# Patient Record
Sex: MALE | Race: BLACK OR AFRICAN AMERICAN | NOT HISPANIC OR LATINO | Employment: OTHER | URBAN - METROPOLITAN AREA
[De-identification: names, ages, dates, MRNs, and addresses within clinical notes are randomized per-mention and may not be internally consistent; named-entity substitution may affect disease eponyms.]

---

## 2019-07-17 ENCOUNTER — HISTORICAL (OUTPATIENT)
Dept: ADMINISTRATIVE | Facility: HOSPITAL | Age: 39
End: 2019-07-17

## 2019-07-17 LAB
BUN SERPL-MCNC: 70 MG/DL (ref 7–18)
CALCIUM SERPL-MCNC: 8.7 MG/DL (ref 8.5–10.1)
CHLORIDE SERPL-SCNC: 111 MMOL/L (ref 98–107)
CO2 SERPL-SCNC: 23 MMOL/L (ref 21–32)
CREAT SERPL-MCNC: 6.56 MG/DL (ref 0.7–1.3)
CREAT/UREA NIT SERPL: 10.7
GLUCOSE SERPL-MCNC: 215 MG/DL (ref 74–106)
POTASSIUM SERPL-SCNC: 5.4 MMOL/L (ref 3.5–5.1)
SODIUM SERPL-SCNC: 141 MMOL/L (ref 136–145)

## 2020-01-16 DIAGNOSIS — Z76.82 ORGAN TRANSPLANT CANDIDATE: Primary | ICD-10-CM

## 2020-01-20 ENCOUNTER — TELEPHONE (OUTPATIENT)
Dept: TRANSPLANT | Facility: CLINIC | Age: 40
End: 2020-01-20

## 2020-06-01 ENCOUNTER — TELEPHONE (OUTPATIENT)
Dept: TRANSPLANT | Facility: CLINIC | Age: 40
End: 2020-06-01

## 2020-06-02 ENCOUNTER — DOCUMENTATION ONLY (OUTPATIENT)
Dept: TRANSPLANT | Facility: CLINIC | Age: 40
End: 2020-06-02

## 2020-06-16 NOTE — PROGRESS NOTES
Spoke to patient to complete history for upcoming appointment. Patient stated that his sister will come with him to his appointment patient also aware that he have to bring a small breakfast to eat after blood is drawn. Patient do not need a

## 2020-06-17 ENCOUNTER — OFFICE VISIT (OUTPATIENT)
Dept: TRANSPLANT | Facility: CLINIC | Age: 40
End: 2020-06-17
Payer: MEDICARE

## 2020-06-17 ENCOUNTER — HOSPITAL ENCOUNTER (OUTPATIENT)
Dept: RADIOLOGY | Facility: HOSPITAL | Age: 40
Discharge: HOME | End: 2020-06-17
Attending: NURSE PRACTITIONER
Payer: MEDICARE

## 2020-06-17 ENCOUNTER — TELEPHONE (OUTPATIENT)
Dept: TRANSPLANT | Facility: CLINIC | Age: 40
End: 2020-06-17

## 2020-06-17 VITALS
OXYGEN SATURATION: 100 % | WEIGHT: 186.31 LBS | SYSTOLIC BLOOD PRESSURE: 103 MMHG | RESPIRATION RATE: 16 BRPM | HEIGHT: 69 IN | TEMPERATURE: 98 F | DIASTOLIC BLOOD PRESSURE: 64 MMHG | BODY MASS INDEX: 27.6 KG/M2 | HEART RATE: 76 BPM

## 2020-06-17 DIAGNOSIS — Z76.82 ORGAN TRANSPLANT CANDIDATE: ICD-10-CM

## 2020-06-17 DIAGNOSIS — N18.5 CKD (CHRONIC KIDNEY DISEASE) STAGE 5, GFR LESS THAN 15 ML/MIN: ICD-10-CM

## 2020-06-17 DIAGNOSIS — H54.3 BLINDNESS OF BOTH EYES DUE TO DIABETES MELLITUS: ICD-10-CM

## 2020-06-17 DIAGNOSIS — Z01.818 PRE-TRANSPLANT EVALUATION FOR KIDNEY TRANSPLANT: Primary | ICD-10-CM

## 2020-06-17 DIAGNOSIS — Z76.82 KIDNEY TRANSPLANT CANDIDATE: Primary | ICD-10-CM

## 2020-06-17 DIAGNOSIS — E10.3499: ICD-10-CM

## 2020-06-17 DIAGNOSIS — I10 HYPERTENSION, UNSPECIFIED TYPE: ICD-10-CM

## 2020-06-17 DIAGNOSIS — E11.39 BLINDNESS OF BOTH EYES DUE TO DIABETES MELLITUS: ICD-10-CM

## 2020-06-17 DIAGNOSIS — N18.6 ESRD ON DIALYSIS: ICD-10-CM

## 2020-06-17 DIAGNOSIS — Z99.2 ESRD ON DIALYSIS: ICD-10-CM

## 2020-06-17 PROCEDURE — 71046 X-RAY EXAM CHEST 2 VIEWS: CPT | Mod: 26,TXP,, | Performed by: RADIOLOGY

## 2020-06-17 PROCEDURE — 72170 X-RAY EXAM OF PELVIS: CPT | Mod: 26,TXP,, | Performed by: RADIOLOGY

## 2020-06-17 PROCEDURE — 72170 X-RAY EXAM OF PELVIS: CPT | Mod: TC,TXP

## 2020-06-17 PROCEDURE — 99205 PR OFFICE/OUTPT VISIT, NEW, LEVL V, 60-74 MIN: ICD-10-PCS | Mod: S$PBB,TXP,, | Performed by: NURSE PRACTITIONER

## 2020-06-17 PROCEDURE — 76700 US EXAM ABDOM COMPLETE: CPT | Mod: 26,TXP,, | Performed by: RADIOLOGY

## 2020-06-17 PROCEDURE — 76700 US EXAM ABDOM COMPLETE: CPT | Mod: TC,TXP

## 2020-06-17 PROCEDURE — 93978 VASCULAR STUDY: CPT | Mod: 26,TXP,, | Performed by: RADIOLOGY

## 2020-06-17 PROCEDURE — 71046 X-RAY EXAM CHEST 2 VIEWS: CPT | Mod: TC,TXP

## 2020-06-17 PROCEDURE — 99244 OFF/OP CNSLTJ NEW/EST MOD 40: CPT | Mod: S$PBB,TXP,, | Performed by: SURGERY

## 2020-06-17 PROCEDURE — 99205 OFFICE O/P NEW HI 60 MIN: CPT | Mod: S$PBB,TXP,, | Performed by: NURSE PRACTITIONER

## 2020-06-17 PROCEDURE — 76700 US ABDOMEN COMPLETE: ICD-10-PCS | Mod: 26,TXP,, | Performed by: RADIOLOGY

## 2020-06-17 PROCEDURE — 72170 XR PELVIS ROUTINE AP: ICD-10-PCS | Mod: 26,TXP,, | Performed by: RADIOLOGY

## 2020-06-17 PROCEDURE — 99244 PR OFFICE CONSULTATION,LEVEL IV: ICD-10-PCS | Mod: S$PBB,TXP,, | Performed by: SURGERY

## 2020-06-17 PROCEDURE — 93978 VASCULAR STUDY: CPT | Mod: TC,TXP

## 2020-06-17 PROCEDURE — 71046 XR CHEST PA AND LATERAL: ICD-10-PCS | Mod: 26,TXP,, | Performed by: RADIOLOGY

## 2020-06-17 PROCEDURE — 99999 PR PBB SHADOW E&M-EST. PATIENT-LVL V: CPT | Mod: PBBFAC,TXP,, | Performed by: NURSE PRACTITIONER

## 2020-06-17 PROCEDURE — 99215 OFFICE O/P EST HI 40 MIN: CPT | Mod: PBBFAC,25,TXP | Performed by: NURSE PRACTITIONER

## 2020-06-17 PROCEDURE — 99999 PR PBB SHADOW E&M-EST. PATIENT-LVL V: ICD-10-PCS | Mod: PBBFAC,TXP,, | Performed by: NURSE PRACTITIONER

## 2020-06-17 PROCEDURE — 93978 US DOPP ILIACS BILATERAL: ICD-10-PCS | Mod: 26,TXP,, | Performed by: RADIOLOGY

## 2020-06-17 RX ORDER — FENOFIBRATE 160 MG/1
160 TABLET ORAL DAILY
Status: ON HOLD | COMMUNITY
End: 2020-11-06 | Stop reason: HOSPADM

## 2020-06-17 RX ORDER — FUROSEMIDE 80 MG/1
80 TABLET ORAL DAILY
Status: ON HOLD | COMMUNITY
End: 2020-11-06 | Stop reason: HOSPADM

## 2020-06-17 RX ORDER — ATENOLOL 50 MG/1
50 TABLET ORAL DAILY
Status: ON HOLD | COMMUNITY
End: 2020-11-06 | Stop reason: HOSPADM

## 2020-06-17 RX ORDER — CALCIUM ACETATE 667 MG/1
1334 CAPSULE ORAL
Status: ON HOLD | COMMUNITY
End: 2020-11-06 | Stop reason: HOSPADM

## 2020-06-17 RX ORDER — HALOPERIDOL 1 MG/1
1 TABLET ORAL 2 TIMES DAILY
Status: ON HOLD | COMMUNITY
End: 2020-11-06 | Stop reason: HOSPADM

## 2020-06-17 RX ORDER — PRAVASTATIN SODIUM 40 MG/1
40 TABLET ORAL DAILY
COMMUNITY
End: 2020-11-20 | Stop reason: SDUPTHER

## 2020-06-17 RX ORDER — INSULIN GLARGINE 100 [IU]/ML
15 INJECTION, SOLUTION SUBCUTANEOUS NIGHTLY
Status: ON HOLD | COMMUNITY
End: 2020-11-06 | Stop reason: HOSPADM

## 2020-06-17 RX ORDER — BUSPIRONE HYDROCHLORIDE 5 MG/1
5 TABLET ORAL 2 TIMES DAILY
Status: ON HOLD | COMMUNITY
End: 2020-11-06 | Stop reason: HOSPADM

## 2020-06-17 RX ORDER — HYDRALAZINE HYDROCHLORIDE 50 MG/1
50 TABLET, FILM COATED ORAL 3 TIMES DAILY
Status: ON HOLD | COMMUNITY
End: 2020-11-09 | Stop reason: HOSPADM

## 2020-06-17 NOTE — LETTER
June 18, 2020        ARUNA Lockett From Jr.  [address]  [place] [address]  Phone: [phone]  Fax: [phone]             [facility]  [address]  [facility] [address]  Phone: [phone]   Patient: Hernandez Rosales   MR Number: [id number]   YOB: 1980   Date of Visit: 6/17/2020       Dear Dr. ARUNA Lockett From .    Thank you for referring Hernandez Rosales to me for evaluation. Attached you will find relevant portions of my assessment and plan of care.    If you have questions, please do not hesitate to call me. I look forward to following Hernandez Rosales along with you.    Sincerely,    Radha Mancia, NP    Enclosure    If you would like to receive this communication electronically, please contact [email or web address] or [phone] to request [employer] access.    [employer] is a tool which provides read-only access to select patient information with whom you have a relationship. Its easy to use and provides real time access to review your patients record including encounter summaries, notes, results, and demographic information.    If you feel you have received this communication in error or would no longer like to receive these types of communications, please e-mail [email or web address]

## 2020-06-17 NOTE — PROGRESS NOTES
PHARM.D. PRE-TRANSPLANT NOTE:    This patient's medication therapy was evaluated as part of his pre-transplant evaluation.      The following general pharmacologic concerns were noted: blind, therefore caregiver involvement will be vital in post-transplant education; would resume haldol, buspar and neurontin in the jayesh-op period    The following concerns for post-operative pain management were noted: none    The following pharmacologic concerns related to HCV therapy were noted: nonr      This patient's medication profile was reviewed for considerations for DAA Hepatitis C therapy:    [x]  No current inducers of CYP 3A4 or PGP  [x]  No amiodarone on this patient's EMR profile in the last 24 months  [x]  No past or current atrial fibrillation on this patient's EMR profile       Current Outpatient Medications   Medication Sig Dispense Refill    amlodipine (NORVASC) 10 MG tablet Take 10 mg by mouth every evening.       atenoloL (TENORMIN) 50 MG tablet Take 50 mg by mouth once daily.      busPIRone (BUSPAR) 5 MG Tab Take 5 mg by mouth 2 (two) times daily.      calcium acetate,phosphat bind, (PHOSLO) 667 mg capsule Take 1,334 mg by mouth 3 (three) times daily with meals.      fenofibrate 160 MG Tab Take 160 mg by mouth once daily.      ferrous sulfate 325 (65 FE) MG EC tablet Take 325 mg by mouth once daily.      furosemide (LASIX) 80 MG tablet Take 80 mg by mouth once daily.      gabapentin (NEURONTIN) 300 MG capsule Take 300 mg by mouth 2 (two) times daily.       GLIPIZIDE ORAL Take 2.5 mg by mouth 2 (two) times daily with meals.      haloperidoL (HALDOL) 1 MG tablet Take 1 mg by mouth 2 (two) times daily.      hydrALAZINE (APRESOLINE) 50 MG tablet Take 50 mg by mouth 3 (three) times daily.      insulin (LANTUS SOLOSTAR U-100 INSULIN) glargine 100 units/mL (3mL) SubQ pen Inject 15 Units into the skin every evening.      pravastatin (PRAVACHOL) 40 MG tablet Take 40 mg by mouth once daily.       No current  facility-administered medications for this visit.          Currently Mr Rasheed's caregiver is responsible for preparing / administering this patient's medications on a daily basis.  I am available for consultation and can be contacted, as needed by the other members of the Kidney Transplant team.

## 2020-06-17 NOTE — PROGRESS NOTES
Transplant Nephrology  Kidney/Pancreas Transplant Recipient Evaluation    Referring Physician: ARUNA Krishnan Jr.  Current Nephrologist: ARUNA Krishnan Jr.    Subjective:   CC:  Initial evaluation of kidney/Pancreas transplant candidacy.    HPI:  Mr. Rosales is a 40 y.o. year old Black or  male who has presented to be evaluated as a potential kidney transplant recipient.  He has ESRD secondary to diabetic nephropathy.  Patient is currently on hemodialysis started on 3/16/2020. Patient is dialyzing on TTS schedule.  Patient reports that he is tolerating dialysis well.. He has a LUE AV graft for dialysis access.     Previous Transplant: no    Past Medical and Surgical History: Mr. Rosales  has a past medical history of Anxiety, Cataract, Diabetes mellitus, Diabetic retinopathy, Disorder of kidney and ureter, Glaucoma, High cholesterol, Hypertension, and Retinal detachment.  He has a past surgical history that includes Leg amputation (Left); Toe amputation (Right); Retinal detachment surgery; and Eye surgery.    Past Social and Family History: Mr. Rosales reports that he has quit smoking. He has a 2.50 pack-year smoking history. He has never used smokeless tobacco. He reports current alcohol use. He reports that he does not use drugs. His family history includes Coronary artery disease in his mother; Diabetes in his brother and mother; Glaucoma in his mother; No Known Problems in his father and sister.    Past Medical History:   Diagnosis Date    Anxiety     Cataract     Diabetes mellitus     Diabetic retinopathy     Disorder of kidney and ureter     Glaucoma     High cholesterol     Hypertension     Retinal detachment      ESRD:  Renal function slowly declining for last 2 years but unsure exactly when renal function began to decline  Dialysis was started on March 16 2020  Site LUE graft  Reports needing fluids at the end of dialysis session for his blood pressure but not sure who low it  "goes  Unsure of dry weight  Urinates 2-3x a day    DM:  DX at 14yo  HA1C 7.2  Insulin-- lantus at night no longer taking short acting, reports diabetic coma in April 2020 and novolog was removed  On glipizide  Gabapentin, right foot neuropathy and phantom limb pain on left  Bilateral retinal detachment 6 years ago    HTN:  reports having BP controled but unsure of reading numbers  On hydralazine and Norvasc    Functional Status:  Walking stick for blindiness  L BKA 2013 with prosthetic   Denies much activity at all due to lack of independence with blindness    L BKA 2013 with prosthetic  Right great toe 2016 and 5th digit  2008  Reports all amputations are secondary to infections  Does not recall having being told that he has vascular disease or stents; denies needing to be on AC for his legs    PTH      Review of Systems   Constitutional: Positive for fatigue. Negative for appetite change, chills and fever.   HENT: Negative for trouble swallowing.    Eyes: Positive for visual disturbance (blind bilaterally).   Respiratory: Negative for cough, chest tightness, shortness of breath and wheezing.    Cardiovascular: Positive for palpitations (occasional ). Negative for chest pain and leg swelling.   Gastrointestinal: Positive for diarrhea (reports weekly). Negative for abdominal pain, constipation and nausea.   Genitourinary: Negative for difficulty urinating (urinates 2-3 times a day), frequency and urgency.   Musculoskeletal: Positive for back pain. Negative for arthralgias and myalgias.   Skin: Negative for rash.   Neurological: Negative for dizziness, weakness, light-headedness and headaches.   Psychiatric/Behavioral: Positive for sleep disturbance (depression). Negative for suicidal ideas.       Objective:   Blood pressure 103/64, pulse 76, temperature 97.5 °F (36.4 °C), temperature source Oral, resp. rate 16, height 5' 8.5" (1.74 m), weight 84.5 kg (186 lb 4.6 oz), SpO2 100 %.body mass index is 27.91 " kg/m².    Physical Exam  Constitutional:       General: He is not in acute distress.     Appearance: He is well-developed. He is not diaphoretic.   Neck:      Musculoskeletal: Normal range of motion and neck supple.   Cardiovascular:      Rate and Rhythm: Normal rate and regular rhythm.      Heart sounds: Normal heart sounds. No murmur. No friction rub. No gallop.    Pulmonary:      Effort: Pulmonary effort is normal. No respiratory distress.      Breath sounds: Normal breath sounds. No wheezing or rales.   Abdominal:      General: Bowel sounds are normal. There is no distension.      Palpations: Abdomen is soft.      Tenderness: There is no abdominal tenderness.   Genitourinary:     Comments: LUE graft  Musculoskeletal: Normal range of motion.         General: No swelling or tenderness.      Right lower leg: No edema.      Left lower leg: No edema.      Comments: L BKA--prosthetic present, amputation to right great toe and 5th digit   Lymphadenopathy:      Cervical: No cervical adenopathy.   Skin:     General: Skin is warm and dry.      Findings: No rash.      Nails: There is no clubbing.               Comments: Multiple tattoos to BUE   Neurological:      Mental Status: He is alert and oriented to person, place, and time.   Psychiatric:         Behavior: Behavior normal.         Labs:  Lab Results   Component Value Date    WBC 6.75 06/17/2020    HGB 11.3 (L) 06/17/2020    HCT 38.3 (L) 06/17/2020     06/17/2020    K 3.9 06/17/2020     06/17/2020    CO2 32 (H) 06/17/2020    BUN 35 (H) 06/17/2020    CREATININE 7.3 (H) 06/17/2020    EGFRNONAA 8.6 (A) 06/17/2020    CALCIUM 8.2 (L) 06/17/2020    PHOS 5.3 (H) 06/17/2020    ALBUMIN 3.6 06/17/2020    AST 27 06/17/2020    ALT 40 06/17/2020    .0 (H) 06/17/2020       No results found for: PREALBUMIN, BILIRUBINUA, GGT, AMYLASE, LIPASE, PROTEINUA, NITRITE, RBCUA, WBCUA    No results found for: HLAABCTYPE    Labs were reviewed with the patient.    Assessment:      1. Pre-transplant evaluation for kidney transplant    2. ESRD on dialysis    3. CKD (chronic kidney disease) stage 5, GFR less than 15 ml/min    4. Type 1 diabetes mellitus with severe nonproliferative retinopathy, macular edema presence unspecified, unspecified laterality    5. Hypertension, unspecified type    6. Blindness of both eyes due to diabetes mellitus        Plan:     Transplant Candidacy:   Based on available information, Mr. Rosales is a suitable kidney and pancreas transplant candidate.   Meets center eligibility for accepting HCV+ donor offer - yes.  Patient educated on HCV+ donors. Hernandez is willing to accept HCV+ donor offer - yes   Patient is a candidate for KDPI > 85 kidney donor offer - yes.  Final determination of transplant candidacy will be made once workup is complete and reviewed by the selection committee.    Radha Mancai NP       Plan:  · Psychiatry eval for depression and anxiety; on multiple medications  · , send results to nephrologist for management  · Cardiology clearance needed; already had echo and lexiscan in red chart    · Obtain old records on left BKA        UNOS Patient Status  Functional Status: 60% - Requires occasional assistance but is able to care for needs  Physical Capacity: No Limitations

## 2020-06-17 NOTE — PROGRESS NOTES
Transplant Surgery  Kidney/Pancreas Transplant Recipient Evaluation    Referring Physician: ARUNA Krishnan Jr.  Current Nephrologist: ARUNA Krishnan .    Subjective:     Reason for Visit: evaluate transplant candidacy    History of Present Illness: Hernandez Rosales is a 40 y.o. year old male undergoing transplant evaluation.    Dialysis History: Hernandez is on hemodialysis.      Transplant History: N/A    Etiology of Renal Disease: Diabetes Mellitus - Type I (based on medical records from referral).    Review of Systems   Constitutional: Negative for activity change, appetite change, chills and fever.   Respiratory: Negative for cough and shortness of breath.    Cardiovascular: Negative for chest pain and leg swelling.   Gastrointestinal: Negative for abdominal distention, constipation, diarrhea, nausea and vomiting.   Genitourinary: Negative for difficulty urinating and dysuria.   Skin: Negative for color change and rash.   Neurological: Negative for dizziness and light-headedness.   All other systems reviewed and are negative.      Objective:     Physical Exam:  Constitutional:   Vitals reviewed: yes   Well-nourished and well-groomed: yes  Eyes:   Sclerae icteric: no   Extraocular movements intact: yes  GI:    Bowel sounds normal: yes   Tenderness: no    If yes, quadrant/location: not applicable   Palpable masses: no    If yes, quadrant/location: not applicable   Hepatosplenomegaly: no   Ascites: no   Hernia: no    If yes, type/location: not applicable   Surgical scars: no    If yes, type/location: not applicable  Resp:   Effort normal: yes   Breath sounds normal: yes    CV:   Regular rate and rhythm: yes   Heart sounds normal: yes   Femoral pulses normal: yes   Extremities edematous: no  Skin:   Rashes or lesions present: no    If yes, describe:not applicable   Jaundice:: no    Musculoskeletal:   Gait normal: yes   Strength normal: yes  Psych:   Oriented to person, place, and time: yes   Affect and mood normal:  yes    Additional comments: not applicable    Counseling: We provided Hernandez Rosales with a group education session today.  We discussed kidney transplantation at length with him, including risks, potential complications, and alternatives in the management of his renal failure.  The discussion included complications related to anesthesia, bleeding, infection, primary nonfunction, and ATN.  I discussed the typical postoperative course, length of hospitalization, the need for long-term immunosuppression, and the need for long-term routine follow-up.  I discussed living-donor and -donor transplantation and the relative advantages and disadvantages of each.  I also discussed average waiting times for both living donation and  donation.  I discussed national and center-specific survival rates.  I also mentioned the potential benefit of multicenter listing to candidates listed with centers within more than one organ procurement organization.  All questions were answered.    Final determination of transplant candidacy will be made once evaluation is complete and reviewed by the Kidney & Kidney/Pancreas Selection Committee.         Transplant Surgery - Candidacy   Assessment/Plan:   Hernandez Rosales has end stage renal disease (ESRD) on dialysis. I see no surgical contraindication to placing a kidney transplant. Based on available information, Hernandez Rosales is a suitable kidney/pancreas transplant candidate.     Dheeraj Juarez MD

## 2020-06-17 NOTE — TELEPHONE ENCOUNTER
Reviewed pt transplant labs.  Notified dialysis unit dietitian of the following abnormal labs via fax and requested their most recent nutrition note on this pt.  Once this note is received it will be scanned into pt's chart.    HbA1c 7.2  Phos 5.3

## 2020-06-17 NOTE — PROGRESS NOTES
INITIAL PATIENT EDUCATION NOTE    Mr. Hernandez Rosales was seen in pre-kidney transplant clinic for evaluation for kidney, kidney/pancreas or pancreas only transplant.  The patient attended a video conference session that discussed/reviewed the following aspects of transplantation: evaluation and selection committee process, [facility] waitlist management/multiple listings, types of organs offered (KDPI < 85%, KDPI > 85%, PHS increased risk, DCD, HCV+, HIV+ for HIV+ recipients and enbloc/dual), financial aspects, surgical procedures, dietary instruction pre- and post-transplant, health maintenance pre- and post-transplant, post-transplant hospitalization and outpatient follow-up, potential to participate in a research protocol, and medication management and side effects.  A question and answer session was provided after the presentation.    The patient was seen by all members of the multi-disciplinary team to include: Nephrologist/PA, Surgeon, , Transplant Coordinator, , Pharmacist and Dietician (if applicable).    The patient reviewed and signed all consents for evaluation which were witnessed and sent to scanning into the [facility] chart.    The patient was given an education book and plan for further evaluation based on his individual assessment.      The patient was encouraged to call with any questions or concerns.

## 2020-06-17 NOTE — PROGRESS NOTES
Transplant Recipient Adult Psychosocial Assessment    Hernandez Rosales  [address] [facility] [address]  Telephone Information:   Mobile [phone]   Home  [phone] (home)  Work  There is no work phone number on file.  E-mail  [email or web address]    Sex: male  YOB: 1980  Age: 40 y.o.    Encounter Date: 2020  [place] Citizen: yes  Primary Language: English   Needed: no   PATIENT IS BILATERALLY BLIND PATIENT IS BILATERALLY BLIND. IT CAN TAKE TIME FOR HIM TO ANSWER THE PHONE. PT AND FAMILY WANT MEDICAL PROVIDERS TO CALL HIS AUNT Malena FIRST AS SHE CAN HELP WITH ANY INFORMATION OR CAN FIND PATIENT EASILY IF HE NEEDS TO CALL PROVIDER BACK.    Emergency Contact:  Malena Rosales, 70 yo aunt, Vanessa CHERY, lives down the street from patient, does drive/own car, retired. [phone]  Dallas Oh, 44 yo sister, Onel GALVAN, does drive/own car, works full time as  for [employer]    Family/Social Support:   Number of dependents/: patient denies  Marital history:  since   Other family dynamics: Pt reports is bilaterally blind due to diabetes and is disabled since . Pt reports mother is . Pt's father is estranged and not available for any support. Pt reports lives alone. Pt reports aunt Malena and cousin Jason live down the street and are highly involved in patient's care, including all transportation for dialysis. Pt's sister Dallas lives away but patient reports is highly involved in patient's life; Dallas came for organ transplant evaluation today. Pt reports aunt Malena is primary contact for all appointments. Pt reports sister Dallas will be primary transplant caregiver with aunt Malena and cousin Jason will be back up transplant caregivers.    Household Composition:  Pt is totally blind and lives alone. Pt reports aunt and cousin live very nearby.    Do you and your caregivers have access to reliable transportation? yes pt reports family  provides all transportation, including for dialysis.  PRIMARY CAREGIVER: Dallas Oh, sister, will be primary caregiver, phone number [phone].     provided in-depth information to patient and caregiver regarding pre- and post-transplant caregiver role.   strongly encourages patient and caregiver to have concrete plan regarding post-transplant care giving, including back-up caregiver(s) to ensure care giving needs are met as needed.    Patient and Caregiver states understanding all aspects of caregiver role/commitment and is able/willing/committed to being caregiver to the fullest extent necessary.    Patient and Caregiver verbalizes understanding of the education provided today and caregiver responsibilities.         remains available. Patient and Caregiver agree to contact  in a timely manner if concerns arise.      Able to take time off work without financial concerns: yes.     Additional Significant Others who will Assist with Transplant:  Malena Rosales, 72 yo aunt, [facility], lives down the street from patient, does drive/own car, retired. [phone]  Jason HerculesNulty, 49 yo cousin, [facility], lives down the street from patient, does drive/own car, works as a beautician. [phone]    Living Will: no  Healthcare Power of : no  Advance Directives on file: <<no information> per medical record.  Verbally reviewed LW/HCPA information.   provided patient with copy of LW/HCPA documents and provided education on completion of forms.    Living Donors: Education and resource information given to patient.    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    Spouse/Significant Other Employment: pt reports is not     Disabled: disabled in 2013 due to diabetes and blindness    Monthly Income:  $905 disability check  Able to afford all costs now and if transplanted, including medications: yes  Patient and Caregiver verbalizes understanding of personal responsibilities related to transplant costs and the importance of having a financial plan to ensure that patients transplant costs are fully covered.       provided fundraising information/education. Patient and Caregiververbalizes understanding.   remains available.    Insurance:   Payor/Plan Subscr  Sex Relation Sub. Ins. ID Effective Group Num   1. [place] - [place]* GIL HANDLEY 1980 Male  [id number] 10/1/15                                    [address]   2. [place] GIL HAND[place] 1980 Male  [id number]* [id number]                                    [address] [address]     Primary Insurance (for UNOS reporting): Public Insurance - Medicare FFS (Fee For Service)  Secondary Insurance (for UNOS reporting): Public Insurance - Medicaid  Patient and Caregiver verbalizes clear understanding that patient may experience difficulty obtaining and/or be denied insurance coverage post-surgery. This includes and is not limited to disability insurance, life insurance, health insurance, burial insurance, long term care insurance, and other insurances.      Patient and Caregiver also reports understanding that future health concerns related to or unrelated to transplantation may not be covered by patient's insurance.  Resources and information provided and reviewed.     Patient and Caregiver provides verbal permission to release any necessary information to outside resources for patient  care and discharge planning.  Resources and information provided are reviewed.     [facility], [phone]. Hemodialysis: Tues, Thurs and Sat for 4 hours.     Dialysis Adherence: Patient and Caregiver report having high dialysis compliance over all. Pt reports has missed treatments in the past due to vomit/Gi illness.  Dialysis compliance update requested.    Infusion Service: patient utilizing? no  Home Health: patient utilizing?  nurse for vital signs -- thinks it'll be d/c soon  DME: yes cane for blindness; left leg prosthesis.   Pulmonary/Cardiac Rehab: pt denies   ADLS:  Relies on family for shopping, medication management, reading, writing and transportation. Pt reports utilizes a blind cane for ambulation.  Adherence:   Pt reports is highly compliant with medical appointments and instructions. Pt reports has been confused and taken too much insulin in the past which resulted in diabetic coma. Pt and family working on obtaining a special equipment that utilizes a phone pham to help with diabetes and blood sugar. The patient has a prescription for it but has not been able to find a pharmacy who can fill it. Transplant  asked patient to enlist help from dialysis social worker and also provided name of [facility] and [facility] transplant [facility] in case those specialty pharmacies can better assist. Adherence education and counseling provided.     Per History Section:  Past Medical History:   Diagnosis Date    Anxiety     Cataract     Diabetes mellitus     Diabetic retinopathy     Disorder of kidney and ureter     Encounter for blood transfusion     Glaucoma     High cholesterol     Hypertension     Retinal detachment      Social History     Tobacco Use    Smoking status: Former Smoker     Packs/day: 0.25     Years: 10.00     Pack years: 2.50    Smokeless tobacco: Never Used   Substance Use Topics    Alcohol use: Yes     Comment: occasional     Social History     Substance and  Sexual Activity   Drug Use No     Social History     Substance and Sexual Activity   Sexual Activity Yes    Partners: Female    Birth control/protection: Condom       Per Today's Psychosocial:  Please review above table for patient's reported substance use.    Patient and Caregiver states clear understanding of the potential impact of substance use as it relates to transplant candidacy and is aware of possible random substance screening.  Substance abstinence/cessation counseling, education and resources provided and reviewed.     Arrests/DWI/Treatment/Rehab: patient denies    Psychiatric History:    Mental Health: anxiety with panic attacks. Pt reports is disabled since about 2013 due to diabetes and blindness. Pt reports blindness was very gradual. Pt reports lives alone and relies heavily on family for support with transportation, reading, writing, shopping and medication management. Pt denies being totally socially isolated and reports does spend recreational time with family, especially with aunt Malena and cousin Jason, who live nearby. Pt's sister Dallas lives away and was with patient for organ transplant evaluation; pt reports sister is highly involved with his life and has been a valuable caregiver for other medical problems and hospitalizations. Patient reports has mistaken, in the past, if he had already taken his insulin and over medicated himself which resulted in falling into diabetic coma.     Patient cassandra is not engaged in on going assessment or treatment by a psychiatrist. Pt reports, a little less than a year ago, he experienced a panic attack the day after having a diabetic coma and, at that time, sister Dallas took him to the ED for assessment. Pt and sister report patient was discharged home with Buspar and Haldol prescriptions and he has been taking the medicines since that time. Transplant  explained, usually with those medicines, it is better for a psychiatrist to be  involved with continued use. [place] was encouraged to speak with his dialysis unit  for mental health provider referral in his community; pt agreed to same.     Transplant  recommends patient should be cleared by [facility] [place] for [place] transplant because mental health clearance from a [place] [facility] mental health unit may not be appropriate for surgery clearance due to the probable inconsistencies of mental health providers in that system.    Please consult [facility] psychiatry for mental health clearance for organ transplant due to patient reporting anxiety with panic attacks within past year.   Above provided to transplant nurse coordinator and transplant medical providers.   Psychiatrist/Counselor: pt denies  Medications:  Buspar 5 mg and Haldol 1 mg. Prescriptions written by ED doctor  Suicide/Homicide Issues: pt denies   Safety at home: pt reports living in safe home environment with no abuse. Pt is blind and requires much assistance from supportive nearby family for ADL.    Knowledge: Patient and Caregiver states having clear understanding and realistic expectations regarding the potential risks and potential benefits of organ transplantation and organ donation and agrees to discuss with health care team members and support system members, as well as to utilize available resources and express questions and/or concerns in order to further facilitate the pt informed decision-making.  Resources and information provided and reviewed.    Patient and Caregiver is aware of [facility]'s affiliation and/or partnership with agencies in home health care, LTAC, SNF, DME, and other hospitals and clinics.    Understanding: Patient and Caregiver reports having a clear understanding of the many lifetime commitments involved with being a transplant recipient, including costs, compliance, medications, lab work, procedures, appointments, concrete and financial planning,  preparedness, timely and appropriate communication of concerns, abstinence (ETOH, tobacco, illicit non-prescribed drugs), adherence to all health care team recommendations, support system and caregiver involvement, appropriate and timely resource utilization and follow-through, mental health counseling as needed/recommended, and patient and caregiver responsibilities.  Social Service Handbook, resources and detailed educational information provided and reviewed.  Educational information provided.    Patient and Caregiver also reports current and expected compliance with health care regime and states having a clear understanding of the importance of compliance.      Patient and Caregiver reports a clear understanding that risks and benefits may be involved with organ transplantation and with organ donation.       Patient and Caregiver also reports clear understanding that psychosocial risk factors may affect patient, and include but are not limited to feelings of depression, generalized anxiety, anxiety regarding dependence on others, post traumatic stress disorder, feelings of guilt and other emotional and/or mental concerns, and/or exacerbation of existing mental health concerns.  Detailed resources provided and discussed.      Patient and Caregiver agrees to access appropriate resources in a timely manner as needed and/or as recommended, and to communicate concerns appropriately.  Patient and Caregiver also reports a clear understanding of treatment options available.     Patient and Caregiver received education in a group setting.   reviewed education, provided additional information, and answered questions.    Feelings or Concerns: Pt reports high motivation to pursue organ transplant    Coping: Identify Patient & Caregiver Strategies to [place]:   1. Currently & Pre-transplant - patient reports plan to walk/be active each day; pt reports records music as a hobby; excellent family support    2. At the  time of surgery - family support   3. During post-Transplant & Recovery Period - family support    Goals: Pt reports hope for successful kidney transplant so he may discontinue dialysis and have healthier life.  Patient referred to Vocational Rehabilitation.    Interview Behavior: Patient and Caregiver presents as alert and oriented x 4, pleasant, good eye contact, well groomed, recall good, concentration/judgement good, average intelligence, calm, communicative, cooperative and asking and answering questions appropriately.  Pt's sister Dallas with patient in session with patient's permission.         Transplant Social Work - Candidacy  Assessment/Plan:     Psychosocial Suitability: Patient presents as an unacceptable high risk candidate for kidney transplant at this time due to needing psychiatric clearance. Please consult psychiatry for organ transplant clearance. Once patient has been cleared by psychiatry patient will be an acceptable medium risk candidate for organ transplant. Pt reports having organ transplant caregiver/transportation plan, medical insurance plan, and plan to afford transplant costs all in place. Pt reports high dialysis compliance with appointments and instructions within last 3 months.    Recommendations/Additional Comments: Psychiatry consult needed due to reported mental health history and patient reporting not currently engaged in ongoing psychiatric assessment and treatment. Dialysis compliance update requested. Pt reports some caregivers work and may need employer paperwork completed for any time missed due to transplant. Pt reports lives away and will need transplant lodging; lodging was reviewed in detail.     Final determination of transplant candidacy will be made once work up is complete and reviewed by the selection committee.    Bev GRAYSON LCSW

## 2020-06-23 ENCOUNTER — SOCIAL WORK (OUTPATIENT)
Dept: TRANSPLANT | Facility: CLINIC | Age: 40
End: 2020-06-23

## 2020-06-23 NOTE — PROGRESS NOTES
"[facility], [phone]. Hemodialysis: Tues, Thurs and Sat for 4 hours.     6- completed dialysis compliance update   Within last 3 months:  Current Dry weight:  84.5   Most recent pre treatment weight:  83.6  6-  AMA:  "6-20-20  Machine clotted 40 mins remaining.  3-16-20  Machine clotted with 35 mins remaining"  No Shows:  6-9-20 gastrointestianl upset.  6-6-20  No show.   5-21-20 no show/no reply.  35 mins remaining  Last intact    6-16-20    Yes concerns with labs. Phosphorus = 7.3  6-2-20  No concerns with caregivers, transportation, or mental health concerns.    Bev Cameron MSW LCSW    "

## 2020-07-22 ENCOUNTER — TELEPHONE (OUTPATIENT)
Dept: TRANSPLANT | Facility: CLINIC | Age: 40
End: 2020-07-22

## 2020-07-23 ENCOUNTER — TELEPHONE (OUTPATIENT)
Dept: TRANSPLANT | Facility: CLINIC | Age: 40
End: 2020-07-23

## 2020-08-12 ENCOUNTER — TELEPHONE (OUTPATIENT)
Dept: TRANSPLANT | Facility: CLINIC | Age: 40
End: 2020-08-12

## 2020-09-10 ENCOUNTER — TELEPHONE (OUTPATIENT)
Dept: TRANSPLANT | Facility: CLINIC | Age: 40
End: 2020-09-10
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
## 2020-09-18 ENCOUNTER — COMMITTEE REVIEW (OUTPATIENT)
Dept: TRANSPLANT | Facility: CLINIC | Age: 40
End: 2020-09-18

## 2020-09-18 NOTE — LETTER
September 18, 2020    ARUNA Lockett From Jr.  [address]  [place] [address]  Phone: [phone]  Fax: [phone]             Dear Dr. ARUNA Lockett From .:    Patient: Hernandez Rosales   MR Number: [id number]   YOB: 1980     Your patient, Hernandez Rosales, was recently discussed at the [facility] Kidney/Pancreas Selection Committee meeting on 9/18/2020. I am happy to inform you that Hernandez has been approved for transplantation.  He has met selection criteria for a Kidney and pancreas transplant related to ESRD secondary to primary diagnosis of Diabetes Mellitus - Type I. Your patient will be placed on the cadaveric wait list pending final financial approval from insurance company.     We appreciate your confidence in allowing us to participate in your patients care.  If you have any questions or concerns, please do not hesitate to contact me.    Sincerely,      Lucretia Gregory MD  Medical Director, Kidney & Kidney/Pancreas Transplantation

## 2020-09-18 NOTE — COMMITTEE REVIEW
Native Organ Dx: Diabetes Mellitus - Type I      SELECTION COMMITTEE NOTE    Hernandez Rosales was presented at selection committee on 9/18/2020.  Patient met selection criteria for kidney/pancreas transplant related to ESRD due to   Diabetes Mellitus - Type I.  No absolute contraindications to transplant at this time.  Patient will be placed on the cadaveric wait list pending final financial approval from insurance company.  Patient will return to clinic for routine appointment in 6 month(s). Patient does not meet criteria for High KDPI kidney offer. Patient meets HCV+ kidney offer. Patient does not meet criteria for dual/enbloc.    I spoke with Hernandez, his sister and his aunt about committee decision. I also updated phone numbers as Hernandez does not currently have a phone. His aunt lives down the street from him and she is his primary contact with his sister being second. I explained we will get blood sample from dialysis unit on Tuesday and once that is received he will be listed. He verbalized understanding.      Note written by Saloni Bella RN    ===============================================    I was present at the meeting and attest to the decision of the committee.    Damien Valladares  09/24/2020

## 2020-10-02 ENCOUNTER — PATIENT MESSAGE (OUTPATIENT)
Dept: TRANSPLANT | Facility: CLINIC | Age: 40
End: 2020-10-02

## 2020-10-20 ENCOUNTER — PATIENT MESSAGE (OUTPATIENT)
Dept: TRANSPLANT | Facility: CLINIC | Age: 40
End: 2020-10-20

## 2020-10-21 DIAGNOSIS — Z76.82 KIDNEY TRANSPLANT CANDIDATE: Primary | ICD-10-CM

## 2020-10-23 ENCOUNTER — TELEPHONE (OUTPATIENT)
Dept: TRANSPLANT | Facility: CLINIC | Age: 40
End: 2020-10-23

## 2020-10-23 DIAGNOSIS — Z76.82 ORGAN TRANSPLANT CANDIDATE: Primary | ICD-10-CM

## 2020-10-23 NOTE — TELEPHONE ENCOUNTER
Listing complete, all serologies, ABO and dialysis start date verified with Gee and myself.      KIDNEY/PANCREAS WAIT LISTING NOTE    Date of Financial clearance to list (check approval of both organs): 2020    N/[facility]:     Organ: Kidney and Pancreas  Name:       Hernandez Rosales   : 1980          Gender:     male    MRN#: [id number]                                 State of Permanent Residence:  [address] [place] [address]  Ethnicity: /Black   Race:      Black or     CLINICAL INFORMATION   Candidate Medical Urgency Status: Active (1)  Number of Previous Kidney Transplants: 0  Number of Previous Pancreas Transplants: 0  Number of Previous Solid Organ Transplants: 0  Did you enter number of previous kidney or other solid organ transplants? yes  Is this Candidate a Prior Living Donor: no  (If yes, please generate letter to [employer] with patient's date of donation, recipient SSN, signed by Surgical Director after patient is listed in order to receive priority points).      ABO  ABO Blood Group:   O POS     ABO Confirmation: (THESE DATES MUST BE PRIOR TO THE LIST DATE AND SUPPORTED BY SEPARATE LAB REPORTS)    Internal Results    Lab Results   Component Value Date    GROUPTRH O POS 2020     No results found for: ABO    External Results    ABO Date 1:    ABO Date 2  Are either of these ABO results based on External Labs? yes  (If Yes, STOP and go to source document in Media Tab for verification).    VITALS  Height: 5'8    Date taken: 2020  Weight: 84.5 kg  Date taken: 2020  (Use height from Transplant clinic visits only).  Did you enter height/weight? Yes    HLA    Class I:  Lab Results   Component Value Date    YMKW6QB 1 2020    NZKO3NT 23 2020    BDIZ9DC 8 2020    AGZQ0JN 27 2020    VGRDF6KX 6 2020    EUUSI4UA 4 2020    JNHKN6WP 1 2020    IQZPJ9YW 2 2020       Class II:  Lab Results   Component Value Date  "   YEASSW13CV 13 06/17/2020    VSYYTQ86ZX XX 06/17/2020    QBJNXX342HK 52 06/17/2020    VDELZC6358 XX 06/17/2020    BOIOY9NE 7 06/17/2020    YYMTS1CM XX 06/17/2020       Tested for HLA Antibodies: Yes, no antibodies detected     If result is "Positive" antibodies are detected     If result is "Negative or questionable" no antibodies detected    Lab Results   Component Value Date    CIPRAS Negative 06/17/2020    CIIPRAS Negative 06/17/2020       DIALYSIS INFORMATION  Is patient Pre-Dialysis: No     Report GFR being used as the criteria for placement on the kidney list. If not, leave blank  GFR < or = 20 ml/min? n/a  If Yes, Specify value  ___   ml/min     Initial date GFR became 20 or less:   Is GFR obtained from an Outside lab Result? n/a  (If YES verify with source document scanned into media)    If patient on Dialysis:    Is candidate currently on dialysis for ESRD? Yes  If Yes,  Date Chronic Dialysis Started:  3/16/20   (verify with source document in Media Tab)   Dialysis Unit Name: [facility] [place] [address] [place] [address]    DIABETES INFORMATION  Primary Native Kidney Diagnosis: Diabetes Mellitus - Type I  Primary Native Pancreas Diagnosis: Diabetes Mellitus - Type I (Pancreas)   C-Peptide Value -   Lab Results   Component Value Date    CPEPTIDE 4.07 06/17/2020     Is candidate currently on Insulin: Yes. Date Insulin started - 1/1/1995, Total insulin dosage in units - 15/day and Insulin duration of use - 9,427 days    FOR NON-KIDNEY DEPARTMENT USE ONLY:  Additional Organs Registered? none    Maximum Acceptable Number of HLA Mismatches  ABDR:     6      (0-6)               AB:               (0-4)  ADR:   _____  (0-4)              BDR: _____ (0-4)  A:        _____  (0-2)              B:      _____ (0-2)          DR: ______ (0-2)    Will Recipient Accept?   Accept HBcAB Positive Organ:            Yes  Accept HBV YIN Positive Organ:        no  Accept HCV Antibody Positive Organ: yes   Accept HCV YIN " Positive Organ: yes    Dual Kidney and En Bloc Opt In : No  Dual  Local:   No  Dual Import:   No  En Bloc Local:   No  En Bloc Import: No     Accept KDPI > 85: Single: No     Local: No     Import: No  Accept KDPI > 85: Dual: No     Local: No     Import: No    Unacceptible Antigens  If yes, list     No results found for: KO2WKHR, CIABCLM, CIIAB, ABCMT  ### DO NOT LIST IF ANTIGEN VALUE WEAK ###    TCR Information  Citizenship - [place] Citizen  Highest education level - Attended College/Technical School  Functional status - 90% - able to carry on normal activity, minor symptoms of disease, 80% - normal activity with effort: some symptoms of disease , 70% - cares for self: unable to carry on normal activity or active work and 60% - requires occasional assistance but is able to care for needs   Working for income - no  Previous Pancreas [facility] - Unknown  Source of payment - Public Insurance - Medicare & Choice  Any previous malignancy - No  Total serum albumin:   Lab Results   Component Value Date    ALBUMIN 3.6 06/17/2020     Exhausted vascular access - no  Exhausted peritoneal access - no  HbA1C -   Lab Results   Component Value Date    HGBA1C 7.2 (H) 06/17/2020

## 2020-10-23 NOTE — LETTER
2020    Hernandez Rosales  [address] [facility] [address]    Dear Hernandez Rosales:  MRN: [id number]  Congratulations! As of 10/23/2020, your name has been placed on the  donor waiting list at the [facility].  Your candidacy for kidney with pancreas  transplant is based on the following criteria: Diabetes Type I.    If you have any friends or family members interested in donating a kidney to you, please have them call the donor coordinator.  If you are a pre-dialysis patient or if you dialyze at home monthly transplant blood kits will be sent directly to you.  If you dialyze at a dialysis center the kits will be sent to your dialysis unit.      While active on the list, you must keep us notified of any changes in your health status.  Should your medical condition change and transplantation is no longer a viable option, it may be necessary to deactivate your status or to remove you from the [facility] list.    Please notify your coordinator when there are changes to your telephone number, address, insurance coverage, or dialysis unit.  If you have any alternate phone numbers that you would like us to have, please let us know.  Your Listed Transplant Coordinator will be Emily Garvin RN.  Feel free to call your coordinator at [phone] or [phone] should you have any questions.    The [facility] for Medicaid [employer] and the [employer] Organ Sharing requires that we inform any patient placed on our waiting list of information that would impact their ability to receive a transplant.  [facility]Pan[facility] transplant program has a transplant surgeon and physician available 365 days a year, 24 hours a day, and 7 days a week to facilitate organ acceptance, procurement, and implantation, and to address urgent patient issues.  You will be notified in writing of any changes to our key personnel and of any changes to our staffing plan that would impact your  ability to receive a transplant.    Attached is a letter from the [employer] for Organ Sharing ([employer]).  It describes the services and information offered to patients by [employer] and the Organ Procurement and Transplant Network.  Again, we congratulate you on your success and look forward to working with you very closely in the future.  Sincerely,    Lucretia Gregory M.D.                                                Medical Director, Kidney and Kidney/Pancreas Transplant  tj/Enclosed  [facility]  [facility]  [phone]  [place] [address]     Cc:On license of [facility]  ARUNA Krishnan Jr., MD                  The Organ Procurement and Transplantation Network   Toll-free patient services line: Your resource for organ transplant information     If you have a question regarding your own medical care, you always should call your transplant hospital first. However, for general organ transplant-related information, you can call the Organ Procurement and Transplantation Network (OPTN) toll-free patient services line at [phone].     Anyone, including potential transplant candidates, candidates, recipients, family members, friends, living donors, and donor family members, can call this number to:     · Talk about organ donation, living donation, the transplant process, the donation process, and transplant policies.   · Get a free patient information kit with helpful booklets, waiting list and transplant information, and a list of all transplant hospitals.   · Ask questions about the OPTN website (https://optn.transplant.hrsa.gov/), the [employer] for Organ Sharings (UNOS) website (https://unos.org/), or the [employer]OS website for living donors and transplant recipients. (https://www.transplantliving.org/).   · Learn how the OPTN can help you.   · Talk about any concerns that you may have with a transplant hospital.     The nations transplant system, the OPTN, is  managed under federal contract by the [employer] Organ Sharing (UNOS), which is a non-profit charitable organization. The OPTN helps create and define organ sharing policies that make the best use of donated organs. This process continuously evaluating new advances and discoveries so policies can be adapted to best serve patients waiting for transplants. To do so, the OPTN works closely with transplant professionals, transplant patients, transplant candidates, donor families, living donors, and the public. All transplant programs and organ procurement organizations throughout the country are OPTN members and are obligated to follow the policies the OPTN creates for allocating organs.     The OPTN also is responsible for:   · Providing educational material for patients, the public, and professionals.   · Raising awareness of the need for donated organs and tissue.   · Coordinating organ procurement, matching, and placement.   · Collecting information about every organ transplant and donation that occurs in the [place].     Remember, you should contact your transplant hospital directly if you have questions or concerns about your own medical care including medical records, work-up progress, and test results.     We are not your transplant hospital, and our staff will not be able to answer questions about your case, so please keep your transplant hospitals phone number handy.   However, while you research your transplant needs and learn as much as you can about transplantation and donation, we welcome your call to our toll-free patient services line at [phone].

## 2020-10-26 DIAGNOSIS — Z76.82 ORGAN TRANSPLANT CANDIDATE: Primary | ICD-10-CM

## 2020-10-27 ENCOUNTER — TELEPHONE (OUTPATIENT)
Dept: TRANSPLANT | Facility: CLINIC | Age: 40
End: 2020-10-27

## 2020-11-02 DIAGNOSIS — Z76.82 AWAITING ORGAN TRANSPLANT STATUS: Primary | ICD-10-CM

## 2020-11-03 ENCOUNTER — ANESTHESIA EVENT (OUTPATIENT)
Dept: SURGERY | Facility: HOSPITAL | Age: 40
DRG: 008 | End: 2020-11-03
Payer: MEDICARE

## 2020-11-03 ENCOUNTER — ANESTHESIA (OUTPATIENT)
Dept: SURGERY | Facility: HOSPITAL | Age: 40
DRG: 008 | End: 2020-11-03
Payer: MEDICARE

## 2020-11-03 ENCOUNTER — HOSPITAL ENCOUNTER (INPATIENT)
Facility: HOSPITAL | Age: 40
LOS: 6 days | Discharge: HOME | DRG: 008 | End: 2020-11-09
Attending: TRANSPLANT SURGERY | Admitting: TRANSPLANT SURGERY
Payer: MEDICARE

## 2020-11-03 DIAGNOSIS — H54.3 BLINDNESS OF BOTH EYES DUE TO DIABETES MELLITUS: ICD-10-CM

## 2020-11-03 DIAGNOSIS — E10.21 DIABETIC NEPHROPATHY ASSOCIATED WITH TYPE 1 DIABETES MELLITUS: ICD-10-CM

## 2020-11-03 DIAGNOSIS — Z91.89 AT RISK FOR OPPORTUNISTIC INFECTIONS: ICD-10-CM

## 2020-11-03 DIAGNOSIS — E16.2 HYPOGLYCEMIA: ICD-10-CM

## 2020-11-03 DIAGNOSIS — I15.0 RENOVASCULAR HYPERTENSION: ICD-10-CM

## 2020-11-03 DIAGNOSIS — Z94.83 HISTORY OF SIMULTANEOUS KIDNEY AND PANCREAS TRANSPLANT: ICD-10-CM

## 2020-11-03 DIAGNOSIS — E10.3499: ICD-10-CM

## 2020-11-03 DIAGNOSIS — Z79.60 LONG-TERM USE OF IMMUNOSUPPRESSANT MEDICATION: ICD-10-CM

## 2020-11-03 DIAGNOSIS — T86.19 RECEIVED KIDNEY FROM DONOR WITH HEPATITIS C: ICD-10-CM

## 2020-11-03 DIAGNOSIS — E11.39 BLINDNESS OF BOTH EYES DUE TO DIABETES MELLITUS: ICD-10-CM

## 2020-11-03 DIAGNOSIS — N25.81 SECONDARY HYPERPARATHYROIDISM: ICD-10-CM

## 2020-11-03 DIAGNOSIS — Z89.519 STATUS POST BELOW KNEE AMPUTATION, UNSPECIFIED LATERALITY: ICD-10-CM

## 2020-11-03 DIAGNOSIS — Z99.2 ESRD ON DIALYSIS: Primary | ICD-10-CM

## 2020-11-03 DIAGNOSIS — Z94.0 HISTORY OF SIMULTANEOUS KIDNEY AND PANCREAS TRANSPLANT: ICD-10-CM

## 2020-11-03 DIAGNOSIS — Z01.818 ENCOUNTER FOR PRE-TRANSPLANT EVALUATION FOR KIDNEY AND PANCREAS TRANSPLANT: ICD-10-CM

## 2020-11-03 DIAGNOSIS — D84.9 IMMUNOCOMPROMISED STATE: ICD-10-CM

## 2020-11-03 DIAGNOSIS — N18.6 ESRD (END STAGE RENAL DISEASE): ICD-10-CM

## 2020-11-03 DIAGNOSIS — Z29.89 PROPHYLACTIC IMMUNOTHERAPY: ICD-10-CM

## 2020-11-03 DIAGNOSIS — Z94.83 STATUS POST PANCREAS TRANSPLANTATION: ICD-10-CM

## 2020-11-03 DIAGNOSIS — N18.6 ESRD ON DIALYSIS: Primary | ICD-10-CM

## 2020-11-03 DIAGNOSIS — Z94.0 KIDNEY TRANSPLANTED: ICD-10-CM

## 2020-11-03 LAB
25(OH)D3+25(OH)D2 SERPL-MCNC: 19 NG/ML (ref 30–96)
ABO + RH BLD: NORMAL
ALBUMIN SERPL BCP-MCNC: 3.2 G/DL (ref 3.5–5.2)
ALP SERPL-CCNC: 105 U/L (ref 55–135)
ALT SERPL W/O P-5'-P-CCNC: 43 U/L (ref 10–44)
AMYLASE SERPL-CCNC: 78 U/L (ref 20–110)
ANION GAP SERPL CALC-SCNC: 15 MMOL/L (ref 8–16)
APTT BLDCRRT: 33 SEC (ref 21–32)
AST SERPL-CCNC: 36 U/L (ref 10–40)
BACTERIA #/AREA URNS AUTO: NORMAL /HPF
BASOPHILS # BLD AUTO: 0.03 K/UL (ref 0–0.2)
BASOPHILS NFR BLD: 0.5 % (ref 0–1.9)
BILIRUB SERPL-MCNC: 0.3 MG/DL (ref 0.1–1)
BILIRUB UR QL STRIP: NEGATIVE
BLD GP AB SCN CELLS X3 SERPL QL: NORMAL
BUN SERPL-MCNC: 75 MG/DL (ref 6–20)
CALCIUM SERPL-MCNC: 8.1 MG/DL (ref 8.7–10.5)
CHLORIDE SERPL-SCNC: 104 MMOL/L (ref 95–110)
CHOLEST SERPL-MCNC: 118 MG/DL (ref 120–199)
CHOLEST/HDLC SERPL: 2.9 {RATIO} (ref 2–5)
CLARITY UR REFRACT.AUTO: CLEAR
CMV IGG SERPL QL IA: REACTIVE
CO2 SERPL-SCNC: 22 MMOL/L (ref 23–29)
COLOR UR AUTO: ABNORMAL
CREAT SERPL-MCNC: 10 MG/DL (ref 0.5–1.4)
DIFFERENTIAL METHOD: ABNORMAL
EOSINOPHIL # BLD AUTO: 0.2 K/UL (ref 0–0.5)
EOSINOPHIL NFR BLD: 4.2 % (ref 0–8)
ERYTHROCYTE [DISTWIDTH] IN BLOOD BY AUTOMATED COUNT: 16.1 % (ref 11.5–14.5)
EST. GFR  (AFRICAN AMERICAN): 6.8 ML/MIN/1.73 M^2
EST. GFR  (NON AFRICAN AMERICAN): 5.9 ML/MIN/1.73 M^2
ESTIMATED AVG GLUCOSE: 189 MG/DL (ref 68–131)
GLUCOSE SERPL-MCNC: 178 MG/DL (ref 70–110)
GLUCOSE UR QL STRIP: ABNORMAL
HBA1C MFR BLD HPLC: 8.2 % (ref 4–5.6)
HBV CORE IGM SERPL QL IA: NEGATIVE
HBV SURFACE AG SERPL QL IA: NEGATIVE
HCT VFR BLD AUTO: 27.8 % (ref 40–54)
HCV AB SERPL QL IA: NEGATIVE
HDLC SERPL-MCNC: 41 MG/DL (ref 40–75)
HDLC SERPL: 34.7 % (ref 20–50)
HGB BLD-MCNC: 8.6 G/DL (ref 14–18)
HGB UR QL STRIP: NEGATIVE
HIV 1+2 AB+HIV1 P24 AG SERPL QL IA: NEGATIVE
HYALINE CASTS UR QL AUTO: 0 /LPF
IMM GRANULOCYTES # BLD AUTO: 0.02 K/UL (ref 0–0.04)
IMM GRANULOCYTES NFR BLD AUTO: 0.4 % (ref 0–0.5)
INR PPP: 1 (ref 0.8–1.2)
KETONES UR QL STRIP: NEGATIVE
LDH SERPL L TO P-CCNC: 273 U/L (ref 110–260)
LDLC SERPL CALC-MCNC: 60.2 MG/DL (ref 63–159)
LEUKOCYTE ESTERASE UR QL STRIP: NEGATIVE
LIPASE SERPL-CCNC: 22 U/L (ref 4–60)
LYMPHOCYTES # BLD AUTO: 1.3 K/UL (ref 1–4.8)
LYMPHOCYTES NFR BLD: 22.9 % (ref 18–48)
MAGNESIUM SERPL-MCNC: 2.2 MG/DL (ref 1.6–2.6)
MCH RBC QN AUTO: 26.3 PG (ref 27–31)
MCHC RBC AUTO-ENTMCNC: 30.9 G/DL (ref 32–36)
MCV RBC AUTO: 85 FL (ref 82–98)
MICROSCOPIC COMMENT: NORMAL
MONOCYTES # BLD AUTO: 0.5 K/UL (ref 0.3–1)
MONOCYTES NFR BLD: 9.2 % (ref 4–15)
NEUTROPHILS # BLD AUTO: 3.6 K/UL (ref 1.8–7.7)
NEUTROPHILS NFR BLD: 62.8 % (ref 38–73)
NITRITE UR QL STRIP: NEGATIVE
NONHDLC SERPL-MCNC: 77 MG/DL
NRBC BLD-RTO: 0 /100 WBC
PH UR STRIP: 8 [PH] (ref 5–8)
PHOSPHATE SERPL-MCNC: 6.8 MG/DL (ref 2.7–4.5)
PLATELET # BLD AUTO: 286 K/UL (ref 150–350)
PMV BLD AUTO: 9.4 FL (ref 9.2–12.9)
POCT GLUCOSE: 106 MG/DL (ref 70–110)
POCT GLUCOSE: 123 MG/DL (ref 70–110)
POCT GLUCOSE: 166 MG/DL (ref 70–110)
POCT GLUCOSE: 96 MG/DL (ref 70–110)
POCT GLUCOSE: 98 MG/DL (ref 70–110)
POTASSIUM SERPL-SCNC: 5.3 MMOL/L (ref 3.5–5.1)
PROT SERPL-MCNC: 6.5 G/DL (ref 6–8.4)
PROT UR QL STRIP: ABNORMAL
PROTHROMBIN TIME: 11.5 SEC (ref 9–12.5)
PTH-INTACT SERPL-MCNC: 904 PG/ML (ref 9–77)
RBC # BLD AUTO: 3.27 M/UL (ref 4.6–6.2)
RBC #/AREA URNS AUTO: 1 /HPF (ref 0–4)
SARS-COV-2 RDRP RESP QL NAA+PROBE: NEGATIVE
SODIUM SERPL-SCNC: 141 MMOL/L (ref 136–145)
SP GR UR STRIP: 1.01 (ref 1–1.03)
TRIGL SERPL-MCNC: 84 MG/DL (ref 30–150)
URATE SERPL-MCNC: 7.1 MG/DL (ref 3.4–7)
URN SPEC COLLECT METH UR: ABNORMAL
WBC # BLD AUTO: 5.68 K/UL (ref 3.9–12.7)
WBC #/AREA URNS AUTO: 2 /HPF (ref 0–5)
YEAST UR QL AUTO: NORMAL

## 2020-11-03 PROCEDURE — 50323 PR TRANSPLANT,PREP CADAVER RENAL GRAFT: ICD-10-PCS | Mod: 51,LT,, | Performed by: TRANSPLANT SURGERY

## 2020-11-03 PROCEDURE — 12000002 HC ACUTE/MED SURGE SEMI-PRIVATE ROOM: Mod: NTX

## 2020-11-03 PROCEDURE — 50605 INSERT URETERAL SUPPORT: CPT | Mod: 51,LT,, | Performed by: TRANSPLANT SURGERY

## 2020-11-03 PROCEDURE — 87340 HEPATITIS B SURFACE AG IA: CPT | Mod: NTX

## 2020-11-03 PROCEDURE — 99223 1ST HOSP IP/OBS HIGH 75: CPT | Mod: NTX,,, | Performed by: NURSE PRACTITIONER

## 2020-11-03 PROCEDURE — 63600175 PHARM REV CODE 636 W HCPCS: Mod: NTX | Performed by: PHYSICIAN ASSISTANT

## 2020-11-03 PROCEDURE — 86705 HEP B CORE ANTIBODY IGM: CPT | Mod: NTX

## 2020-11-03 PROCEDURE — D9220A PRA ANESTHESIA: ICD-10-PCS | Mod: CRNA,,, | Performed by: STUDENT IN AN ORGANIZED HEALTH CARE EDUCATION/TRAINING PROGRAM

## 2020-11-03 PROCEDURE — 86706 HEP B SURFACE ANTIBODY: CPT | Mod: NTX

## 2020-11-03 PROCEDURE — 50360 PR TRANSPLANTATION OF KIDNEY: ICD-10-PCS | Mod: 51,LT,, | Performed by: TRANSPLANT SURGERY

## 2020-11-03 PROCEDURE — 93010 ELECTROCARDIOGRAM REPORT: CPT | Mod: NTX,,, | Performed by: INTERNAL MEDICINE

## 2020-11-03 PROCEDURE — 86901 BLOOD TYPING SEROLOGIC RH(D): CPT | Mod: NTX

## 2020-11-03 PROCEDURE — 81001 URINALYSIS AUTO W/SCOPE: CPT | Mod: NTX

## 2020-11-03 PROCEDURE — 83970 ASSAY OF PARATHORMONE: CPT | Mod: NTX

## 2020-11-03 PROCEDURE — 36620 INSERTION CATHETER ARTERY: CPT | Mod: 59,,, | Performed by: ANESTHESIOLOGY

## 2020-11-03 PROCEDURE — 80061 LIPID PANEL: CPT | Mod: NTX

## 2020-11-03 PROCEDURE — 93010 EKG 12-LEAD: ICD-10-PCS | Mod: NTX,,, | Performed by: INTERNAL MEDICINE

## 2020-11-03 PROCEDURE — 82306 VITAMIN D 25 HYDROXY: CPT | Mod: NTX

## 2020-11-03 PROCEDURE — U0002 COVID-19 LAB TEST NON-CDC: HCPCS | Mod: NTX

## 2020-11-03 PROCEDURE — 99222 1ST HOSP IP/OBS MODERATE 55: CPT | Mod: ,,, | Performed by: NURSE PRACTITIONER

## 2020-11-03 PROCEDURE — 25000003 PHARM REV CODE 250: Performed by: NURSE ANESTHETIST, CERTIFIED REGISTERED

## 2020-11-03 PROCEDURE — 86825 HLA X-MATH NON-CYTOTOXIC: CPT

## 2020-11-03 PROCEDURE — 83735 ASSAY OF MAGNESIUM: CPT | Mod: NTX

## 2020-11-03 PROCEDURE — 83615 LACTATE (LD) (LDH) ENZYME: CPT | Mod: NTX

## 2020-11-03 PROCEDURE — 25000003 PHARM REV CODE 250: Mod: NTX | Performed by: NURSE PRACTITIONER

## 2020-11-03 PROCEDURE — 83036 HEMOGLOBIN GLYCOSYLATED A1C: CPT | Mod: NTX

## 2020-11-03 PROCEDURE — 25000003 PHARM REV CODE 250: Mod: NTX | Performed by: STUDENT IN AN ORGANIZED HEALTH CARE EDUCATION/TRAINING PROGRAM

## 2020-11-03 PROCEDURE — 86825 HLA X-MATH NON-CYTOTOXIC: CPT | Mod: 91

## 2020-11-03 PROCEDURE — 50605 PR URETEROTOMY TO INSERT STENT: ICD-10-PCS | Mod: 51,LT,, | Performed by: TRANSPLANT SURGERY

## 2020-11-03 PROCEDURE — 80053 COMPREHEN METABOLIC PANEL: CPT | Mod: NTX

## 2020-11-03 PROCEDURE — 84550 ASSAY OF BLOOD/URIC ACID: CPT | Mod: NTX

## 2020-11-03 PROCEDURE — D9220A PRA ANESTHESIA: Mod: CRNA,,, | Performed by: STUDENT IN AN ORGANIZED HEALTH CARE EDUCATION/TRAINING PROGRAM

## 2020-11-03 PROCEDURE — 36415 COLL VENOUS BLD VENIPUNCTURE: CPT | Mod: NTX

## 2020-11-03 PROCEDURE — 63600175 PHARM REV CODE 636 W HCPCS: Performed by: TRANSPLANT SURGERY

## 2020-11-03 PROCEDURE — D9220A PRA ANESTHESIA: ICD-10-PCS | Mod: ANES,,, | Performed by: ANESTHESIOLOGY

## 2020-11-03 PROCEDURE — 36000931 HC OR TIME LEV VII EA ADD 15 MIN: Performed by: TRANSPLANT SURGERY

## 2020-11-03 PROCEDURE — 99222 PR INITIAL HOSPITAL CARE,LEVL II: ICD-10-PCS | Mod: ,,, | Performed by: NURSE PRACTITIONER

## 2020-11-03 PROCEDURE — 82150 ASSAY OF AMYLASE: CPT | Mod: NTX

## 2020-11-03 PROCEDURE — 86803 HEPATITIS C AB TEST: CPT | Mod: NTX

## 2020-11-03 PROCEDURE — 48554 TRANSPL ALLOGRAFT PANCREAS: CPT | Mod: ,,, | Performed by: TRANSPLANT SURGERY

## 2020-11-03 PROCEDURE — 99223 PR INITIAL HOSPITAL CARE,LEVL III: ICD-10-PCS | Mod: NTX,,, | Performed by: NURSE PRACTITIONER

## 2020-11-03 PROCEDURE — 85730 THROMBOPLASTIN TIME PARTIAL: CPT | Mod: NTX

## 2020-11-03 PROCEDURE — 63600175 PHARM REV CODE 636 W HCPCS: Mod: JG,NTX | Performed by: NURSE PRACTITIONER

## 2020-11-03 PROCEDURE — 86644 CMV ANTIBODY: CPT | Mod: NTX

## 2020-11-03 PROCEDURE — 37000009 HC ANESTHESIA EA ADD 15 MINS: Performed by: TRANSPLANT SURGERY

## 2020-11-03 PROCEDURE — 36000930 HC OR TIME LEV VII 1ST 15 MIN: Performed by: TRANSPLANT SURGERY

## 2020-11-03 PROCEDURE — 27201423 OPTIME MED/SURG SUP & DEVICES STERILE SUPPLY: Performed by: TRANSPLANT SURGERY

## 2020-11-03 PROCEDURE — 93005 ELECTROCARDIOGRAM TRACING: CPT | Mod: NTX

## 2020-11-03 PROCEDURE — 25000003 PHARM REV CODE 250: Mod: NTX | Performed by: PHYSICIAN ASSISTANT

## 2020-11-03 PROCEDURE — 84100 ASSAY OF PHOSPHORUS: CPT | Mod: NTX

## 2020-11-03 PROCEDURE — 85610 PROTHROMBIN TIME: CPT | Mod: NTX

## 2020-11-03 PROCEDURE — 86920 COMPATIBILITY TEST SPIN: CPT

## 2020-11-03 PROCEDURE — 85025 COMPLETE CBC W/AUTO DIFF WBC: CPT | Mod: NTX

## 2020-11-03 PROCEDURE — 25000003 PHARM REV CODE 250: Performed by: STUDENT IN AN ORGANIZED HEALTH CARE EDUCATION/TRAINING PROGRAM

## 2020-11-03 PROCEDURE — 48554 PR TRANSPLANT ALLOGRAFT PANCREAS: ICD-10-PCS | Mod: 82,,, | Performed by: TRANSPLANT SURGERY

## 2020-11-03 PROCEDURE — 63600175 PHARM REV CODE 636 W HCPCS: Performed by: NURSE ANESTHETIST, CERTIFIED REGISTERED

## 2020-11-03 PROCEDURE — 48554 PR TRANSPLANT ALLOGRAFT PANCREAS: ICD-10-PCS | Mod: ,,, | Performed by: TRANSPLANT SURGERY

## 2020-11-03 PROCEDURE — 63600175 PHARM REV CODE 636 W HCPCS: Performed by: STUDENT IN AN ORGANIZED HEALTH CARE EDUCATION/TRAINING PROGRAM

## 2020-11-03 PROCEDURE — 83690 ASSAY OF LIPASE: CPT | Mod: NTX

## 2020-11-03 PROCEDURE — D9220A PRA ANESTHESIA: Mod: ANES,,, | Performed by: ANESTHESIOLOGY

## 2020-11-03 PROCEDURE — 50360 RNL ALTRNSPLJ W/O RCP NFRCT: CPT | Mod: 82,51,LT, | Performed by: TRANSPLANT SURGERY

## 2020-11-03 PROCEDURE — 86703 HIV-1/HIV-2 1 RESULT ANTBDY: CPT | Mod: NTX

## 2020-11-03 PROCEDURE — 48554 TRANSPL ALLOGRAFT PANCREAS: CPT | Mod: 82,,, | Performed by: TRANSPLANT SURGERY

## 2020-11-03 PROCEDURE — 87522 HEPATITIS C REVRS TRNSCRPJ: CPT | Mod: NTX

## 2020-11-03 PROCEDURE — 37000008 HC ANESTHESIA 1ST 15 MINUTES: Performed by: TRANSPLANT SURGERY

## 2020-11-03 PROCEDURE — 36620 PR INSERT CATH,ART,PERCUT,SHORTTERM: ICD-10-PCS | Mod: 59,,, | Performed by: ANESTHESIOLOGY

## 2020-11-03 PROCEDURE — 80100016 HC MAINTENANCE HEMODIALYSIS: Mod: NTX

## 2020-11-03 PROCEDURE — 50360 RNL ALTRNSPLJ W/O RCP NFRCT: CPT | Mod: 51,LT,, | Performed by: TRANSPLANT SURGERY

## 2020-11-03 PROCEDURE — C2617 STENT, NON-COR, TEM W/O DEL: HCPCS | Performed by: TRANSPLANT SURGERY

## 2020-11-03 PROCEDURE — 48551 PR TRANSPLANT,PREP DONOR PANCREAS: ICD-10-PCS | Mod: 51,,, | Performed by: TRANSPLANT SURGERY

## 2020-11-03 PROCEDURE — 50360 PR TRANSPLANTATION OF KIDNEY: ICD-10-PCS | Mod: 82,51,LT, | Performed by: TRANSPLANT SURGERY

## 2020-11-03 PROCEDURE — C1729 CATH, DRAINAGE: HCPCS | Performed by: TRANSPLANT SURGERY

## 2020-11-03 DEVICE — STENT DOUBLE J 7FRX12CM
Type: IMPLANTABLE DEVICE | Site: KIDNEY | Status: NON-FUNCTIONAL
Removed: 2020-11-25

## 2020-11-03 RX ORDER — SODIUM CHLORIDE 9 MG/ML
INJECTION, SOLUTION INTRAVENOUS
Status: CANCELLED | OUTPATIENT
Start: 2020-11-03

## 2020-11-03 RX ORDER — ATENOLOL 25 MG/1
50 TABLET ORAL ONCE
Status: COMPLETED | OUTPATIENT
Start: 2020-11-03 | End: 2020-11-03

## 2020-11-03 RX ORDER — HEPARIN SODIUM 5000 [USP'U]/ML
5000 INJECTION, SOLUTION INTRAVENOUS; SUBCUTANEOUS
Status: CANCELLED | OUTPATIENT
Start: 2020-11-03

## 2020-11-03 RX ORDER — DIPHENHYDRAMINE HYDROCHLORIDE 50 MG/ML
50 INJECTION INTRAMUSCULAR; INTRAVENOUS ONCE
Status: COMPLETED | OUTPATIENT
Start: 2020-11-03 | End: 2020-11-03

## 2020-11-03 RX ORDER — MUPIROCIN 20 MG/G
OINTMENT TOPICAL
Status: DISCONTINUED | OUTPATIENT
Start: 2020-11-03 | End: 2020-11-05

## 2020-11-03 RX ORDER — PROPOFOL 10 MG/ML
VIAL (ML) INTRAVENOUS
Status: DISCONTINUED | OUTPATIENT
Start: 2020-11-03 | End: 2020-11-04

## 2020-11-03 RX ORDER — HYDROMORPHONE HYDROCHLORIDE 1 MG/ML
0.2 INJECTION, SOLUTION INTRAMUSCULAR; INTRAVENOUS; SUBCUTANEOUS EVERY 5 MIN PRN
Status: CANCELLED | OUTPATIENT
Start: 2020-11-03

## 2020-11-03 RX ORDER — ROCURONIUM BROMIDE 10 MG/ML
INJECTION, SOLUTION INTRAVENOUS
Status: DISCONTINUED | OUTPATIENT
Start: 2020-11-03 | End: 2020-11-04

## 2020-11-03 RX ORDER — CALCIUM CHLORIDE INJECTION 100 MG/ML
INJECTION, SOLUTION INTRAVENOUS
Status: DISCONTINUED | OUTPATIENT
Start: 2020-11-03 | End: 2020-11-04

## 2020-11-03 RX ORDER — HEPARIN SODIUM 1000 [USP'U]/ML
INJECTION, SOLUTION INTRAVENOUS; SUBCUTANEOUS
Status: DISCONTINUED | OUTPATIENT
Start: 2020-11-03 | End: 2020-11-04 | Stop reason: HOSPADM

## 2020-11-03 RX ORDER — ACETAMINOPHEN 650 MG/20.3ML
650 LIQUID ORAL ONCE
Status: COMPLETED | OUTPATIENT
Start: 2020-11-03 | End: 2020-11-03

## 2020-11-03 RX ORDER — MIDAZOLAM HYDROCHLORIDE 1 MG/ML
INJECTION, SOLUTION INTRAMUSCULAR; INTRAVENOUS
Status: DISCONTINUED | OUTPATIENT
Start: 2020-11-03 | End: 2020-11-04

## 2020-11-03 RX ORDER — HEPARIN SODIUM 1000 [USP'U]/ML
1000 INJECTION, SOLUTION INTRAVENOUS; SUBCUTANEOUS
Status: CANCELLED | OUTPATIENT
Start: 2020-11-03

## 2020-11-03 RX ORDER — ONDANSETRON 2 MG/ML
4 INJECTION INTRAMUSCULAR; INTRAVENOUS ONCE AS NEEDED
Status: CANCELLED | OUTPATIENT
Start: 2020-11-03 | End: 2032-04-01

## 2020-11-03 RX ORDER — NAPROXEN SODIUM 220 MG/1
81 TABLET, FILM COATED ORAL ONCE
Status: COMPLETED | OUTPATIENT
Start: 2020-11-03 | End: 2020-11-03

## 2020-11-03 RX ORDER — CLONIDINE HYDROCHLORIDE 0.1 MG/1
0.1 TABLET ORAL EVERY 8 HOURS PRN
Status: DISCONTINUED | OUTPATIENT
Start: 2020-11-03 | End: 2020-11-05

## 2020-11-03 RX ORDER — ONDANSETRON 2 MG/ML
INJECTION INTRAMUSCULAR; INTRAVENOUS
Status: DISCONTINUED | OUTPATIENT
Start: 2020-11-03 | End: 2020-11-04

## 2020-11-03 RX ORDER — SODIUM CHLORIDE 9 MG/ML
INJECTION, SOLUTION INTRAVENOUS ONCE
Status: CANCELLED | OUTPATIENT
Start: 2020-11-03 | End: 2020-11-03

## 2020-11-03 RX ORDER — CEFAZOLIN SODIUM 1 G/3ML
INJECTION, POWDER, FOR SOLUTION INTRAMUSCULAR; INTRAVENOUS
Status: DISCONTINUED | OUTPATIENT
Start: 2020-11-03 | End: 2020-11-04 | Stop reason: HOSPADM

## 2020-11-03 RX ORDER — FENTANYL CITRATE 50 UG/ML
INJECTION, SOLUTION INTRAMUSCULAR; INTRAVENOUS
Status: DISCONTINUED | OUTPATIENT
Start: 2020-11-03 | End: 2020-11-04

## 2020-11-03 RX ORDER — HEPARIN SODIUM 5000 [USP'U]/ML
5000 INJECTION, SOLUTION INTRAVENOUS; SUBCUTANEOUS ONCE
Status: COMPLETED | OUTPATIENT
Start: 2020-11-03 | End: 2020-11-03

## 2020-11-03 RX ORDER — SUCCINYLCHOLINE CHLORIDE 20 MG/ML
INJECTION INTRAMUSCULAR; INTRAVENOUS
Status: DISCONTINUED | OUTPATIENT
Start: 2020-11-03 | End: 2020-11-04

## 2020-11-03 RX ORDER — HEPARIN SODIUM,PORCINE 10 UNIT/ML
10 VIAL (ML) INTRAVENOUS
Status: CANCELLED | OUTPATIENT
Start: 2020-11-03

## 2020-11-03 RX ORDER — HYDRALAZINE HYDROCHLORIDE 50 MG/1
50 TABLET, FILM COATED ORAL ONCE
Status: COMPLETED | OUTPATIENT
Start: 2020-11-03 | End: 2020-11-03

## 2020-11-03 RX ORDER — GENTAMICIN SULFATE 40 MG/ML
80 INJECTION, SOLUTION INTRAMUSCULAR; INTRAVENOUS
Status: CANCELLED | OUTPATIENT
Start: 2020-11-03

## 2020-11-03 RX ORDER — MANNITOL 250 MG/ML
INJECTION, SOLUTION INTRAVENOUS
Status: DISCONTINUED | OUTPATIENT
Start: 2020-11-03 | End: 2020-11-04

## 2020-11-03 RX ORDER — FENTANYL CITRATE 50 UG/ML
25 INJECTION, SOLUTION INTRAMUSCULAR; INTRAVENOUS EVERY 5 MIN PRN
Status: CANCELLED | OUTPATIENT
Start: 2020-11-03

## 2020-11-03 RX ORDER — FUROSEMIDE 10 MG/ML
INJECTION INTRAMUSCULAR; INTRAVENOUS
Status: DISCONTINUED | OUTPATIENT
Start: 2020-11-03 | End: 2020-11-04

## 2020-11-03 RX ORDER — PHENYLEPHRINE HYDROCHLORIDE 10 MG/ML
INJECTION INTRAVENOUS
Status: DISCONTINUED | OUTPATIENT
Start: 2020-11-03 | End: 2020-11-04

## 2020-11-03 RX ORDER — LIDOCAINE HYDROCHLORIDE 20 MG/ML
INJECTION INTRAVENOUS
Status: DISCONTINUED | OUTPATIENT
Start: 2020-11-03 | End: 2020-11-04

## 2020-11-03 RX ADMIN — PHENYLEPHRINE HYDROCHLORIDE 100 MCG: 10 INJECTION INTRAVENOUS at 07:11

## 2020-11-03 RX ADMIN — MIDAZOLAM HYDROCHLORIDE 2 MG: 1 INJECTION, SOLUTION INTRAMUSCULAR; INTRAVENOUS at 07:11

## 2020-11-03 RX ADMIN — SODIUM CHLORIDE 3 UNITS/HR: 9 INJECTION, SOLUTION INTRAVENOUS at 11:11

## 2020-11-03 RX ADMIN — PROPOFOL 30 MG: 10 INJECTION, EMULSION INTRAVENOUS at 11:11

## 2020-11-03 RX ADMIN — DIPHENHYDRAMINE HYDROCHLORIDE 50 MG: 50 INJECTION INTRAMUSCULAR; INTRAVENOUS at 08:11

## 2020-11-03 RX ADMIN — ROCURONIUM BROMIDE 10 MG: 10 INJECTION, SOLUTION INTRAVENOUS at 09:11

## 2020-11-03 RX ADMIN — FENTANYL CITRATE 50 MCG: 50 INJECTION, SOLUTION INTRAMUSCULAR; INTRAVENOUS at 11:11

## 2020-11-03 RX ADMIN — AMPICILLIN SODIUM AND SULBACTAM SODIUM 3 G: 2; 1 INJECTION, POWDER, FOR SOLUTION INTRAMUSCULAR; INTRAVENOUS at 07:11

## 2020-11-03 RX ADMIN — HEPARIN SODIUM 5000 UNITS: 5000 INJECTION INTRAVENOUS; SUBCUTANEOUS at 09:11

## 2020-11-03 RX ADMIN — ROCURONIUM BROMIDE 30 MG: 10 INJECTION, SOLUTION INTRAVENOUS at 07:11

## 2020-11-03 RX ADMIN — AMPICILLIN SODIUM AND SULBACTAM SODIUM 3 G: 2; 1 INJECTION, POWDER, FOR SOLUTION INTRAMUSCULAR; INTRAVENOUS at 11:11

## 2020-11-03 RX ADMIN — HYDRALAZINE HYDROCHLORIDE 50 MG: 50 TABLET, FILM COATED ORAL at 11:11

## 2020-11-03 RX ADMIN — PHENYLEPHRINE HYDROCHLORIDE 100 MCG: 10 INJECTION INTRAVENOUS at 10:11

## 2020-11-03 RX ADMIN — ROCURONIUM BROMIDE 20 MG: 10 INJECTION, SOLUTION INTRAVENOUS at 11:11

## 2020-11-03 RX ADMIN — PHENYLEPHRINE HYDROCHLORIDE 100 MCG: 10 INJECTION INTRAVENOUS at 08:11

## 2020-11-03 RX ADMIN — CLONIDINE HYDROCHLORIDE 0.1 MG: 0.1 TABLET ORAL at 03:11

## 2020-11-03 RX ADMIN — HEPARIN SODIUM 125 MG: 1000 INJECTION, SOLUTION INTRAVENOUS; SUBCUTANEOUS at 09:11

## 2020-11-03 RX ADMIN — SODIUM CHLORIDE, SODIUM GLUCONATE, SODIUM ACETATE, POTASSIUM CHLORIDE, MAGNESIUM CHLORIDE, SODIUM PHOSPHATE, DIBASIC, AND POTASSIUM PHOSPHATE: .53; .5; .37; .037; .03; .012; .00082 INJECTION, SOLUTION INTRAVENOUS at 07:11

## 2020-11-03 RX ADMIN — SODIUM CHLORIDE, SODIUM GLUCONATE, SODIUM ACETATE, POTASSIUM CHLORIDE, MAGNESIUM CHLORIDE, SODIUM PHOSPHATE, DIBASIC, AND POTASSIUM PHOSPHATE: .53; .5; .37; .037; .03; .012; .00082 INJECTION, SOLUTION INTRAVENOUS at 11:11

## 2020-11-03 RX ADMIN — ACETAMINOPHEN 650 MG: 650 SOLUTION ORAL at 08:11

## 2020-11-03 RX ADMIN — ASPIRIN 81 MG: 81 TABLET, CHEWABLE ORAL at 10:11

## 2020-11-03 RX ADMIN — PROPOFOL 50 MG: 10 INJECTION, EMULSION INTRAVENOUS at 11:11

## 2020-11-03 RX ADMIN — ATENOLOL 50 MG: 25 TABLET ORAL at 11:11

## 2020-11-03 RX ADMIN — PROPOFOL 120 MG: 10 INJECTION, EMULSION INTRAVENOUS at 07:11

## 2020-11-03 RX ADMIN — ROCURONIUM BROMIDE 10 MG: 10 INJECTION, SOLUTION INTRAVENOUS at 10:11

## 2020-11-03 RX ADMIN — FENTANYL CITRATE 100 MCG: 50 INJECTION, SOLUTION INTRAMUSCULAR; INTRAVENOUS at 07:11

## 2020-11-03 RX ADMIN — MANNITOL 25 G: 250 INJECTION, SOLUTION INTRAVENOUS at 10:11

## 2020-11-03 RX ADMIN — FUROSEMIDE 100 MG: 10 INJECTION, SOLUTION INTRAMUSCULAR; INTRAVENOUS at 10:11

## 2020-11-03 RX ADMIN — ROCURONIUM BROMIDE 10 MG: 10 INJECTION, SOLUTION INTRAVENOUS at 08:11

## 2020-11-03 RX ADMIN — SUCCINYLCHOLINE CHLORIDE 100 MG: 20 INJECTION, SOLUTION INTRAMUSCULAR; INTRAVENOUS at 07:11

## 2020-11-03 RX ADMIN — CALCIUM CHLORIDE 1 G: 100 INJECTION, SOLUTION INTRAVENOUS at 11:11

## 2020-11-03 RX ADMIN — SODIUM CHLORIDE 0.2 MCG/KG/MIN: 9 INJECTION, SOLUTION INTRAVENOUS at 08:11

## 2020-11-03 RX ADMIN — SODIUM CHLORIDE, SODIUM GLUCONATE, SODIUM ACETATE, POTASSIUM CHLORIDE, MAGNESIUM CHLORIDE, SODIUM PHOSPHATE, DIBASIC, AND POTASSIUM PHOSPHATE: .53; .5; .37; .037; .03; .012; .00082 INJECTION, SOLUTION INTRAVENOUS at 08:11

## 2020-11-03 RX ADMIN — PHENYLEPHRINE HYDROCHLORIDE 100 MCG: 10 INJECTION INTRAVENOUS at 11:11

## 2020-11-03 RX ADMIN — LIDOCAINE HYDROCHLORIDE 100 MG: 20 INJECTION, SOLUTION INTRAVENOUS at 07:11

## 2020-11-03 RX ADMIN — ONDANSETRON 4 MG: 2 INJECTION, SOLUTION INTRAMUSCULAR; INTRAVENOUS at 07:11

## 2020-11-03 RX ADMIN — SODIUM CHLORIDE 100 ML: 9 INJECTION, SOLUTION INTRAVENOUS at 08:11

## 2020-11-03 NOTE — PROGRESS NOTES
3 hr dialysis trx completed. 3 L fluid removed. Needles pulled. Drsgs applied to needle stick sites.Karina trx well.Report called to Camilla. Tranport to U requested.

## 2020-11-03 NOTE — SUBJECTIVE & OBJECTIVE
Subjective:     Chief Complaint/Reason for Admission: SPKTxp    History of Present Illness:  Mr. Rosales is a 40 y.o. Black or  male with ESRD secondary to diabetic nephropathy (T1DM) who presents for direct admission 11/3 for kidney/pancreas transplant. Patient is currently on hemodialysis started on 3/16/2020. Patient is dialyzing on TTS schedule.  Last HD Saturday 10/31/20- removed 3.2L. Patient reports that he is tolerating dialysis well. He has a LUE AV graft for dialysis access. Dry weight 188.6 kg. Standing scale weight on admit 96.25 kg. Native UOP- voids 3-4 times a day 200+ cc (~1L). Pre op labs and imaging pending. Donor is PHS increased risk, CMV, EBV IgG, and HCV YIN+. OR tentatively scheduled for 1700 with Dr. Romero. Induction will be Thymoglobulin. Pt accompanied by sister.  Assessment is unremarkable, he denies CP, SOB, COVID contacts.  Rapid COVID ordered.    Plan for HD today.         Dialysis History: Mr. Ng with ESRD, requiring chronic dialysis who is on hemodialysis started on 2016. Patient is dialyzing on TTS schedule.  Patient reports that he is tolerating dialysis well.    Date of Last Dialysis: 10/31/2020    Native urine output per day: ~1L    Previous Transplant: no    PTA Medications   Medication Sig    amlodipine (NORVASC) 10 MG tablet Take 10 mg by mouth every evening.     atenoloL (TENORMIN) 50 MG tablet Take 50 mg by mouth once daily.    busPIRone (BUSPAR) 5 MG Tab Take 5 mg by mouth 2 (two) times daily.    calcium acetate,phosphat bind, (PHOSLO) 667 mg capsule Take 1,334 mg by mouth 3 (three) times daily with meals.    fenofibrate 160 MG Tab Take 160 mg by mouth once daily.    ferrous sulfate 325 (65 FE) MG EC tablet Take 325 mg by mouth once daily.    furosemide (LASIX) 80 MG tablet Take 80 mg by mouth once daily.    gabapentin (NEURONTIN) 300 MG capsule Take 300 mg by mouth 2 (two) times daily.     GLIPIZIDE ORAL Take 2.5 mg by mouth 2 (two) times  daily with meals.    haloperidoL (HALDOL) 1 MG tablet Take 1 mg by mouth 2 (two) times daily.    hydrALAZINE (APRESOLINE) 50 MG tablet Take 50 mg by mouth 3 (three) times daily.    insulin (LANTUS SOLOSTAR U-100 INSULIN) glargine 100 units/mL (3mL) SubQ pen Inject 15 Units into the skin every evening.    pravastatin (PRAVACHOL) 40 MG tablet Take 40 mg by mouth once daily.       Review of patient's allergies indicates:  No Known Allergies    Past Medical History:   Diagnosis Date    Anxiety     Cataract     Diabetes mellitus     Diabetic retinopathy     Disorder of kidney and ureter     Encounter for blood transfusion     Glaucoma     High cholesterol     Hypertension     Retinal detachment      Past Surgical History:   Procedure Laterality Date    EYE SURGERY      LEG AMPUTATION Left     RETINAL DETACHMENT SURGERY      TOE AMPUTATION Right      Family History     Problem Relation (Age of Onset)    Coronary artery disease Mother    Diabetes Mother, Sister, Brother, Maternal Aunt, Maternal Uncle    Glaucoma Mother    Heart disease Mother, Maternal Aunt, Maternal Uncle    Hypertension Mother, Brother, Maternal Aunt, Maternal Uncle    No Known Problems Father    Stroke Mother, Maternal Aunt        Tobacco Use    Smoking status: Former Smoker     Packs/day: 0.25     Years: 10.00     Pack years: 2.50    Smokeless tobacco: Never Used   Substance and Sexual Activity    Alcohol use: Yes     Comment: occasional    Drug use: No    Sexual activity: Yes     Partners: Female     Birth control/protection: Condom        Review of Systems   Constitutional: Negative for activity change, appetite change, chills, fatigue and fever.   HENT: Negative for congestion and trouble swallowing.    Eyes: Positive for visual disturbance.   Respiratory: Negative for cough, shortness of breath and wheezing.    Cardiovascular: Negative for chest pain and leg swelling.   Gastrointestinal: Negative for abdominal distention,  abdominal pain, constipation, diarrhea, nausea and vomiting.   Endocrine: Negative.    Genitourinary: Negative for decreased urine volume, difficulty urinating, dysuria, hematuria and urgency.   Musculoskeletal: Negative for arthralgias.   Skin: Negative for color change, pallor, rash and wound.   Allergic/Immunologic: Negative for immunocompromised state.   Neurological: Negative for dizziness, tremors, seizures, syncope, weakness and headaches.   Hematological: Does not bruise/bleed easily.   Psychiatric/Behavioral: Negative for agitation, confusion, decreased concentration, sleep disturbance and suicidal ideas. The patient is not nervous/anxious.      Objective:     Vital Signs (Most Recent):        Weight: 96.2 kg (212 lb 3.1 oz)  Body mass index is 31.79 kg/m².     Physical Exam  Vitals signs and nursing note reviewed.   Constitutional:       General: He is not in acute distress.     Appearance: He is well-developed. He is not diaphoretic.   HENT:      Head: Normocephalic and atraumatic.      Mouth/Throat:      Pharynx: No oropharyngeal exudate.   Eyes:      General: No scleral icterus.     Comments: Pt is legally blind   Neck:      Musculoskeletal: Normal range of motion and neck supple.      Thyroid: No thyromegaly.   Cardiovascular:      Rate and Rhythm: Normal rate and regular rhythm.      Heart sounds: Normal heart sounds. No murmur.   Pulmonary:      Effort: Pulmonary effort is normal. No respiratory distress.      Breath sounds: No wheezing or rales.   Abdominal:      General: Abdomen is flat. Bowel sounds are normal. There is no distension.      Palpations: Abdomen is soft.      Tenderness: There is no abdominal tenderness. There is no guarding.   Genitourinary:     Penis: Normal.    Musculoskeletal: Normal range of motion.   Skin:     General: Skin is warm and dry.      Capillary Refill: Capillary refill takes 2 to 3 seconds.      Findings: No erythema.   Neurological:      Mental Status: He is alert  and oriented to person, place, and time.   Psychiatric:         Mood and Affect: Mood normal.         Behavior: Behavior normal.         Thought Content: Thought content normal.         Judgment: Judgment normal.         Laboratory  CBC: No results for input(s): WBC, RBC, HGB, HCT, PLT, MCV, MCH, MCHC in the last 168 hours.  CMP: No results for input(s): GLU, CALCIUM, ALBUMIN, PROT, NA, K, CO2, CL, BUN, CREATININE, ALKPHOS, ALT, AST in the last 168 hours.    Invalid input(s): BILITO  Coagulation: No results for input(s): PT, APTT in the last 168 hours.  Labs within the past 24 hours have been reviewed.    Diagnostic Results:  Chest X-Ray: No results found for this or any previous visit.

## 2020-11-03 NOTE — PROCEDURES
Seen in Acute Dialysis Unit.  Patient states that he was told not to take BP meds on dialysis days due to hypotension. However today BP in 200/106. Will check sitting BP readings     Patient complains:  None.    ROS:  he reports no shortness of breath, nausea or vomiting.    VITALS:  weight is 96.2 kg (212 lb 3.1 oz). His oral temperature is 98 °F (36.7 °C). His blood pressure is 190/102 (abnormal) and his pulse is 72. His respiration is 18 and oxygen saturation is 99%.  Hemodialysis documentation flowsheet reviewed with dialysis nurse, including weight and vitals.    Resting comfortably, NAD.  Respiration unlabored. Lungs clear.  Heart regular, no rub.  Abdomen soft, nontender.  Ext: right AKA  No edema.    Recent Labs   Lab 11/03/20  0947      K 5.3*      CO2 22*   BUN 75*   CREATININE 10.0*   CALCIUM 8.1*   PHOS 6.8*     Recent Labs   Lab 11/03/20  0947   WBC 5.68   HGB 8.6*   HCT 27.8*          ASSESSMENT/PLAN: patient with uncontrolled hypertension. He did not take the meds today for BP as per his dialysis unit advice. Will start atenolol and hydralazine  Dialysis proceeding well for patient with advance kidney disease requiring renal replacement therapy..  See hemodialysis documentation flowsheet or dialysis synopsis for more details of HD run.

## 2020-11-03 NOTE — ANESTHESIA PREPROCEDURE EVALUATION
[facility]  Anesthesia Pre-Operative Evaluation         Patient Name: Hernandez Rosales  YOB: 1980  MRN: [id number]    SUBJECTIVE:     Pre-operative evaluation for Procedure(s) (LRB):  TRANSPLANT, KIDNEY (N/A)  TRANSPLANT, PANCREAS (N/A)     11/03/2020    Hernandez Rosales is a 40 y.o. male w/ a significant PMHx of ESRD on HD secondary to diabetic nephropathy (T1DM), obesity, and HTN who follows with [facility] and presents as a direct admit from for kidney/pancreas transplant. Last HD Saturday 10/31/20- removed 3.2L      Patient now presents for the above procedure(s).      LDA:       Peripheral IV - Single Lumen 11/03/20 0938 20 G Anterior;Right Upper Arm (Active)   Number of days: 0       Prev airway:   7/24/2013: PreO2 times 5 min. Easy mask and grade 1 view. Intubated with 8.0 ET tube without any diff. +EtCO2 and BBS=. Mucosa and dentition remain in preanesthetic condition. Modified RSI used.     Drips: None documented.      Patient Active Problem List   Diagnosis    Blindness of both eyes due to diabetes mellitus    Diabetes mellitus    Hyperkalemia    Hyperphosphatemia    Hypertension    Status post below-knee amputation    CKD (chronic kidney disease) stage 4, GFR 15-29 ml/min    CKD (chronic kidney disease) stage 5, GFR less than 15 ml/min    ESRD on dialysis    ESRD (end stage renal disease)    Encounter for pre-transplant evaluation for kidney and pancreas transplant       Review of patient's allergies indicates:  No Known Allergies    Current Inpatient Medications:   acetaminophen  650 mg Per NG tube Once    Thymoglobulin 1.5mg/kg, hydrocortisone 20mg, heparin 1000 units in NS 500ml (Peripheral line)  1.5 mg/kg (Order-Specific) Intravenous Once    diphenhydrAMINE  50 mg Intravenous Once    heparin (porcine)  5,000 Units Subcutaneous Once    methylPREDNISolone (SOLU-Medrol) IVPB (doses > 250 mg)  500 mg Intravenous Once       No current facility-administered medications  on file prior to encounter.      Current Outpatient Medications on File Prior to Encounter   Medication Sig Dispense Refill    amlodipine (NORVASC) 10 MG tablet Take 10 mg by mouth every evening.       atenoloL (TENORMIN) 50 MG tablet Take 50 mg by mouth once daily.      busPIRone (BUSPAR) 5 MG Tab Take 5 mg by mouth 2 (two) times daily.      calcium acetate,phosphat bind, (PHOSLO) 667 mg capsule Take 1,334 mg by mouth 3 (three) times daily with meals.      fenofibrate 160 MG Tab Take 160 mg by mouth once daily.      ferrous sulfate 325 (65 FE) MG EC tablet Take 325 mg by mouth once daily.      furosemide (LASIX) 80 MG tablet Take 80 mg by mouth once daily.      gabapentin (NEURONTIN) 300 MG capsule Take 300 mg by mouth 2 (two) times daily.       GLIPIZIDE ORAL Take 2.5 mg by mouth 2 (two) times daily with meals.      haloperidoL (HALDOL) 1 MG tablet Take 1 mg by mouth 2 (two) times daily.      hydrALAZINE (APRESOLINE) 50 MG tablet Take 50 mg by mouth 3 (three) times daily.      insulin (LANTUS SOLOSTAR U-100 INSULIN) glargine 100 units/mL (3mL) SubQ pen Inject 15 Units into the skin every evening.      pravastatin (PRAVACHOL) 40 MG tablet Take 40 mg by mouth once daily.         Past Surgical History:   Procedure Laterality Date    EYE SURGERY      LEG AMPUTATION Left     RETINAL DETACHMENT SURGERY      TOE AMPUTATION Right        Social History     Socioeconomic History    Marital status:      Spouse name: Not on file    Number of children: Not on file    Years of education: Not on file    Highest education level: Not on file   Occupational History    Occupation: disabled   Social Needs    Financial resource strain: Not on file    Food insecurity     Worry: Not on file     Inability: Not on file    Transportation needs     Medical: Not on file     Non-medical: Not on file   Tobacco Use    Smoking status: Former Smoker     Packs/day: 0.25     Years: 10.00     Pack years: 2.50     Smokeless tobacco: Never Used   Substance and Sexual Activity    Alcohol use: Yes     Comment: occasional    Drug use: No    Sexual activity: Yes     Partners: Female     Birth control/protection: Condom   Lifestyle    Physical activity     Days per week: Not on file     Minutes per session: Not on file    Stress: Not on file   Relationships    Social connections     Talks on phone: Not on file     Gets together: Not on file     Attends Congregational service: Not on file     Active member of club or organization: Not on file     Attends meetings of clubs or organizations: Not on file     Relationship status: Not on file   Other Topics Concern    Not on file   Social History Narrative    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        OBJECTIVE:     Vital Signs Range (Last 24H):  Temp:  [36.7 °C (98 °F)]   Pulse:  [70-85]   Resp:  [18]   BP: (176-224)/()   SpO2:  [99 %]       Significant Labs:  Lab Results   Component Value Date    WBC 5.68 11/03/2020    HGB 8.6 (L) 11/03/2020    HCT 27.8 (L) 11/03/2020     11/03/2020    CHOL 118 (L) 11/03/2020    TRIG 84 11/03/2020    HDL 41 11/03/2020    ALT 43 11/03/2020    AST 36 11/03/2020     11/03/2020    K 5.3 (H) 11/03/2020     11/03/2020    CREATININE 10.0 (H) 11/03/2020    BUN 75 (H) 11/03/2020    CO2 22 (L) 11/03/2020    INR 1.0 11/03/2020    HGBA1C 8.2 (H) 11/03/2020       Diagnostic Studies: No relevant studies.    EKG: No results found for this or any previous visit.    ECHOCARDIOGRAM:  TTE:  No results found for this or any previous visit.      ASSESSMENT/PLAN:         Anesthesia Evaluation    I have reviewed the Patient Summary Reports.    I have reviewed the Nursing Notes. I have reviewed the NPO Status.   I have reviewed the Medications.     Review of Systems  Anesthesia Hx:  History of prior surgery of interest to airway management or planning: Denies Family Hx of Anesthesia complications.    Social:  Social Alcohol Use, Former Smoker     Hematology/Oncology:     Oncology Normal    -- Anemia:   Cardiovascular:   Hypertension    Pulmonary:  Pulmonary Normal    Renal/:   Chronic Renal Disease, ESRD, Dialysis    Hepatic/GI:  Hepatic/GI Normal    Musculoskeletal:  Musculoskeletal Normal    Neurological:  Neurology Normal    Endocrine:   Diabetes, poorly controlled    Psych:  Psychiatric Normal           Physical Exam  General:  Well nourished    Airway/Jaw/Neck:  Airway Findings: Mouth Opening: Normal Tongue: Normal  General Airway Assessment: Adult  Mallampati: III  Improves to II with phonation.  TM Distance: Normal, at least 6 cm  Jaw/Neck Findings:  Neck ROM: Normal ROM      Dental:  Dental Findings: In tact, Periodontal disease, Mild   Chest/Lungs:  Chest/Lungs Clear    Heart/Vascular:  Heart Findings: Normal     Musculoskeletal:  Musculoskeletal Findings:     Mental Status:  Mental Status Findings:  Cooperative, Alert and Oriented         Anesthesia Plan  Type of Anesthesia, risks & benefits discussed:  Anesthesia Type:  general  Patient's Preference:   Intra-op Monitoring Plan: standard ASA monitors, arterial line and central line  Intra-op Monitoring Plan Comments:   Post Op Pain Control Plan: per primary service following discharge from PACU, multimodal analgesia and IV/PO Opioids PRN  Post Op Pain Control Plan Comments:   Induction:   IV  Beta Blocker:  Patient is not currently on a Beta-Blocker (No further documentation required).       Informed Consent: Patient understands risks and agrees with Anesthesia plan.  Questions answered. Anesthesia consent signed with patient.  ASA Score: 3  emergent   Day of Surgery Review of History & Physical: I have interviewed and examined the patient. I have reviewed the patient's H&P dated:    H&P update referred to the provider.         Ready For Surgery From Anesthesia Perspective.

## 2020-11-03 NOTE — HPI
Mr. Rosales is a 40 y.o. Black or  male with ESRD secondary to diabetic nephropathy (T1DM) who presents for direct admission 11/3 for kidney/pancreas transplant. Patient is currently on hemodialysis started on 3/16/2020. Patient is dialyzing on TTS schedule.  Last HD Saturday 10/31/20- removed 3.2L. Patient reports that he is tolerating dialysis well. He has a LUE AV graft for dialysis access. Dry weight 188.6 kg. Standing scale weight on admit 96.25 kg. Native UOP- voids 3-4 times a day 200+ cc (~1L). Pre op labs and imaging pending. Donor is PHS increased risk, CMV, EBV IgG, and HCV YIN+. OR tentatively scheduled for 1700 with Dr. Romero. Induction will be Thymoglobulin. Pt accompanied by sister.  Assessment is unremarkable, he denies CP, SOB, COVID contacts.  Rapid COVID ordered. Plan for HD today.

## 2020-11-03 NOTE — PROGRESS NOTES
NP aware of patient's high bp (200/102) manual. Patient had not taken am meds. NP said she would order something. Patient left unit for dialysis.

## 2020-11-03 NOTE — H&P
[facility]  Kidney Transplant  H&P      Subjective:     Chief Complaint/Reason for Admission: SPKTxp    History of Present Illness:  Mr. Rosales is a 40 y.o. Black or  male with ESRD secondary to diabetic nephropathy (T1DM) who presents for direct admission 11/3 for kidney/pancreas transplant. Patient is currently on hemodialysis started on 3/16/2020. Patient is dialyzing on TTS schedule.  Last HD Saturday 10/31/20- removed 3.2L. Patient reports that he is tolerating dialysis well. He has a LUE AV graft for dialysis access. Dry weight 188.6 kg. Standing scale weight on admit 96.25 kg. Native UOP- voids 3-4 times a day 200+ cc (~1L). Pre op labs and imaging pending. Donor is PHS increased risk, CMV, EBV IgG, and HCV YIN+. OR tentatively scheduled for 1700 with Dr. Romero. Induction will be Thymoglobulin. Pt accompanied by sister.  Assessment is unremarkable, he denies CP, SOB, COVID contacts.  Rapid COVID ordered.    Plan for HD today.         Dialysis History: Mr. Ng with ESRD, requiring chronic dialysis who is on hemodialysis started on 2016. Patient is dialyzing on TTS schedule.  Patient reports that he is tolerating dialysis well.    Date of Last Dialysis: 10/31/2020    Native urine output per day: ~1L    Previous Transplant: no    PTA Medications   Medication Sig    amlodipine (NORVASC) 10 MG tablet Take 10 mg by mouth every evening.     atenoloL (TENORMIN) 50 MG tablet Take 50 mg by mouth once daily.    busPIRone (BUSPAR) 5 MG Tab Take 5 mg by mouth 2 (two) times daily.    calcium acetate,phosphat bind, (PHOSLO) 667 mg capsule Take 1,334 mg by mouth 3 (three) times daily with meals.    fenofibrate 160 MG Tab Take 160 mg by mouth once daily.    ferrous sulfate 325 (65 FE) MG EC tablet Take 325 mg by mouth once daily.    furosemide (LASIX) 80 MG tablet Take 80 mg by mouth once daily.    gabapentin (NEURONTIN) 300 MG capsule Take 300 mg by mouth 2 (two) times daily.      GLIPIZIDE ORAL Take 2.5 mg by mouth 2 (two) times daily with meals.    haloperidoL (HALDOL) 1 MG tablet Take 1 mg by mouth 2 (two) times daily.    hydrALAZINE (APRESOLINE) 50 MG tablet Take 50 mg by mouth 3 (three) times daily.    insulin (LANTUS SOLOSTAR U-100 INSULIN) glargine 100 units/mL (3mL) SubQ pen Inject 15 Units into the skin every evening.    pravastatin (PRAVACHOL) 40 MG tablet Take 40 mg by mouth once daily.       Review of patient's allergies indicates:  No Known Allergies    Past Medical History:   Diagnosis Date    Anxiety     Cataract     Diabetes mellitus     Diabetic retinopathy     Disorder of kidney and ureter     Encounter for blood transfusion     Glaucoma     High cholesterol     Hypertension     Retinal detachment      Past Surgical History:   Procedure Laterality Date    EYE SURGERY      LEG AMPUTATION Left     RETINAL DETACHMENT SURGERY      TOE AMPUTATION Right      Family History     Problem Relation (Age of Onset)    Coronary artery disease Mother    Diabetes Mother, Sister, Brother, Maternal Aunt, Maternal Uncle    Glaucoma Mother    Heart disease Mother, Maternal Aunt, Maternal Uncle    Hypertension Mother, Brother, Maternal Aunt, Maternal Uncle    No Known Problems Father    Stroke Mother, Maternal Aunt        Tobacco Use    Smoking status: Former Smoker     Packs/day: 0.25     Years: 10.00     Pack years: 2.50    Smokeless tobacco: Never Used   Substance and Sexual Activity    Alcohol use: Yes     Comment: occasional    Drug use: No    Sexual activity: Yes     Partners: Female     Birth control/protection: Condom        Review of Systems   Constitutional: Negative for activity change, appetite change, chills, fatigue and fever.   HENT: Negative for congestion and trouble swallowing.    Eyes: Positive for visual disturbance.   Respiratory: Negative for cough, shortness of breath and wheezing.    Cardiovascular: Negative for chest pain and leg swelling.    Gastrointestinal: Negative for abdominal distention, abdominal pain, constipation, diarrhea, nausea and vomiting.   Endocrine: Negative.    Genitourinary: Negative for decreased urine volume, difficulty urinating, dysuria, hematuria and urgency.   Musculoskeletal: Negative for arthralgias.   Skin: Negative for color change, pallor, rash and wound.   Allergic/Immunologic: Negative for immunocompromised state.   Neurological: Negative for dizziness, tremors, seizures, syncope, weakness and headaches.   Hematological: Does not bruise/bleed easily.   Psychiatric/Behavioral: Negative for agitation, confusion, decreased concentration, sleep disturbance and suicidal ideas. The patient is not nervous/anxious.      Objective:     Vital Signs (Most Recent):        Weight: 96.2 kg (212 lb 3.1 oz)  Body mass index is 31.79 kg/m².     Physical Exam  Vitals signs and nursing note reviewed.   Constitutional:       General: He is not in acute distress.     Appearance: He is well-developed. He is not diaphoretic.   HENT:      Head: Normocephalic and atraumatic.      Mouth/Throat:      Pharynx: No oropharyngeal exudate.   Eyes:      General: No scleral icterus.     Comments: Pt is legally blind   Neck:      Musculoskeletal: Normal range of motion and neck supple.      Thyroid: No thyromegaly.   Cardiovascular:      Rate and Rhythm: Normal rate and regular rhythm.      Heart sounds: Normal heart sounds. No murmur.   Pulmonary:      Effort: Pulmonary effort is normal. No respiratory distress.      Breath sounds: No wheezing or rales.   Abdominal:      General: Abdomen is flat. Bowel sounds are normal. There is no distension.      Palpations: Abdomen is soft.      Tenderness: There is no abdominal tenderness. There is no guarding.   Genitourinary:     Penis: Normal.    Musculoskeletal: Normal range of motion.   Skin:     General: Skin is warm and dry.      Capillary Refill: Capillary refill takes 2 to 3 seconds.      Findings: No  erythema.   Neurological:      Mental Status: He is alert and oriented to person, place, and time.   Psychiatric:         Mood and Affect: Mood normal.         Behavior: Behavior normal.         Thought Content: Thought content normal.         Judgment: Judgment normal.         Laboratory  CBC: No results for input(s): WBC, RBC, HGB, HCT, PLT, MCV, MCH, MCHC in the last 168 hours.  CMP: No results for input(s): GLU, CALCIUM, ALBUMIN, PROT, NA, K, CO2, CL, BUN, CREATININE, ALKPHOS, ALT, AST in the last 168 hours.    Invalid input(s): BILITO  Coagulation: No results for input(s): PT, APTT in the last 168 hours.  Labs within the past 24 hours have been reviewed.    Diagnostic Results:  Chest X-Ray: No results found for this or any previous visit.    Assessment/Plan:     * ESRD on dialysis  - HD TTS, plan for HD today, last HD 10/31/2020      Status post below-knee amputation  - consult PT/OT after transplant      Blindness of both eyes due to diabetes mellitus            The patient presents for kidney/pancreas transplant.  There are no apparent contraindications to proceeding with the planned transplant.  The patient understands that the transplant could potentially be cancelled pending detailed assessment of the donor organ.  He will receive Thymoglobulin induction.  A complete discussion of the transplant procedure, including risks, complications, and alternatives, as well as any donor-specific risk factors requiring specific disclosure, will be carried out by the responsible staff surgeon prior to the procedure.     Discharge Planning:  Admitted for transplant    Holly Enriquez NP  Kidney Transplant  [facility]

## 2020-11-03 NOTE — PROGRESS NOTES
Notified JAMIL Enriquez NP about patient's high pressures. She asked that he stand and have his pressure checked (150/63) and then rechecked 20 minutes later (134/65).

## 2020-11-03 NOTE — PROGRESS NOTES
Arrived per w/c. Assisted to bed. BP upon arrival 217/111. Alert oriented, no distress. 3 hr HD started via L UA AVF.

## 2020-11-03 NOTE — PROGRESS NOTES
TERESITA received notification from inpatient nurse coordinator that pt's caregiver/sister Dallas Oh was inquiring about lodging locally. SW also received information that pt is 100% blind and sister was wondering if she could stay inpatient to assist (past COVID-19 visitor hours). TERESITA notified TSU director Osbaldo. TERESITA tried to call pt's sister back to notify her of exception but was not available. TERESITA left voicemail and notified pt's aunt/other caregiver Malena Rosales.     TERESITA remains available at [phone].

## 2020-11-04 PROBLEM — T38.0X5A ADRENAL CORTICAL STEROIDS CAUSING ADVERSE EFFECT IN THERAPEUTIC USE: Status: ACTIVE | Noted: 2020-11-04

## 2020-11-04 PROBLEM — E16.2 HYPOGLYCEMIA: Status: ACTIVE | Noted: 2020-11-04

## 2020-11-04 PROBLEM — Z29.89 PROPHYLACTIC IMMUNOTHERAPY: Status: ACTIVE | Noted: 2020-11-04

## 2020-11-04 LAB
ALBUMIN SERPL BCP-MCNC: 2.8 G/DL (ref 3.5–5.2)
ALBUMIN SERPL BCP-MCNC: 2.8 G/DL (ref 3.5–5.2)
ALLENS TEST: ABNORMAL
ALP SERPL-CCNC: 68 U/L (ref 55–135)
ALP SERPL-CCNC: 80 U/L (ref 55–135)
ALT SERPL W/O P-5'-P-CCNC: 43 U/L (ref 10–44)
ALT SERPL W/O P-5'-P-CCNC: 49 U/L (ref 10–44)
AMYLASE SERPL-CCNC: 325 U/L (ref 20–110)
AMYLASE SERPL-CCNC: 493 U/L (ref 20–110)
AMYLASE SERPL-CCNC: 537 U/L (ref 20–110)
AMYLASE SERPL-CCNC: 537 U/L (ref 20–110)
AMYLASE SERPL-CCNC: 599 U/L (ref 20–110)
ANION GAP SERPL CALC-SCNC: 12 MMOL/L (ref 8–16)
ANION GAP SERPL CALC-SCNC: 13 MMOL/L (ref 8–16)
ANION GAP SERPL CALC-SCNC: 13 MMOL/L (ref 8–16)
ANION GAP SERPL CALC-SCNC: 16 MMOL/L (ref 8–16)
ANION GAP SERPL CALC-SCNC: 16 MMOL/L (ref 8–16)
ANISOCYTOSIS BLD QL SMEAR: SLIGHT
AST SERPL-CCNC: 52 U/L (ref 10–40)
AST SERPL-CCNC: 56 U/L (ref 10–40)
BASO STIPL BLD QL SMEAR: ABNORMAL
BASOPHILS # BLD AUTO: 0.01 K/UL (ref 0–0.2)
BASOPHILS # BLD AUTO: 0.01 K/UL (ref 0–0.2)
BASOPHILS # BLD AUTO: 0.02 K/UL (ref 0–0.2)
BASOPHILS # BLD AUTO: ABNORMAL K/UL (ref 0–0.2)
BASOPHILS # BLD AUTO: ABNORMAL K/UL (ref 0–0.2)
BASOPHILS NFR BLD: 0 % (ref 0–1.9)
BASOPHILS NFR BLD: 0 % (ref 0–1.9)
BASOPHILS NFR BLD: 0.1 % (ref 0–1.9)
BILIRUB SERPL-MCNC: 0.3 MG/DL (ref 0.1–1)
BILIRUB SERPL-MCNC: 0.5 MG/DL (ref 0.1–1)
BUN SERPL-MCNC: 38 MG/DL (ref 6–20)
BUN SERPL-MCNC: 38 MG/DL (ref 6–20)
BUN SERPL-MCNC: 40 MG/DL (ref 6–20)
BUN SERPL-MCNC: 44 MG/DL (ref 6–20)
BUN SERPL-MCNC: 45 MG/DL (ref 6–20)
CA-I BLDV-SCNC: 0.95 MMOL/L (ref 1.06–1.42)
CALCIUM SERPL-MCNC: 7.2 MG/DL (ref 8.7–10.5)
CALCIUM SERPL-MCNC: 7.4 MG/DL (ref 8.7–10.5)
CALCIUM SERPL-MCNC: 7.6 MG/DL (ref 8.7–10.5)
CALCIUM SERPL-MCNC: 7.9 MG/DL (ref 8.7–10.5)
CALCIUM SERPL-MCNC: 7.9 MG/DL (ref 8.7–10.5)
CHLORIDE SERPL-SCNC: 103 MMOL/L (ref 95–110)
CHLORIDE SERPL-SCNC: 103 MMOL/L (ref 95–110)
CHLORIDE SERPL-SCNC: 104 MMOL/L (ref 95–110)
CHLORIDE SERPL-SCNC: 104 MMOL/L (ref 95–110)
CHLORIDE SERPL-SCNC: 105 MMOL/L (ref 95–110)
CO2 SERPL-SCNC: 22 MMOL/L (ref 23–29)
CO2 SERPL-SCNC: 23 MMOL/L (ref 23–29)
CREAT SERPL-MCNC: 5.9 MG/DL (ref 0.5–1.4)
CREAT SERPL-MCNC: 6.1 MG/DL (ref 0.5–1.4)
CREAT SERPL-MCNC: 6.1 MG/DL (ref 0.5–1.4)
CREAT SERPL-MCNC: 6.2 MG/DL (ref 0.5–1.4)
CREAT SERPL-MCNC: 6.2 MG/DL (ref 0.5–1.4)
DELSYS: ABNORMAL
DIFFERENTIAL METHOD: ABNORMAL
EOSINOPHIL # BLD AUTO: 0 K/UL (ref 0–0.5)
EOSINOPHIL # BLD AUTO: ABNORMAL K/UL (ref 0–0.5)
EOSINOPHIL # BLD AUTO: ABNORMAL K/UL (ref 0–0.5)
EOSINOPHIL NFR BLD: 0 % (ref 0–8)
EOSINOPHIL NFR BLD: 0.1 % (ref 0–8)
EOSINOPHIL NFR BLD: 0.1 % (ref 0–8)
ERYTHROCYTE [DISTWIDTH] IN BLOOD BY AUTOMATED COUNT: 16.2 % (ref 11.5–14.5)
ERYTHROCYTE [DISTWIDTH] IN BLOOD BY AUTOMATED COUNT: 16.2 % (ref 11.5–14.5)
ERYTHROCYTE [DISTWIDTH] IN BLOOD BY AUTOMATED COUNT: 16.5 % (ref 11.5–14.5)
ERYTHROCYTE [DISTWIDTH] IN BLOOD BY AUTOMATED COUNT: 16.6 % (ref 11.5–14.5)
ERYTHROCYTE [DISTWIDTH] IN BLOOD BY AUTOMATED COUNT: 16.7 % (ref 11.5–14.5)
EST. GFR  (AFRICAN AMERICAN): 12.1 ML/MIN/1.73 M^2
EST. GFR  (AFRICAN AMERICAN): 12.1 ML/MIN/1.73 M^2
EST. GFR  (AFRICAN AMERICAN): 12.3 ML/MIN/1.73 M^2
EST. GFR  (AFRICAN AMERICAN): 12.3 ML/MIN/1.73 M^2
EST. GFR  (AFRICAN AMERICAN): 12.9 ML/MIN/1.73 M^2
EST. GFR  (NON AFRICAN AMERICAN): 10.5 ML/MIN/1.73 M^2
EST. GFR  (NON AFRICAN AMERICAN): 10.5 ML/MIN/1.73 M^2
EST. GFR  (NON AFRICAN AMERICAN): 10.7 ML/MIN/1.73 M^2
EST. GFR  (NON AFRICAN AMERICAN): 10.7 ML/MIN/1.73 M^2
EST. GFR  (NON AFRICAN AMERICAN): 11.1 ML/MIN/1.73 M^2
FLOW: 3
FLOW: 3.5
FLOW: 6
GLUCOSE SERPL-MCNC: 111 MG/DL (ref 70–110)
GLUCOSE SERPL-MCNC: 176 MG/DL (ref 70–110)
GLUCOSE SERPL-MCNC: 198 MG/DL (ref 70–110)
GLUCOSE SERPL-MCNC: 39 MG/DL (ref 70–110)
GLUCOSE SERPL-MCNC: 39 MG/DL (ref 70–110)
GLUCOSE SERPL-MCNC: 66 MG/DL (ref 70–110)
GLUCOSE SERPL-MCNC: 79 MG/DL (ref 70–110)
HBV SURFACE AB SER QL IA: POSITIVE
HBV SURFACE AB SERPL IA-ACNC: 467 MIU/ML
HCO3 UR-SCNC: 17.2 MMOL/L (ref 24–28)
HCO3 UR-SCNC: 20.9 MMOL/L (ref 24–28)
HCO3 UR-SCNC: 23.3 MMOL/L (ref 24–28)
HCO3 UR-SCNC: 23.4 MMOL/L (ref 24–28)
HCO3 UR-SCNC: 24.4 MMOL/L (ref 24–28)
HCT VFR BLD AUTO: 25.8 % (ref 40–54)
HCT VFR BLD AUTO: 26.9 % (ref 40–54)
HCT VFR BLD AUTO: 27.8 % (ref 40–54)
HCT VFR BLD AUTO: 27.8 % (ref 40–54)
HCT VFR BLD AUTO: 28.2 % (ref 40–54)
HCT VFR BLD CALC: 21 %PCV (ref 36–54)
HCT VFR BLD CALC: 23 %PCV (ref 36–54)
HCT VFR BLD CALC: 26 %PCV (ref 36–54)
HGB BLD-MCNC: 8 G/DL (ref 14–18)
HGB BLD-MCNC: 8.2 G/DL (ref 14–18)
HGB BLD-MCNC: 8.6 G/DL (ref 14–18)
HGB BLD-MCNC: 8.6 G/DL (ref 14–18)
HGB BLD-MCNC: 8.9 G/DL (ref 14–18)
HYPOCHROMIA BLD QL SMEAR: ABNORMAL
IMM GRANULOCYTES # BLD AUTO: 0.05 K/UL (ref 0–0.04)
IMM GRANULOCYTES # BLD AUTO: 0.06 K/UL (ref 0–0.04)
IMM GRANULOCYTES # BLD AUTO: 0.08 K/UL (ref 0–0.04)
IMM GRANULOCYTES # BLD AUTO: ABNORMAL K/UL (ref 0–0.04)
IMM GRANULOCYTES # BLD AUTO: ABNORMAL K/UL (ref 0–0.04)
IMM GRANULOCYTES NFR BLD AUTO: 0.4 % (ref 0–0.5)
IMM GRANULOCYTES NFR BLD AUTO: 0.5 % (ref 0–0.5)
IMM GRANULOCYTES NFR BLD AUTO: 0.5 % (ref 0–0.5)
IMM GRANULOCYTES NFR BLD AUTO: ABNORMAL % (ref 0–0.5)
IMM GRANULOCYTES NFR BLD AUTO: ABNORMAL % (ref 0–0.5)
LIPASE SERPL-CCNC: 214 U/L (ref 4–60)
LIPASE SERPL-CCNC: 388 U/L (ref 4–60)
LIPASE SERPL-CCNC: 835 U/L (ref 4–60)
LIPASE SERPL-CCNC: >1000 U/L (ref 4–60)
LIPASE SERPL-CCNC: >1000 U/L (ref 4–60)
LYMPHOCYTES # BLD AUTO: 0 K/UL (ref 1–4.8)
LYMPHOCYTES # BLD AUTO: 0.1 K/UL (ref 1–4.8)
LYMPHOCYTES # BLD AUTO: 0.1 K/UL (ref 1–4.8)
LYMPHOCYTES # BLD AUTO: ABNORMAL K/UL (ref 1–4.8)
LYMPHOCYTES # BLD AUTO: ABNORMAL K/UL (ref 1–4.8)
LYMPHOCYTES NFR BLD: 0.3 % (ref 18–48)
LYMPHOCYTES NFR BLD: 0.4 % (ref 18–48)
LYMPHOCYTES NFR BLD: 0.6 % (ref 18–48)
LYMPHOCYTES NFR BLD: 2 % (ref 18–48)
LYMPHOCYTES NFR BLD: 2 % (ref 18–48)
MAGNESIUM SERPL-MCNC: 1.9 MG/DL (ref 1.6–2.6)
MCH RBC QN AUTO: 26.3 PG (ref 27–31)
MCH RBC QN AUTO: 27 PG (ref 27–31)
MCHC RBC AUTO-ENTMCNC: 30.5 G/DL (ref 32–36)
MCHC RBC AUTO-ENTMCNC: 30.9 G/DL (ref 32–36)
MCHC RBC AUTO-ENTMCNC: 30.9 G/DL (ref 32–36)
MCHC RBC AUTO-ENTMCNC: 31 G/DL (ref 32–36)
MCHC RBC AUTO-ENTMCNC: 31.6 G/DL (ref 32–36)
MCV RBC AUTO: 85 FL (ref 82–98)
MCV RBC AUTO: 86 FL (ref 82–98)
MCV RBC AUTO: 86 FL (ref 82–98)
MODE: ABNORMAL
MONOCYTES # BLD AUTO: 0.6 K/UL (ref 0.3–1)
MONOCYTES # BLD AUTO: 0.7 K/UL (ref 0.3–1)
MONOCYTES # BLD AUTO: 0.9 K/UL (ref 0.3–1)
MONOCYTES # BLD AUTO: ABNORMAL K/UL (ref 0.3–1)
MONOCYTES # BLD AUTO: ABNORMAL K/UL (ref 0.3–1)
MONOCYTES NFR BLD: 2 % (ref 4–15)
MONOCYTES NFR BLD: 2 % (ref 4–15)
MONOCYTES NFR BLD: 4.8 % (ref 4–15)
MONOCYTES NFR BLD: 5.9 % (ref 4–15)
MONOCYTES NFR BLD: 6.1 % (ref 4–15)
NEUTROPHILS # BLD AUTO: 11 K/UL (ref 1.8–7.7)
NEUTROPHILS # BLD AUTO: 11.6 K/UL (ref 1.8–7.7)
NEUTROPHILS # BLD AUTO: 14.1 K/UL (ref 1.8–7.7)
NEUTROPHILS # BLD AUTO: ABNORMAL K/UL (ref 1.8–7.7)
NEUTROPHILS # BLD AUTO: ABNORMAL K/UL (ref 1.8–7.7)
NEUTROPHILS NFR BLD: 78 % (ref 38–73)
NEUTROPHILS NFR BLD: 78 % (ref 38–73)
NEUTROPHILS NFR BLD: 92.8 % (ref 38–73)
NEUTROPHILS NFR BLD: 93.1 % (ref 38–73)
NEUTROPHILS NFR BLD: 94.1 % (ref 38–73)
NEUTS BAND NFR BLD MANUAL: 18 %
NEUTS BAND NFR BLD MANUAL: 18 %
NRBC BLD-RTO: 0 /100 WBC
OVALOCYTES BLD QL SMEAR: ABNORMAL
PCO2 BLDA: 27.4 MMHG (ref 35–45)
PCO2 BLDA: 34.2 MMHG (ref 35–45)
PCO2 BLDA: 39.3 MMHG (ref 35–45)
PCO2 BLDA: 39.8 MMHG (ref 35–45)
PCO2 BLDA: 43 MMHG (ref 35–45)
PH SMN: 7.36 [PH] (ref 7.35–7.45)
PH SMN: 7.38 [PH] (ref 7.35–7.45)
PH SMN: 7.38 [PH] (ref 7.35–7.45)
PH SMN: 7.39 [PH] (ref 7.35–7.45)
PH SMN: 7.41 [PH] (ref 7.35–7.45)
PHOSPHATE SERPL-MCNC: 5.3 MG/DL (ref 2.7–4.5)
PLATELET # BLD AUTO: 248 K/UL (ref 150–350)
PLATELET # BLD AUTO: 248 K/UL (ref 150–350)
PLATELET # BLD AUTO: 275 K/UL (ref 150–350)
PLATELET # BLD AUTO: 285 K/UL (ref 150–350)
PLATELET # BLD AUTO: 326 K/UL (ref 150–350)
PLATELET BLD QL SMEAR: ABNORMAL
PMV BLD AUTO: 9 FL (ref 9.2–12.9)
PMV BLD AUTO: 9 FL (ref 9.2–12.9)
PMV BLD AUTO: 9.5 FL (ref 9.2–12.9)
PMV BLD AUTO: 9.6 FL (ref 9.2–12.9)
PMV BLD AUTO: 9.8 FL (ref 9.2–12.9)
PO2 BLDA: 103 MMHG (ref 80–100)
PO2 BLDA: 68 MMHG (ref 80–100)
PO2 BLDA: 73 MMHG (ref 80–100)
PO2 BLDA: 75 MMHG (ref 80–100)
PO2 BLDA: 99 MMHG (ref 80–100)
POC BE: -1 MMOL/L
POC BE: -2 MMOL/L
POC BE: -2 MMOL/L
POC BE: -4 MMOL/L
POC BE: -8 MMOL/L
POC IONIZED CALCIUM: 0.88 MMOL/L (ref 1.06–1.42)
POC IONIZED CALCIUM: 1.1 MMOL/L (ref 1.06–1.42)
POC IONIZED CALCIUM: 1.16 MMOL/L (ref 1.06–1.42)
POC SATURATED O2: 93 % (ref 95–100)
POC SATURATED O2: 94 % (ref 95–100)
POC SATURATED O2: 94 % (ref 95–100)
POC SATURATED O2: 98 % (ref 95–100)
POC SATURATED O2: 98 % (ref 95–100)
POC TCO2: 18 MMOL/L (ref 23–27)
POC TCO2: 22 MMOL/L (ref 23–27)
POC TCO2: 24 MMOL/L (ref 23–27)
POC TCO2: 25 MMOL/L (ref 23–27)
POC TCO2: 26 MMOL/L (ref 23–27)
POCT GLUCOSE: 103 MG/DL (ref 70–110)
POCT GLUCOSE: 107 MG/DL (ref 70–110)
POCT GLUCOSE: 109 MG/DL (ref 70–110)
POCT GLUCOSE: 110 MG/DL (ref 70–110)
POCT GLUCOSE: 126 MG/DL (ref 70–110)
POCT GLUCOSE: 128 MG/DL (ref 70–110)
POCT GLUCOSE: 142 MG/DL (ref 70–110)
POCT GLUCOSE: 199 MG/DL (ref 70–110)
POCT GLUCOSE: 33 MG/DL (ref 70–110)
POCT GLUCOSE: 37 MG/DL (ref 70–110)
POCT GLUCOSE: 45 MG/DL (ref 70–110)
POCT GLUCOSE: 50 MG/DL (ref 70–110)
POCT GLUCOSE: 53 MG/DL (ref 70–110)
POCT GLUCOSE: 58 MG/DL (ref 70–110)
POCT GLUCOSE: 63 MG/DL (ref 70–110)
POCT GLUCOSE: 66 MG/DL (ref 70–110)
POCT GLUCOSE: 71 MG/DL (ref 70–110)
POCT GLUCOSE: 77 MG/DL (ref 70–110)
POCT GLUCOSE: 78 MG/DL (ref 70–110)
POCT GLUCOSE: 79 MG/DL (ref 70–110)
POCT GLUCOSE: 80 MG/DL (ref 70–110)
POCT GLUCOSE: 82 MG/DL (ref 70–110)
POCT GLUCOSE: 84 MG/DL (ref 70–110)
POCT GLUCOSE: 86 MG/DL (ref 70–110)
POCT GLUCOSE: 88 MG/DL (ref 70–110)
POCT GLUCOSE: 90 MG/DL (ref 70–110)
POCT GLUCOSE: 92 MG/DL (ref 70–110)
POCT GLUCOSE: 93 MG/DL (ref 70–110)
POCT GLUCOSE: 93 MG/DL (ref 70–110)
POCT GLUCOSE: 98 MG/DL (ref 70–110)
POCT GLUCOSE: 99 MG/DL (ref 70–110)
POIKILOCYTOSIS BLD QL SMEAR: SLIGHT
POLYCHROMASIA BLD QL SMEAR: ABNORMAL
POTASSIUM BLD-SCNC: 3.7 MMOL/L (ref 3.5–5.1)
POTASSIUM BLD-SCNC: 4 MMOL/L (ref 3.5–5.1)
POTASSIUM BLD-SCNC: 4.4 MMOL/L (ref 3.5–5.1)
POTASSIUM SERPL-SCNC: 3.8 MMOL/L (ref 3.5–5.1)
POTASSIUM SERPL-SCNC: 3.8 MMOL/L (ref 3.5–5.1)
POTASSIUM SERPL-SCNC: 4 MMOL/L (ref 3.5–5.1)
POTASSIUM SERPL-SCNC: 4.1 MMOL/L (ref 3.5–5.1)
POTASSIUM SERPL-SCNC: 5.6 MMOL/L (ref 3.5–5.1)
PROT SERPL-MCNC: 5.5 G/DL (ref 6–8.4)
PROT SERPL-MCNC: 5.6 G/DL (ref 6–8.4)
RBC # BLD AUTO: 3.04 M/UL (ref 4.6–6.2)
RBC # BLD AUTO: 3.12 M/UL (ref 4.6–6.2)
RBC # BLD AUTO: 3.27 M/UL (ref 4.6–6.2)
RBC # BLD AUTO: 3.27 M/UL (ref 4.6–6.2)
RBC # BLD AUTO: 3.3 M/UL (ref 4.6–6.2)
SAMPLE: ABNORMAL
SCHISTOCYTES BLD QL SMEAR: ABNORMAL
SITE: ABNORMAL
SODIUM BLD-SCNC: 137 MMOL/L (ref 136–145)
SODIUM BLD-SCNC: 138 MMOL/L (ref 136–145)
SODIUM BLD-SCNC: 140 MMOL/L (ref 136–145)
SODIUM SERPL-SCNC: 138 MMOL/L (ref 136–145)
SODIUM SERPL-SCNC: 139 MMOL/L (ref 136–145)
SODIUM SERPL-SCNC: 141 MMOL/L (ref 136–145)
SP02: 97
SP02: 99
WBC # BLD AUTO: 11.8 K/UL (ref 3.9–12.7)
WBC # BLD AUTO: 12.35 K/UL (ref 3.9–12.7)
WBC # BLD AUTO: 15.17 K/UL (ref 3.9–12.7)
WBC # BLD AUTO: 3.11 K/UL (ref 3.9–12.7)
WBC # BLD AUTO: 3.11 K/UL (ref 3.9–12.7)

## 2020-11-04 PROCEDURE — 84100 ASSAY OF PHOSPHORUS: CPT

## 2020-11-04 PROCEDURE — 82150 ASSAY OF AMYLASE: CPT | Mod: 91

## 2020-11-04 PROCEDURE — 63600175 PHARM REV CODE 636 W HCPCS: Performed by: NURSE ANESTHETIST, CERTIFIED REGISTERED

## 2020-11-04 PROCEDURE — 80053 COMPREHEN METABOLIC PANEL: CPT | Mod: 91

## 2020-11-04 PROCEDURE — 99232 PR SUBSEQUENT HOSPITAL CARE,LEVL II: ICD-10-PCS | Mod: ,,, | Performed by: NURSE PRACTITIONER

## 2020-11-04 PROCEDURE — 97530 THERAPEUTIC ACTIVITIES: CPT

## 2020-11-04 PROCEDURE — 63600175 PHARM REV CODE 636 W HCPCS: Performed by: STUDENT IN AN ORGANIZED HEALTH CARE EDUCATION/TRAINING PROGRAM

## 2020-11-04 PROCEDURE — 81200002 HC PANCREAS ACQUISITION CHARGE

## 2020-11-04 PROCEDURE — S5010 5% DEXTROSE AND 0.45% SALINE: HCPCS | Performed by: TRANSPLANT SURGERY

## 2020-11-04 PROCEDURE — 99900035 HC TECH TIME PER 15 MIN (STAT)

## 2020-11-04 PROCEDURE — 20000000 HC ICU ROOM

## 2020-11-04 PROCEDURE — 82330 ASSAY OF CALCIUM: CPT

## 2020-11-04 PROCEDURE — 85007 BL SMEAR W/DIFF WBC COUNT: CPT

## 2020-11-04 PROCEDURE — 25000003 PHARM REV CODE 250: Performed by: NURSE ANESTHETIST, CERTIFIED REGISTERED

## 2020-11-04 PROCEDURE — 37799 UNLISTED PX VASCULAR SURGERY: CPT

## 2020-11-04 PROCEDURE — 83690 ASSAY OF LIPASE: CPT | Mod: 91

## 2020-11-04 PROCEDURE — 84132 ASSAY OF SERUM POTASSIUM: CPT

## 2020-11-04 PROCEDURE — 94799 UNLISTED PULMONARY SVC/PX: CPT

## 2020-11-04 PROCEDURE — 80048 BASIC METABOLIC PNL TOTAL CA: CPT | Mod: 91

## 2020-11-04 PROCEDURE — 94770 HC EXHALED C02 TEST: CPT

## 2020-11-04 PROCEDURE — 27000221 HC OXYGEN, UP TO 24 HOURS

## 2020-11-04 PROCEDURE — 25000003 PHARM REV CODE 250: Performed by: TRANSPLANT SURGERY

## 2020-11-04 PROCEDURE — 25000003 PHARM REV CODE 250: Performed by: STUDENT IN AN ORGANIZED HEALTH CARE EDUCATION/TRAINING PROGRAM

## 2020-11-04 PROCEDURE — 99232 SBSQ HOSP IP/OBS MODERATE 35: CPT | Mod: ,,, | Performed by: NURSE PRACTITIONER

## 2020-11-04 PROCEDURE — 80053 COMPREHEN METABOLIC PANEL: CPT

## 2020-11-04 PROCEDURE — 25000242 PHARM REV CODE 250 ALT 637 W/ HCPCS: Performed by: STUDENT IN AN ORGANIZED HEALTH CARE EDUCATION/TRAINING PROGRAM

## 2020-11-04 PROCEDURE — 99232 PR SUBSEQUENT HOSPITAL CARE,LEVL II: ICD-10-PCS | Mod: ,,, | Performed by: ANESTHESIOLOGY

## 2020-11-04 PROCEDURE — 94640 AIRWAY INHALATION TREATMENT: CPT

## 2020-11-04 PROCEDURE — 85027 COMPLETE CBC AUTOMATED: CPT

## 2020-11-04 PROCEDURE — 84295 ASSAY OF SERUM SODIUM: CPT

## 2020-11-04 PROCEDURE — 97161 PT EVAL LOW COMPLEX 20 MIN: CPT

## 2020-11-04 PROCEDURE — 81200001 HC KIDNEY ACQUISITION - CADAVER

## 2020-11-04 PROCEDURE — 83735 ASSAY OF MAGNESIUM: CPT

## 2020-11-04 PROCEDURE — 85025 COMPLETE CBC W/AUTO DIFF WBC: CPT | Mod: 91

## 2020-11-04 PROCEDURE — S5010 5% DEXTROSE AND 0.45% SALINE: HCPCS | Performed by: STUDENT IN AN ORGANIZED HEALTH CARE EDUCATION/TRAINING PROGRAM

## 2020-11-04 PROCEDURE — 94761 N-INVAS EAR/PLS OXIMETRY MLT: CPT

## 2020-11-04 PROCEDURE — P9047 ALBUMIN (HUMAN), 25%, 50ML: HCPCS | Mod: JG | Performed by: TRANSPLANT SURGERY

## 2020-11-04 PROCEDURE — 82803 BLOOD GASES ANY COMBINATION: CPT

## 2020-11-04 PROCEDURE — 99232 SBSQ HOSP IP/OBS MODERATE 35: CPT | Mod: ,,, | Performed by: ANESTHESIOLOGY

## 2020-11-04 PROCEDURE — 63600175 PHARM REV CODE 636 W HCPCS: Performed by: TRANSPLANT SURGERY

## 2020-11-04 PROCEDURE — 81300003 HC PANCREAS TRANSPORT, GROUND 4-5 HOURS

## 2020-11-04 PROCEDURE — 85014 HEMATOCRIT: CPT

## 2020-11-04 PROCEDURE — 81300002 HC KIDNEY TRANSPORT, GROUND 4-5 HOURS

## 2020-11-04 RX ORDER — NYSTATIN 100000 [USP'U]/ML
500000 SUSPENSION ORAL 4 TIMES DAILY
Status: DISCONTINUED | OUTPATIENT
Start: 2020-11-11 | End: 2020-11-09 | Stop reason: HOSPADM

## 2020-11-04 RX ORDER — ONDANSETRON 2 MG/ML
4 INJECTION INTRAMUSCULAR; INTRAVENOUS ONCE AS NEEDED
Status: DISCONTINUED | OUTPATIENT
Start: 2020-11-04 | End: 2020-11-09 | Stop reason: HOSPADM

## 2020-11-04 RX ORDER — NIFEDIPINE 30 MG/1
30 TABLET, EXTENDED RELEASE ORAL DAILY
Status: DISCONTINUED | OUTPATIENT
Start: 2020-11-04 | End: 2020-11-06

## 2020-11-04 RX ORDER — DEXTROSE MONOHYDRATE AND SODIUM CHLORIDE 5; .45 G/100ML; G/100ML
INJECTION, SOLUTION INTRAVENOUS CONTINUOUS
Status: DISCONTINUED | OUTPATIENT
Start: 2020-11-04 | End: 2020-11-04

## 2020-11-04 RX ORDER — MYCOPHENOLATE MOFETIL 200 MG/ML
1000 POWDER, FOR SUSPENSION ORAL 2 TIMES DAILY
Status: DISCONTINUED | OUTPATIENT
Start: 2020-11-04 | End: 2020-11-05

## 2020-11-04 RX ORDER — ALBUMIN HUMAN 250 G/1000ML
25 SOLUTION INTRAVENOUS EVERY 6 HOURS
Status: COMPLETED | OUTPATIENT
Start: 2020-11-04 | End: 2020-11-05

## 2020-11-04 RX ORDER — SODIUM CHLORIDE 9 MG/ML
INJECTION, SOLUTION INTRAVENOUS CONTINUOUS
Status: DISCONTINUED | OUTPATIENT
Start: 2020-11-04 | End: 2020-11-04

## 2020-11-04 RX ORDER — HEPARIN SODIUM 5000 [USP'U]/ML
5000 INJECTION, SOLUTION INTRAVENOUS; SUBCUTANEOUS 3 TIMES DAILY
Status: DISCONTINUED | OUTPATIENT
Start: 2020-11-04 | End: 2020-11-06

## 2020-11-04 RX ORDER — ALBUTEROL SULFATE 2.5 MG/.5ML
SOLUTION RESPIRATORY (INHALATION)
Status: DISPENSED
Start: 2020-11-04 | End: 2020-11-05

## 2020-11-04 RX ORDER — NAPROXEN SODIUM 220 MG/1
81 TABLET, FILM COATED ORAL DAILY
Status: DISCONTINUED | OUTPATIENT
Start: 2020-11-04 | End: 2020-11-09 | Stop reason: HOSPADM

## 2020-11-04 RX ORDER — ALBUTEROL SULFATE 5 MG/ML
10 SOLUTION RESPIRATORY (INHALATION) ONCE
Status: COMPLETED | OUTPATIENT
Start: 2020-11-04 | End: 2020-11-04

## 2020-11-04 RX ORDER — DIPHENHYDRAMINE HYDROCHLORIDE 50 MG/ML
50 INJECTION INTRAMUSCULAR; INTRAVENOUS
Status: COMPLETED | OUTPATIENT
Start: 2020-11-05 | End: 2020-11-06

## 2020-11-04 RX ORDER — ACETAMINOPHEN 650 MG/20.3ML
650 LIQUID ORAL
Status: COMPLETED | OUTPATIENT
Start: 2020-11-05 | End: 2020-11-06

## 2020-11-04 RX ORDER — DEXTROSE MONOHYDRATE 100 MG/ML
INJECTION, SOLUTION INTRAVENOUS CONTINUOUS
Status: DISCONTINUED | OUTPATIENT
Start: 2020-11-04 | End: 2020-11-04

## 2020-11-04 RX ORDER — MYCOPHENOLATE MOFETIL 250 MG/1
1000 CAPSULE ORAL 2 TIMES DAILY
Qty: 240 CAPSULE | Refills: 11 | Status: ON HOLD | OUTPATIENT
Start: 2020-11-04 | End: 2020-11-19 | Stop reason: HOSPADM

## 2020-11-04 RX ORDER — MUPIROCIN 20 MG/G
1 OINTMENT TOPICAL 2 TIMES DAILY
Status: COMPLETED | OUTPATIENT
Start: 2020-11-04 | End: 2020-11-08

## 2020-11-04 RX ORDER — HYDROMORPHONE HCL IN 0.9% NACL 6 MG/30 ML
PATIENT CONTROLLED ANALGESIA SYRINGE INTRAVENOUS CONTINUOUS
Status: DISCONTINUED | OUTPATIENT
Start: 2020-11-04 | End: 2020-11-05

## 2020-11-04 RX ORDER — LABETALOL HYDROCHLORIDE 5 MG/ML
INJECTION, SOLUTION INTRAVENOUS
Status: DISCONTINUED | OUTPATIENT
Start: 2020-11-04 | End: 2020-11-04

## 2020-11-04 RX ORDER — NALOXONE HCL 0.4 MG/ML
0.02 VIAL (ML) INJECTION
Status: DISCONTINUED | OUTPATIENT
Start: 2020-11-04 | End: 2020-11-09 | Stop reason: HOSPADM

## 2020-11-04 RX ORDER — METHYLPREDNISOLONE SOD SUCC 125 MG
125 VIAL (EA) INJECTION ONCE
Status: COMPLETED | OUTPATIENT
Start: 2020-11-06 | End: 2020-11-06

## 2020-11-04 RX ORDER — VALGANCICLOVIR 450 MG/1
450 TABLET, FILM COATED ORAL DAILY
Status: DISCONTINUED | OUTPATIENT
Start: 2020-11-14 | End: 2020-11-09 | Stop reason: HOSPADM

## 2020-11-04 RX ORDER — FLUCONAZOLE 2 MG/ML
200 INJECTION, SOLUTION INTRAVENOUS
Status: DISCONTINUED | OUTPATIENT
Start: 2020-11-04 | End: 2020-11-06

## 2020-11-04 RX ORDER — PREDNISONE 5 MG/1
TABLET ORAL
Qty: 120 TABLET | Refills: 11 | Status: SHIPPED | OUTPATIENT
Start: 2020-11-04 | End: 2021-11-01 | Stop reason: SDUPTHER

## 2020-11-04 RX ORDER — FUROSEMIDE 10 MG/ML
100 INJECTION INTRAMUSCULAR; INTRAVENOUS ONCE
Status: COMPLETED | OUTPATIENT
Start: 2020-11-04 | End: 2020-11-04

## 2020-11-04 RX ORDER — DEXTROSE MONOHYDRATE AND SODIUM CHLORIDE 5; .45 G/100ML; G/100ML
INJECTION, SOLUTION INTRAVENOUS CONTINUOUS
Status: DISCONTINUED | OUTPATIENT
Start: 2020-11-04 | End: 2020-11-07

## 2020-11-04 RX ORDER — NICARDIPINE HYDROCHLORIDE 0.2 MG/ML
1 INJECTION INTRAVENOUS CONTINUOUS
Status: DISCONTINUED | OUTPATIENT
Start: 2020-11-04 | End: 2020-11-05

## 2020-11-04 RX ORDER — NICARDIPINE HYDROCHLORIDE 0.2 MG/ML
INJECTION INTRAVENOUS
Status: DISPENSED
Start: 2020-11-04 | End: 2020-11-04

## 2020-11-04 RX ORDER — TACROLIMUS 1 MG/1
6 CAPSULE ORAL EVERY 12 HOURS
Qty: 360 CAPSULE | Refills: 11 | Status: SHIPPED | OUTPATIENT
Start: 2020-11-04 | End: 2020-11-09 | Stop reason: SDUPTHER

## 2020-11-04 RX ORDER — DEXTROSE MONOHYDRATE 100 MG/ML
INJECTION, SOLUTION INTRAVENOUS CONTINUOUS
Status: DISCONTINUED | OUTPATIENT
Start: 2020-11-04 | End: 2020-11-06

## 2020-11-04 RX ORDER — HYDROMORPHONE HCL IN 0.9% NACL 6 MG/30 ML
PATIENT CONTROLLED ANALGESIA SYRINGE INTRAVENOUS CONTINUOUS
Status: DISCONTINUED | OUTPATIENT
Start: 2020-11-04 | End: 2020-11-04

## 2020-11-04 RX ORDER — SODIUM CHLORIDE 9 MG/ML
INJECTION, SOLUTION INTRAVENOUS CONTINUOUS
Status: DISCONTINUED | OUTPATIENT
Start: 2020-11-04 | End: 2020-11-05

## 2020-11-04 RX ORDER — NYSTATIN 100000 [USP'U]/ML
500000 SUSPENSION ORAL 4 TIMES DAILY
Qty: 620 ML | Refills: 0 | Status: SHIPPED | OUTPATIENT
Start: 2020-11-11 | End: 2020-11-06

## 2020-11-04 RX ORDER — VALGANCICLOVIR 450 MG/1
900 TABLET, FILM COATED ORAL DAILY
Qty: 60 TABLET | Refills: 2 | Status: SHIPPED | OUTPATIENT
Start: 2020-11-14 | End: 2020-11-06

## 2020-11-04 RX ORDER — SULFAMETHOXAZOLE AND TRIMETHOPRIM 400; 80 MG/1; MG/1
1 TABLET ORAL EVERY MORNING
Qty: 30 TABLET | Refills: 5 | Status: SHIPPED | OUTPATIENT
Start: 2020-11-04 | End: 2021-03-12

## 2020-11-04 RX ORDER — HYDROMORPHONE HYDROCHLORIDE 1 MG/ML
1 INJECTION, SOLUTION INTRAMUSCULAR; INTRAVENOUS; SUBCUTANEOUS ONCE
Status: COMPLETED | OUTPATIENT
Start: 2020-11-04 | End: 2020-11-04

## 2020-11-04 RX ORDER — HYDROMORPHONE HYDROCHLORIDE 2 MG/ML
INJECTION, SOLUTION INTRAMUSCULAR; INTRAVENOUS; SUBCUTANEOUS
Status: DISCONTINUED | OUTPATIENT
Start: 2020-11-04 | End: 2020-11-04

## 2020-11-04 RX ORDER — KETAMINE HCL IN 0.9 % NACL 50 MG/5 ML
SYRINGE (ML) INTRAVENOUS
Status: DISCONTINUED | OUTPATIENT
Start: 2020-11-04 | End: 2020-11-04

## 2020-11-04 RX ORDER — FAMOTIDINE 10 MG/ML
20 INJECTION INTRAVENOUS EVERY 24 HOURS
Status: DISCONTINUED | OUTPATIENT
Start: 2020-11-04 | End: 2020-11-05

## 2020-11-04 RX ORDER — SULFAMETHOXAZOLE AND TRIMETHOPRIM 400; 80 MG/1; MG/1
1 TABLET ORAL EVERY MORNING
Status: DISCONTINUED | OUTPATIENT
Start: 2020-11-05 | End: 2020-11-09 | Stop reason: HOSPADM

## 2020-11-04 RX ADMIN — ASPIRIN 81 MG CHEWABLE TABLET 81 MG: 81 TABLET CHEWABLE at 03:11

## 2020-11-04 RX ADMIN — DEXTROSE MONOHYDRATE 12.5 G: 25 INJECTION, SOLUTION INTRAVENOUS at 06:11

## 2020-11-04 RX ADMIN — SODIUM CHLORIDE 1000 ML: 0.9 INJECTION, SOLUTION INTRAVENOUS at 04:11

## 2020-11-04 RX ADMIN — HYDROMORPHONE HYDROCHLORIDE 0.2 MG: 2 INJECTION, SOLUTION INTRAMUSCULAR; INTRAVENOUS; SUBCUTANEOUS at 01:11

## 2020-11-04 RX ADMIN — AMPICILLIN SODIUM AND SULBACTAM SODIUM 3 G: 2; 1 INJECTION, POWDER, FOR SOLUTION INTRAMUSCULAR; INTRAVENOUS at 05:11

## 2020-11-04 RX ADMIN — HYDROMORPHONE HYDROCHLORIDE 0.2 MG: 2 INJECTION, SOLUTION INTRAMUSCULAR; INTRAVENOUS; SUBCUTANEOUS at 02:11

## 2020-11-04 RX ADMIN — ALBUMIN (HUMAN) 25 G: 12.5 SOLUTION INTRAVENOUS at 05:11

## 2020-11-04 RX ADMIN — SUGAMMADEX 200 MG: 100 INJECTION, SOLUTION INTRAVENOUS at 01:11

## 2020-11-04 RX ADMIN — PHENYLEPHRINE HYDROCHLORIDE 100 MCG: 10 INJECTION INTRAVENOUS at 12:11

## 2020-11-04 RX ADMIN — TACROLIMUS 1 MG: 1 CAPSULE, GELATIN COATED ORAL at 08:11

## 2020-11-04 RX ADMIN — DEXTROSE MONOHYDRATE 12.5 G: 25 INJECTION, SOLUTION INTRAVENOUS at 05:11

## 2020-11-04 RX ADMIN — MYCOPHENOLATE MOFETIL 1000 MG: 200 POWDER, FOR SUSPENSION ORAL at 09:11

## 2020-11-04 RX ADMIN — HYDROMORPHONE HYDROCHLORIDE 1 MG: 1 INJECTION, SOLUTION INTRAMUSCULAR; INTRAVENOUS; SUBCUTANEOUS at 03:11

## 2020-11-04 RX ADMIN — CALCIUM GLUCONATE 2 G: 98 INJECTION, SOLUTION INTRAVENOUS at 10:11

## 2020-11-04 RX ADMIN — Medication: at 02:11

## 2020-11-04 RX ADMIN — HEPARIN SODIUM 5000 UNITS: 5000 INJECTION INTRAVENOUS; SUBCUTANEOUS at 08:11

## 2020-11-04 RX ADMIN — ALBUTEROL SULFATE 10 MG: 2.5 SOLUTION RESPIRATORY (INHALATION) at 04:11

## 2020-11-04 RX ADMIN — DEXTROSE MONOHYDRATE 25 G: 25 INJECTION, SOLUTION INTRAVENOUS at 02:11

## 2020-11-04 RX ADMIN — Medication: at 11:11

## 2020-11-04 RX ADMIN — DEXTROSE AND SODIUM CHLORIDE: 5; .45 INJECTION, SOLUTION INTRAVENOUS at 08:11

## 2020-11-04 RX ADMIN — MUPIROCIN 1 G: 20 OINTMENT TOPICAL at 08:11

## 2020-11-04 RX ADMIN — FAMOTIDINE 20 MG: 10 INJECTION INTRAVENOUS at 08:11

## 2020-11-04 RX ADMIN — LABETALOL HYDROCHLORIDE 10 MG: 5 INJECTION, SOLUTION INTRAVENOUS at 01:11

## 2020-11-04 RX ADMIN — AMPICILLIN SODIUM AND SULBACTAM SODIUM 3 G: 2; 1 INJECTION, POWDER, FOR SOLUTION INTRAMUSCULAR; INTRAVENOUS at 06:11

## 2020-11-04 RX ADMIN — DEXTROSE MONOHYDRATE 25 G: 25 INJECTION, SOLUTION INTRAVENOUS at 04:11

## 2020-11-04 RX ADMIN — DEXTROSE AND SODIUM CHLORIDE: 5; .45 INJECTION, SOLUTION INTRAVENOUS at 02:11

## 2020-11-04 RX ADMIN — HEPARIN SODIUM 5000 UNITS: 5000 INJECTION INTRAVENOUS; SUBCUTANEOUS at 09:11

## 2020-11-04 RX ADMIN — FUROSEMIDE 100 MG: 10 INJECTION, SOLUTION INTRAMUSCULAR; INTRAVENOUS at 04:11

## 2020-11-04 RX ADMIN — MYCOPHENOLATE MOFETIL 1000 MG: 200 POWDER, FOR SUSPENSION ORAL at 11:11

## 2020-11-04 RX ADMIN — FLUCONAZOLE 200 MG: 2 INJECTION, SOLUTION INTRAVENOUS at 03:11

## 2020-11-04 RX ADMIN — DEXTROSE MONOHYDRATE 25 G: 25 INJECTION, SOLUTION INTRAVENOUS at 03:11

## 2020-11-04 RX ADMIN — TACROLIMUS 1 MG: 1 CAPSULE, GELATIN COATED ORAL at 06:11

## 2020-11-04 RX ADMIN — HYDROMORPHONE HYDROCHLORIDE 0.2 MG: 2 INJECTION, SOLUTION INTRAMUSCULAR; INTRAVENOUS; SUBCUTANEOUS at 12:11

## 2020-11-04 RX ADMIN — Medication 30 MG: at 12:11

## 2020-11-04 RX ADMIN — AMPICILLIN SODIUM AND SULBACTAM SODIUM 3 G: 2; 1 INJECTION, POWDER, FOR SOLUTION INTRAMUSCULAR; INTRAVENOUS at 11:11

## 2020-11-04 RX ADMIN — ROCURONIUM BROMIDE 10 MG: 10 INJECTION, SOLUTION INTRAVENOUS at 12:11

## 2020-11-04 RX ADMIN — MUPIROCIN 1 G: 20 OINTMENT TOPICAL at 09:11

## 2020-11-04 RX ADMIN — LABETALOL HYDROCHLORIDE 10 MG: 5 INJECTION, SOLUTION INTRAVENOUS at 02:11

## 2020-11-04 RX ADMIN — SODIUM CHLORIDE: 0.9 INJECTION, SOLUTION INTRAVENOUS at 02:11

## 2020-11-04 RX ADMIN — NICARDIPINE HYDROCHLORIDE 1 MG/HR: 0.2 INJECTION, SOLUTION INTRAVENOUS at 04:11

## 2020-11-04 RX ADMIN — HEPARIN SODIUM 5000 UNITS: 5000 INJECTION INTRAVENOUS; SUBCUTANEOUS at 04:11

## 2020-11-04 RX ADMIN — DEXTROSE: 10 SOLUTION INTRAVENOUS at 07:11

## 2020-11-04 RX ADMIN — ONDANSETRON 4 MG: 2 INJECTION, SOLUTION INTRAMUSCULAR; INTRAVENOUS at 12:11

## 2020-11-04 RX ADMIN — DEXTROSE: 10 SOLUTION INTRAVENOUS at 09:11

## 2020-11-04 RX ADMIN — ALBUMIN (HUMAN) 25 G: 12.5 SOLUTION INTRAVENOUS at 11:11

## 2020-11-04 RX ADMIN — DEXTROSE: 10 SOLUTION INTRAVENOUS at 11:11

## 2020-11-04 NOTE — PLAN OF CARE
Progress Note  Transplant Surgery    Admit Date: 11/3/2020  Post-operative Day: 1  Hospital Day: 2    ORGAN: LEFT KIDNEY    Disease Etiology: Diabetes Mellitus - Type I  Donor Type: Donation after Brain Death    CDC High Risk: Yes    Donor CMV Status: Positive  Donor CMV Status:   Donor HBcAB: Negative    Donor HBV YIN: Organ record is missing.  Donor HCV YIN: Organ record is missing.  Donor HCV Status: Positive    Whole or Partial:   Biliary Anastomosis:   Arterial Anatomy:          Follow-up For: Procedure(s) (LRB):  TRANSPLANT, KIDNEY (N/A)  TRANSPLANT, PANCREAS (N/A)      ASSESSMENT/PLAN:     I conducted multidisciplinary rounds in conjunction with the ICU/Critical Care attending staff, fellows, and residents, with involvement of ancillary services as appropriate. The patient's general condition was reviewed, specifically including allograft function, immunosuppressive management, dietary and nutritional status, pharmacy concerns, and status of expected transition to transplant stepdown care.    For details see the critical care note.    Richard Mejia MD

## 2020-11-04 NOTE — ASSESSMENT & PLAN NOTE
BG goal 140-180.     Patient likely with type 2 DM. No PRECIOUS labs noted in chart.   Cpeptide on 6/17/20 was 4. Previously on glipizide but with hypoglycemia. Only on basal insulin prior to admission.    BG monitoring every 6 hours and low dose correction scale until surgery.

## 2020-11-04 NOTE — OP NOTE
Operative Report    Date of Procedure: 11/3/2020    Surgeon: Iona Abreu MD  First Assistant: Dhara Bojorquez    Pre-operative Diagnosis: Allograft kidney for transplantation  Post-operative Diagnosis: Same    Procedure(s) Performed: Back Table Preparation of Kidney, Simple    Anesthesia: Not applicable  Estimated Blood Loss: Not applicable  Fluids Administered: Not applicable    Findings: as described below   Drains: not applicable    Preamble  Indications: This report describes only the backbench preparation of the kidney prior to transplantation.  The transplant operation itself is described in a separate report.    ABO Confirmation: Immediately following arrival of the donor organ and prior to implantation, a formal ABO confirmation was done according to hospital and [facility] policies.  I confirmed the [facility] ID number of the donor organ and the donor and recipient ABO types, directly verifying these data by comparison with the [facility] report.  This confirmation was personally done by an attending surgeon and circulating nurse, and is officially documented elsewhere.    Time-Out: A complete time out was carried out prior to the procedure, with confirmation of patient identity, correct procedure, correct operative site, appropriate antibiotic prophylaxis, review of any known allergies, and presence of all needed equipment.    Procedure in Detail  Prior to starting the operation, the left kidney  was prepared on the back table. Arterial anatomy was single. Venous anatomy was single. Ureteral anatomy was single. Back table vascular reconstruction was not required .  Unneeded fat was removed from the kidney, the vessels were cleaned of adherent tissue and tested for leaks, and the kidney was maintained at ice temperature in organ preservation solution until it was brought to the operative field.

## 2020-11-04 NOTE — PROGRESS NOTES
"TRANSPLANT DAILY PROGRESS NOTE    Patient seen and examined at bedside in ICU. Arousable, awake, on oxygen 3 liters. Having birping    Interval history:   40 year old  male with history of ESRD on HD, type I DM on insulin admitted for kidney/pancreas transplant. He underwent Kidney Pancreas transplant last night , seen and examined at bedside. He is on HD since 3/16/20. DW is 188.6 kg. Native UOP is around close to 1 liter per day. Last HD is yesterday before transplant surgery. Post op, patient is doing okay    Pt is s/p kidney and pancreas transplant 11/4/20. pt is blind and L BKA.     I/O last 3 completed shifts:  In: 4415.6 [I.V.:3265.6; Other:500; IV Piggyback:650]  Out: 6130 [Urine:2170; Drains:260; Other:3500; Blood:200]    VITALS:  height is 5' 8" (1.727 m) and weight is 85.1 kg (187 lb 9.8 oz). His oral temperature is 98.8 °F (37.1 °C). His blood pressure is 125/65 and his pulse is 79. His respiration is 10 and oxygen saturation is 100%.       A&O x2, NAD.  Lungs CTA, unlabored.  Heart regular, no rub.  Abdomen soft, no masses, 1 ALESSANDRO drainage  Allograft nontender.  Edema: none.  Vascular access : Left arm AVF    Recent Labs   Lab 11/04/20  0146 11/04/20  0219 11/04/20  0734 11/04/20  1301   WBC 3.11*  3.11*  --  11.80 15.17*   HGB 8.6*  8.6*  --  8.2* 8.9*   HCT 27.8*  27.8* 26* 26.9* 28.2*     248  --  275 326       Recent Labs   Lab 11/03/20  0947 11/04/20  0146 11/04/20  0734 11/04/20  1301    141  141 141 139   K 5.3* 3.8  3.8 4.1 5.6*    103  103 105 104   CO2 22* 22*  22* 23 22*   BUN 75* 38*  38* 40* 44*   CREATININE 10.0* 6.2*  6.2* 6.1* 6.1*   CALCIUM 8.1* 7.9*  7.9* 7.4* 7.6*   PHOS 6.8*  --   --   --          ASSESSMENT/PLAN:    S/p HOMA 11/3/2020  Native Kidney disease is from DM, ESRD on HD since 2016.   Induction: thymoglobulin  CIT :close to 6 hours.   Patient was intubated during surgery, now extubated  Post transplant ultrasound Kidney and " pancreas => satisfactory  Urine output around 50 cc per hour, on lasix  On dextrose drip, amylase and lipase were slightly improving      Essential Hypertension  Uncontrolled, requiring nicardipine  Now off pressors, blood pressures around 140-150/80-90  On oral nifedipine    Long term Immunosuppression   cellcept 1 gm bid  Tacrolimus 1 mg bid  Steroid taper    Prophylaxis for opportunistic infections  C/w bactrim, valcyte, nystatin    Fluids and electrolytes  Slightly hypervolemic  Hyperkalemia, 5.6      Recent Labs   Lab 11/04/20  0146 11/04/20  0734 11/04/20  1301   Creatinine 6.2 H  6.2 H 6.1 H 6.1 H   eGFR if non African American 10.5 A  10.5 A 10.7 A 10.7 A   eGFR if  12.1 A  12.1 A 12.3 A 12.3 A       Renal plan  - c/w lasix iv for now  - replace 1:1 fluids for now  -Follow with strict I/Os, daily weights, BP (goal <140/90), daily labs.    -Avoid nephrotoxic agents when feasible (NSAIDs, IV contrast dye, Aminoglycoside-containing antibiotics)  -Renally dose all appropriate medications, including antibiotics    -Target level for [place] is 10-12. Will follow up labs at 6:00 pm. No need for dialysis for now.   -Will trend immunosuppression levels daily. Also monitor for med-related side effects or drug toxicity given immunosuppressant med with narrow therapeutic window.     Case was discussed with Dr. Valladares

## 2020-11-04 NOTE — PLAN OF CARE
Recommendations     1.) Advance diet as tolerated to diabetic per SLP texture recommendations.   2.) RD to monitor need for ONS     Goals: Pt to meet >75% of estimated energy and protein needs over the course of 7 days.  Nutrition Goal Status: new  Communication of RD Recs: (POC)

## 2020-11-04 NOTE — ASSESSMENT & PLAN NOTE
40-year-old male s/p kidney and pancreas transplant for ESRD secondary to T1DM. Donor PHS increased risk, CMV, EBV, & HCV YIN positive. Thymo induction- peripheral.     Neuro  -PRN pain meds    Cards  -hemodynamically stable  -monitor hemodynamics    Resp  -room air    Renal  -monitor urine output closely and replace 1/1 per protocol  -trend BUN/cr    FENGI  -npo  -GI ppx  -lyte replacement protocol    Heme  -trend h/h  -dvt ppx    Endo  -insulin gtt    ID  -continue perioperative abx  -continue immunosuppression  -trend wbc  Dispo  -ICU

## 2020-11-04 NOTE — PROGRESS NOTES
TRANSPLANT NOTE:      ORGAN: LEFT KIDNEY    Disease Etiology: Diabetes Mellitus - Type I  Donor Type: Donation after Brain Death    [facility] High Risk: Yes    Donor CMV Status:   Donor HBcAB: Negative    Donor HCV Status: Positive      Hernandez Rosales is a 40 y.o. 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    This pt will receive Thymoglobulin for induction on POD 0, 1 and 2.  This patients maintenance immunosuppression will include a steroid taper per protocol to 5mg daily, Prograf, and Cellcept.  Opportunistic infection prophylaxis will include Valcyte for 3 months, Bactrim for 1 year, and fluconazole for 7 days followed by nystatin for 4 weeks.  Patient to began self medications upon transfer to the [facility],  and I plan to meet with this patient and his/her support person on prior to discharge to review the medication section of the Kidney Transplant Education Manual.  I have reviewed his/her pre-op medications and have restarted those, as appropriate.

## 2020-11-04 NOTE — PROGRESS NOTES
MD notified pt UOP 25mL for this hour. Updated on all current labs. U/s being done @ bedside currently.

## 2020-11-04 NOTE — PLAN OF CARE
Pre-operative Discussion Note  Kidney Transplant Surgery    Hernandez Rosales is a 40 y.o. male with ESRD, requiring chronic dialysis admitted for kidney transplant.  I discussed the planned procedure in detail, including expected hospital course and outcomes, benefits, risks, and potential complications.  Complications discussed included death, graft failure, bleeding, infection, vascular thrombosis, and rejection.  I discussed the risks of anesthesia, as well as the potential need for re-operation.  The possibility of other complications not specifically mentioned was also discussed.  Also, I discussed the need for lifelong immunosuppression and the possibility of serious complications from immunosuppressive drugs.    The discussion included the risks that the patient will incur if he elects to not have the proposed procedure.    Relevant donor-specific risk factors were disclosed and discussed with the patient, including:   PA TX: I discussed the issues pertinent to pancreas transplant, including expected benefits as well as additional risks.  I discussed the longer overall length of surgery and hospitalization, and the greater potential for surgical and infectious complications.  I specifically discussed the risk of pancreas allograft loss due to allograft thrombosis.  PHS: I discussed the use of organs from donors with PHS increased risk behavior, including the testing protocols utilized, as well as data from the literature regarding the likelihood of transmission of hepatitis or HIV.  The patient is willing to consider such grafts.  HCV: I discussed the use of HCV-positive organs in recipients who already have HCV, including the outcomes that are not demonstrably different from HCV-positive recipients receiving HCV-negative organs.  The potential advantage to the recipient is possibly receiving a transplant sooner by accepting such an organ.  The patient is willing to consider such grafts.     Specific PHS  Increased Risk Behavior criteria for the organ donor include:  People who have had sex in exchange for money or drugs in the preceding 12 months  People who have had sex with a person that has injected drugs by IV, IM, or subQ route for nonmedical reasons in the preceding 12 months    HCV: Non-viremic recipient: I discussed the use of HCV-positive organs in naive recipients, including the risk of viral transmission to the patients or others, potential insurance barriers for antiviral medication coverage, risk for fibrosing cholestatic hepatitis, death or graft loss. The potential advantage to the recipient is the possibility of receiving a transplant sooner with decreased mortality risk by accepting such an organ. The patient is willing to consider such grafts.    COVID-19: I discussed the possibility of COVID-19 transmission with the patient. Although YIN testing is available for the virus using a technique which in theory should be very accurate, there is no data yet regarding the likelihood of a  false-negative test leading to virus transmission. Based on accuracy of testing for other viruses, it is expected that this risk is extremely small.     I also discussed that transplant immunosuppression will increase susceptibility to COVID-19 and other viruses, and that although we use stringent precautions to protect patients from infection, it is possible for a transplant recipient to contract this infection. If COVID-19 infection should occur, it would be a serious matter with a significant risk of death.    All questions were answered.  The patient and available family members voice understanding and agree to proceed with the transplant.    UNOS Patient Status  Note on scores:  ICU = 10 = total assistance  TSU = 20-30 = partial assistance  Outpatient admitted for transplant requiring medical care in last year = 40-50 = partial assistance  Scores 60 or higher indicate no assistance, meaning no need for medical care  in last year. This would be very unusual for a transplant candidate.    UNOS Patient Status  Functional Status: 50% - Requires considerable assistance and frequent medical care  Physical Capacity: No Limitations

## 2020-11-04 NOTE — OP NOTE
Certification of Assistant at Surgery       Surgery Date: 11/3/2020     Participating Surgeons:  Surgeon(s) and Role:     * Adin Romero Jr., MD - Primary     * Iona Abreu MD - Assisting     * Abad Sawyer MD - Resident - Assisting     * Hailey Lind MD - Fellow    Procedures:  Procedure(s) (LRB):  TRANSPLANT, KIDNEY (N/A)  TRANSPLANT, PANCREAS (N/A)    Assistant Surgeon's Certification of Necessity:  I understand that section 1842 (b) (6) (d) of the Social Security Act generally prohibits Medicare Part B reasonable charge payment for the services of assistants at surgery in teaching hospitals when qualified residents are available to furnish such services. I certify that the services for which payment is claimed were medically necessary, and that no qualified resident was available to perform the services. I further understand that these services are subject to post-payment review by the Medicare carrier.      Iona Abreu MD    11/04/2020  12:32 AM

## 2020-11-04 NOTE — ASSESSMENT & PLAN NOTE
If patient has any issues with BG following transplant would highly benefit from CGM. Patient with blindness and difficulty checking BG.

## 2020-11-04 NOTE — ANESTHESIA PROCEDURE NOTES
Arterial    Diagnosis: esrd    Patient location during procedure: done in OR  Procedure start time: 11/3/2020 8:33 PM  Timeout: 11/3/2020 8:33 PM  Procedure end time: 11/3/2020 8:33 PM    Staffing  Authorizing Provider: Johnathan Carrion MD  Performing Provider: Johnathan Carrion MD    Anesthesiologist was present at the time of the procedure.    Preanesthetic Checklist  Completed: patient identified, site marked, surgical consent, pre-op evaluation, timeout performed, IV checked, risks and benefits discussed, monitors and equipment checked and anesthesia consent givenArterial  Skin Prep: chlorhexidine gluconate and isopropyl alcohol  Local Infiltration: none  Orientation: right  Location: radial  Catheter Size: 20 G  Catheter placement by Anatomical landmarks. Heme positive aspiration all ports.Insertion Attempts: 2  Assessment  Dressing: secured with tape and tegaderm  Patient: Tolerated well

## 2020-11-04 NOTE — ANESTHESIA PROCEDURE NOTES
Intubation  Performed by: Karla Ortega CRNA  Authorized by: Johnathan Carrion MD     Intubation:     Induction:  Intravenous    Intubated:  Postinduction    Mask Ventilation:  N/a    Attempts:  1    Attempted By:  CRNA    Blade:  Franchesca 4    Laryngeal View Grade: Grade IIA - cords partially seen      Difficult Airway Encountered?: No      Complications:  None    Airway Device:  Oral endotracheal tube    Airway Device Size:  7.5    Style/Cuff Inflation:  Cuffed (inflated to minimal occlusive pressure)    Tube secured:  23    Secured at:  The lips    Placement Verified By:  Capnometry    Complicating Factors:  None    Findings Post-Intubation:  BS equal bilateral and atraumatic/condition of teeth unchanged

## 2020-11-04 NOTE — CONSULTS
"  [facility]  Adult Nutrition  Consult Note    SUMMARY     Recommendations    1.) Advance diet as tolerated to diabetic per SLP texture recommendations.   2.) RD to monitor need for ONS    Goals: Pt to meet >75% of estimated energy and protein needs over the course of 7 days.  Nutrition Goal Status: new  Communication of RD Recs: (POC)    Reason for Assessment    Reason For Assessment: consult(pancreas and kidney transplant)  Diagnosis: (ESRD)  Relevant Medical History: DM, HTN, diabetic neuropathy  Interdisciplinary Rounds: did not attend  General Information Comments: Pt s/p pancreas and kiney transplant. Pt asleep during RD visit, unable to complete NFPE at this time. GI- LBM 11/3.  Nutrition Discharge Planning: Post transplant nutrition education to be completed on U    Nutrition Risk Screen    Nutrition Risk Screen: no indicators present    Nutrition/Diet History    Spiritual, Cultural Beliefs, Tenriism Practices, Values that Affect Care: no  Food Allergies: NKFA  Factors Affecting Nutritional Intake: NPO    Anthropometrics    Temp: 98.5 °F (36.9 °C)  Height: 5' 8" (172.7 cm)  Height (inches): 68 in  Weight Method: Bed Scale  Weight: 85.1 kg (187 lb 9.8 oz)  Weight (lb): 187.61 lb  Ideal Body Weight (IBW), Male: 154 lb  % Ideal Body Weight, Male (lb): 121.82 %  BMI (Calculated): 28.5  BMI Grade: 25 - 29.9 - overweight  Usual Body Weight (UBW), k.5 kg(6/17)  % Usual Body Weight: 100.92  Amputation %: 5.9  Total Amputation %: 5.9       Lab/Procedures/Meds    Pertinent Labs Reviewed: reviewed  Pertinent Labs Comments: BUN 30, Cr 6.2, GFR 12.1, Glucose 39, Ca 7.9, Alb 2.8  Pertinent Medications Reviewed: reviewed  Pertinent Medications Comments: acetaminophen, ampicillin, famotidine, diflucan, heparin, methylprednisolone, mycophenolate, tacrolimus, insulin, hydromorphone      Estimated/Assessed Needs    Weight Used For Calorie Calculations: 85 kg (187 lb 6.3 oz)  Energy Calorie " Requirements (kcal): 2550  Energy Need Method: Kcal/kg(30kcal/kg)  Protein Requirements: 85-110gm (1.0-1.3gm/kg renal function)  Weight Used For Protein Calculations: 85 kg (187 lb 6.3 oz)  Fluid Requirements (mL): 1mL/kcal or per MD recommendations.  Estimated Fluid Requirement Method: RDA Method  RDA Method (mL): 2550  CHO Requirement: 275g      Nutrition Prescription Ordered    Current Diet Order: NPO (11/3)    Evaluation of Received Nutrient/Fluid Intake    Other calories: 204(IVF)  IV fluid: 600mL  I/O: I: 4415; O: 5960; -1.5L since admission  Tolerance: (RD to monitor)  % Intake of Estimated Energy Needs: 8  % Meal Intake: 0    Nutrition Risk    Level of Risk/Frequency of Follow-up: high , 2x weekly    Assessment and Plan    Nutrition Problem  Inadequate energy intake    Related to (etiology):   Decrease ability to consume sufficient energy     Signs and Symptoms (as evidenced by):   NPO with no means of nutrition    Interventions/Recommendations (treatment strategy):  1.) collaboration with other providers    Nutrition Diagnosis Status:   new         Monitor and Evaluation    Food and Nutrient Intake: food and beverage intake, energy intake  Food and Nutrient Adminstration: diet order  Knowledge/Beliefs/Attitudes: food and nutrition knowledge/skill  Anthropometric Measurements: weight, weight change  Biochemical Data, Medical Tests and Procedures: electrolyte and renal panel, gastrointestinal profile, glucose/endocrine profile, inflammatory profile  Nutrition-Focused Physical Findings: skin, overall appearance       Nutrition Follow-Up    RD Follow-up?: Yes

## 2020-11-04 NOTE — PT/OT/SLP EVAL
Physical Therapy Evaluation and treatment    Patient Name:  Hernandez Rosales   MRN:  [id number]    Recommendations:     Discharge Recommendations:  (home no needs)   Discharge Equipment Recommendations: none   Barriers to discharge: None    Assessment:     Hernandez Rosales is a 40 y.o. male admitted with a medical diagnosis of ESRD on dialysis.  He presents with the following impairments/functional limitations:  impaired endurance, impaired functional mobilty, gait instability, impaired balance. pt tolerated treatment well and will benefit from skilled PT 4x/wk to progress physically. Pt should be able to discharge home with no needs when medically stable. Pt is s/p kidney and pancreas transplant 11/4/20. pt is blind and L BKA.     Rehab Prognosis: Good; patient would benefit from acute skilled PT services to address these deficits and reach maximum level of function.    Recent Surgery: Procedure(s) (LRB):  TRANSPLANT, KIDNEY (N/A)  TRANSPLANT, PANCREAS (N/A) 1 Day Post-Op    Plan:     During this hospitalization, patient to be seen 4 x/week to address the identified rehab impairments via gait training, therapeutic activities and progress toward the following goals:    · Plan of Care Expires:  12/03/20    Subjective     Chief Complaint: pt c/o pain in abdomen during treatment.   Patient/Family Comments/goals:  To get better and go home.   Pain/Comfort:  · Pain Rating 1: 4/10(abdomen)  · Pain Rating Post-Intervention 1: 4/10(abdomen)    Patients cultural, spiritual, Taoist conflicts given the current situation: no    Living Environment:  Pt lives alone but will go to his Aunt's house upon discharge. She lives in [address] [place] [address].   Prior to admission, patients level of function was modified Independent using L BKA prosthesis and blind cane.  Equipment used at home: (BK prosthesis, blind cane).  DME owned (not currently used): none.  Upon discharge, patient will have assistance from Aunt.    Objective:     Communicated  with nurse  prior to session.  Patient found supine with telemetry, arterial line, pulse ox (continuous), blood pressure cuff, PCA, oxygen, peripheral IV, ALESSANDRO drain  upon PT entry to room.    General Precautions: Standard, fall, blind(L BKA)   Orthopedic Precautions:    Braces:       Exams:  · Cognitive Exam:  Patient is oriented to Person, Place, Time and Situation  · RLE ROM: WFL  · RLE Strength: WFL  · LLE ROM: WFL  · LLE Strength: WFL    Functional Mobility:  · Bed Mobility:     · Rolling Right: maximal assistance  · Supine to Sit: maximal assistance  ·   · Transfers:     · Sit to Stand:  maximal assistance with hand-held assist  · Bed to Chair: maximal assistance with  hand-held assist  using  Squat Pivot    Therapeutic Activities and Exercises:   pt received verbal instructions in role of PT and POC. Pt verbally expressed understanding of such.     AM-PAC 6 CLICK MOBILITY  Total Score:10     Patient left up in chair with all lines intact, call button in reach and RN notified.    GOALS:   Multidisciplinary Problems     Physical Therapy Goals        Problem: Physical Therapy Goal    Goal Priority Disciplines Outcome Goal Variances Interventions   Physical Therapy Goal     PT, PT/OT Ongoing, Progressing     Description: Goals to be met by: 12/3/20    Patient will increase functional independence with mobility by performin. Supine to sit with Stand-by Assistance-not met  2. Sit to stand transfer with Contact Guard Assistance -not met  3. Gait  x 250 feet with Stand-by Assistance using blind cane and LLE BK prosthesis- not met4. Pt min assist don/doff BK prosthesis - not met                     History:     Past Medical History:   Diagnosis Date    Anxiety     Cataract     Diabetes mellitus     Diabetic retinopathy     Disorder of kidney and ureter     Encounter for blood transfusion     Glaucoma     High cholesterol     Hypertension     Retinal detachment        Past Surgical History:   Procedure  Laterality Date    EYE SURGERY      KIDNEY TRANSPLANT N/A 11/3/2020    Procedure: TRANSPLANT, KIDNEY;  Surgeon: Adin Romero Jr., MD;  Location: [address];  Service: Transplant;  Laterality: N/A;    LEG AMPUTATION Left     RETINAL DETACHMENT SURGERY      TOE AMPUTATION Right     TRANSPLANTATION OF PANCREAS N/A 11/3/2020    Procedure: TRANSPLANT, PANCREAS;  Surgeon: Adin Romero Jr., MD;  Location: [address];  Service: Transplant;  Laterality: N/A;       Time Tracking:     PT Received On: 11/04/20  PT Start Time: 1019     PT Stop Time: 1036  PT Total Time (min): 17 min     Billable Minutes: Evaluation 8 min and Therapeutic Activity 9 min      Gilma Cuevas, PT  11/04/2020

## 2020-11-04 NOTE — HOSPITAL COURSE
Mr. Rosales is a 40 y.o. Black or  male with ESRD secondary to diabetic nephropathy (T1DM) who is now s/p pancreas and kidney transplant. Extubated off pressors.

## 2020-11-04 NOTE — PLAN OF CARE
Pt s/p K/P transplant. Tolerating NC oxygen well.  Dilaudid PCA for pain control. Pt remains on MIVF and NS for I=O maintenance.  Plan of care discussed with pt, questions encouraged and addressed.  Understanding verbalized.  Positive reinforcement provided.

## 2020-11-04 NOTE — ASSESSMENT & PLAN NOTE
Titrate insulin slowly to avoid hypoglycemia as the risk of hypoglycemia increases with decreased creatinine clearance.    Estimated Creatinine Clearance: 17.3 mL/min (A) (based on SCr of 6.2 mg/dL (H)).

## 2020-11-04 NOTE — SUBJECTIVE & OBJECTIVE
Follow-up For: Procedure(s) (LRB):  TRANSPLANT, KIDNEY (N/A)  TRANSPLANT, PANCREAS (N/A)    Post-Operative Day: 1 Day Post-Op     Past Medical History:   Diagnosis Date    Anxiety     Cataract     Diabetes mellitus     Diabetic retinopathy     Disorder of kidney and ureter     Encounter for blood transfusion     Glaucoma     High cholesterol     Hypertension     Retinal detachment        Past Surgical History:   Procedure Laterality Date    EYE SURGERY      LEG AMPUTATION Left     RETINAL DETACHMENT SURGERY      TOE AMPUTATION Right        Review of patient's allergies indicates:  No Known Allergies    Family History     Problem Relation (Age of Onset)    Coronary artery disease Mother    Diabetes Mother, Sister, Brother, Maternal Aunt, Maternal Uncle    Glaucoma Mother    Heart disease Mother, Maternal Aunt, Maternal Uncle    Hypertension Mother, Brother, Maternal Aunt, Maternal Uncle    No Known Problems Father    Stroke Mother, Maternal Aunt        Tobacco Use    Smoking status: Former Smoker     Packs/day: 0.25     Years: 10.00     Pack years: 2.50    Smokeless tobacco: Never Used   Substance and Sexual Activity    Alcohol use: Yes     Comment: occasional    Drug use: No    Sexual activity: Yes     Partners: Female     Birth control/protection: Condom      Review of Systems   Unable to perform ROS: Acuity of condition     Objective:     Vital Signs (Most Recent):  Temp: 98.3 °F (36.8 °C) (11/03/20 1647)  Pulse: 75 (11/03/20 1647)  Resp: 18 (11/03/20 1647)  BP: 134/65 (11/03/20 1720)  SpO2: 99 % (11/03/20 1445) Vital Signs (24h Range):  Temp:  [98 °F (36.7 °C)-98.5 °F (36.9 °C)] 98.3 °F (36.8 °C)  Pulse:  [70-85] 75  Resp:  [18] 18  SpO2:  [99 %] 99 %  BP: (134-224)/() 134/65     Weight: 96.2 kg (212 lb 3.1 oz)  Body mass index is 31.79 kg/m².      Intake/Output Summary (Last 24 hours) at 11/4/2020 0135  Last data filed at 11/4/2020 0047  Gross per 24 hour   Intake 2700 ml   Output 4800  ml   Net -2100 ml       Physical Exam  Vitals signs and nursing note reviewed.   HENT:      Head: Normocephalic and atraumatic.      Mouth/Throat:      Mouth: Mucous membranes are moist.   Eyes:      Comments: Bilateral visual deficits.    Neck:      Musculoskeletal: Neck supple.   Cardiovascular:      Rate and Rhythm: Normal rate.   Pulmonary:      Effort: Pulmonary effort is normal.   Abdominal:      General: There is no distension.      Comments: Incision clean, dry, and intact. Drain serous.    Musculoskeletal:         General: No swelling.      Comments: Left BKA   Skin:     General: Skin is warm and dry.   Neurological:      General: No focal deficit present.      Mental Status: He is alert.   Psychiatric:         Mood and Affect: Mood normal.         Behavior: Behavior normal.             Lines/Drains/Airways     Drain                 Closed/Suction Drain 11/04/20 0040 Right Abdomen Bulb 19 Fr. less than 1 day         Urethral Catheter 11/03/20 2002 Non-latex;Straight-tip;Triple-lumen 20 Fr. less than 1 day          Airway                 Airway - Non-Surgical 11/03/20 2108 less than 1 day          Arterial Line            Arterial Line 11/03/20 2033 Right Radial less than 1 day          Peripheral Intravenous Line                 Peripheral IV - Single Lumen 11/03/20 0938 20 G Anterior;Right Upper Arm less than 1 day         Peripheral IV - Single Lumen 11/03/20 1952 18 G Left Forearm less than 1 day         Peripheral IV - Single Lumen 11/03/20 1957 16 G Right Forearm less than 1 day                Significant Labs:    CBC/Anemia Profile:  Recent Labs   Lab 11/03/20  0947   WBC 5.68   HGB 8.6*   HCT 27.8*      MCV 85   RDW 16.1*        Chemistries:  Recent Labs   Lab 11/03/20  0947      K 5.3*      CO2 22*   BUN 75*   CREATININE 10.0*   CALCIUM 8.1*   ALBUMIN 3.2*   PROT 6.5   BILITOT 0.3   ALKPHOS 105   ALT 43   AST 36   MG 2.2   PHOS 6.8*         Significant Imaging: I have reviewed all  pertinent imaging results/findings within the past 24 hours.

## 2020-11-04 NOTE — TRANSFER OF CARE
Anesthesia Transfer of Care Note    Patient: Hernandez Rosales    Procedure(s) Performed: Procedure(s) (LRB):  TRANSPLANT, KIDNEY (N/A)  TRANSPLANT, PANCREAS (N/A)    Patient location: ICU    Anesthesia Type: general    Transport from OR: Transported from OR on 6-10 L/min O2 by face mask with adequate spontaneous ventilation. Continuous ECG monitoring in transport. Continuous SpO2 monitoring in transport. Continuos invasive BP monitoring in transport    Post pain: adequate analgesia    Post assessment: no apparent anesthetic complications and tolerated procedure well    Post vital signs: stable    Level of consciousness: awake    Nausea/Vomiting: no nausea/vomiting    Complications: none    Transfer of care protocol was followed      Last vitals:   Visit Vitals  /65 (BP Location: Right arm, Patient Position: Standing)   Pulse 75   Temp 36.8 °C (98.3 °F) (Oral)   Resp 18   Wt 96.2 kg (212 lb 3.1 oz)   SpO2 99%   BMI 31.79 kg/m²

## 2020-11-04 NOTE — NURSING
- Patient departed to OR for kidney-pancreas transplantation.  - Patient left in stable condition via bed. Patient left wearing protective mask.  - Signed consents and medication (Solu-Medrol) with anesthesia escorts.

## 2020-11-04 NOTE — PROGRESS NOTES
Admit Note     PT % BLIND.     Met with patient to assess needs. Patient is a 40 y.o.  male, admitted for for kidney transplant.      Patient admitted from home on 11/3/2020 .  At this time, patient presents as alert and oriented x 4, pleasant, well groomed, recall good, concentration/judgement good, average intelligence, calm, communicative, cooperative and asking and answering questions appropriately.  At this time, patients caregiver presents as not present.    Household/Family Systems     Patient resides with patient's self, at [address] [place] [address].  Support system includes pt's aunt, sister, cousin and niece.  Patient does not have dependents that are need of being cared for.     Patients primary caregiver is Dallas Oh, patients sister, phone number [phone]. Pt's aunt, Malena Rosales ph# [phone] and cousin, Jason Davalos ph# [phone] will also assist.  Confirmed patients contact information is [phone] (home);   Patient's cell is [phone].    During admission, patient's caregiver plans to stay in patient's room as allowed.  Confirmed patient and patients caregivers do have access to reliable transportation.    Cognitive Status/Learning     Patient reports reading ability as cannot read and states patient does have difficulty with reading, writing and seeing as patient is blind.  Patient reports patient learns best by verbal instruction and repeitition.   Needed: No.   Highest education level: Attended College/Technical School    Vocation/Disability   .  Working for Income: no  Patient is disabled due to diabetes and blindness since 2013.  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    Adherence     Patient reports a high level of adherence to patients health care regimen.  Adherence counseling and education provided. Patient verbalizes understanding.    Substance Use    Patient reports the following  substance usage.    Tobacco: Pt is a former smoker. No current use.  Alcohol: Pt reports ocassional use.  Illicit Drugs/Non-prescribed Medications: none, patient denies any use.  Patient states clear understanding of the potential impact of substance use.  Substance abstinence/cessation counseling, education and resources provided and reviewed.     Services Utilizing/ADLS    Infusion Service: Prior to admission, patient utilizing? no  Home Health: Prior to admission, patient utilizing? yes Pt has home health for vitals  DME: Prior to admission, yes blind walking stick and pt working on getting continuous glucometer but has had issues, SW notified pharmacist  Pulmonary/Cardiac Rehab: Prior to admission, no  Dialysis:  Prior to admission, yes Pt last dialyzed on Tuesday   Transplant Specialty Pharmacy:  Prior to admission, no.    Prior to admission, patient reports patient was independent with ADLS and was not driving.  Patient reports patient is not able to care for self at this time due to compromised medical condition (as documented in medical record) and physical weakness..  Patient indicates a willingness to care for self once medically cleared to do so.    Insurance/Medications    Insured by   Payor/Plan Subscr  Sex Relation Sub. Ins. ID Effective Group Num   1. [place] - [place]* GIL HANDLEY DELORES 1980 Male  [id number] 10/1/15                                    [address]   2. [place] ENDERGIL ESTRELLA 1980 Male  [id number]* [id number]                                    [address] [address]      Primary Insurance (for UNOS reporting): Public Insurance - Medicare FFS (Fee For Service)  Secondary Insurance (for UNOS reporting): Public Insurance - Medicaid    Patient reports patient is able to obtain and afford medications at this time and at time of discharge.    Living Will/Healthcare Power of     Patient states patient does not have a LW and/or HCPA.   provided education regarding LW and  HCPA and the completion of forms.    Coping/Mental Health    Patient is coping adequately with the aid of  family members. Pt reports feeling a little anxious in unfamiliar environment but other than that feels fine. Pt reports taking Buspar and no longer taking Haldol.   Patient indicates mental health difficulties.     Discharge Planning    At time of discharge, patient plans to return to [place] apartments under the care of pt's sister.  Patients sister will transport patient.  Per rounds today, expected discharge date has not been medically determined at this time. Patient and patients caregiver  verbalize understanding and are involved in treatment planning and discharge process.    Additional Concerns    Patient is being followed for needs, education, resources, information, emotional support, supportive counseling, and for supportive and skilled discharge plan of care.  provided resource list, patient choice, psychosocial and supportive counseling, resources, education, assistance and discharge planning with patient and caregiver involvement, ongoing SW availability and services as appropriate.  remains available. Patient denies additional needs and/or concerns at this time. Patient verbalizes understanding and agreement with information reviewed, social work availability, and how to access available resources as needed.

## 2020-11-04 NOTE — ASSESSMENT & PLAN NOTE
BG goal 140-180. S/p pancreas transplant     Patient likely with type 2 DM. No PRECIOUS labs noted in chart.   Cpeptide on 6/17/20 was 4. Previously on glipizide but with hypoglycemia. Only on basal insulin prior to admission.    BG monitoring hourly given hypoglycemia   Consider d5 or d10 infusion titrate to maintain BG of 70 or above. Defer to primary given kidney transplant.

## 2020-11-04 NOTE — PLAN OF CARE
Problem: Physical Therapy Goal  Goal: Physical Therapy Goal  Description: Goals to be met by: 12/3/20    Patient will increase functional independence with mobility by performin. Supine to sit with Stand-by Assistance-not met  2. Sit to stand transfer with Contact Guard Assistance -not met  3. Gait  x 250 feet with Stand-by Assistance using blind cane and LLE BK prosthesis- not met  4. Pt min assist don/doff BK prosthesis - not met    Outcome: Ongoing, Progressing   Evaluation completed and goals appropriate. Gilma Cuevas, PT  2020

## 2020-11-04 NOTE — PROGRESS NOTES
[facility]  Critical Care - Surgery  Progress Note    Patient Name: Hernandez Rosales  MRN: [id number]  Admission Date: 11/3/2020  Hospital Length of Stay: 1 days  Code Status: Full Code  Attending Provider: Adin Romero Jr., MD  Primary Care Provider: Primary Doctor No   Principal Problem: ESRD on dialysis    Subjective:     Hospital/ICU Course:  Mr. Rosales is a 40 y.o. Black or  male with ESRD secondary to diabetic nephropathy (T1DM) who is now s/p pancreas and kidney transplant. Extubated off pressors.     Interval History/Significant Events:   Admitted overnight  Problems with hypoglycemia, started on D10   Monitoring urine output closely for replacement  >1L of urine output (baseline on him is 1L)   Patient states feeling well pain controlled  No nausea and no vomiting    Follow-up For: Procedure(s) (LRB):  TRANSPLANT, KIDNEY (N/A)  TRANSPLANT, PANCREAS (N/A)    Post-Operative Day: 1 Day Post-Op    Objective:     Vital Signs (Most Recent):  Temp: 98 °F (36.7 °C) (11/04/20 1100)  Pulse: 84 (11/04/20 1100)  Resp: 16 (11/04/20 1100)  BP: (!) 113/59 (11/04/20 1100)  SpO2: 100 % (11/04/20 1100) Vital Signs (24h Range):  Temp:  [98 °F (36.7 °C)-99.3 °F (37.4 °C)] 98 °F (36.7 °C)  Pulse:  [70-87] 84  Resp:  [6-23] 16  SpO2:  [94 %-100 %] 100 %  BP: (113-214)/() 113/59  Arterial Line BP: (114-176)/(47-63) 114/47     Weight: 85.1 kg (187 lb 9.8 oz)  Body mass index is 28.53 kg/m².      Intake/Output Summary (Last 24 hours) at 11/4/2020 1120  Last data filed at 11/4/2020 1114  Gross per 24 hour   Intake 4515.58 ml   Output 6155 ml   Net -1639.42 ml       Physical Exam  Constitutional:       General: He is not in acute distress.  HENT:      Head: Normocephalic.      Nose: Nose normal.      Mouth/Throat:      Mouth: Mucous membranes are moist.   Eyes:      General: No scleral icterus.  Neck:      Musculoskeletal: Neck supple.   Cardiovascular:      Rate and Rhythm: Normal rate.    Pulmonary:      Breath sounds: Normal breath sounds.   Abdominal:      General: Abdomen is flat.      Palpations: Abdomen is soft.   Neurological:      General: No focal deficit present.         Lines/Drains/Airways     Drain                 Hemodialysis AV Fistula Left upper arm -- days         Closed/Suction Drain 11/04/20 0040 Right Abdomen Bulb 19 Fr. less than 1 day         Urethral Catheter 11/03/20 2002 Non-latex;Straight-tip;Triple-lumen 20 Fr. less than 1 day          Arterial Line            Arterial Line 11/03/20 2033 Right Radial less than 1 day          Peripheral Intravenous Line                 Peripheral IV - Single Lumen 11/03/20 0938 20 G Anterior;Right Upper Arm 1 day         Peripheral IV - Single Lumen 11/03/20 1952 18 G Left Forearm less than 1 day         Peripheral IV - Single Lumen 11/03/20 1957 16 G Right Forearm less than 1 day                Significant Labs:    CBC/Anemia Profile:  Recent Labs   Lab 11/03/20  0947 11/04/20  0146 11/04/20  0219 11/04/20  0734   WBC 5.68 3.11*  3.11*  --  11.80   HGB 8.6* 8.6*  8.6*  --  8.2*   HCT 27.8* 27.8*  27.8* 26* 26.9*    248  248  --  275   MCV 85 85  85  --  86   RDW 16.1* 16.2*  16.2*  --  16.5*        Chemistries:  Recent Labs   Lab 11/03/20  0947 11/04/20  0146 11/04/20  0734    141  141 141   K 5.3* 3.8  3.8 4.1    103  103 105   CO2 22* 22*  22* 23   BUN 75* 38*  38* 40*   CREATININE 10.0* 6.2*  6.2* 6.1*   CALCIUM 8.1* 7.9*  7.9* 7.4*   ALBUMIN 3.2* 2.8*  --    PROT 6.5 5.5*  --    BILITOT 0.3 0.5  --    ALKPHOS 105 80  --    ALT 43 49*  --    AST 36 56*  --    MG 2.2  --   --    PHOS 6.8*  --   --          Assessment/Plan:     40-year-old male s/p kidney and pancreas transplant for ESRD secondary to T1DM. Donor PHS increased risk, CMV, EBV, & HCV YIN positive. Thymo induction- peripheral.      Neuro  -PRN pain meds, going down on PCA pump settings since pt a bit drowsy  - haldol and buspar home meds  starting tomorrow     Cards  -hemodynamically stable  -monitor hemodynamics  - off cardene     Resp  - 3L NC weaning     Renal  -monitor urine output closely and replace 1/1 per protocol  Having   - trend BUN/cr  - ultrasound renal ordered for today     FENGI  -npo for now pending ultrasound results and how he does today  -GI ppx  -lyte replacement protocol  - follow up pancreas ultrasound for today      Heme  -trend h/h  -dvt ppx     Endo  -insulin gtt have been off   - D10 infusion drip with replacement D5     ID  - WBC 11   -continue perioperative abx  -continue immunosuppression  -trend wbc      Dispo  -ICU       Critical care was time spent personally by me on the following activities: development of treatment plan with patient or surrogate and bedside caregivers, discussions with consultants, evaluation of patient's response to treatment, examination of patient, ordering and performing treatments and interventions, ordering and review of laboratory studies, ordering and review of radiographic studies, pulse oximetry, re-evaluation of patient's condition.  This critical care time did not overlap with that of any other provider or involve time for any procedures.     Josefa Schultz MD  Critical Care - Surgery  [facility]

## 2020-11-04 NOTE — PROGRESS NOTES
MD notified blood sugar in the 50s despite mult amps of D50 overnight. States will call staff to discuss D10 gtt.

## 2020-11-04 NOTE — CARE UPDATE
BG goal 140-180    Endocrine to continue following. K/P transplant overnight. Extubated in OR. Hypoglycemia overnight requiring multiple d50 boluses.     Plan:  BG monitoring hourly.   Consider d5 or d10 infusion- titrate to maintain BG above 70. Will defer to primary.     Discharge planning: TBD     Endocrine to continue to follow    ** Please call Endocrine for any BG related issues **

## 2020-11-04 NOTE — PLAN OF CARE
Alva Barger spoke with sister Matilde.  Let her know procedure has begun and patient is stable at this time.  She had no further questions at this time.

## 2020-11-04 NOTE — SUBJECTIVE & OBJECTIVE
Interval HPI:   Overnight events: Remains in ICU. Extubated yesterday. BG at or below goal. Requiring d50 boluses.   Eating:   NPO  Nausea: No  Hypoglycemia and intervention: Yes  Fever: No  TPN and/or TF: No      BP (!) 114/54   Pulse 85   Temp 99.3 °F (37.4 °C) (Oral)   Resp 12   Wt 85 kg (187 lb 6.3 oz)   SpO2 99%   BMI 28.08 kg/m²     Labs Reviewed and Include    Recent Labs   Lab 11/04/20  0146   GLU 39*  39*   CALCIUM 7.9*  7.9*   ALBUMIN 2.8*   PROT 5.5*     141   K 3.8  3.8   CO2 22*  22*     103   BUN 38*  38*   CREATININE 6.2*  6.2*   ALKPHOS 80   ALT 49*   AST 56*   BILITOT 0.5     Lab Results   Component Value Date    WBC 3.11 (L) 11/04/2020    WBC 3.11 (L) 11/04/2020    HGB 8.6 (L) 11/04/2020    HGB 8.6 (L) 11/04/2020    HCT 26 (L) 11/04/2020    MCV 85 11/04/2020    MCV 85 11/04/2020     11/04/2020     11/04/2020     No results for input(s): TSH, FREET4 in the last 168 hours.  Lab Results   Component Value Date    HGBA1C 8.2 (H) 11/03/2020       Nutritional status:   Body mass index is 28.08 kg/m².  Lab Results   Component Value Date    ALBUMIN 2.8 (L) 11/04/2020    ALBUMIN 3.2 (L) 11/03/2020    ALBUMIN 3.6 06/17/2020     No results found for: PREALBUMIN    Estimated Creatinine Clearance: 17.3 mL/min (A) (based on SCr of 6.2 mg/dL (H)).    Accu-Checks  Recent Labs     11/04/20  0320 11/04/20  0331 11/04/20  0346 11/04/20  0402 11/04/20  0434 11/04/20  0452 11/04/20  0519 11/04/20  0541 11/04/20  0600 11/04/20  0621   POCTGLUCOSE 53* 199* 128* 88 50* 142* 88 63* 98 66*       Current Medications and/or Treatments Impacting Glycemic Control  Immunotherapy:    Immunosuppressants         Stop Route Frequency     antithymocyte globulin (rabbit) 150 mg, hydrocortisone sodium succinate (SOLU-CORTEF) 20 mg, heparin (porcine) 1,000 Units in sodium chloride 0.9% 500 mL      11/07 0859 IV Daily     mycophenolate mofetil 200 mg/mL suspension 1,000 mg      -- PER NG TUBE 2  times daily     tacrolimus (PROGRAF) 1 mg/mL oral syringe      -- PER NG TUBE 2 times daily        Steroids:   Hormones (From admission, onward)    Start     Stop Route Frequency Ordered    11/07/20 0900  methylPREDNISolone sodium succinate injection 20 mg      -- IV Daily 11/04/20 0145 11/06/20 0900  methylPREDNISolone sodium succinate injection 125 mg      -- IV Once 11/04/20 0145 11/05/20 0900  methylPREDNISolone sodium succinate (SOLU-MEDROL) 250 mg in dextrose 5 % 50 mL IVPB      -- IV Once 11/04/20 0145 11/05/20 0900  antithymocyte globulin (rabbit) 150 mg, hydrocortisone sodium succinate (SOLU-CORTEF) 20 mg, heparin (porcine) 1,000 Units in sodium chloride 0.9% 500 mL      11/07 0859 IV Daily 11/04/20 0145        Pressors:    Autonomic Drugs (From admission, onward)    None        Hyperglycemia/Diabetes Medications:   Antihyperglycemics (From admission, onward)    Start     Stop Route Frequency Ordered    11/04/20 0230  insulin regular 100 Units in sodium chloride 0.9% 100 mL infusion     Question:  Insulin Rate Adjustment (DO NOT MODIFY ANSWER)  Answer:  [email or web address] [email or web address]    -- IV Continuous 11/04/20 0145

## 2020-11-04 NOTE — OP NOTE
Operative Report    Date of Procedure: 11/3/2020    Surgeons: Surgeon(s) and Role:     * Adin Romero Jr., MD - Primary     * Iona Abreu MD - Assisting     * Abad Sawyer MD - Resident - Assisting     * Hailey Lind MD - Fellow    First Assistant Attestation:  The presence of an additional attending surgeon functioning as first assistant was required due to the complexity of the procedure relative to any available residents. I certify that no resident was available who was qualified to serve as first assistant. Duties performed by the assistant included assisting the primary surgeon.    Pre-operative Diagnosis:  1. End stage renal disease secondary to Diabetes Mellitus - Type I  2. Diabetes Mellitus - Type I (Pancreas)    Post-operative Diagnosis: Same    Procedure(s) Performed:  1. DBD-Donor Kidney Transplant  2. DBD-Donor Pancreas Transplant with Loop Duodeno-Enterostomy    Anesthesia: General endotracheal  Estimated Blood Loss: 200 mL  Fluids Administered:      Findings: normal intraabdominal anatomy;    Drains: 19f Chente drains x 1    Preamble  Indications and Patient Counseling: The patient is a 40 y.o. year old male with advanced renal failure and diabetes.  He has been evaluated for combined kidney-pancreas transplant, including expected outcome, risks, potential complications, and the advantages and disadvantages of alternatives of managing his renal disease.  Any donor-specific risk factors were also reviewed with the patient.  All questions were answered.  He voices understanding and agrees to proceed with the transplant.     Donor Risk Factors: Prior to the operation, the patient was advised of any donor-specific risk factors requiring specific disclosure.  Factors in this case included PHS increased risk behavior.    Specific PHS Increased Risk Behavior criteria for the organ donor include:  People who have had sex in exchange for money or drugs in the preceding 12 months  People  who have had sex with a person that has injected drugs by IV, IM, or subQ route for nonmedical reasons in the preceding 12 months    All questions were answered, the patient voiced appropriate understanding, and he agreed to proceed with the planned procedure.    ABO Confirmation: Immediately following arrival of the donor organ and prior to implantation, a formal ABO confirmation was done according to hospital and UNOS policies.  I confirmed the OS ID number ([id number]) of the donor organ and the donor and recipient ABO types, directly verifying these data by comparison with the UNOS Match Run report ([id number]).  This confirmation was personally done by an attending surgeon and circulating nurse, and is officially documented elsewhere.    Time-Out: A complete time out was carried out prior to incision, with confirmation of patient identity, correct procedure, correct operative site, appropriate antibiotic prophylaxis, review of any known allergies, and presence of all needed equipment.    Note: the back bench preparation of the donor organs is described in a separate report.      Procedure in Detail  The patient was brought into the operating room and placed in a supine position on the OR table. After the induction of GET anesthesia, lines were placed by the anesthesiologist. The urinary bladder was catheterized and irrigated with antibiotic solution. There was no tension on the axillae and all pressure points were padded. Sequential compression boots were used as were Omkar Huggers. The abdomen was sterilely prepped and draped. The abdomen was entered via a midline laparotomy incision and the Bookwalter retractor was place to provide exposure. Dissection was carried out as needed to expose the vessels for later anastomosis, including the common iliac and the smv for the pancreas, and the right external iliac artery and the  right external iliac vein for the kidney. Overlying lymphatics were ligated or cauterized and  the vessels were dissected free for a length compatible with later anastomosis.    A segment of donor iliac artery was then brought to the operative field for arterial inflow for the pancreas allograft.  Anastomosis was done to the recipient right common iliac artery in an end-to-side fashion with continous 5-0 polypropylene after appropriate control with a vascular clamp.  At completion of the anastomosis, a vascular clamp was applied to the arterial graft just above the suture line, and flow was restored through the native artery.  This graft was then tunneled through the root of the small bowel mesentery to emerge just lateral to the superior mesenteric vein.    The kidney was brought to the OR table at 11/3/2020  9:57 PM.  Venous control was obtained with a vascular clamp.  A venotomy was made, the vein irrigated, and an end renal to right external iliac vein anastomosis was created with 5-0 polypropylene.  Arterial control was obtained with a vascular clamp.  Arteriotomy was made, the artery irrigated, and an aortic patch to right external iliac artery anastomosis was created with 6-0 polypropylene.  The kidney was unclamped and reperfused at 11/3/2020 10:28 PM.  Reperfusion quality was good. Intraoperative urine production was observed.  After hemostasis was obtained, a Lich uretero-neocystostomy was created.  The bladder was filled and identified, opened, and the anastomosis created using 6-0 PDS.  The bladder muscle was closed over the distal ureter to create an antireflux tunnel.  A ureteral stent was used.       The pancreas was brought to the OR table on 11/3/2020 11:14 PM.  Venous control was obtained with a vascular clamp. A venotomy was made, the vein irrigated, and a portal vein to smv anastomosis was created with 6-0 Prolene. The arterial anastomosis was done between the allograft Y-graft and the inflow arterial iliac graft using 5-0 polypropylene.  The pancreas was reperfused at 11/3/2020 11:36 PM.  Anastomosis time was 11/3/2020 11:36 PM and cold ischemia time was 11/3/2020 11:36 PM.  Hemostasis obtained using silk ties, polyproplylene sutures, electrocautery, and temporary packing as appropriate. Once hemostasis was obtained, exocrine drainage of the pancreas was achieved by creating a loop duodeno-enterostomy in a standard fashion.  With the pancreas well perfused and sitting appropriately without tension on the anastomoses, viscera were replaced in their usual position. The wound was closed after a final check for hemostasis. At the end of the case the needle, sponge and instrument counts were all correct. Sterile dressings were applied and the patient was brought to the recovery room/ICU in stable condition.         Organ Transplanted: 1. Left Kidney        2. Whole Pancreas With Duodenum     Kidney Implantation Site:  Artery:    right external iliac artery  Vein:    right external iliac vein  Sin:    not to be removed before 2 days.  Ureteral Stent:  Yes    Pancreas Implantation Site:  Venous Drainage: smv  Exocrine Drainage: loop duodeno-enterostomy    Ischemic Times:   Kidney anastomosis (warm ischemia) time:  31 minutes   Kidney cold ischemia time:     355 minutes.  Pancreas anastomosis (warm ischemia) time:  22 minutes   Pancreas cold ischemia time:    432 minutes.    Donor Data:  UNOS ID: [id number]   UNOS Match Run: [id number]   Donor Type: Donation after Brain Death   Donor CMV Status: Positive   Donor HBcAB: Negative   Donor HCV Status: Positive

## 2020-11-04 NOTE — ASSESSMENT & PLAN NOTE
Managed per primary.  Related to new pancreas  May require continuous dextrose infusion if BG dose not improve with steroid administration for transplant     ADDENDUM: started on d10 infusion per primary. BG stabilizing.

## 2020-11-04 NOTE — SUBJECTIVE & OBJECTIVE
Interval HPI:   HD today. Feeling well. BG slightly below goal; patient took basal insulin last night. NPO for surgery this evening.   Eating:   NPO  Nausea: No  Hypoglycemia and intervention: No  Fever: No  TPN and/or TF: No  PMH, PSH, FH, SH reviewed       Review of Systems   Constitutional: Negative for weight changes.  Eyes: Negative for visual disturbance.  Respiratory: Negative for cough.   Cardiovascular: Negative for chest pain.  Gastrointestinal: Negative for nausea.  Endocrine: Negative for polyuria, polydipsia.  Musculoskeletal: Negative for back pain.  Skin: Negative for rash.  Neurological: Negative for syncope.  Psychiatric/Behavioral: Negative for depression.      Current Medications and/or Treatments Impacting Glycemic Control  Immunotherapy:    Immunosuppressants         Stop Route Frequency     antithymocyte globulin (rabbit) 150 mg, hydrocortisone sodium succinate (SOLU-CORTEF) 20 mg, heparin (porcine) 1,000 Units in sodium chloride 0.9% 500 mL      11/07 0859 IV Daily     mycophenolate mofetil 200 mg/mL suspension 1,000 mg      -- PER NG TUBE 2 times daily     tacrolimus (PROGRAF) 1 mg/mL oral syringe      -- PER NG TUBE 2 times daily        Steroids:   Hormones (From admission, onward)    Start     Stop Route Frequency Ordered    11/07/20 0900  methylPREDNISolone sodium succinate injection 20 mg      -- IV Daily 11/04/20 0145    11/06/20 0900  methylPREDNISolone sodium succinate injection 125 mg      -- IV Once 11/04/20 0145    11/05/20 0900  methylPREDNISolone sodium succinate (SOLU-MEDROL) 250 mg in dextrose 5 % 50 mL IVPB      -- IV Once 11/04/20 0145    11/05/20 0900  antithymocyte globulin (rabbit) 150 mg, hydrocortisone sodium succinate (SOLU-CORTEF) 20 mg, heparin (porcine) 1,000 Units in sodium chloride 0.9% 500 mL      11/07 0859 IV Daily 11/04/20 0145        Pressors:    Autonomic Drugs (From admission, onward)    None        Hyperglycemia/Diabetes Medications:   Antihyperglycemics  (From admission, onward)    Start     Stop Route Frequency Ordered    11/04/20 0230  insulin regular 100 Units in sodium chloride 0.9% 100 mL infusion     Question:  Insulin Rate Adjustment (DO NOT MODIFY ANSWER)  Answer:  \\[email or web address] [id number]    -- IV Continuous 11/04/20 0145             PHYSICAL EXAMINATION:  Vitals:    11/04/20 0500   BP:    Pulse: 85   Resp: 12   Temp:      Body mass index is 28.08 kg/m².    Physical Exam   Constitutional: Well developed, well nourished, NAD.  ENT: External ears no masses with nose patent; normal hearing.  Neck: Supple; trachea midline; no thyromegaly.  Cardiovascular: Normal heart sounds, no LE edema. DP +2 bilaterally.  Lungs: Normal effort; lungs anterior bilaterally clear to auscultation.  Abdomen: Soft, no masses, no hernias.  MS: No clubbing or cyanosis of nails noted;  unable to assess gait.  Skin: No rashes, lesions, or ulcers; no nodules. Injection sites are ok. No lipo hypertropthy or atrophy.  Psychiatric: Good judgement and insight; normal mood and affect.  Neurological: Cranial nerves are grossly intact.   Foot:L BKA

## 2020-11-04 NOTE — PROGRESS NOTES
[facility]  Endocrinology  Progress Note    Admit Date: 11/3/2020     Reason for Consult: Management of T2DM, Hyperglycemia     Surgical Procedure and Date: tentative kidney/pancreas transplant    Diabetes diagnosis year: age 15    Home Diabetes Medications:  Lantus 15 units. Previously on glipizide but stopped a few months ago.     Lab Results   Component Value Date    HGBA1C 8.2 (H) 11/03/2020         How often checking glucose at home? Does not check often  BG readings on regimen: variable   Hypoglycemia on the regimen?  Yes  Missed doses on regimen?  No    Diabetes Complications include:     Hypoglycemia , Diabetic nephropathy  , Diabetic chronic kidney disease     , Diabetic retinopathy  and Amputation status    Complicating diabetes co morbidities:   CKD, ESRD and Glucocorticoid use       HPI:   Patient is a 40 y.o. male with a diagnosis of ESRD and type 2 DM. Patient admitted for kidney/pancreas transplant. Endocrinology consulted for BG/ DM management.       Interval HPI:   Overnight events: Remains in ICU. Extubated yesterday. BG at or below goal. Requiring d50 boluses.   Eating:   NPO  Nausea: No  Hypoglycemia and intervention: Yes  Fever: No  TPN and/or TF: No      BP (!) 114/54   Pulse 85   Temp 99.3 °F (37.4 °C) (Oral)   Resp 12   Wt 85 kg (187 lb 6.3 oz)   SpO2 99%   BMI 28.08 kg/m²     Labs Reviewed and Include    Recent Labs   Lab 11/04/20  0146   GLU 39*  39*   CALCIUM 7.9*  7.9*   ALBUMIN 2.8*   PROT 5.5*     141   K 3.8  3.8   CO2 22*  22*     103   BUN 38*  38*   CREATININE 6.2*  6.2*   ALKPHOS 80   ALT 49*   AST 56*   BILITOT 0.5     Lab Results   Component Value Date    WBC 3.11 (L) 11/04/2020    WBC 3.11 (L) 11/04/2020    HGB 8.6 (L) 11/04/2020    HGB 8.6 (L) 11/04/2020    HCT 26 (L) 11/04/2020    MCV 85 11/04/2020    MCV 85 11/04/2020     11/04/2020     11/04/2020     No results for input(s): TSH, FREET4 in the last 168 hours.  Lab  Results   Component Value Date    HGBA1C 8.2 (H) 11/03/2020       Nutritional status:   Body mass index is 28.08 kg/m².  Lab Results   Component Value Date    ALBUMIN 2.8 (L) 11/04/2020    ALBUMIN 3.2 (L) 11/03/2020    ALBUMIN 3.6 06/17/2020     No results found for: PREALBUMIN    Estimated Creatinine Clearance: 17.3 mL/min (A) (based on SCr of 6.2 mg/dL (H)).    Accu-Checks  Recent Labs     11/04/20  0320 11/04/20  0331 11/04/20  0346 11/04/20  0402 11/04/20  0434 11/04/20  0452 11/04/20  0519 11/04/20  0541 11/04/20  0600 11/04/20  0621   POCTGLUCOSE 53* 199* 128* 88 50* 142* 88 63* 98 66*       Current Medications and/or Treatments Impacting Glycemic Control  Immunotherapy:    Immunosuppressants         Stop Route Frequency     antithymocyte globulin (rabbit) 150 mg, hydrocortisone sodium succinate (SOLU-CORTEF) 20 mg, heparin (porcine) 1,000 Units in sodium chloride 0.9% 500 mL      11/07 0859 IV Daily     mycophenolate mofetil 200 mg/mL suspension 1,000 mg      -- PER NG TUBE 2 times daily     tacrolimus (PROGRAF) 1 mg/mL oral syringe      -- PER NG TUBE 2 times daily        Steroids:   Hormones (From admission, onward)    Start     Stop Route Frequency Ordered    11/07/20 0900  methylPREDNISolone sodium succinate injection 20 mg      -- IV Daily 11/04/20 0145    11/06/20 0900  methylPREDNISolone sodium succinate injection 125 mg      -- IV Once 11/04/20 0145    11/05/20 0900  methylPREDNISolone sodium succinate (SOLU-MEDROL) 250 mg in dextrose 5 % 50 mL IVPB      -- IV Once 11/04/20 0145    11/05/20 0900  antithymocyte globulin (rabbit) 150 mg, hydrocortisone sodium succinate (SOLU-CORTEF) 20 mg, heparin (porcine) 1,000 Units in sodium chloride 0.9% 500 mL      11/07 0859 IV Daily 11/04/20 0145        Pressors:    Autonomic Drugs (From admission, onward)    None        Hyperglycemia/Diabetes Medications:   Antihyperglycemics (From admission, onward)    Start     Stop Route Frequency Ordered    11/04/20 7205   insulin regular 100 Units in sodium chloride 0.9% 100 mL infusion     Question:  Insulin Rate Adjustment (DO NOT MODIFY ANSWER)  Answer:  [email or web address] [email or web address]    -- IV Continuous 11/04/20 0145          ASSESSMENT and PLAN    * ESRD on dialysis  Titrate insulin slowly to avoid hypoglycemia as the risk of hypoglycemia increases with decreased creatinine clearance.    Estimated Creatinine Clearance: 17.3 mL/min (A) (based on SCr of 6.2 mg/dL (H)).      Diabetes mellitus  BG goal 140-180. S/p pancreas transplant     Patient likely with type 2 DM. No PRECIOUS labs noted in chart.   Cpeptide on 6/17/20 was 4. Previously on glipizide but with hypoglycemia. Only on basal insulin prior to admission.    BG monitoring hourly given hypoglycemia   Consider d5 or d10 infusion titrate to maintain BG of 70 or above. Defer to primary given kidney transplant.       Hypoglycemia    Managed per primary.  Related to new pancreas  May require continuous dextrose infusion if BG dose not improve with steroid administration for transplant     ADDENDUM: started on d10 infusion per primary. BG stabilizing.     Prophylactic immunotherapy  May increase insulin resistance.         Blindness of both eyes due to diabetes mellitus  If patient has any issues with BG following transplant would highly benefit from CGM. Patient with blindness and difficulty checking BG.     Encounter for pre-transplant evaluation for kidney and pancreas transplant  S/p kidney/ pancreas transplant   Optimize BG control.       Iona Cabello NP  Endocrinology  [facility]

## 2020-11-04 NOTE — SUBJECTIVE & OBJECTIVE
Interval History/Significant Events:   Admitted overnight  Problems with hypoglycemia, started on D10   Monitoring urine output closely for replacement  >1L of urine output (baseline on him is 1L)     Follow-up For: Procedure(s) (LRB):  TRANSPLANT, KIDNEY (N/A)  TRANSPLANT, PANCREAS (N/A)    Post-Operative Day: 1 Day Post-Op    Objective:     Vital Signs (Most Recent):  Temp: 98 °F (36.7 °C) (11/04/20 1100)  Pulse: 84 (11/04/20 1100)  Resp: 16 (11/04/20 1100)  BP: (!) 113/59 (11/04/20 1100)  SpO2: 100 % (11/04/20 1100) Vital Signs (24h Range):  Temp:  [98 °F (36.7 °C)-99.3 °F (37.4 °C)] 98 °F (36.7 °C)  Pulse:  [70-87] 84  Resp:  [6-23] 16  SpO2:  [94 %-100 %] 100 %  BP: (113-214)/() 113/59  Arterial Line BP: (114-176)/(47-63) 114/47     Weight: 85.1 kg (187 lb 9.8 oz)  Body mass index is 28.53 kg/m².      Intake/Output Summary (Last 24 hours) at 11/4/2020 1120  Last data filed at 11/4/2020 1114  Gross per 24 hour   Intake 4515.58 ml   Output 6155 ml   Net -1639.42 ml       Physical Exam  Constitutional:       General: He is not in acute distress.  HENT:      Head: Normocephalic.      Nose: Nose normal.      Mouth/Throat:      Mouth: Mucous membranes are moist.   Eyes:      General: No scleral icterus.  Neck:      Musculoskeletal: Neck supple.   Cardiovascular:      Rate and Rhythm: Normal rate.   Pulmonary:      Breath sounds: Normal breath sounds.   Abdominal:      General: Abdomen is flat.      Palpations: Abdomen is soft.   Neurological:      General: No focal deficit present.         Lines/Drains/Airways     Drain                 Hemodialysis AV Fistula Left upper arm -- days         Closed/Suction Drain 11/04/20 0040 Right Abdomen Bulb 19 Fr. less than 1 day         Urethral Catheter 11/03/20 2002 Non-latex;Straight-tip;Triple-lumen 20 Fr. less than 1 day          Arterial Line            Arterial Line 11/03/20 2033 Right Radial less than 1 day          Peripheral Intravenous Line                  Peripheral IV - Single Lumen 11/03/20 0938 20 G Anterior;Right Upper Arm 1 day         Peripheral IV - Single Lumen 11/03/20 1952 18 G Left Forearm less than 1 day         Peripheral IV - Single Lumen 11/03/20 1957 16 G Right Forearm less than 1 day                Significant Labs:    CBC/Anemia Profile:  Recent Labs   Lab 11/03/20  0947 11/04/20  0146 11/04/20  0219 11/04/20  0734   WBC 5.68 3.11*  3.11*  --  11.80   HGB 8.6* 8.6*  8.6*  --  8.2*   HCT 27.8* 27.8*  27.8* 26* 26.9*    248  248  --  275   MCV 85 85  85  --  86   RDW 16.1* 16.2*  16.2*  --  16.5*        Chemistries:  Recent Labs   Lab 11/03/20  0947 11/04/20  0146 11/04/20  0734    141  141 141   K 5.3* 3.8  3.8 4.1    103  103 105   CO2 22* 22*  22* 23   BUN 75* 38*  38* 40*   CREATININE 10.0* 6.2*  6.2* 6.1*   CALCIUM 8.1* 7.9*  7.9* 7.4*   ALBUMIN 3.2* 2.8*  --    PROT 6.5 5.5*  --    BILITOT 0.3 0.5  --    ALKPHOS 105 80  --    ALT 43 49*  --    AST 36 56*  --    MG 2.2  --   --    PHOS 6.8*  --   --

## 2020-11-04 NOTE — CONSULTS
[facility]  Endocrinology  Diabetes Consult Note    Consult Requested by: Adin Romero Jr., MD   Reason for admit: ESRD on dialysis    HISTORY OF PRESENT ILLNESS:  Reason for Consult: Management of T2DM, Hyperglycemia     Surgical Procedure and Date: tentative kidney/pancreas transplant    Diabetes diagnosis year: age 15    Home Diabetes Medications:  Lantus 15 units. Previously on glipizide but stopped a few months ago.     Lab Results   Component Value Date    HGBA1C 8.2 (H) 11/03/2020         How often checking glucose at home? Does not check often  BG readings on regimen: variable   Hypoglycemia on the regimen?  Yes  Missed doses on regimen?  No    Diabetes Complications include:     Hypoglycemia , Diabetic nephropathy  , Diabetic chronic kidney disease     , Diabetic retinopathy  and Amputation status    Complicating diabetes co morbidities:   CKD, ESRD and Glucocorticoid use       HPI:   Patient is a 40 y.o. male with a diagnosis of ESRD and type 2 DM. Patient admitted for kidney/pancreas transplant. Endocrinology consulted for BG/ DM management.       Interval HPI:   HD today. Feeling well. BG slightly below goal; patient took basal insulin last night. NPO for surgery this evening.   Eating:   NPO  Nausea: No  Hypoglycemia and intervention: No  Fever: No  TPN and/or TF: No  PMH, PSH, FH, SH reviewed       Review of Systems   Constitutional: Negative for weight changes.  Eyes: Negative for visual disturbance.  Respiratory: Negative for cough.   Cardiovascular: Negative for chest pain.  Gastrointestinal: Negative for nausea.  Endocrine: Negative for polyuria, polydipsia.  Musculoskeletal: Negative for back pain.  Skin: Negative for rash.  Neurological: Negative for syncope.  Psychiatric/Behavioral: Negative for depression.      Current Medications and/or Treatments Impacting Glycemic Control  Immunotherapy:    Immunosuppressants         Stop Route Frequency     antithymocyte globulin  (rabbit) 150 mg, hydrocortisone sodium succinate (SOLU-CORTEF) 20 mg, heparin (porcine) 1,000 Units in sodium chloride 0.9% 500 mL      11/07 0859 IV Daily     mycophenolate mofetil 200 mg/mL suspension 1,000 mg      -- PER NG TUBE 2 times daily     tacrolimus (PROGRAF) 1 mg/mL oral syringe      -- PER NG TUBE 2 times daily        Steroids:   Hormones (From admission, onward)    Start     Stop Route Frequency Ordered    11/07/20 0900  methylPREDNISolone sodium succinate injection 20 mg      -- IV Daily 11/04/20 0145 11/06/20 0900  methylPREDNISolone sodium succinate injection 125 mg      -- IV Once 11/04/20 0145 11/05/20 0900  methylPREDNISolone sodium succinate (SOLU-MEDROL) 250 mg in dextrose 5 % 50 mL IVPB      -- IV Once 11/04/20 0145 11/05/20 0900  antithymocyte globulin (rabbit) 150 mg, hydrocortisone sodium succinate (SOLU-CORTEF) 20 mg, heparin (porcine) 1,000 Units in sodium chloride 0.9% 500 mL      11/07 0859 IV Daily 11/04/20 0145        Pressors:    Autonomic Drugs (From admission, onward)    None        Hyperglycemia/Diabetes Medications:   Antihyperglycemics (From admission, onward)    Start     Stop Route Frequency Ordered    11/04/20 0230  insulin regular 100 Units in sodium chloride 0.9% 100 mL infusion     Question:  Insulin Rate Adjustment (DO NOT MODIFY ANSWER)  Answer:  [email or web address] [email or web address]    -- IV Continuous 11/04/20 0145             PHYSICAL EXAMINATION:  Vitals:    11/04/20 0500   BP:    Pulse: 85   Resp: 12   Temp:      Body mass index is 28.08 kg/m².    Physical Exam   Constitutional: Well developed, well nourished, NAD.  ENT: External ears no masses with nose patent; normal hearing.  Neck: Supple; trachea midline; no thyromegaly.  Cardiovascular: Normal heart sounds, no LE edema. DP +2 bilaterally.  Lungs: Normal effort; lungs anterior bilaterally clear to auscultation.  Abdomen: Soft, no masses, no hernias.  MS: No  clubbing or cyanosis of nails noted;  unable to assess gait.  Skin: No rashes, lesions, or ulcers; no nodules. Injection sites are ok. No lipo hypertropthy or atrophy.  Psychiatric: Good judgement and insight; normal mood and affect.  Neurological: Cranial nerves are grossly intact.   Foot:L BKA          Labs Reviewed and Include   Recent Labs   Lab 11/04/20  0146   GLU 39*  39*   CALCIUM 7.9*  7.9*   ALBUMIN 2.8*   PROT 5.5*     141   K 3.8  3.8   CO2 22*  22*     103   BUN 38*  38*   CREATININE 6.2*  6.2*   ALKPHOS 80   ALT 49*   AST 56*   BILITOT 0.5     Lab Results   Component Value Date    WBC 3.11 (L) 11/04/2020    WBC 3.11 (L) 11/04/2020    HGB 8.6 (L) 11/04/2020    HGB 8.6 (L) 11/04/2020    HCT 26 (L) 11/04/2020    MCV 85 11/04/2020    MCV 85 11/04/2020     11/04/2020     11/04/2020     No results for input(s): TSH, FREET4 in the last 168 hours.  Lab Results   Component Value Date    HGBA1C 8.2 (H) 11/03/2020       Nutritional status:   Body mass index is 28.08 kg/m².  Lab Results   Component Value Date    ALBUMIN 2.8 (L) 11/04/2020    ALBUMIN 3.2 (L) 11/03/2020    ALBUMIN 3.6 06/17/2020     No results found for: PREALBUMIN    Estimated Creatinine Clearance: 17.3 mL/min (A) (based on SCr of 6.2 mg/dL (H)).    Accu-Checks  Recent Labs     11/04/20  0304 11/04/20  0320 11/04/20  0331 11/04/20  0346 11/04/20  0402 11/04/20  0434 11/04/20  0452 11/04/20  0519 11/04/20  0541 11/04/20  0600   POCTGLUCOSE 71 53* 199* 128* 88 50* 142* 88 63* 98        ASSESSMENT and PLAN    * ESRD on dialysis  Titrate insulin slowly to avoid hypoglycemia as the risk of hypoglycemia increases with decreased creatinine clearance.    Estimated Creatinine Clearance: 17.3 mL/min (A) (based on SCr of 6.2 mg/dL (H)).      Diabetes mellitus  BG goal 140-180.     Patient likely with type 2 DM. No PRECIOUS labs noted in chart.   Cpeptide on 6/17/20 was 4. Previously on glipizide but with hypoglycemia. Only on  basal insulin prior to admission.    BG monitoring every 6 hours and low dose correction scale until surgery.       Blindness of both eyes due to diabetes mellitus  If patient has any issues with BG following transplant would highly benefit from CGM. Patient with blindness and difficulty checking BG.         Plan discussed with patient and family at bedside.     Iona Cabello NP  Endocrinology  [facility]

## 2020-11-04 NOTE — H&P
[facility]  Critical Care - Surgery  History & Physical    Patient Name: Hernandez Rosales  MRN: [id number]  Admission Date: 11/3/2020  Code Status: Full Code  Attending Physician: Adin Romero Jr., MD   Primary Care Provider: Primary Doctor No   Principal Problem: ESRD on dialysis    Subjective:     HPI:  Mr. Rosales is a 40 y.o. Black or  male with ESRD secondary to diabetic nephropathy (T1DM) who is now s/p pancreas and kidney transplant. Extubated off pressors.     Hospital/ICU Course:  No notes on file    Follow-up For: Procedure(s) (LRB):  TRANSPLANT, KIDNEY (N/A)  TRANSPLANT, PANCREAS (N/A)    Post-Operative Day: 1 Day Post-Op     Past Medical History:   Diagnosis Date    Anxiety     Cataract     Diabetes mellitus     Diabetic retinopathy     Disorder of kidney and ureter     Encounter for blood transfusion     Glaucoma     High cholesterol     Hypertension     Retinal detachment        Past Surgical History:   Procedure Laterality Date    EYE SURGERY      LEG AMPUTATION Left     RETINAL DETACHMENT SURGERY      TOE AMPUTATION Right        Review of patient's allergies indicates:  No Known Allergies    Family History     Problem Relation (Age of Onset)    Coronary artery disease Mother    Diabetes Mother, Sister, Brother, Maternal Aunt, Maternal Uncle    Glaucoma Mother    Heart disease Mother, Maternal Aunt, Maternal Uncle    Hypertension Mother, Brother, Maternal Aunt, Maternal Uncle    No Known Problems Father    Stroke Mother, Maternal Aunt        Tobacco Use    Smoking status: Former Smoker     Packs/day: 0.25     Years: 10.00     Pack years: 2.50    Smokeless tobacco: Never Used   Substance and Sexual Activity    Alcohol use: Yes     Comment: occasional    Drug use: No    Sexual activity: Yes     Partners: Female     Birth control/protection: Condom      Review of Systems   Unable to perform ROS: Acuity of condition     Objective:     Vital Signs  (Most Recent):  Temp: 98.3 °F (36.8 °C) (11/03/20 1647)  Pulse: 75 (11/03/20 1647)  Resp: 18 (11/03/20 1647)  BP: 134/65 (11/03/20 1720)  SpO2: 99 % (11/03/20 1445) Vital Signs (24h Range):  Temp:  [98 °F (36.7 °C)-98.5 °F (36.9 °C)] 98.3 °F (36.8 °C)  Pulse:  [70-85] 75  Resp:  [18] 18  SpO2:  [99 %] 99 %  BP: (134-224)/() 134/65     Weight: 96.2 kg (212 lb 3.1 oz)  Body mass index is 31.79 kg/m².      Intake/Output Summary (Last 24 hours) at 11/4/2020 0135  Last data filed at 11/4/2020 0047  Gross per 24 hour   Intake 2700 ml   Output 4800 ml   Net -2100 ml       Physical Exam  Vitals signs and nursing note reviewed.   HENT:      Head: Normocephalic and atraumatic.      Mouth/Throat:      Mouth: Mucous membranes are moist.   Eyes:      Comments: Bilateral visual deficits.    Neck:      Musculoskeletal: Neck supple.   Cardiovascular:      Rate and Rhythm: Normal rate.   Pulmonary:      Effort: Pulmonary effort is normal.   Abdominal:      General: There is no distension.      Comments: Incision clean, dry, and intact. Drain serous.    Musculoskeletal:         General: No swelling.      Comments: Left BKA   Skin:     General: Skin is warm and dry.   Neurological:      General: No focal deficit present.      Mental Status: He is alert.   Psychiatric:         Mood and Affect: Mood normal.         Behavior: Behavior normal.             Lines/Drains/Airways     Drain                 Closed/Suction Drain 11/04/20 0040 Right Abdomen Bulb 19 Fr. less than 1 day         Urethral Catheter 11/03/20 2002 Non-latex;Straight-tip;Triple-lumen 20 Fr. less than 1 day          Airway                 Airway - Non-Surgical 11/03/20 2108 less than 1 day          Arterial Line            Arterial Line 11/03/20 2033 Right Radial less than 1 day          Peripheral Intravenous Line                 Peripheral IV - Single Lumen 11/03/20 0938 20 G Anterior;Right Upper Arm less than 1 day         Peripheral IV - Single Lumen 11/03/20  1952 18 G Left Forearm less than 1 day         Peripheral IV - Single Lumen 11/03/20 1957 16 G Right Forearm less than 1 day                Significant Labs:    CBC/Anemia Profile:  Recent Labs   Lab 11/03/20  0947   WBC 5.68   HGB 8.6*   HCT 27.8*      MCV 85   RDW 16.1*        Chemistries:  Recent Labs   Lab 11/03/20  0947      K 5.3*      CO2 22*   BUN 75*   CREATININE 10.0*   CALCIUM 8.1*   ALBUMIN 3.2*   PROT 6.5   BILITOT 0.3   ALKPHOS 105   ALT 43   AST 36   MG 2.2   PHOS 6.8*         Significant Imaging: I have reviewed all pertinent imaging results/findings within the past 24 hours.    Assessment/Plan:     * ESRD on dialysis  40-year-old male s/p kidney and pancreas transplant for ESRD secondary to T1DM. Donor PHS increased risk, CMV, EBV, & HCV YIN positive. Thymo induction- peripheral.     Neuro  -PRN pain meds    Cards  -hemodynamically stable  -monitor hemodynamics    Resp  -room air    Renal  -monitor urine output closely and replace 1/1 per protocol  -trend BUN/cr    FENGI  -npo  -GI ppx  -lyte replacement protocol    Heme  -trend h/h  -dvt ppx    Endo  -insulin gtt    ID  -continue perioperative abx  -continue immunosuppression  -trend wbc  Dispo  -ICU         Critical care was time spent personally by me on the following activities: development of treatment plan with patient or surrogate and bedside caregivers, discussions with consultants, evaluation of patient's response to treatment, examination of patient, ordering and performing treatments and interventions, ordering and review of laboratory studies, ordering and review of radiographic studies, pulse oximetry, re-evaluation of patient's condition.  This critical care time did not overlap with that of any other provider or involve time for any procedures.     Gato Edwards MD  Critical Care - Surgery  [facility]

## 2020-11-04 NOTE — OP NOTE
Operative Report    Date of Procedure: 11/3/2020    Surgeon: Adin Romero Jr, MD  First Assistant: Hailey Lind MD    Pre-operative Diagnosis: Allograft pancreas for transplantation  Post-operative Diagnosis: Same    Procedure(s) Performed: Back table preparation of allograft pancreas with vascular reconstruction: Y-Graft to SPA and SMA  Anesthesia: Not applicable  Estimated Blood Loss: Not applicable  Fluids Administered: Not applicable    Findings: 2cm longitudinal laceration to distal splenic vein  Drains: Not applicable  Specimens: none    Preamble  Indications: This report describes only the backbench preparation of the pancreas prior to transplantation.  The transplant operation itself is described in a separate report.    ABO Confirmation: Immediately following arrival of the donor organ and prior to implantation, a formal ABO confirmation was done according to hospital and UNOS policies.  I confirmed the UNOS ID number of the donor organ and the donor and recipient ABO types, directly verifying these data by comparison with the UNOS [facility] report.  This confirmation was personally done by an attending surgeon and circulating nurse, and is officially documented elsewhere.    Time-Out: A complete time out was carried out prior to the procedure, with confirmation of patient identity, correct procedure, correct operative site, appropriate antibiotic prophylaxis, review of any known allergies, and presence of all needed equipment.    Procedure in Detail  Prior to starting the operation, the pancreas was prepared on the back table. This required Y-Graft to SPA and SMA reconstruction. The arterial anastomoses were completed with 6-0 prolene. The portal vein outflow was dissected for length. The spleen was removed by ligating and dividing the splenic vessels with silk ties. The pancreas was cleared of the surrounding fatty soft tissues. The duodenal ends were shortened and closed with a combination of  vascular staplers and sutures. The closure of the vessels in the root of the small bowel mesentery was reinforced as needed with ties on visible vessels and oversewing of the staple line with 4-0 polypropylene. All vascular anastomoses and closures were checked for hemostasis by flushing with preservation solution.

## 2020-11-04 NOTE — HPI
Reason for Consult: Management of T2DM, Hyperglycemia     Surgical Procedure and Date: tentative kidney/pancreas transplant    Diabetes diagnosis year: age 15    Home Diabetes Medications:  Lantus 15 units. Previously on glipizide but stopped a few months ago.     Lab Results   Component Value Date    HGBA1C 8.2 (H) 11/03/2020         How often checking glucose at home? Does not check often  BG readings on regimen: variable   Hypoglycemia on the regimen?  Yes  Missed doses on regimen?  No    Diabetes Complications include:     Hypoglycemia , Diabetic nephropathy  , Diabetic chronic kidney disease     , Diabetic retinopathy  and Amputation status    Complicating diabetes co morbidities:   CKD, ESRD and Glucocorticoid use       HPI:   Patient is a 40 y.o. male with a diagnosis of ESRD and type 2 DM. Patient admitted for kidney/pancreas transplant. Endocrinology consulted for BG/ DM management.

## 2020-11-05 ENCOUNTER — TELEPHONE (OUTPATIENT)
Dept: TRANSPLANT | Facility: CLINIC | Age: 40
End: 2020-11-05

## 2020-11-05 DIAGNOSIS — Z94.0 KIDNEY REPLACED BY TRANSPLANT: Primary | ICD-10-CM

## 2020-11-05 LAB
ALBUMIN SERPL BCP-MCNC: 3.1 G/DL (ref 3.5–5.2)
ALBUMIN SERPL BCP-MCNC: 3.1 G/DL (ref 3.5–5.2)
ALBUMIN SERPL BCP-MCNC: 3.2 G/DL (ref 3.5–5.2)
ALLENS TEST: ABNORMAL
ALP SERPL-CCNC: 71 U/L (ref 55–135)
ALP SERPL-CCNC: 76 U/L (ref 55–135)
ALT SERPL W/O P-5'-P-CCNC: 66 U/L (ref 10–44)
ALT SERPL W/O P-5'-P-CCNC: 72 U/L (ref 10–44)
AMYLASE SERPL-CCNC: 145 U/L (ref 20–110)
AMYLASE SERPL-CCNC: 199 U/L (ref 20–110)
ANION GAP SERPL CALC-SCNC: 11 MMOL/L (ref 8–16)
ANION GAP SERPL CALC-SCNC: 13 MMOL/L (ref 8–16)
ANION GAP SERPL CALC-SCNC: 13 MMOL/L (ref 8–16)
ANISOCYTOSIS BLD QL SMEAR: SLIGHT
ANISOCYTOSIS BLD QL SMEAR: SLIGHT
AST SERPL-CCNC: 106 U/L (ref 10–40)
AST SERPL-CCNC: 60 U/L (ref 10–40)
BASOPHILS # BLD AUTO: 0 K/UL (ref 0–0.2)
BASOPHILS # BLD AUTO: 0.01 K/UL (ref 0–0.2)
BASOPHILS NFR BLD: 0 % (ref 0–1.9)
BASOPHILS NFR BLD: 0.1 % (ref 0–1.9)
BILIRUB SERPL-MCNC: 0.3 MG/DL (ref 0.1–1)
BILIRUB SERPL-MCNC: 0.3 MG/DL (ref 0.1–1)
BUN SERPL-MCNC: 40 MG/DL (ref 6–20)
BUN SERPL-MCNC: 42 MG/DL (ref 6–20)
BUN SERPL-MCNC: 45 MG/DL (ref 6–20)
CA-I BLDV-SCNC: 0.99 MMOL/L (ref 1.06–1.42)
CA-I BLDV-SCNC: 1.14 MMOL/L (ref 1.06–1.42)
CALCIUM SERPL-MCNC: 7.4 MG/DL (ref 8.7–10.5)
CALCIUM SERPL-MCNC: 7.6 MG/DL (ref 8.7–10.5)
CALCIUM SERPL-MCNC: 8.2 MG/DL (ref 8.7–10.5)
CHLORIDE SERPL-SCNC: 101 MMOL/L (ref 95–110)
CHLORIDE SERPL-SCNC: 102 MMOL/L (ref 95–110)
CHLORIDE SERPL-SCNC: 103 MMOL/L (ref 95–110)
CO2 SERPL-SCNC: 22 MMOL/L (ref 23–29)
CREAT SERPL-MCNC: 4.1 MG/DL (ref 0.5–1.4)
CREAT SERPL-MCNC: 5 MG/DL (ref 0.5–1.4)
CREAT SERPL-MCNC: 5 MG/DL (ref 0.5–1.4)
DELSYS: ABNORMAL
DIFFERENTIAL METHOD: ABNORMAL
DIFFERENTIAL METHOD: ABNORMAL
EOSINOPHIL # BLD AUTO: 0 K/UL (ref 0–0.5)
EOSINOPHIL # BLD AUTO: 0 K/UL (ref 0–0.5)
EOSINOPHIL NFR BLD: 0 % (ref 0–8)
EOSINOPHIL NFR BLD: 0.1 % (ref 0–8)
ERYTHROCYTE [DISTWIDTH] IN BLOOD BY AUTOMATED COUNT: 16.7 % (ref 11.5–14.5)
ERYTHROCYTE [DISTWIDTH] IN BLOOD BY AUTOMATED COUNT: 16.9 % (ref 11.5–14.5)
ERYTHROCYTE [SEDIMENTATION RATE] IN BLOOD BY WESTERGREN METHOD: 18 MM/H
EST. GFR  (AFRICAN AMERICAN): 15.7 ML/MIN/1.73 M^2
EST. GFR  (AFRICAN AMERICAN): 15.7 ML/MIN/1.73 M^2
EST. GFR  (AFRICAN AMERICAN): 20 ML/MIN/1.73 M^2
EST. GFR  (NON AFRICAN AMERICAN): 13.6 ML/MIN/1.73 M^2
EST. GFR  (NON AFRICAN AMERICAN): 13.6 ML/MIN/1.73 M^2
EST. GFR  (NON AFRICAN AMERICAN): 17.3 ML/MIN/1.73 M^2
FIO2: 21
GLUCOSE SERPL-MCNC: 112 MG/DL (ref 70–110)
GLUCOSE SERPL-MCNC: 94 MG/DL (ref 70–110)
GLUCOSE SERPL-MCNC: 98 MG/DL (ref 70–110)
HCO3 UR-SCNC: 24.7 MMOL/L (ref 24–28)
HCT VFR BLD AUTO: 23.9 % (ref 40–54)
HCT VFR BLD AUTO: 28.4 % (ref 40–54)
HCV RNA SERPL NAA+PROBE-LOG IU: <1.08 LOG (10) IU/ML
HCV RNA SERPL QL NAA+PROBE: NOT DETECTED IU/ML
HCV RNA SPEC NAA+PROBE-ACNC: <12 IU/ML
HGB BLD-MCNC: 7.4 G/DL (ref 14–18)
HGB BLD-MCNC: 8.7 G/DL (ref 14–18)
HYPOCHROMIA BLD QL SMEAR: ABNORMAL
IMM GRANULOCYTES # BLD AUTO: 0.03 K/UL (ref 0–0.04)
IMM GRANULOCYTES # BLD AUTO: 0.06 K/UL (ref 0–0.04)
IMM GRANULOCYTES NFR BLD AUTO: 0.3 % (ref 0–0.5)
IMM GRANULOCYTES NFR BLD AUTO: 0.6 % (ref 0–0.5)
INR PPP: 1.3 (ref 0.8–1.2)
LIPASE SERPL-CCNC: 120 U/L (ref 4–60)
LIPASE SERPL-CCNC: 63 U/L (ref 4–60)
LYMPHOCYTES # BLD AUTO: 0.1 K/UL (ref 1–4.8)
LYMPHOCYTES # BLD AUTO: 0.1 K/UL (ref 1–4.8)
LYMPHOCYTES NFR BLD: 0.6 % (ref 18–48)
LYMPHOCYTES NFR BLD: 0.9 % (ref 18–48)
MAGNESIUM SERPL-MCNC: 1.9 MG/DL (ref 1.6–2.6)
MAGNESIUM SERPL-MCNC: 1.9 MG/DL (ref 1.6–2.6)
MCH RBC QN AUTO: 26 PG (ref 27–31)
MCH RBC QN AUTO: 26.5 PG (ref 27–31)
MCHC RBC AUTO-ENTMCNC: 30.6 G/DL (ref 32–36)
MCHC RBC AUTO-ENTMCNC: 31 G/DL (ref 32–36)
MCV RBC AUTO: 84 FL (ref 82–98)
MCV RBC AUTO: 87 FL (ref 82–98)
MODE: ABNORMAL
MONOCYTES # BLD AUTO: 0.1 K/UL (ref 0.3–1)
MONOCYTES # BLD AUTO: 0.5 K/UL (ref 0.3–1)
MONOCYTES NFR BLD: 1.2 % (ref 4–15)
MONOCYTES NFR BLD: 4.8 % (ref 4–15)
NEUTROPHILS # BLD AUTO: 10 K/UL (ref 1.8–7.7)
NEUTROPHILS # BLD AUTO: 9.9 K/UL (ref 1.8–7.7)
NEUTROPHILS NFR BLD: 93.9 % (ref 38–73)
NEUTROPHILS NFR BLD: 97.5 % (ref 38–73)
NRBC BLD-RTO: 0 /100 WBC
NRBC BLD-RTO: 0 /100 WBC
PCO2 BLDA: 45.1 MMHG (ref 35–45)
PH SMN: 7.35 [PH] (ref 7.35–7.45)
PHOSPHATE SERPL-MCNC: 5.5 MG/DL (ref 2.7–4.5)
PHOSPHATE SERPL-MCNC: 6.5 MG/DL (ref 2.7–4.5)
PHOSPHATE SERPL-MCNC: 6.8 MG/DL (ref 2.7–4.5)
PLATELET # BLD AUTO: 239 K/UL (ref 150–350)
PLATELET # BLD AUTO: 246 K/UL (ref 150–350)
PLATELET BLD QL SMEAR: ABNORMAL
PLATELET BLD QL SMEAR: ABNORMAL
PMV BLD AUTO: 9.1 FL (ref 9.2–12.9)
PMV BLD AUTO: 9.6 FL (ref 9.2–12.9)
PO2 BLDA: 69 MMHG (ref 80–100)
POC BE: -1 MMOL/L
POC SATURATED O2: 92 % (ref 95–100)
POC TCO2: 26 MMOL/L (ref 23–27)
POCT GLUCOSE: 100 MG/DL (ref 70–110)
POCT GLUCOSE: 100 MG/DL (ref 70–110)
POCT GLUCOSE: 102 MG/DL (ref 70–110)
POCT GLUCOSE: 102 MG/DL (ref 70–110)
POCT GLUCOSE: 103 MG/DL (ref 70–110)
POCT GLUCOSE: 124 MG/DL (ref 70–110)
POCT GLUCOSE: 77 MG/DL (ref 70–110)
POCT GLUCOSE: 82 MG/DL (ref 70–110)
POCT GLUCOSE: 87 MG/DL (ref 70–110)
POCT GLUCOSE: 88 MG/DL (ref 70–110)
POCT GLUCOSE: 90 MG/DL (ref 70–110)
POCT GLUCOSE: 90 MG/DL (ref 70–110)
POCT GLUCOSE: 92 MG/DL (ref 70–110)
POCT GLUCOSE: 92 MG/DL (ref 70–110)
POCT GLUCOSE: 93 MG/DL (ref 70–110)
POCT GLUCOSE: 95 MG/DL (ref 70–110)
POCT GLUCOSE: 96 MG/DL (ref 70–110)
POIKILOCYTOSIS BLD QL SMEAR: SLIGHT
POTASSIUM SERPL-SCNC: 4.5 MMOL/L (ref 3.5–5.1)
POTASSIUM SERPL-SCNC: 4.5 MMOL/L (ref 3.5–5.1)
POTASSIUM SERPL-SCNC: 4.6 MMOL/L (ref 3.5–5.1)
PROT SERPL-MCNC: 5.7 G/DL (ref 6–8.4)
PROT SERPL-MCNC: 5.9 G/DL (ref 6–8.4)
PROTHROMBIN TIME: 13.9 SEC (ref 9–12.5)
RBC # BLD AUTO: 2.85 M/UL (ref 4.6–6.2)
RBC # BLD AUTO: 3.28 M/UL (ref 4.6–6.2)
SAMPLE: ABNORMAL
SITE: ABNORMAL
SODIUM SERPL-SCNC: 136 MMOL/L (ref 136–145)
SODIUM SERPL-SCNC: 136 MMOL/L (ref 136–145)
SODIUM SERPL-SCNC: 137 MMOL/L (ref 136–145)
SP02: 98
TACROLIMUS BLD-MCNC: <1.5 NG/ML (ref 5–15)
WBC # BLD AUTO: 10.2 K/UL (ref 3.9–12.7)
WBC # BLD AUTO: 10.55 K/UL (ref 3.9–12.7)

## 2020-11-05 PROCEDURE — 25000003 PHARM REV CODE 250: Performed by: NURSE PRACTITIONER

## 2020-11-05 PROCEDURE — 25000003 PHARM REV CODE 250: Performed by: TRANSPLANT SURGERY

## 2020-11-05 PROCEDURE — 25000003 PHARM REV CODE 250: Performed by: STUDENT IN AN ORGANIZED HEALTH CARE EDUCATION/TRAINING PROGRAM

## 2020-11-05 PROCEDURE — 80053 COMPREHEN METABOLIC PANEL: CPT | Mod: 91

## 2020-11-05 PROCEDURE — 82150 ASSAY OF AMYLASE: CPT

## 2020-11-05 PROCEDURE — 94799 UNLISTED PULMONARY SVC/PX: CPT

## 2020-11-05 PROCEDURE — P9047 ALBUMIN (HUMAN), 25%, 50ML: HCPCS | Mod: JG | Performed by: TRANSPLANT SURGERY

## 2020-11-05 PROCEDURE — 37799 UNLISTED PX VASCULAR SURGERY: CPT

## 2020-11-05 PROCEDURE — 63600175 PHARM REV CODE 636 W HCPCS: Performed by: TRANSPLANT SURGERY

## 2020-11-05 PROCEDURE — 63600175 PHARM REV CODE 636 W HCPCS: Performed by: STUDENT IN AN ORGANIZED HEALTH CARE EDUCATION/TRAINING PROGRAM

## 2020-11-05 PROCEDURE — 94761 N-INVAS EAR/PLS OXIMETRY MLT: CPT

## 2020-11-05 PROCEDURE — 83690 ASSAY OF LIPASE: CPT | Mod: 91

## 2020-11-05 PROCEDURE — 80053 COMPREHEN METABOLIC PANEL: CPT

## 2020-11-05 PROCEDURE — 85025 COMPLETE CBC W/AUTO DIFF WBC: CPT

## 2020-11-05 PROCEDURE — 20600001 HC STEP DOWN PRIVATE ROOM

## 2020-11-05 PROCEDURE — 83735 ASSAY OF MAGNESIUM: CPT | Mod: 91

## 2020-11-05 PROCEDURE — P9016 RBC LEUKOCYTES REDUCED: HCPCS

## 2020-11-05 PROCEDURE — 84100 ASSAY OF PHOSPHORUS: CPT

## 2020-11-05 PROCEDURE — 99223 1ST HOSP IP/OBS HIGH 75: CPT | Mod: GC,,, | Performed by: INTERNAL MEDICINE

## 2020-11-05 PROCEDURE — 82150 ASSAY OF AMYLASE: CPT | Mod: 91

## 2020-11-05 PROCEDURE — 84100 ASSAY OF PHOSPHORUS: CPT | Mod: 91

## 2020-11-05 PROCEDURE — 85610 PROTHROMBIN TIME: CPT

## 2020-11-05 PROCEDURE — 99223 PR INITIAL HOSPITAL CARE,LEVL III: ICD-10-PCS | Mod: GC,,, | Performed by: INTERNAL MEDICINE

## 2020-11-05 PROCEDURE — 99232 PR SUBSEQUENT HOSPITAL CARE,LEVL II: ICD-10-PCS | Mod: ,,, | Performed by: ANESTHESIOLOGY

## 2020-11-05 PROCEDURE — 99232 SBSQ HOSP IP/OBS MODERATE 35: CPT | Mod: ,,, | Performed by: ANESTHESIOLOGY

## 2020-11-05 PROCEDURE — 99900035 HC TECH TIME PER 15 MIN (STAT)

## 2020-11-05 PROCEDURE — 63600175 PHARM REV CODE 636 W HCPCS: Performed by: NURSE PRACTITIONER

## 2020-11-05 PROCEDURE — 83735 ASSAY OF MAGNESIUM: CPT

## 2020-11-05 PROCEDURE — 82803 BLOOD GASES ANY COMBINATION: CPT

## 2020-11-05 PROCEDURE — 80197 ASSAY OF TACROLIMUS: CPT

## 2020-11-05 PROCEDURE — 83690 ASSAY OF LIPASE: CPT

## 2020-11-05 PROCEDURE — S5010 5% DEXTROSE AND 0.45% SALINE: HCPCS | Performed by: STUDENT IN AN ORGANIZED HEALTH CARE EDUCATION/TRAINING PROGRAM

## 2020-11-05 PROCEDURE — 36430 TRANSFUSION BLD/BLD COMPNT: CPT

## 2020-11-05 PROCEDURE — 80069 RENAL FUNCTION PANEL: CPT

## 2020-11-05 PROCEDURE — 82330 ASSAY OF CALCIUM: CPT | Mod: 91

## 2020-11-05 RX ORDER — GABAPENTIN 300 MG/1
300 CAPSULE ORAL 2 TIMES DAILY
Status: DISCONTINUED | OUTPATIENT
Start: 2020-11-05 | End: 2020-11-05

## 2020-11-05 RX ORDER — MYCOPHENOLATE MOFETIL 250 MG/1
1000 CAPSULE ORAL 2 TIMES DAILY
Status: DISCONTINUED | OUTPATIENT
Start: 2020-11-05 | End: 2020-11-09 | Stop reason: HOSPADM

## 2020-11-05 RX ORDER — HALOPERIDOL 0.5 MG/1
1 TABLET ORAL 2 TIMES DAILY
Status: DISCONTINUED | OUTPATIENT
Start: 2020-11-05 | End: 2020-11-05

## 2020-11-05 RX ORDER — OXYCODONE HYDROCHLORIDE 10 MG/1
10 TABLET ORAL EVERY 6 HOURS PRN
Status: DISCONTINUED | OUTPATIENT
Start: 2020-11-05 | End: 2020-11-05

## 2020-11-05 RX ORDER — TACROLIMUS 1 MG/1
3 CAPSULE ORAL 2 TIMES DAILY
Status: DISCONTINUED | OUTPATIENT
Start: 2020-11-05 | End: 2020-11-06

## 2020-11-05 RX ORDER — FAMOTIDINE 20 MG/1
20 TABLET, FILM COATED ORAL NIGHTLY
Status: DISCONTINUED | OUTPATIENT
Start: 2020-11-05 | End: 2020-11-09 | Stop reason: HOSPADM

## 2020-11-05 RX ORDER — BUSPIRONE HYDROCHLORIDE 5 MG/1
5 TABLET ORAL 2 TIMES DAILY
Status: DISCONTINUED | OUTPATIENT
Start: 2020-11-05 | End: 2020-11-06

## 2020-11-05 RX ORDER — ATENOLOL 25 MG/1
50 TABLET ORAL DAILY
Status: DISCONTINUED | OUTPATIENT
Start: 2020-11-06 | End: 2020-11-06

## 2020-11-05 RX ORDER — METOPROLOL TARTRATE 25 MG/1
25 TABLET, FILM COATED ORAL 2 TIMES DAILY
Status: DISCONTINUED | OUTPATIENT
Start: 2020-11-05 | End: 2020-11-06

## 2020-11-05 RX ORDER — TACROLIMUS 1 MG/1
2 CAPSULE ORAL ONCE
Status: COMPLETED | OUTPATIENT
Start: 2020-11-05 | End: 2020-11-05

## 2020-11-05 RX ORDER — GABAPENTIN 300 MG/1
300 CAPSULE ORAL 2 TIMES DAILY
Status: DISCONTINUED | OUTPATIENT
Start: 2020-11-05 | End: 2020-11-09 | Stop reason: HOSPADM

## 2020-11-05 RX ORDER — HYDROCODONE BITARTRATE AND ACETAMINOPHEN 500; 5 MG/1; MG/1
TABLET ORAL
Status: DISCONTINUED | OUTPATIENT
Start: 2020-11-05 | End: 2020-11-05

## 2020-11-05 RX ORDER — BUSPIRONE HYDROCHLORIDE 5 MG/1
5 TABLET ORAL DAILY
Status: DISCONTINUED | OUTPATIENT
Start: 2020-11-05 | End: 2020-11-05

## 2020-11-05 RX ORDER — OXYCODONE HYDROCHLORIDE 10 MG/1
10 TABLET ORAL EVERY 4 HOURS PRN
Status: DISCONTINUED | OUTPATIENT
Start: 2020-11-05 | End: 2020-11-09 | Stop reason: HOSPADM

## 2020-11-05 RX ORDER — AMLODIPINE BESYLATE 10 MG/1
10 TABLET ORAL NIGHTLY
Status: DISCONTINUED | OUTPATIENT
Start: 2020-11-05 | End: 2020-11-06

## 2020-11-05 RX ORDER — OXYCODONE HYDROCHLORIDE 5 MG/1
5 TABLET ORAL EVERY 4 HOURS PRN
Status: DISCONTINUED | OUTPATIENT
Start: 2020-11-05 | End: 2020-11-09 | Stop reason: HOSPADM

## 2020-11-05 RX ORDER — DOCUSATE SODIUM 100 MG/1
100 CAPSULE, LIQUID FILLED ORAL 3 TIMES DAILY
Status: DISCONTINUED | OUTPATIENT
Start: 2020-11-05 | End: 2020-11-09 | Stop reason: HOSPADM

## 2020-11-05 RX ORDER — OXYCODONE HYDROCHLORIDE 5 MG/1
5 TABLET ORAL EVERY 6 HOURS PRN
Status: DISCONTINUED | OUTPATIENT
Start: 2020-11-05 | End: 2020-11-05

## 2020-11-05 RX ADMIN — SULFAMETHOXAZOLE AND TRIMETHOPRIM 1 TABLET: 400; 80 TABLET ORAL at 08:11

## 2020-11-05 RX ADMIN — TACROLIMUS 2 MG: 1 CAPSULE ORAL at 10:11

## 2020-11-05 RX ADMIN — NIFEDIPINE 30 MG: 30 TABLET, FILM COATED, EXTENDED RELEASE ORAL at 07:11

## 2020-11-05 RX ADMIN — BUSPIRONE HYDROCHLORIDE 5 MG: 5 TABLET ORAL at 08:11

## 2020-11-05 RX ADMIN — OXYCODONE HYDROCHLORIDE 10 MG: 10 TABLET ORAL at 04:11

## 2020-11-05 RX ADMIN — MYCOPHENOLATE MOFETIL 1000 MG: 200 POWDER, FOR SUSPENSION ORAL at 09:11

## 2020-11-05 RX ADMIN — DEXTROSE AND SODIUM CHLORIDE: 5; .45 INJECTION, SOLUTION INTRAVENOUS at 08:11

## 2020-11-05 RX ADMIN — ALBUMIN (HUMAN) 25 G: 12.5 SOLUTION INTRAVENOUS at 12:11

## 2020-11-05 RX ADMIN — FLUCONAZOLE 200 MG: 2 INJECTION, SOLUTION INTRAVENOUS at 02:11

## 2020-11-05 RX ADMIN — AMLODIPINE BESYLATE 10 MG: 10 TABLET ORAL at 08:11

## 2020-11-05 RX ADMIN — HEPARIN SODIUM 5000 UNITS: 5000 INJECTION INTRAVENOUS; SUBCUTANEOUS at 03:11

## 2020-11-05 RX ADMIN — DEXTROSE AND SODIUM CHLORIDE: 5; .45 INJECTION, SOLUTION INTRAVENOUS at 04:11

## 2020-11-05 RX ADMIN — MUPIROCIN 1 G: 20 OINTMENT TOPICAL at 08:11

## 2020-11-05 RX ADMIN — Medication: at 01:11

## 2020-11-05 RX ADMIN — TACROLIMUS 1 MG: 1 CAPSULE, GELATIN COATED ORAL at 08:11

## 2020-11-05 RX ADMIN — FAMOTIDINE 20 MG: 20 TABLET, FILM COATED ORAL at 08:11

## 2020-11-05 RX ADMIN — HEPARIN SODIUM 5000 UNITS: 5000 INJECTION INTRAVENOUS; SUBCUTANEOUS at 08:11

## 2020-11-05 RX ADMIN — ASPIRIN 81 MG CHEWABLE TABLET 81 MG: 81 TABLET CHEWABLE at 08:11

## 2020-11-05 RX ADMIN — METOPROLOL TARTRATE 25 MG: 25 TABLET, FILM COATED ORAL at 10:11

## 2020-11-05 RX ADMIN — HEPARIN SODIUM 125 MG: 1000 INJECTION, SOLUTION INTRAVENOUS; SUBCUTANEOUS at 10:11

## 2020-11-05 RX ADMIN — DEXTROSE: 10 SOLUTION INTRAVENOUS at 04:11

## 2020-11-05 RX ADMIN — DEXTROSE 250 MG: 5 SOLUTION INTRAVENOUS at 08:11

## 2020-11-05 RX ADMIN — FAMOTIDINE 20 MG: 10 INJECTION INTRAVENOUS at 08:11

## 2020-11-05 RX ADMIN — DOCUSATE SODIUM 100 MG: 50 CAPSULE, LIQUID FILLED ORAL at 03:11

## 2020-11-05 RX ADMIN — DEXTROSE: 10 SOLUTION INTRAVENOUS at 08:11

## 2020-11-05 RX ADMIN — METOPROLOL TARTRATE 25 MG: 25 TABLET, FILM COATED ORAL at 08:11

## 2020-11-05 RX ADMIN — GABAPENTIN 300 MG: 300 CAPSULE ORAL at 11:11

## 2020-11-05 RX ADMIN — MYCOPHENOLATE MOFETIL 1000 MG: 250 CAPSULE ORAL at 08:11

## 2020-11-05 RX ADMIN — GABAPENTIN 300 MG: 300 CAPSULE ORAL at 08:11

## 2020-11-05 RX ADMIN — DIPHENHYDRAMINE HYDROCHLORIDE 50 MG: 50 INJECTION, SOLUTION INTRAMUSCULAR; INTRAVENOUS at 09:11

## 2020-11-05 RX ADMIN — ACETAMINOPHEN 650 MG: 160 SOLUTION ORAL at 09:11

## 2020-11-05 RX ADMIN — OXYCODONE HYDROCHLORIDE 10 MG: 10 TABLET ORAL at 08:11

## 2020-11-05 RX ADMIN — TACROLIMUS 3 MG: 1 CAPSULE ORAL at 06:11

## 2020-11-05 RX ADMIN — CALCIUM GLUCONATE 2 G: 98 INJECTION, SOLUTION INTRAVENOUS at 05:11

## 2020-11-05 RX ADMIN — DOCUSATE SODIUM 100 MG: 50 CAPSULE, LIQUID FILLED ORAL at 08:11

## 2020-11-05 NOTE — PROGRESS NOTES
[facility]  Critical Care - Surgery  Progress Note    Patient Name: Hernandez Rosales  MRN: [id number]  Admission Date: 11/3/2020  Hospital Length of Stay: 2 days  Code Status: Full Code  Attending Provider: Adin Romero Jr., MD  Primary Care Provider: Primary Doctor No   Principal Problem: ESRD on dialysis    Subjective:     Hospital/ICU Course:  Mr. Rosales is a 40 y.o.  male with ESRD secondary to diabetic nephropathy (T1DM) who is now s/p pancreas and kidney transplant. Extubated off pressors.     Interval History/Significant Events:   No acute events overnight except now with some hypertension after moving to his chair   Hyperkalemia improved, creatinine improved  Hyperglycemia still tx with D10 stable in the >50s  Patient states feeling well, pain controlled  No bowel movements  Denies nausea and vomiting    Follow-up For: Procedure(s) (LRB):  TRANSPLANT, KIDNEY (N/A)  TRANSPLANT, PANCREAS (N/A)    Post-Operative Day: 1 Day Post-Op    Objective:     Vital Signs (Most Recent):  Temp: 98.5 °F (36.9 °C) (11/05/20 0700)  Pulse: 82 (11/05/20 0730)  Resp: (!) 28 (11/05/20 0730)  BP: (!) 173/83 (11/05/20 0700)  SpO2: 100 % (11/05/20 0730) Vital Signs (24h Range):  Temp:  [98 °F (36.7 °C)-98.9 °F (37.2 °C)] 98.5 °F (36.9 °C)  Pulse:  [74-87] 82  Resp:  [6-29] 28  SpO2:  [95 %-100 %] 100 %  BP: (106-182)/(55-85) 173/83  Arterial Line BP: (102-193)/(44-81) 161/65     Weight: 89 kg (196 lb 3.4 oz)  Body mass index is 29.83 kg/m².      Intake/Output Summary (Last 24 hours) at 11/5/2020 0745  Last data filed at 11/5/2020 0700  Gross per 24 hour   Intake 4154.3 ml   Output 3760 ml   Net 394.3 ml       Physical Exam  Constitutional:       General: He is not in acute distress.  HENT:      Head: Normocephalic.      Nose: Nose normal.      Mouth/Throat:      Mouth: Mucous membranes are moist.   Eyes:      General: No scleral icterus.  Neck:      Musculoskeletal: Neck supple.   Cardiovascular:       Rate and Rhythm: Normal rate.   Pulmonary:      Breath sounds: Normal breath sounds.   Abdominal:      General: Abdomen is flat.      Palpations: Abdomen is soft.      Incision c/d/i, drain SS  Neurological:      General: No focal deficit present.         Lines/Drains/Airways     Drain                 Hemodialysis AV Fistula Left upper arm -- days         Closed/Suction Drain 11/04/20 0040 Right Abdomen Bulb 19 Fr. 1 day         Urethral Catheter 11/03/20 2002 Non-latex;Straight-tip;Triple-lumen 20 Fr. 1 day          Arterial Line            Arterial Line 11/03/20 2033 Right Radial 1 day          Peripheral Intravenous Line                 Peripheral IV - Single Lumen 11/03/20 1952 18 G Left Forearm 1 day         Peripheral IV - Single Lumen 11/03/20 1957 16 G Right Forearm 1 day                Significant Labs:    CBC/Anemia Profile:  Recent Labs   Lab 11/04/20  1301 11/04/20 2000 11/05/20 0414   WBC 15.17* 12.35 10.55   HGB 8.9* 8.0* 7.4*   HCT 28.2* 25.8* 23.9*    285 246   MCV 86 85 84   RDW 16.7* 16.6* 16.9*        Chemistries:  Recent Labs   Lab 11/03/20  0947 11/04/20  0146  11/04/20  1301 11/04/20 2000 11/05/20  0414    141  141   < > 139 138 136  137   K 5.3* 3.8  3.8   < > 5.6* 4.0 4.5  4.5    103  103   < > 104 104 101  102   CO2 22* 22*  22*   < > 22* 22* 22*  22*   BUN 75* 38*  38*   < > 44* 45* 45*  42*   CREATININE 10.0* 6.2*  6.2*   < > 6.1* 5.9* 5.0*  5.0*   CALCIUM 8.1* 7.9*  7.9*   < > 7.6* 7.2* 7.6*  7.4*   ALBUMIN 3.2* 2.8*  --   --  2.8* 3.1*  3.1*   PROT 6.5 5.5*  --   --  5.6* 5.7*   BILITOT 0.3 0.5  --   --  0.3 0.3   ALKPHOS 105 80  --   --  68 71   ALT 43 49*  --   --  43 72*   AST 36 56*  --   --  52* 106*   MG 2.2  --   --   --  1.9 1.9   PHOS 6.8*  --   --   --  5.3* 6.8*  6.5*    < > = values in this interval not displayed.         Assessment/Plan:     40-year-old male s/p kidney and pancreas transplant for ESRD secondary to T1DM on 11/4.  Donor PHS increased risk, CMV, EBV, & HCV YIN positive. Thymo induction- peripheral.     Neuro  - on PCA, transition to oral meds   - haldol, neurontin, buspar home meds on     Cards  -hemodynamically stable  - nifedipine for hypertension  - possibly restart slowly home meds as appropriate to maintain adequate BP for renal perfusion     Resp  - at room air  - cont monitoring sats while on PCA     Renal  - urine output 100cc/hr for a total of 3.2L   Drain 280 SS  -monitor urine output closely and replace 1/1 per protocol  - trend BUN/cr which has improved 5 from 5.9 and BUN 42 from 45  - renal ultrasound wnl     FENGI  - last BM 11/3  - npo, will discuss timing for starting a CLD  -GI ppx  -lyte replacement protocol  - pancreas u/s WNL     Heme  - aspirin   -trend h/h 7.4 from 8  -dvt ppx heparin 5,000 units TID      Endo  -insulin gtt have been off   - D10 infusion drip with replacement D5  - goal is sugars >50     ID  - temp 98.9 afebrile   - WBC 10.5 from 11  - Mycophenolate mofetil, steroid taper, nystatin, bactrim, prograf, valganciclovir  -trend wbc     Dispo  -ICU possibly floor soon      Critical care was time spent personally by me on the following activities: development of treatment plan with patient or surrogate and bedside caregivers, discussions with consultants, evaluation of patient's response to treatment, examination of patient, ordering and performing treatments and interventions, ordering and review of laboratory studies, ordering and review of radiographic studies, pulse oximetry, re-evaluation of patient's condition.  This critical care time did not overlap with that of any other provider or involve time for any procedures.     Josefa Shcultz MD  Critical Care - Surgery  [facility]

## 2020-11-05 NOTE — TELEPHONE ENCOUNTER
LATE ENTRY from 11/3/2020    ON-CALL NOTE    UNOS# [id number]    Notified by Abad Clark, , that Hernandez Rosales is eligible for kidney/pancreas offer.  Spoke with patient and identified no acute medical issues with telephone assessment. Protocol script read to patient regarding PHS increased risk and HCV+ donor offer. Patient verbalized understanding, all questions answered, patient accepts organ offer. Notified by Abad Clark that virtual crossmatch was unable to be performed.  Current sample of blood is available from date 10/13/2020 for crossmatch.  Patient reports no sensitizing event since last blood sample for PRA received. Notified Eusebio in [facility] Lab to perform a prospective  crossmatch per guideline.    Patient notified of plan and states understanding.      11/3/2020 at 11pm-  Pt instructed to be at [facility] 9am , for admit    11/4/2020 at 5:00 am- CXM was negative, pt notified  Pt will go to room 51530. Thymoglobulin induction    Dialysis unit notified.

## 2020-11-05 NOTE — CARE UPDATE
BG goal 140-180    Remains in ICU, NAEON. BG reasonable with d10 infusion. Diet clear liquid. Diet advancing. Solumedrol 250 mg and hydrocortisone 20 mg.     Plan:  Change BG monitoring to ac/hs/0200; BG stable for > 24 hours and diet advancing.     Discharge planning:tbd     Endocrine to continue to follow    ** Please call Endocrine for any BG related issues **

## 2020-11-05 NOTE — PLAN OF CARE
Progress Note  Transplant Surgery    Admit Date: 11/3/2020  Post-operative Day: 2  Hospital Day: 3    ORGAN: LEFT KIDNEY    Disease Etiology: Diabetes Mellitus - Type I  Donor Type: Donation after Brain Death    CDC High Risk: Yes    Donor CMV Status: Positive  Donor CMV Status:   Donor HBcAB: Negative    Donor HBV YIN: Organ record is missing.  Donor HCV YIN: Organ record is missing.  Donor HCV Status: Positive    Whole or Partial:   Biliary Anastomosis:   Arterial Anatomy:          Follow-up For: Procedure(s) (LRB):  TRANSPLANT, KIDNEY (N/A)  TRANSPLANT, PANCREAS (N/A)      ASSESSMENT/PLAN:     I conducted multidisciplinary rounds in conjunction with the ICU/Critical Care attending staff, fellows, and residents, with involvement of ancillary services as appropriate. The patient's general condition was reviewed, specifically including allograft function, immunosuppressive management, dietary and nutritional status, pharmacy concerns, and status of expected transition to transplant stepdown care.    For details see the critical care note.    Richard Mejia MD

## 2020-11-05 NOTE — PLAN OF CARE
Significant events:  D10 infusion continued overnight.  Calcium replaced as needed.  Vitals:  Room air. O2: >97%   HR: 70's-80's, NSR.  SBP: 110-160's. Temperature max: 98.9 F.   Gtts:  D5 1/2 NS @ 50 cc/hr,  D10 titrated for I's = O's,  and PCA.   UOP: 120-350 cc/hr.        Bowel Movement: last bowel movement 11/3/20.  ALESSANDRO drain: 280 cc, Serosanguinous, thin output.    Neuro:  AAOx4, follows commands, and moves all extremities purposefully.   Diet:  NPO except sips with medications.       Plan: Get OOBTC this AM.       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 Waffle mattress, heel, and sacral foams in use. Assisted in turns Q2 hrs. No new skin breakdown at this time.

## 2020-11-05 NOTE — ANESTHESIA POSTPROCEDURE EVALUATION
Anesthesia Post Evaluation    Patient: Hernandez Rosales    Procedure(s) Performed: Procedure(s) (LRB):  TRANSPLANT, KIDNEY (N/A)  TRANSPLANT, PANCREAS (N/A)    Final Anesthesia Type: general    Patient location during evaluation: PACU  Patient participation: Yes- Able to Participate  Level of consciousness: awake  Post-procedure vital signs: reviewed and stable  Pain management: adequate  Airway patency: patent    PONV status at discharge: No PONV  Anesthetic complications: no      Cardiovascular status: blood pressure returned to baseline  Respiratory status: unassisted  Hydration status: euvolemic  Follow-up not needed.          Vitals Value Taken Time   /58 11/04/20 1801   Temp 37.1 °C (98.8 °F) 11/04/20 1500   Pulse 82 11/04/20 1855   Resp 12 11/04/20 1855   SpO2 100 % 11/04/20 1855   Vitals shown include unvalidated device data.      No case tracking events are documented in the log.      Pain/Roque Score: Pain Rating Prior to Med Admin: 10 (11/4/2020  3:25 AM)

## 2020-11-05 NOTE — PROGRESS NOTES
Patient admitted for a combined Kidney/Pancreas Transplant. Transplant coordinator met with the patient in ICU. Black teaching book will be given once patient is transferred to TSU. Transplant Coordinator will continue to follow patient and assist with care plan and discharge planning.    ESRD 2/2 DM/HTN  SCD, PHS increased risk, KDPI 46%  Recipient- HCVAb+/YIN+  Thymo induction  CMV ++

## 2020-11-06 DIAGNOSIS — Z94.0 KIDNEY REPLACED BY TRANSPLANT: Primary | ICD-10-CM

## 2020-11-06 PROBLEM — N18.6 ESRD ON DIALYSIS: Status: RESOLVED | Noted: 2020-06-17 | Resolved: 2020-11-06

## 2020-11-06 PROBLEM — T86.19 RECEIVED KIDNEY FROM DONOR WITH HEPATITIS C: Status: ACTIVE | Noted: 2020-11-06

## 2020-11-06 PROBLEM — N25.81 SECONDARY HYPERPARATHYROIDISM: Status: ACTIVE | Noted: 2020-11-06

## 2020-11-06 PROBLEM — Z91.89 AT RISK FOR OPPORTUNISTIC INFECTIONS: Status: ACTIVE | Noted: 2020-11-06

## 2020-11-06 PROBLEM — Z94.83 HISTORY OF SIMULTANEOUS KIDNEY AND PANCREAS TRANSPLANT: Status: ACTIVE | Noted: 2020-11-06

## 2020-11-06 PROBLEM — Z79.60 LONG-TERM USE OF IMMUNOSUPPRESSANT MEDICATION: Status: ACTIVE | Noted: 2020-11-06

## 2020-11-06 PROBLEM — Z99.2 ESRD ON DIALYSIS: Status: RESOLVED | Noted: 2020-06-17 | Resolved: 2020-11-06

## 2020-11-06 PROBLEM — I15.0 RENOVASCULAR HYPERTENSION: Status: ACTIVE | Noted: 2020-11-06

## 2020-11-06 PROBLEM — N18.6 ESRD (END STAGE RENAL DISEASE): Status: RESOLVED | Noted: 2020-11-03 | Resolved: 2020-11-06

## 2020-11-06 LAB
ALBUMIN SERPL BCP-MCNC: 3.1 G/DL (ref 3.5–5.2)
AMYLASE SERPL-CCNC: 68 U/L (ref 20–110)
ANION GAP SERPL CALC-SCNC: 12 MMOL/L (ref 8–16)
ANISOCYTOSIS BLD QL SMEAR: SLIGHT
BASOPHILS # BLD AUTO: 0 K/UL (ref 0–0.2)
BASOPHILS # BLD AUTO: 0 K/UL (ref 0–0.2)
BASOPHILS NFR BLD: 0 % (ref 0–1.9)
BASOPHILS NFR BLD: 0 % (ref 0–1.9)
BUN SERPL-MCNC: 32 MG/DL (ref 6–20)
BURR CELLS BLD QL SMEAR: ABNORMAL
CALCIUM SERPL-MCNC: 8.4 MG/DL (ref 8.7–10.5)
CHLORIDE SERPL-SCNC: 108 MMOL/L (ref 95–110)
CO2 SERPL-SCNC: 18 MMOL/L (ref 23–29)
CREAT SERPL-MCNC: 2.8 MG/DL (ref 0.5–1.4)
DIFFERENTIAL METHOD: ABNORMAL
DIFFERENTIAL METHOD: ABNORMAL
EOSINOPHIL # BLD AUTO: 0 K/UL (ref 0–0.5)
EOSINOPHIL # BLD AUTO: 0 K/UL (ref 0–0.5)
EOSINOPHIL NFR BLD: 0 % (ref 0–8)
EOSINOPHIL NFR BLD: 0.1 % (ref 0–8)
ERYTHROCYTE [DISTWIDTH] IN BLOOD BY AUTOMATED COUNT: 16.2 % (ref 11.5–14.5)
ERYTHROCYTE [DISTWIDTH] IN BLOOD BY AUTOMATED COUNT: 16.6 % (ref 11.5–14.5)
EST. GFR  (AFRICAN AMERICAN): 31.6 ML/MIN/1.73 M^2
EST. GFR  (NON AFRICAN AMERICAN): 27.4 ML/MIN/1.73 M^2
GLUCOSE SERPL-MCNC: 86 MG/DL (ref 70–110)
HCT VFR BLD AUTO: 28.3 % (ref 40–54)
HCT VFR BLD AUTO: 30.6 % (ref 40–54)
HGB BLD-MCNC: 8.8 G/DL (ref 14–18)
HGB BLD-MCNC: 9.3 G/DL (ref 14–18)
IMM GRANULOCYTES # BLD AUTO: 0.02 K/UL (ref 0–0.04)
IMM GRANULOCYTES # BLD AUTO: 0.05 K/UL (ref 0–0.04)
IMM GRANULOCYTES NFR BLD AUTO: 0.5 % (ref 0–0.5)
IMM GRANULOCYTES NFR BLD AUTO: 0.6 % (ref 0–0.5)
LIPASE SERPL-CCNC: 21 U/L (ref 4–60)
LYMPHOCYTES # BLD AUTO: 0 K/UL (ref 1–4.8)
LYMPHOCYTES # BLD AUTO: 0.1 K/UL (ref 1–4.8)
LYMPHOCYTES NFR BLD: 0.7 % (ref 18–48)
LYMPHOCYTES NFR BLD: 1.4 % (ref 18–48)
MAGNESIUM SERPL-MCNC: 1.7 MG/DL (ref 1.6–2.6)
MCH RBC QN AUTO: 26.4 PG (ref 27–31)
MCH RBC QN AUTO: 26.9 PG (ref 27–31)
MCHC RBC AUTO-ENTMCNC: 30.4 G/DL (ref 32–36)
MCHC RBC AUTO-ENTMCNC: 31.1 G/DL (ref 32–36)
MCV RBC AUTO: 85 FL (ref 82–98)
MCV RBC AUTO: 88 FL (ref 82–98)
MONOCYTES # BLD AUTO: 0.1 K/UL (ref 0.3–1)
MONOCYTES # BLD AUTO: 0.5 K/UL (ref 0.3–1)
MONOCYTES NFR BLD: 1.7 % (ref 4–15)
MONOCYTES NFR BLD: 6.2 % (ref 4–15)
NEUTROPHILS # BLD AUTO: 4.1 K/UL (ref 1.8–7.7)
NEUTROPHILS # BLD AUTO: 7.2 K/UL (ref 1.8–7.7)
NEUTROPHILS NFR BLD: 91.7 % (ref 38–73)
NEUTROPHILS NFR BLD: 97.1 % (ref 38–73)
NRBC BLD-RTO: 0 /100 WBC
NRBC BLD-RTO: 0 /100 WBC
OVALOCYTES BLD QL SMEAR: ABNORMAL
PHOSPHATE SERPL-MCNC: 3.6 MG/DL (ref 2.7–4.5)
PLATELET # BLD AUTO: 233 K/UL (ref 150–350)
PLATELET # BLD AUTO: 266 K/UL (ref 150–350)
PMV BLD AUTO: 10.6 FL (ref 9.2–12.9)
PMV BLD AUTO: 9.8 FL (ref 9.2–12.9)
POCT GLUCOSE: 109 MG/DL (ref 70–110)
POCT GLUCOSE: 72 MG/DL (ref 70–110)
POCT GLUCOSE: 80 MG/DL (ref 70–110)
POCT GLUCOSE: 83 MG/DL (ref 70–110)
POCT GLUCOSE: 84 MG/DL (ref 70–110)
POCT GLUCOSE: 91 MG/DL (ref 70–110)
POCT GLUCOSE: 96 MG/DL (ref 70–110)
POCT GLUCOSE: 97 MG/DL (ref 70–110)
POCT GLUCOSE: 98 MG/DL (ref 70–110)
POIKILOCYTOSIS BLD QL SMEAR: SLIGHT
POTASSIUM SERPL-SCNC: 4.9 MMOL/L (ref 3.5–5.1)
RBC # BLD AUTO: 3.33 M/UL (ref 4.6–6.2)
RBC # BLD AUTO: 3.46 M/UL (ref 4.6–6.2)
SODIUM SERPL-SCNC: 138 MMOL/L (ref 136–145)
TACROLIMUS BLD-MCNC: <1.5 NG/ML (ref 5–15)
WBC # BLD AUTO: 4.2 K/UL (ref 3.9–12.7)
WBC # BLD AUTO: 7.8 K/UL (ref 3.9–12.7)

## 2020-11-06 PROCEDURE — 97803 MED NUTRITION INDIV SUBSEQ: CPT

## 2020-11-06 PROCEDURE — S5010 5% DEXTROSE AND 0.45% SALINE: HCPCS | Performed by: STUDENT IN AN ORGANIZED HEALTH CARE EDUCATION/TRAINING PROGRAM

## 2020-11-06 PROCEDURE — 20600001 HC STEP DOWN PRIVATE ROOM

## 2020-11-06 PROCEDURE — 25000003 PHARM REV CODE 250: Performed by: NURSE PRACTITIONER

## 2020-11-06 PROCEDURE — 82150 ASSAY OF AMYLASE: CPT

## 2020-11-06 PROCEDURE — 99233 PR SUBSEQUENT HOSPITAL CARE,LEVL III: ICD-10-PCS | Mod: ,,, | Performed by: NURSE PRACTITIONER

## 2020-11-06 PROCEDURE — 83690 ASSAY OF LIPASE: CPT

## 2020-11-06 PROCEDURE — 25000003 PHARM REV CODE 250: Performed by: TRANSPLANT SURGERY

## 2020-11-06 PROCEDURE — 63600175 PHARM REV CODE 636 W HCPCS: Performed by: STUDENT IN AN ORGANIZED HEALTH CARE EDUCATION/TRAINING PROGRAM

## 2020-11-06 PROCEDURE — 63600175 PHARM REV CODE 636 W HCPCS: Performed by: TRANSPLANT SURGERY

## 2020-11-06 PROCEDURE — 25000003 PHARM REV CODE 250: Performed by: STUDENT IN AN ORGANIZED HEALTH CARE EDUCATION/TRAINING PROGRAM

## 2020-11-06 PROCEDURE — 36415 COLL VENOUS BLD VENIPUNCTURE: CPT

## 2020-11-06 PROCEDURE — 85025 COMPLETE CBC W/AUTO DIFF WBC: CPT

## 2020-11-06 PROCEDURE — 99233 SBSQ HOSP IP/OBS HIGH 50: CPT | Mod: ,,, | Performed by: NURSE PRACTITIONER

## 2020-11-06 PROCEDURE — 80069 RENAL FUNCTION PANEL: CPT

## 2020-11-06 PROCEDURE — 97530 THERAPEUTIC ACTIVITIES: CPT | Mod: CQ

## 2020-11-06 PROCEDURE — 83735 ASSAY OF MAGNESIUM: CPT

## 2020-11-06 PROCEDURE — 80197 ASSAY OF TACROLIMUS: CPT

## 2020-11-06 PROCEDURE — 97116 GAIT TRAINING THERAPY: CPT | Mod: CQ

## 2020-11-06 RX ORDER — METOPROLOL TARTRATE 25 MG/1
25 TABLET, FILM COATED ORAL 2 TIMES DAILY
Qty: 60 TABLET | Refills: 11 | Status: ON HOLD | OUTPATIENT
Start: 2020-11-06 | End: 2020-11-19 | Stop reason: HOSPADM

## 2020-11-06 RX ORDER — NAPROXEN SODIUM 220 MG/1
81 TABLET, FILM COATED ORAL DAILY
Qty: 30 TABLET | Refills: 11 | Status: CANCELLED | OUTPATIENT
Start: 2020-11-07 | End: 2021-11-07

## 2020-11-06 RX ORDER — OXYCODONE AND ACETAMINOPHEN 10; 325 MG/1; MG/1
1 TABLET ORAL EVERY 4 HOURS PRN
Qty: 40 TABLET | Refills: 0 | Status: SHIPPED | OUTPATIENT
Start: 2020-11-06 | End: 2021-05-10

## 2020-11-06 RX ORDER — NYSTATIN 100000 [USP'U]/ML
500000 SUSPENSION ORAL
Qty: 480 ML | Refills: 0 | Status: SHIPPED | OUTPATIENT
Start: 2020-11-06 | End: 2021-05-10

## 2020-11-06 RX ORDER — HEPARIN SODIUM 5000 [USP'U]/ML
5000 INJECTION, SOLUTION INTRAVENOUS; SUBCUTANEOUS EVERY 8 HOURS
Status: DISCONTINUED | OUTPATIENT
Start: 2020-11-06 | End: 2020-11-09 | Stop reason: HOSPADM

## 2020-11-06 RX ORDER — TACROLIMUS 1 MG/1
5 CAPSULE ORAL 2 TIMES DAILY
Status: DISCONTINUED | OUTPATIENT
Start: 2020-11-06 | End: 2020-11-07

## 2020-11-06 RX ORDER — SODIUM BICARBONATE 650 MG/1
650 TABLET ORAL 2 TIMES DAILY
Status: DISCONTINUED | OUTPATIENT
Start: 2020-11-06 | End: 2020-11-08

## 2020-11-06 RX ORDER — BISACODYL 10 MG
10 SUPPOSITORY, RECTAL RECTAL NIGHTLY
Status: DISCONTINUED | OUTPATIENT
Start: 2020-11-06 | End: 2020-11-09 | Stop reason: HOSPADM

## 2020-11-06 RX ORDER — SODIUM BICARBONATE 650 MG/1
650 TABLET ORAL 2 TIMES DAILY
Qty: 60 TABLET | Refills: 11 | Status: CANCELLED | COMMUNITY
Start: 2020-11-06 | End: 2021-11-06

## 2020-11-06 RX ORDER — ERGOCALCIFEROL 1.25 MG/1
50000 CAPSULE ORAL
Qty: 4 CAPSULE | Refills: 5 | Status: SHIPPED | OUTPATIENT
Start: 2020-11-06 | End: 2020-12-04 | Stop reason: SDUPTHER

## 2020-11-06 RX ORDER — SODIUM BICARBONATE 650 MG/1
650 TABLET ORAL 2 TIMES DAILY
Qty: 60 TABLET | Refills: 11 | Status: SHIPPED | OUTPATIENT
Start: 2020-11-06 | End: 2020-11-09

## 2020-11-06 RX ORDER — FLUCONAZOLE 200 MG/1
200 TABLET ORAL DAILY
Status: DISCONTINUED | OUTPATIENT
Start: 2020-11-07 | End: 2020-11-06

## 2020-11-06 RX ORDER — NIFEDIPINE 30 MG/1
30 TABLET, EXTENDED RELEASE ORAL DAILY
Qty: 30 TABLET | Refills: 11 | Status: ON HOLD | OUTPATIENT
Start: 2020-11-06 | End: 2020-11-19 | Stop reason: HOSPADM

## 2020-11-06 RX ORDER — METOPROLOL TARTRATE 25 MG/1
25 TABLET, FILM COATED ORAL 2 TIMES DAILY
Status: DISCONTINUED | OUTPATIENT
Start: 2020-11-06 | End: 2020-11-09 | Stop reason: HOSPADM

## 2020-11-06 RX ORDER — NIFEDIPINE 30 MG/1
30 TABLET, EXTENDED RELEASE ORAL DAILY
Status: DISCONTINUED | OUTPATIENT
Start: 2020-11-06 | End: 2020-11-09 | Stop reason: HOSPADM

## 2020-11-06 RX ORDER — TACROLIMUS 1 MG/1
2 CAPSULE ORAL ONCE
Status: COMPLETED | OUTPATIENT
Start: 2020-11-06 | End: 2020-11-06

## 2020-11-06 RX ORDER — FLUCONAZOLE 200 MG/1
200 TABLET ORAL DAILY
Status: DISCONTINUED | OUTPATIENT
Start: 2020-11-07 | End: 2020-11-09 | Stop reason: HOSPADM

## 2020-11-06 RX ORDER — CALCITRIOL 0.25 UG/1
0.25 CAPSULE ORAL DAILY
Status: DISCONTINUED | OUTPATIENT
Start: 2020-11-06 | End: 2020-11-09 | Stop reason: HOSPADM

## 2020-11-06 RX ORDER — VALGANCICLOVIR 450 MG/1
450 TABLET, FILM COATED ORAL DAILY
Qty: 30 TABLET | Refills: 2 | Status: ON HOLD | OUTPATIENT
Start: 2020-11-06 | End: 2020-11-19 | Stop reason: SDUPTHER

## 2020-11-06 RX ORDER — FAMOTIDINE 20 MG/1
20 TABLET, FILM COATED ORAL NIGHTLY
Qty: 30 TABLET | Refills: 3 | Status: ON HOLD | OUTPATIENT
Start: 2020-11-06 | End: 2020-11-19 | Stop reason: HOSPADM

## 2020-11-06 RX ORDER — ASPIRIN 81 MG/1
81 TABLET ORAL DAILY
Qty: 30 TABLET | Refills: 11 | Status: SHIPPED | OUTPATIENT
Start: 2020-11-06 | End: 2021-11-18 | Stop reason: SDUPTHER

## 2020-11-06 RX ORDER — DOCUSATE SODIUM 100 MG/1
100 CAPSULE, LIQUID FILLED ORAL 3 TIMES DAILY PRN
Refills: 0 | COMMUNITY
Start: 2020-11-06 | End: 2021-07-20

## 2020-11-06 RX ORDER — CLONIDINE HYDROCHLORIDE 0.1 MG/1
0.1 TABLET ORAL EVERY 8 HOURS PRN
Status: DISCONTINUED | OUTPATIENT
Start: 2020-11-06 | End: 2020-11-09 | Stop reason: HOSPADM

## 2020-11-06 RX ORDER — CALCITRIOL 0.25 UG/1
0.25 CAPSULE ORAL DAILY
Qty: 30 CAPSULE | Refills: 11 | Status: SHIPPED | OUTPATIENT
Start: 2020-11-06 | End: 2021-12-01

## 2020-11-06 RX ORDER — OXYCODONE HYDROCHLORIDE 10 MG/1
5-10 TABLET ORAL EVERY 4 HOURS PRN
Qty: 40 TABLET | Refills: 0 | Status: SHIPPED | OUTPATIENT
Start: 2020-11-06 | End: 2020-11-06 | Stop reason: HOSPADM

## 2020-11-06 RX ADMIN — DOCUSATE SODIUM 100 MG: 50 CAPSULE, LIQUID FILLED ORAL at 02:11

## 2020-11-06 RX ADMIN — DOCUSATE SODIUM 100 MG: 50 CAPSULE, LIQUID FILLED ORAL at 08:11

## 2020-11-06 RX ADMIN — DEXTROSE AND SODIUM CHLORIDE: 5; .45 INJECTION, SOLUTION INTRAVENOUS at 05:11

## 2020-11-06 RX ADMIN — FAMOTIDINE 20 MG: 20 TABLET, FILM COATED ORAL at 08:11

## 2020-11-06 RX ADMIN — FLUCONAZOLE 200 MG: 2 INJECTION, SOLUTION INTRAVENOUS at 02:11

## 2020-11-06 RX ADMIN — HEPARIN SODIUM 5000 UNITS: 5000 INJECTION INTRAVENOUS; SUBCUTANEOUS at 08:11

## 2020-11-06 RX ADMIN — METOPROLOL TARTRATE 25 MG: 25 TABLET, FILM COATED ORAL at 12:11

## 2020-11-06 RX ADMIN — ASPIRIN 81 MG CHEWABLE TABLET 81 MG: 81 TABLET CHEWABLE at 08:11

## 2020-11-06 RX ADMIN — SULFAMETHOXAZOLE AND TRIMETHOPRIM 1 TABLET: 400; 80 TABLET ORAL at 08:11

## 2020-11-06 RX ADMIN — METOPROLOL TARTRATE 25 MG: 25 TABLET, FILM COATED ORAL at 08:11

## 2020-11-06 RX ADMIN — MYCOPHENOLATE MOFETIL 1000 MG: 250 CAPSULE ORAL at 08:11

## 2020-11-06 RX ADMIN — DIPHENHYDRAMINE HYDROCHLORIDE 50 MG: 50 INJECTION, SOLUTION INTRAMUSCULAR; INTRAVENOUS at 09:11

## 2020-11-06 RX ADMIN — CALCITRIOL CAPSULES 0.25 MCG 0.25 MCG: 0.25 CAPSULE ORAL at 12:11

## 2020-11-06 RX ADMIN — OXYCODONE HYDROCHLORIDE 10 MG: 10 TABLET ORAL at 06:11

## 2020-11-06 RX ADMIN — METHYLPREDNISOLONE SODIUM SUCCINATE 125 MG: 125 INJECTION, POWDER, FOR SOLUTION INTRAMUSCULAR; INTRAVENOUS at 09:11

## 2020-11-06 RX ADMIN — SODIUM BICARBONATE 650 MG TABLET 650 MG: at 12:11

## 2020-11-06 RX ADMIN — NIFEDIPINE 30 MG: 30 TABLET, FILM COATED, EXTENDED RELEASE ORAL at 02:11

## 2020-11-06 RX ADMIN — HEPARIN SODIUM 125 MG: 1000 INJECTION, SOLUTION INTRAVENOUS; SUBCUTANEOUS at 09:11

## 2020-11-06 RX ADMIN — SODIUM BICARBONATE 650 MG TABLET 650 MG: at 08:11

## 2020-11-06 RX ADMIN — TACROLIMUS 3 MG: 1 CAPSULE ORAL at 08:11

## 2020-11-06 RX ADMIN — HEPARIN SODIUM 5000 UNITS: 5000 INJECTION INTRAVENOUS; SUBCUTANEOUS at 02:11

## 2020-11-06 RX ADMIN — BUSPIRONE HYDROCHLORIDE 5 MG: 5 TABLET ORAL at 08:11

## 2020-11-06 RX ADMIN — OXYCODONE HYDROCHLORIDE 10 MG: 10 TABLET ORAL at 05:11

## 2020-11-06 RX ADMIN — CLONIDINE HYDROCHLORIDE 0.1 MG: 0.1 TABLET ORAL at 05:11

## 2020-11-06 RX ADMIN — MUPIROCIN 1 G: 20 OINTMENT TOPICAL at 08:11

## 2020-11-06 RX ADMIN — ACETAMINOPHEN 650 MG: 160 SOLUTION ORAL at 09:11

## 2020-11-06 RX ADMIN — TACROLIMUS 5 MG: 1 CAPSULE ORAL at 05:11

## 2020-11-06 RX ADMIN — TACROLIMUS 2 MG: 1 CAPSULE ORAL at 12:11

## 2020-11-06 RX ADMIN — GABAPENTIN 300 MG: 300 CAPSULE ORAL at 08:11

## 2020-11-06 RX ADMIN — BISACODYL 10 MG: 10 SUPPOSITORY RECTAL at 08:11

## 2020-11-06 NOTE — PROGRESS NOTES
EDUCATION NOTE:    Met with Hernandez Rosales and his caregivers to provide teaching re: immunosuppressant medications.  Reviewed medication section of the Kidney Transplant Education book that was provided.  Emphasized the importance of compliance, role of the blue medication card, concerns for drug interactions, and process of obtaining refills.  Counseled regarding Prograf, Cellcept , prednisone, including directions for use, monitoring, how to handle missed doses, and side effects. Patient and sister verbalized understanding and had the opportunity to ask questions.

## 2020-11-06 NOTE — ASSESSMENT & PLAN NOTE
- Maintenance IS with prograf, MMF, and prednisone taper. cont to check tacrolimus level daily. Assess for toxicity and adjust level as needed

## 2020-11-06 NOTE — ASSESSMENT & PLAN NOTE
- hypoglycemia post-op requiring D10W gtt   - D10W gtt discontinued 11/6 when diet advanced  - continue to monitor BG closely

## 2020-11-06 NOTE — PLAN OF CARE
Pt aao x4. Call bell within reach. Sister at bedside and attentive to pt's needs. Pt is blind in both eyes. He also uses a prosthesis on left bka. He worked with PT today and ambulated in room. Up to chair throughout afternoon. Eating well. Diet advanced to regular but doctor wants patient to eat half at a time. Pt and sister verbalized understanding and observed her removing half his food. He is passing gas but no BM yet. Supp ordered for tonight. Right arm with 3+ edema. Ultrasound ordered. Will continue to monitor.

## 2020-11-06 NOTE — PROGRESS NOTES
Patient arrived on unit from SICU - in NAD. Lying BP elevated. IVF and D10 gtt infusing via PIV. Sin to gravity with clear yellow urine. ALESSANDRO with SS drainage. Midline dressing CDI. Accompanied by sister who has permission to stay with patient and will be his caretaker post-transplant. Call bell placed within reach.

## 2020-11-06 NOTE — PLAN OF CARE
AAO x 4. VSS, afebrile, SpO2>95% on RA.   BG monitoring Q2H 80-90s. D10 gtt @ 50 mL/hr continuous. D5 0.45% NS @ 50 mL/hr continuous. Pt tolerating clear liquid diet.  ML incision with dressing CDI. R ALESSANDRO with 200 mL serosanguinous output this shift. PRN oxycodone for pain control.   Sin with 2400 mL UOP this shift.   BUN Cr and amylase lipase trending down.   Bowel sounds remain hypoactive-pt denies N/V.   Self meds set up.   Fall precautions maintained and pt repositions self.   POC reviewed with pt.   See flowsheet for assessment findings.   Plan for peripheral thymo today.   Will continue to monitor.

## 2020-11-06 NOTE — PLAN OF CARE
Problem: Physical Therapy Goal  Goal: Physical Therapy Goal  Description: Goals to be met by: 12/3/20    Patient will increase functional independence with mobility by performin. Supine to sit with Stand-by Assistance-not met  2. Sit to stand transfer with Contact Guard Assistance -not met  3. Gait  x 250 feet with Stand-by Assistance using blind cane and LLE BK prosthesis- not met  4. Pt min assist don/doff BK prosthesis - not met    Outcome: Ongoing, Progressing

## 2020-11-06 NOTE — PROGRESS NOTES
[facility]  [facility]  Progress Note      Reason for Follow-up: Reassessment of Kidney, Pancreas Transplant - 11/3/2020  (#1) recipient and management of immunosuppression.    ORGAN: LEFT KIDNEY    Donor Type: Donation after Brain Death    Donor CMV Status: Positive  Donor HBcAB:Negative  Donor HCV Status:Positive  Donor HBV YIN: Negative  Donor HCV YIN: Positive      Subjective:   History of Present Illness:  Mr. Rosales is a 40 y.o. Black or  male with ESRD secondary to diabetic nephropathy (T1DM) who presents for direct admission 11/3 for kidney/pancreas transplant. Patient is currently on hemodialysis started on 3/16/2020. Patient is dialyzing on TTS schedule.  Last HD Saturday 10/31/20- removed 3.2L. Patient reports that he is tolerating dialysis well. He has a LUE AV graft for dialysis access. Dry weight 188.6 kg. Standing scale weight on admit 96.25 kg. Native UOP- voids 3-4 times a day 200+ cc (~1L). Pre op labs and imaging pending. Donor is PHS increased risk, CMV, EBV IgG, and HCV YIN+. OR tentatively scheduled for 1700 with Dr. Romero. Induction will be Thymoglobulin. Pt accompanied by sister.  Assessment is unremarkable, he denies CP, SOB, COVID contacts.  Rapid COVID ordered. Plan for HD today.    Hospital Course:  Patient is now s/p SPK 11/3/20 (Thymo induction, HCV Ab+/YIN+, CMV D+/R+). Surgery without complication. POD#1 kidney/panc US satisfactory. He progressed well post-operatively and transferred to TSU on POD#3.     Interval History: no acute events overnight. Feels well today. Cr and panc enzymes trending down, excellent uop (~3.4L). Discontinue zamudio today. ALESSANDRO x 1 with ss drg. Tolerating diet, denies N/V, advance to regular. Discontinue D10W, monitor BG closely. (+) flatus, continue bowel regimen, encourage ambulation. PT consulted. BP stable, adjusted anti-hypertensives.       Past Medical, Surgical, Family, and Social History:   Unchanged from  H&P.    Scheduled Meds:   Thymoglobulin 1.5mg/kg, hydrocortisone 20mg, heparin 1000 units in NS 500ml (Peripheral line)  1.5 mg/kg Intravenous Daily    aspirin  81 mg Oral Daily    bisacodyL  10 mg Rectal QHS    calcitRIOL  0.25 mcg Oral Daily    docusate sodium  100 mg Oral TID    famotidine  20 mg Oral QHS    [START ON 11/7/2020] fluconazole  200 mg Oral Daily    gabapentin  300 mg Oral BID    heparin (porcine)  5,000 Units Subcutaneous Q8H    [START ON 11/7/2020] methylPREDNISolone sodium succinate  20 mg Intravenous Daily    metoprolol tartrate  25 mg Oral BID    mupirocin  1 g Nasal BID    mycophenolate  1,000 mg Oral BID    NIFEdipine  30 mg Oral Daily    [START ON 11/11/2020] nystatin  500,000 Units Mouth/Throat QID    sodium bicarbonate  650 mg Oral BID    sulfamethoxazole-trimethoprim 400-80mg  1 tablet Oral Daily AM    tacrolimus  5 mg Oral BID    [START ON 11/14/2020] valGANciclovir  450 mg Oral Daily     Continuous Infusions:   dextrose 5 % and 0.45 % NaCl 50 mL/hr at 11/05/20 2024     PRN Meds:dextrose 50%, dextrose 50%, naloxone, ondansetron, oxyCODONE, oxyCODONE    Intake/Output - Last 3 Shifts       11/04 0700 - 11/05 0659 11/05 0700 - 11/06 0659 11/06 0700 - 11/07 0659    P.O.  856     I.V. (mL/kg) 3832.3 (43.1) 2535 (31.8)     Blood 200 324     Other 122      IV Piggyback  700     Total Intake(mL/kg) 4154.3 (46.7) 4415 (55.4)     Urine (mL/kg/hr) 3400 (1.6) 3575 (1.9)     Emesis/NG output  0     Drains 430 330     Other       Stool  0     Blood       Total Output 3830 3905     Net +324.3 +510            Stool Occurrence  0 x     Emesis Occurrence  0 x            Review of Systems   Constitutional: Negative for activity change, appetite change, chills, fatigue and fever.   HENT: Negative for congestion and trouble swallowing.    Eyes: Positive for visual disturbance (blind).   Respiratory: Negative for cough, chest tightness, shortness of breath and wheezing.    Cardiovascular:  "Negative for chest pain, palpitations and leg swelling.   Gastrointestinal: Positive for abdominal pain and constipation. Negative for abdominal distention, diarrhea, nausea and vomiting.   Endocrine: Negative.    Genitourinary: Negative for decreased urine volume, difficulty urinating, dysuria, frequency, hematuria and urgency.   Musculoskeletal: Negative for arthralgias, back pain and neck pain.        L BKA   Skin: Negative for color change, pallor, rash and wound.   Allergic/Immunologic: Negative for immunocompromised state.   Neurological: Positive for weakness. Negative for dizziness, tremors, seizures, syncope and headaches.   Hematological: Does not bruise/bleed easily.   Psychiatric/Behavioral: Negative for agitation, confusion, decreased concentration, sleep disturbance and suicidal ideas. The patient is not nervous/anxious.       Objective:     Vital Signs (Most Recent):  Temp: 98.1 °F (36.7 °C) (11/06/20 1057)  Pulse: 83 (11/06/20 1057)  Resp: 18 (11/06/20 1057)  BP: (!) 149/83 (11/06/20 1057)  SpO2: 96 % (11/06/20 1057) Vital Signs (24h Range):  Temp:  [98.1 °F (36.7 °C)-98.9 °F (37.2 °C)] 98.1 °F (36.7 °C)  Pulse:  [73-88] 83  Resp:  [9-24] 18  SpO2:  [95 %-98 %] 96 %  BP: (135-172)/(62-83) 149/83  Arterial Line BP: (113-140)/(48-58) 130/55     Weight: 79.7 kg (175 lb 11.3 oz)  Height: 5' 8" (172.7 cm)  Body mass index is 26.72 kg/m².    Physical Exam  Vitals signs and nursing note reviewed.   Constitutional:       General: He is not in acute distress.     Appearance: He is well-developed. He is not diaphoretic.   HENT:      Head: Normocephalic and atraumatic.      Mouth/Throat:      Pharynx: No oropharyngeal exudate.   Eyes:      General: No scleral icterus.     Comments: Pt is legally blind   Neck:      Musculoskeletal: Normal range of motion and neck supple.      Thyroid: No thyromegaly.   Cardiovascular:      Rate and Rhythm: Normal rate and regular rhythm.      Heart sounds: Normal heart sounds. " No murmur.   Pulmonary:      Effort: Pulmonary effort is normal. No respiratory distress.      Breath sounds: No wheezing or rales.   Abdominal:      General: Abdomen is flat. Bowel sounds are normal. There is no distension.      Palpations: Abdomen is soft.      Tenderness: There is no abdominal tenderness. There is no guarding.       Genitourinary:     Penis: Normal.    Musculoskeletal: Normal range of motion.      Comments: L BKA   Skin:     General: Skin is warm and dry.      Capillary Refill: Capillary refill takes 2 to 3 seconds.      Findings: No erythema.   Neurological:      Mental Status: He is alert and oriented to person, place, and time.   Psychiatric:         Mood and Affect: Mood normal.         Behavior: Behavior normal.         Thought Content: Thought content normal.         Judgment: Judgment normal.       Laboratory:  CBC:   Recent Labs   Lab 11/04/20 2000 11/05/20 0414 11/05/20 1156   WBC 12.35 10.55 10.20   RBC 3.04* 2.85* 3.28*   HGB 8.0* 7.4* 8.7*   HCT 25.8* 23.9* 28.4*    246 239   MCV 85 84 87   MCH 26.3* 26.0* 26.5*   MCHC 31.0* 31.0* 30.6*     CMP:   Recent Labs   Lab 11/04/20 2000 11/05/20 0414 11/05/20 1156 11/06/20  0458   * 98  94 112* 86   CALCIUM 7.2* 7.6*  7.4* 8.2* 8.4*   ALBUMIN 2.8* 3.1*  3.1* 3.2* 3.1*   PROT 5.6* 5.7* 5.9*  --     136  137 136 138   K 4.0 4.5  4.5 4.6 4.9   CO2 22* 22*  22* 22* 18*    101  102 103 108   BUN 45* 45*  42* 40* 32*   CREATININE 5.9* 5.0*  5.0* 4.1* 2.8*   ALKPHOS 68 71 76  --    ALT 43 72* 66*  --    AST 52* 106* 60*  --      Coagulation:   Recent Labs   Lab 11/03/20  0947   APTT 33.0*     Labs within the past 24 hours have been reviewed.    Diagnostic Results:  US - Kidney:   Results for orders placed during the hospital encounter of 11/03/20   US Transplant Kidney With Doppler    Narrative EXAMINATION:  US TRANSPLANT KIDNEY WITH DOPPLER    CLINICAL HISTORY:  s/p LEFT kidney transplant please evaluate  both kidneys completely;    TECHNIQUE:  Real time gray scale and doppler ultrasound was performed over the patient's renal allograft.    COMPARISON:  None    FINDINGS:  Renal allograft in the right lower quadrant.  The allograft measures 10.2 cm. Normal perfusion. No hydronephrosis.  Ureteral stent in place.    No fluid collections.    Vasculature:    Resistive indices are as follow:    Interlobar artery: 0.60    Upper segmental: 0.62    Mid segmental: 0.59    Lower segmental: 0.80    Main renal artery peak systolic velocity: 132cm/sec with normal waveform.    Renal artery/iliac ratio: 0.80.    The main renal vein is patent.      Impression Satisfactory gray scale and doppler evaluation of renal allograft.    No hydronephrosis noting ureteral stent in place.    Electronically signed by resident: Candis Ibarra  Date:    11/04/2020  Time:    14:50    Electronically signed by: Armando Mendez MD  Date:    11/04/2020  Time:    15:22     US - Pancreas:   Results for orders placed during the hospital encounter of 11/03/20   US Pancreas Transplant Post    Narrative EXAMINATION:  US PANCREAS TRANSPLANT POST    CLINICAL HISTORY:  evaluate POD 1 after pancreas transplant;    TECHNIQUE:  Grayscale, color flow, and spectral analysis was used to evaluate the pancreas allograft using transabdominal ultrasound technique.    COMPARISON:  None.    FINDINGS:  Pancreatic allograft is homogeneous in echogenicity.  There are no peripancreatic fluid collections.    Velocities: cm/s    Conduit:176 cm/s    Splenic artery:24 cm/s    Splenic vein:25 cm/s    Portal vein:27 cm/s    Superior mesenteric vein:37 cm/s    Iliac artery:177 cm/s      Impression Satisfactory grayscale and color flow evaluation of the pancreatic allograft.      Electronically signed by: Armando Mendez MD  Date:    11/04/2020  Time:    15:49     Assessment/Plan:     * History of simultaneous kidney and pancreas transplant  - see kidney transplanted  - see status post  pancreas transplant    At risk for opportunistic infections  - nystatin for thrush prevention  - Bactrim for PJP prophylaxis for 1 year  - Valcyte for CMV prophylaxis (CMV D+/R+)  - Fluconazole for 1 month    Renovascular hypertension  - monitor orthostatic vital signs. Only treat standing BP.  - Continue nifedipine and metoprolol, can titrate up as needed    Secondary hyperparathyroidism  - . Ca borderline low.  - Start calcitriol    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  - Donor HCV Ab+/YIN+  - POD#7 Hep C antibody, Hep C quant PCR, LFTs    Long-term use of immunosuppressant medication  - Maintenance IS with prograf, MMF, and prednisone taper. cont to check tacrolimus level daily. Assess for toxicity and adjust level as needed    Status post pancreas transplantation  - s/p SPK 11/3/20 (Thymo induction, HCV Ab+/YIN +, CMV D+/R+)  - Surgery without complication  - POD#1 panc US satisfactory. Repeat panc US on POD#7 or near discharge.   - Panc enzymes trending down  - Hypoglycemic post-op requiring D10W gtt (d/c'd 11/6)  - Tolerating diet, no N/V, (+) flatus, no BM, continue bowel regimen. PT consulted.   - Continue ASA 81mg    Kidney transplanted  - s/p SPK 11/3/20 (Thymo induction, KDPI 46%, CIT ~ 5 hours)   - Cr trending down, excellent uop  - Kidney US POD#1 satisfactory  - Dc zamudio today    Hypoglycemia  - hypoglycemia post-op requiring D10W gtt   - D10W gtt discontinued 11/6 when diet advanced  - continue to monitor BG closely    Prophylactic immunotherapy  - See long-term use of immunosuppressant medication    Status post below-knee amputation  - PT consulted    Blindness of both eyes due to diabetes mellitus          Discharge Planning: not stable for dc at this time. Possible discharge Sunday or Monday.    Alee Arboleda, GUSTAVO  Kidney Transplant  [facility]

## 2020-11-06 NOTE — NURSING
Notified ARUNA EASTON and JAMIL BROTHERS of nonocclusive superficial thrombus of the right basilic and cephalic vein. Will continue to monitor.

## 2020-11-06 NOTE — PROGRESS NOTES
"[facility]  Adult Nutrition  Progress Note    SUMMARY       Recommendations    1. Continue Regular diet as tolerated   2. Monitor for ONS    Goals: Pt to meet >75% of estimated energy and protein needs over the course of 7 days.  Nutrition Goal Status: progressing towards goal  Communication of RD Recs: (POC)    Reason for Assessment    Reason For Assessment: RD follow-up  Diagnosis: (ESRD)  Relevant Medical History: DM, HTN, diabetic neuropathy  Interdisciplinary Rounds: did not attend  General Information Comments: Pt s/p pancreas-kidney transplant 11/3. Pt sleepy during visit. Caregiver present. 17% wt loss in > 1 year, not significant. PTA caregiver reports pt following a low sodium diet at home. Was tolerating liquids well, recent diet advancement to Regular. Appetite is okay. Denies n/v/d/c. UBW between 190-200#. NFPE completed , no s/s of malnutrition.    Nutrition Discharge Planning: Post transplant nutrition education provided on . Food safety/drug interactions emphasized. General healthy/low salt diet recommended. Education material left. No other needs identified. Caregiver present.    Nutrition Risk Screen    Nutrition Risk Screen: no indicators present    Nutrition/Diet History    Patient Reported Diet/Restrictions/Preferences: low salt  Spiritual, Cultural Beliefs, Sikh Practices, Values that Affect Care: no  Food Allergies: NKFA  Factors Affecting Nutritional Intake: decreased appetite    Anthropometrics    Temp: 98.4 °F (36.9 °C)  Height: 5' 8" (172.7 cm)  Height (inches): 68 in  Weight Method: Bed Scale  Weight: 79.7 kg (175 lb 11.3 oz)  Weight (lb): 175.71 lb  Ideal Body Weight (IBW), Male: 154 lb  % Ideal Body Weight, Male (lb): 121.82 %  BMI (Calculated): 26.7  BMI Grade: 25 - 29.9 - overweight  Usual Body Weight (UBW), k.5 kg()  % Usual Body Weight: 100.92  Amputation %: 5.9  Total Amputation %: 5.9       Lab/Procedures/Meds    Pertinent Labs Reviewed: " reviewed  Pertinent Labs Comments: BUN 32, Cr 2.8, GFR 31.6  Pertinent Medications Reviewed: reviewed  Pertinent Medications Comments: acetaminophen, calcitriol, docusate, famotidine, heparin, methylprednisolone, mycophenolate, tacrolimus    Estimated/Assessed Needs    Weight Used For Calorie Calculations: 85 kg (187 lb 6.3 oz)  Energy Calorie Requirements (kcal): 2125 kcals/day(25 kcal/kg)  Energy Need Method: Kcal/kg  Protein Requirements: 85-110gm (1.0-1.3gm/kg renal function)  Weight Used For Protein Calculations: 85 kg (187 lb 6.3 oz)  Fluid Requirements (mL): 1mL/kcal or per MD recommendations.  Estimated Fluid Requirement Method: RDA Method  RDA Method (mL): 2125  CHO Requirement: 265 g      Nutrition Prescription Ordered    Current Diet Order: Regular    Evaluation of Received Nutrient/Fluid Intake    I/O: I: 4415; O: 5960; -1.5L since admission  Comments: LBM: 11/3  Tolerance: (RD to monitor)  % Intake of Estimated Energy Needs: 25 - 50 %  % Meal Intake: 25 - 50 %    Nutrition Risk    Level of Risk/Frequency of Follow-up: high     Assessment and Plan    Nutrition Problem  Increased nutrient needs, energy and protein    Related to (etiology):   Physiological demands, healing    Signs and Symptoms (as evidenced by):   S/p kidney-pancreas transplant 11/3     Interventions (treatment strategy):  Collaboration with other providers  General/healthful diet  Nutrition education    Nutrition Diagnosis Status:   New      Monitor and Evaluation    Food and Nutrient Intake: food and beverage intake, energy intake  Food and Nutrient Adminstration: diet order  Knowledge/Beliefs/Attitudes: food and nutrition knowledge/skill  Anthropometric Measurements: weight, weight change  Biochemical Data, Medical Tests and Procedures: electrolyte and renal panel, gastrointestinal profile, glucose/endocrine profile, inflammatory profile  Nutrition-Focused Physical Findings: skin, overall appearance     Malnutrition Assessment                  Orbital Region (Subcutaneous Fat Loss): well nourished  Upper Arm Region (Subcutaneous Fat Loss): well nourished  Thoracic and Lumbar Region: well nourished   Confucianism Region (Muscle Loss): well nourished  Clavicle Bone Region (Muscle Loss): well nourished  Scapular Bone Region (Muscle Loss): well nourished  Dorsal Hand (Muscle Loss): well nourished  Patellar Region (Muscle Loss): well nourished  Anterior Thigh Region (Muscle Loss): well nourished  Posterior Calf Region (Muscle Loss): well nourished   Edema (Fluid Accumulation): 1-->trace             Nutrition Follow-Up    RD Follow-up?: Yes

## 2020-11-06 NOTE — PLAN OF CARE
Recommendations    1. Continue Regular diet as tolerated   2. Monitor for ONS    Goals: Pt to meet >75% of estimated energy and protein needs over the course of 7 days.  Nutrition Goal Status: progressing towards goal  Communication of RD Recs: (POC)

## 2020-11-06 NOTE — PROGRESS NOTES
Discharge instructions and follow up appointments reviewed with patient's sister. Outpatient appointments for lab, transplant coordinator, and pharmacist scheduled for Monday 11/9/20.  Understanding verbalized.

## 2020-11-06 NOTE — ASSESSMENT & PLAN NOTE
- s/p SPK 11/3/20 (Thymo induction, KDPI 46%, CIT ~ 5 hours)   - Cr trending down, excellent uop  - Kidney US POD#1 satisfactory  - Christian zamudio today

## 2020-11-06 NOTE — ASSESSMENT & PLAN NOTE
- nystatin for thrush prevention  - Bactrim for PJP prophylaxis for 1 year  - Valcyte for CMV prophylaxis (CMV D+/R+)  - Fluconazole for 1 month

## 2020-11-06 NOTE — ASSESSMENT & PLAN NOTE
- monitor orthostatic vital signs. Only treat standing BP.  - Continue nifedipine and metoprolol, can titrate up as needed

## 2020-11-06 NOTE — SUBJECTIVE & OBJECTIVE
Subjective:   History of Present Illness:  Mr. Rosales is a 40 y.o. Black or  male with ESRD secondary to diabetic nephropathy (T1DM) who presents for direct admission 11/3 for kidney/pancreas transplant. Patient is currently on hemodialysis started on 3/16/2020. Patient is dialyzing on TTS schedule.  Last HD Saturday 10/31/20- removed 3.2L. Patient reports that he is tolerating dialysis well. He has a LUE AV graft for dialysis access. Dry weight 188.6 kg. Standing scale weight on admit 96.25 kg. Native UOP- voids 3-4 times a day 200+ cc (~1L). Pre op labs and imaging pending. Donor is PHS increased risk, CMV, EBV IgG, and HCV YIN+. OR tentatively scheduled for 1700 with Dr. Romero. Induction will be Thymoglobulin. Pt accompanied by sister.  Assessment is unremarkable, he denies CP, SOB, COVID contacts.  Rapid COVID ordered. Plan for HD today.    Hospital Course:  Patient is now s/p SPK 11/3/20 (Thymo induction, HCV Ab+/YIN+, CMV D+/R+). Surgery without complication. POD#1 kidney/panc US satisfactory. He progressed well post-operatively and transferred to [facility] on POD#3.     Interval History: no acute events overnight. Feels well today. Cr and panc enzymes trending down, excellent uop (~3.4L). Discontinue zamudio today. ALESSANDRO x 1 with ss drg. Tolerating diet, denies N/V, advance to regular. Discontinue D10W, monitor BG closely. (+) flatus, continue bowel regimen, encourage ambulation. PT consulted. BP stable, adjusted anti-hypertensives.       Past Medical, Surgical, Family, and Social History:   Unchanged from H&P.    Scheduled Meds:   Thymoglobulin 1.5mg/kg, hydrocortisone 20mg, heparin 1000 units in NS 500ml (Peripheral line)  1.5 mg/kg Intravenous Daily    aspirin  81 mg Oral Daily    bisacodyL  10 mg Rectal QHS    calcitRIOL  0.25 mcg Oral Daily    docusate sodium  100 mg Oral TID    famotidine  20 mg Oral QHS    [START ON 11/7/2020] fluconazole  200 mg Oral Daily    gabapentin  300 mg Oral  BID    heparin (porcine)  5,000 Units Subcutaneous Q8H    [START ON 11/7/2020] methylPREDNISolone sodium succinate  20 mg Intravenous Daily    metoprolol tartrate  25 mg Oral BID    mupirocin  1 g Nasal BID    mycophenolate  1,000 mg Oral BID    NIFEdipine  30 mg Oral Daily    [START ON 11/11/2020] nystatin  500,000 Units Mouth/Throat QID    sodium bicarbonate  650 mg Oral BID    sulfamethoxazole-trimethoprim 400-80mg  1 tablet Oral Daily AM    tacrolimus  5 mg Oral BID    [START ON 11/14/2020] valGANciclovir  450 mg Oral Daily     Continuous Infusions:   dextrose 5 % and 0.45 % NaCl 50 mL/hr at 11/05/20 2024     PRN Meds:dextrose 50%, dextrose 50%, naloxone, ondansetron, oxyCODONE, oxyCODONE    Intake/Output - Last 3 Shifts       11/04 0700 - 11/05 0659 11/05 0700 - 11/06 0659 11/06 0700 - 11/07 0659    P.O.  856     I.V. (mL/kg) 3832.3 (43.1) 2535 (31.8)     Blood 200 324     Other 122      IV Piggyback  700     Total Intake(mL/kg) 4154.3 (46.7) 4415 (55.4)     Urine (mL/kg/hr) 3400 (1.6) 3575 (1.9)     Emesis/NG output  0     Drains 430 330     Other       Stool  0     Blood       Total Output 3830 3905     Net +324.3 +510            Stool Occurrence  0 x     Emesis Occurrence  0 x            Review of Systems   Constitutional: Negative for activity change, appetite change, chills, fatigue and fever.   HENT: Negative for congestion and trouble swallowing.    Eyes: Positive for visual disturbance (blind).   Respiratory: Negative for cough, chest tightness, shortness of breath and wheezing.    Cardiovascular: Negative for chest pain, palpitations and leg swelling.   Gastrointestinal: Positive for abdominal pain and constipation. Negative for abdominal distention, diarrhea, nausea and vomiting.   Endocrine: Negative.    Genitourinary: Negative for decreased urine volume, difficulty urinating, dysuria, frequency, hematuria and urgency.   Musculoskeletal: Negative for arthralgias, back pain and neck pain.  "       L BKA   Skin: Negative for color change, pallor, rash and wound.   Allergic/Immunologic: Negative for immunocompromised state.   Neurological: Positive for weakness. Negative for dizziness, tremors, seizures, syncope and headaches.   Hematological: Does not bruise/bleed easily.   Psychiatric/Behavioral: Negative for agitation, confusion, decreased concentration, sleep disturbance and suicidal ideas. The patient is not nervous/anxious.       Objective:     Vital Signs (Most Recent):  Temp: 98.1 °F (36.7 °C) (11/06/20 1057)  Pulse: 83 (11/06/20 1057)  Resp: 18 (11/06/20 1057)  BP: (!) 149/83 (11/06/20 1057)  SpO2: 96 % (11/06/20 1057) Vital Signs (24h Range):  Temp:  [98.1 °F (36.7 °C)-98.9 °F (37.2 °C)] 98.1 °F (36.7 °C)  Pulse:  [73-88] 83  Resp:  [9-24] 18  SpO2:  [95 %-98 %] 96 %  BP: (135-172)/(62-83) 149/83  Arterial Line BP: (113-140)/(48-58) 130/55     Weight: 79.7 kg (175 lb 11.3 oz)  Height: 5' 8" (172.7 cm)  Body mass index is 26.72 kg/m².    Physical Exam  Vitals signs and nursing note reviewed.   Constitutional:       General: He is not in acute distress.     Appearance: He is well-developed. He is not diaphoretic.   HENT:      Head: Normocephalic and atraumatic.      Mouth/Throat:      Pharynx: No oropharyngeal exudate.   Eyes:      General: No scleral icterus.     Comments: Pt is legally blind   Neck:      Musculoskeletal: Normal range of motion and neck supple.      Thyroid: No thyromegaly.   Cardiovascular:      Rate and Rhythm: Normal rate and regular rhythm.      Heart sounds: Normal heart sounds. No murmur.   Pulmonary:      Effort: Pulmonary effort is normal. No respiratory distress.      Breath sounds: No wheezing or rales.   Abdominal:      General: Abdomen is flat. Bowel sounds are normal. There is no distension.      Palpations: Abdomen is soft.      Tenderness: There is no abdominal tenderness. There is no guarding.       Genitourinary:     Penis: Normal.    Musculoskeletal: Normal " range of motion.      Comments: L BKA   Skin:     General: Skin is warm and dry.      Capillary Refill: Capillary refill takes 2 to 3 seconds.      Findings: No erythema.   Neurological:      Mental Status: He is alert and oriented to person, place, and time.   Psychiatric:         Mood and Affect: Mood normal.         Behavior: Behavior normal.         Thought Content: Thought content normal.         Judgment: Judgment normal.       Laboratory:  CBC:   Recent Labs   Lab 11/04/20 2000 11/05/20 0414 11/05/20  1156   WBC 12.35 10.55 10.20   RBC 3.04* 2.85* 3.28*   HGB 8.0* 7.4* 8.7*   HCT 25.8* 23.9* 28.4*    246 239   MCV 85 84 87   MCH 26.3* 26.0* 26.5*   MCHC 31.0* 31.0* 30.6*     CMP:   Recent Labs   Lab 11/04/20 2000 11/05/20 0414 11/05/20 1156 11/06/20  0458   * 98  94 112* 86   CALCIUM 7.2* 7.6*  7.4* 8.2* 8.4*   ALBUMIN 2.8* 3.1*  3.1* 3.2* 3.1*   PROT 5.6* 5.7* 5.9*  --     136  137 136 138   K 4.0 4.5  4.5 4.6 4.9   CO2 22* 22*  22* 22* 18*    101  102 103 108   BUN 45* 45*  42* 40* 32*   CREATININE 5.9* 5.0*  5.0* 4.1* 2.8*   ALKPHOS 68 71 76  --    ALT 43 72* 66*  --    AST 52* 106* 60*  --      Coagulation:   Recent Labs   Lab 11/03/20  0947   APTT 33.0*     Labs within the past 24 hours have been reviewed.    Diagnostic Results:  US - Kidney:   Results for orders placed during the hospital encounter of 11/03/20   US Transplant Kidney With Doppler    Narrative EXAMINATION:  US TRANSPLANT KIDNEY WITH DOPPLER    CLINICAL HISTORY:  s/p LEFT kidney transplant please evaluate both kidneys completely;    TECHNIQUE:  Real time gray scale and doppler ultrasound was performed over the patient's renal allograft.    COMPARISON:  None    FINDINGS:  Renal allograft in the right lower quadrant.  The allograft measures 10.2 cm. Normal perfusion. No hydronephrosis.  Ureteral stent in place.    No fluid collections.    Vasculature:    Resistive indices are as follow:    Interlobar  artery: 0.60    Upper segmental: 0.62    Mid segmental: 0.59    Lower segmental: 0.80    Main renal artery peak systolic velocity: 132cm/sec with normal waveform.    Renal artery/iliac ratio: 0.80.    The main renal vein is patent.      Impression Satisfactory gray scale and doppler evaluation of renal allograft.    No hydronephrosis noting ureteral stent in place.    Electronically signed by resident: Candis Ibarra  Date:    11/04/2020  Time:    14:50    Electronically signed by: Armando Mendez MD  Date:    11/04/2020  Time:    15:22     US - Pancreas:   Results for orders placed during the hospital encounter of 11/03/20   US Pancreas Transplant Post    Narrative EXAMINATION:  US PANCREAS TRANSPLANT POST    CLINICAL HISTORY:  evaluate POD 1 after pancreas transplant;    TECHNIQUE:  Grayscale, color flow, and spectral analysis was used to evaluate the pancreas allograft using transabdominal ultrasound technique.    COMPARISON:  None.    FINDINGS:  Pancreatic allograft is homogeneous in echogenicity.  There are no peripancreatic fluid collections.    Velocities: cm/s    Conduit:176 cm/s    Splenic artery:24 cm/s    Splenic vein:25 cm/s    Portal vein:27 cm/s    Superior mesenteric vein:37 cm/s    Iliac artery:177 cm/s      Impression Satisfactory grayscale and color flow evaluation of the pancreatic allograft.      Electronically signed by: Armando Mendez MD  Date:    11/04/2020  Time:    15:49

## 2020-11-06 NOTE — SUBJECTIVE & OBJECTIVE
Subjective:   History of Present Illness:  Mr. Rosales is a 40 y.o. Black or  male with ESRD secondary to diabetic nephropathy (T1DM) who presents for direct admission 11/3 for kidney/pancreas transplant. Patient is currently on hemodialysis started on 3/16/2020. Patient is dialyzing on TTS schedule.  Last HD Saturday 10/31/20- removed 3.2L. Patient reports that he is tolerating dialysis well. He has a LUE AV graft for dialysis access. Dry weight 188.6 kg. Standing scale weight on admit 96.25 kg. Native UOP- voids 3-4 times a day 200+ cc (~1L). Pre op labs and imaging pending. Donor is PHS increased risk, CMV, EBV IgG, and HCV YIN+. OR tentatively scheduled for 1700 with Dr. Romero. Induction will be Thymoglobulin. Pt accompanied by sister.  Assessment is unremarkable, he denies CP, SOB, COVID contacts.  Rapid COVID ordered. Plan for HD today.    Hospital Course:  Patient is now s/p SPK 11/3/20 (Thymo induction, HCV Ab+/YIN+, CMV D+/R+). Surgery without complication. POD#1 kidney/panc US satisfactory. He progressed well post-operatively and transferred to [facility] on POD#3.     Interval History: no acute events overnight. Feels well today. Cr and panc enzymes trending down, excellent uop (~3.4L). Discontinue zamudio today. ALESSANDRO x 1 with ss drg. Tolerating diet, denies N/V, advance to regular. Discontinue D10W, monitor BG closely. (+) flatus, continue bowel regimen, encourage ambulation. PT consulted. BP stable, adjusted anti-hypertensives.     Past Medical, Surgical, Family, and Social History:   Unchanged from H&P.    Scheduled Meds:   Thymoglobulin 1.5mg/kg, hydrocortisone 20mg, heparin 1000 units in NS 500ml (Peripheral line)  1.5 mg/kg Intravenous Daily    aspirin  81 mg Oral Daily    bisacodyL  10 mg Rectal QHS    calcitRIOL  0.25 mcg Oral Daily    docusate sodium  100 mg Oral TID    famotidine  20 mg Oral QHS    [START ON 11/7/2020] fluconazole  200 mg Oral Daily    gabapentin  300 mg Oral BID     heparin (porcine)  5,000 Units Subcutaneous Q8H    [START ON 11/7/2020] methylPREDNISolone sodium succinate  20 mg Intravenous Daily    metoprolol tartrate  25 mg Oral BID    mupirocin  1 g Nasal BID    mycophenolate  1,000 mg Oral BID    NIFEdipine  30 mg Oral Daily    [START ON 11/11/2020] nystatin  500,000 Units Mouth/Throat QID    sodium bicarbonate  650 mg Oral BID    sulfamethoxazole-trimethoprim 400-80mg  1 tablet Oral Daily AM    tacrolimus  5 mg Oral BID    [START ON 11/14/2020] valGANciclovir  450 mg Oral Daily     Continuous Infusions:   dextrose 5 % and 0.45 % NaCl 50 mL/hr at 11/05/20 2024     PRN Meds:dextrose 50%, dextrose 50%, naloxone, ondansetron, oxyCODONE, oxyCODONE    Intake/Output - Last 3 Shifts       11/04 0700 - 11/05 0659 11/05 0700 - 11/06 0659 11/06 0700 - 11/07 0659    P.O.  856     I.V. (mL/kg) 3832.3 (43.1) 2535 (31.8)     Blood 200 324     Other 122      IV Piggyback  700     Total Intake(mL/kg) 4154.3 (46.7) 4415 (55.4)     Urine (mL/kg/hr) 3400 (1.6) 3575 (1.9)     Emesis/NG output  0     Drains 430 330     Other       Stool  0     Blood       Total Output 3830 3905     Net +324.3 +510            Stool Occurrence  0 x     Emesis Occurrence  0 x            Review of Systems   Constitutional: Negative for activity change, appetite change, chills, fatigue and fever.   HENT: Negative for congestion and trouble swallowing.    Eyes: Positive for visual disturbance (blind).   Respiratory: Negative for cough, chest tightness, shortness of breath and wheezing.    Cardiovascular: Negative for chest pain, palpitations and leg swelling.   Gastrointestinal: Positive for abdominal pain and constipation. Negative for abdominal distention, diarrhea, nausea and vomiting.   Endocrine: Negative.    Genitourinary: Negative for decreased urine volume, difficulty urinating, dysuria, frequency, hematuria and urgency.   Musculoskeletal: Negative for arthralgias, back pain and neck pain.       "  L BKA   Skin: Negative for color change, pallor, rash and wound.   Allergic/Immunologic: Negative for immunocompromised state.   Neurological: Positive for weakness. Negative for dizziness, tremors, seizures, syncope and headaches.   Hematological: Does not bruise/bleed easily.   Psychiatric/Behavioral: Negative for agitation, confusion, decreased concentration, sleep disturbance and suicidal ideas. The patient is not nervous/anxious.       Objective:     Vital Signs (Most Recent):  Temp: 98.1 °F (36.7 °C) (11/06/20 1057)  Pulse: 83 (11/06/20 1057)  Resp: 18 (11/06/20 1057)  BP: (!) 149/83 (11/06/20 1057)  SpO2: 96 % (11/06/20 1057) Vital Signs (24h Range):  Temp:  [98.1 °F (36.7 °C)-98.9 °F (37.2 °C)] 98.1 °F (36.7 °C)  Pulse:  [73-88] 83  Resp:  [9-25] 18  SpO2:  [95 %-98 %] 96 %  BP: (135-172)/(62-83) 149/83  Arterial Line BP: (113-143)/(48-58) 130/55     Weight: 79.7 kg (175 lb 11.3 oz)  Height: 5' 8" (172.7 cm)  Body mass index is 26.72 kg/m².    Physical Exam  Vitals signs and nursing note reviewed.   Constitutional:       General: He is not in acute distress.     Appearance: He is well-developed. He is not diaphoretic.   HENT:      Head: Normocephalic and atraumatic.      Mouth/Throat:      Pharynx: No oropharyngeal exudate.   Eyes:      General: No scleral icterus.     Comments: Pt is legally blind   Neck:      Musculoskeletal: Normal range of motion and neck supple.      Thyroid: No thyromegaly.   Cardiovascular:      Rate and Rhythm: Normal rate and regular rhythm.      Heart sounds: Normal heart sounds. No murmur.   Pulmonary:      Effort: Pulmonary effort is normal. No respiratory distress.      Breath sounds: No wheezing or rales.   Abdominal:      General: Abdomen is flat. Bowel sounds are normal. There is no distension.      Palpations: Abdomen is soft.      Tenderness: There is no abdominal tenderness. There is no guarding.       Genitourinary:     Penis: Normal.    Musculoskeletal: Normal range of " motion.      Comments: L BKA   Skin:     General: Skin is warm and dry.      Capillary Refill: Capillary refill takes 2 to 3 seconds.      Findings: No erythema.   Neurological:      Mental Status: He is alert and oriented to person, place, and time.   Psychiatric:         Mood and Affect: Mood normal.         Behavior: Behavior normal.         Thought Content: Thought content normal.         Judgment: Judgment normal.       Laboratory:  CBC:   Recent Labs   Lab 11/04/20 2000 11/05/20 0414 11/05/20  1156   WBC 12.35 10.55 10.20   RBC 3.04* 2.85* 3.28*   HGB 8.0* 7.4* 8.7*   HCT 25.8* 23.9* 28.4*    246 239   MCV 85 84 87   MCH 26.3* 26.0* 26.5*   MCHC 31.0* 31.0* 30.6*     CMP:   Recent Labs   Lab 11/04/20 2000 11/05/20 0414 11/05/20 1156 11/06/20  0458   * 98  94 112* 86   CALCIUM 7.2* 7.6*  7.4* 8.2* 8.4*   ALBUMIN 2.8* 3.1*  3.1* 3.2* 3.1*   PROT 5.6* 5.7* 5.9*  --     136  137 136 138   K 4.0 4.5  4.5 4.6 4.9   CO2 22* 22*  22* 22* 18*    101  102 103 108   BUN 45* 45*  42* 40* 32*   CREATININE 5.9* 5.0*  5.0* 4.1* 2.8*   ALKPHOS 68 71 76  --    ALT 43 72* 66*  --    AST 52* 106* 60*  --      Coagulation:   Recent Labs   Lab 11/03/20  0947   APTT 33.0*     Labs within the past 24 hours have been reviewed.    Diagnostic Results:  US - Kidney:   Results for orders placed during the hospital encounter of 11/03/20   US Transplant Kidney With Doppler    Narrative EXAMINATION:  US TRANSPLANT KIDNEY WITH DOPPLER    CLINICAL HISTORY:  s/p LEFT kidney transplant please evaluate both kidneys completely;    TECHNIQUE:  Real time gray scale and doppler ultrasound was performed over the patient's renal allograft.    COMPARISON:  None    FINDINGS:  Renal allograft in the right lower quadrant.  The allograft measures 10.2 cm. Normal perfusion. No hydronephrosis.  Ureteral stent in place.    No fluid collections.    Vasculature:    Resistive indices are as follow:    Interlobar artery:  0.60    Upper segmental: 0.62    Mid segmental: 0.59    Lower segmental: 0.80    Main renal artery peak systolic velocity: 132cm/sec with normal waveform.    Renal artery/iliac ratio: 0.80.    The main renal vein is patent.      Impression Satisfactory gray scale and doppler evaluation of renal allograft.    No hydronephrosis noting ureteral stent in place.    Electronically signed by resident: Candis Ibarra  Date:    11/04/2020  Time:    14:50    Electronically signed by: Armando Mendez MD  Date:    11/04/2020  Time:    15:22     US - Pancreas:   Results for orders placed during the hospital encounter of 11/03/20   US Pancreas Transplant Post    Narrative EXAMINATION:  US PANCREAS TRANSPLANT POST    CLINICAL HISTORY:  evaluate POD 1 after pancreas transplant;    TECHNIQUE:  Grayscale, color flow, and spectral analysis was used to evaluate the pancreas allograft using transabdominal ultrasound technique.    COMPARISON:  None.    FINDINGS:  Pancreatic allograft is homogeneous in echogenicity.  There are no peripancreatic fluid collections.    Velocities: cm/s    Conduit:176 cm/s    Splenic artery:24 cm/s    Splenic vein:25 cm/s    Portal vein:27 cm/s    Superior mesenteric vein:37 cm/s    Iliac artery:177 cm/s      Impression Satisfactory grayscale and color flow evaluation of the pancreatic allograft.      Electronically signed by: Armando Mendez MD  Date:    11/04/2020  Time:    15:49

## 2020-11-06 NOTE — PROGRESS NOTES
Met with patient and his sister. Provided with post kidney transplant education book. Asked sister to review information and I will return to review later today.

## 2020-11-06 NOTE — PROGRESS NOTES
Transplant Teaching Book given to patient, Hernandez Rosales, on 11/06/2020.  During the course of the hospital stay the patient received information regarding kidney transplant. Teaching and instruction were completed.  Areas that were discussed included: how to contact the Transplant Team, the importance of measuring intake of fluids and urine output, and monitoring vital signs such as blood pressure, temperature, and daily weights.  Parameters for which to report abnormal findings were given.  Appointment were provided along with the rational for the importance of lab work and clinic visits.  A written medication list was provided.  The importance of immunosuppressive medications, their common side effects, and treatment to prevent or minimize side effects has been reviewed.  Signs and symptoms of rejection and infection along with various treatments were reviewed.  The need to avoid infection was discussed.  Wound care and special consideration regarding activities of daily living were explained.  Written and verbal teaching of the above information was given to patient's sister. All questions were answered and understanding verbalized.

## 2020-11-06 NOTE — DISCHARGE SUMMARY
[facility]  Kidney Transplant  Discharge Summary    Patient Name: Hernandez Rosales  MRN: [id number]  Admission Date: 11/3/2020  Hospital Length of Stay: 6 days  Discharge Date and Time:  11/09/2020 7:38 PM  Attending Physician: No att. providers found   Discharging Provider: Marisa Peña NP  Primary Care Provider: Primary Doctor Margret    HPI:   Mr. Rosales is a 40 y.o. Black or  male with ESRD secondary to diabetic nephropathy (T1DM) who presents for direct admission 11/3 for kidney/pancreas transplant. Patient is currently on hemodialysis started on 3/16/2020. Patient is dialyzing on TTS schedule.  Last HD Saturday 10/31/20- removed 3.2L. Patient reports that he is tolerating dialysis well. He has a LUE AV graft for dialysis access. Dry weight 188.6 kg. Standing scale weight on admit 96.25 kg. Native UOP- voids 3-4 times a day 200+ cc (~1L). Pre op labs and imaging pending. Donor is PHS increased risk, CMV, EBV IgG, and HCV YIN+. OR tentatively scheduled for 1700 with Dr. Romero. Induction will be Thymoglobulin. Pt accompanied by sister.  Assessment is unremarkable, he denies CP, SOB, COVID contacts.  Rapid COVID ordered. Plan for HD today.    Procedure(s) (LRB):  TRANSPLANT, KIDNEY (N/A)  TRANSPLANT, PANCREAS (N/A)     Hospital Course:    Patient is now s/p SPK 11/3/20 (Thymo induction, HCV Ab+/YIN+, CMV D+/R+). Surgery without complication. POD#1 kidney/panc US satisfactory. He progressed well post-operatively and transferred to TSU on POD#3. Cr trended down, excellent uop. Sin was removed on POD#3. He is voiding without difficulty. Panc enzymes trended down. Patient was hypoglycemic immediately post-op requiring D10W gtt, now improved. BP stable, patient is orthostatic. He is tolerating a diet, no N/V, (+) BM. Due to L BKA, PT consulted, recommending home with no needs. His maintenance IS per post-SPK transplant protocol with Thymo induction. Donor HCV Ab+/YIN+, needs Hep C  antibody, HCV quant PCR, and LFTs weekly. He and his caregivers received post-txp education. He is stable and ready for discharge today. He will have follow up labs and clinic appointment per post-txp protocol.     Final Active Diagnoses:    Diagnosis Date Noted POA    PRINCIPAL PROBLEM:  History of simultaneous kidney and pancreas transplant [Z94.0, Z94.83] 11/06/2020 Not Applicable    Long-term use of immunosuppressant medication [Z79.899] 11/06/2020 Not Applicable    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 [T86.19] 11/06/2020 No    Secondary hyperparathyroidism [N25.81] 11/06/2020 Yes    Renovascular hypertension [I15.0] 11/06/2020 Yes    At risk for opportunistic infections [Z91.89] 11/06/2020 No    Kidney transplanted [Z94.0]  Not Applicable    Status post pancreas transplantation [Z94.83]  Not Applicable    Immunocompromised state [D84.9]  No    Prophylactic immunotherapy [Z29.8] 11/04/2020 Not Applicable    Hypoglycemia [E16.2] 11/04/2020 No    Adrenal cortical steroids causing adverse effect in therapeutic use [T38.0X5A] 11/04/2020 No    Blindness of both eyes due to diabetes mellitus [E11.39, H54.3] 12/18/2014 Yes    Status post below-knee amputation [Z89.519] 08/08/2013 Not Applicable    Type 2 diabetes mellitus with chronic kidney disease [E11.22] 03/27/2013 Unknown      Problems Resolved During this Admission:    Diagnosis Date Noted Date Resolved POA    Diabetic nephropathy associated with type 1 diabetes mellitus [E10.21]  11/06/2020 Yes    ESRD (end stage renal disease) [N18.6] 11/03/2020 11/06/2020 Yes    Encounter for pre-transplant evaluation for kidney and pancreas transplant [Z01.818]  11/06/2020 Not Applicable    ESRD on dialysis [N18.6, Z99.2] 06/17/2020 11/06/2020 Not Applicable       Treatments: surgery: kidney pancreas transplant    Consults (From admission, onward)        Status Ordering Provider     Inpatient consult to Endocrinology  Once     Provider:  (Not  yet assigned)    Completed TIMO STAHL     Inpatient consult to Midline team  Once     Provider:  (Not yet assigned)    Completed BULMARO FOWLER JR     Inpatient consult to Registered Dietitian/Nutritionist  Once     Provider:  (Not yet assigned)    Completed BULMARO FOWLER JR     IP Consult to Transplant Nephrology (KTM)  Once     Provider:  (Not yet assigned)    Completed BULMARO FOWLER JR          Pending Diagnostic Studies:     Procedure Component Value Units Date/Time    EKG 12-lead [[id number]]     Order Status: Sent Lab Status: No result      Pancreas Transplant Post [[id number]]     Order Status: Sent Lab Status: No result         Significant Diagnostic Studies: Labs:   CMP   Recent Labs   Lab 11/08/20  0624 11/09/20  0640    141   K 4.3 4.3   * 112*   CO2 19* 19*   GLU 78 78   BUN 37* 35*   CREATININE 2.2* 2.0*   CALCIUM 7.9* 7.8*   ALBUMIN 2.8* 2.8*   ANIONGAP 8 10   ESTGFRAFRICA 42.4* 47.5*   EGFRNONAA 36.6* 41.1*   , CBC   Recent Labs   Lab 11/08/20  0624 11/09/20  0640   WBC 4.76 3.74*   HGB 8.9* 8.6*   HCT 29.1* 27.8*    268    and INR   Lab Results   Component Value Date    INR 1.3 (H) 11/05/2020    INR 1.0 11/03/2020    INR 1.1 06/17/2020       Discharged Condition: good    Disposition: Home or Self Care    Follow Up:    Patient Instructions:      Diet Adult Regular     Notify your health care provider if you experience any of the following:  temperature >100.4     Notify your health care provider if you experience any of the following:  persistent nausea and vomiting or diarrhea     Notify your health care provider if you experience any of the following:  severe uncontrolled pain     Notify your health care provider if you experience any of the following:  redness, tenderness, or signs of infection (pain, swelling, redness, odor or green/yellow discharge around incision site)     Notify your health care provider if you experience any of the following:  increased  confusion or weakness     Notify your health care provider if you experience any of the following:  persistent dizziness, light-headedness, or visual disturbances     Notify your health care provider if you experience any of the following:  worsening rash     Notify your health care provider if you experience any of the following:  severe persistent headache     Notify your health care provider if you experience any of the following:  difficulty breathing or increased cough     No dressing needed     Activity as tolerated     Medications:  Reconciled Home Medications:      Medication List      START taking these medications    aspirin 81 MG EC tablet  Commonly known as: ECOTRIN  Take 1 tablet (81 mg total) by mouth once daily.     calcitRIOL 0.25 MCG Cap  Commonly known as: ROCALTROL  Take 1 capsule (0.25 mcg total) by mouth once daily.     cinacalcet 30 MG Tab  Commonly known as: SENSIPAR  Take 1 tablet (30 mg total) by mouth daily with breakfast.  Start taking on: November 10, 2020     docusate sodium 100 MG capsule  Commonly known as: COLACE  Take 1 capsule (100 mg total) by mouth 3 (three) times daily as needed for Constipation.     ergocalciferol 50,000 unit Cap  Commonly known as: ERGOCALCIFEROL  Take 1 capsule (50,000 Units total) by mouth every 7 days.     famotidine 20 MG tablet  Commonly known as: PEPCID  Take 1 tablet (20 mg total) by mouth every evening.     fluconazole 200 MG Tab  Commonly known as: DIFLUCAN  Take 1 tablet (200 mg total) by mouth once daily. for 1 day  Start taking on: November 10, 2020     k phos di & mono-sod phos mono 250 mg Tab  Commonly known as: K-PHOS-NEUTRAL  Take 3 tablets by mouth 3 (three) times daily.     metoprolol tartrate 25 MG tablet  Commonly known as: LOPRESSOR  Take 1 tablet (25 mg total) by mouth 2 (two) times daily.     mycophenolate 250 mg Cap  Commonly known as: CELLCEPT  Take 4 capsules (1,000 mg total) by mouth 2 (two) times daily.     NIFEdipine 30 MG (OSM) 24  hr tablet  Commonly known as: PROCARDIA-XL  Take 1 tablet (30 mg total) by mouth once daily.     nystatin 100,000 unit/mL suspension  Commonly known as: MYCOSTATIN  Take 5 mLs (500,000 Units total) by mouth 3 (three) times daily after meals. STOP 12/7/20     oxyCODONE-acetaminophen  mg per tablet  Commonly known as: PERCOCET  Take 1 tablet by mouth every 4 (four) hours as needed for Pain.     predniSONE 5 MG tablet  Commonly known as: DELTASONE  Take by mouth daily: 20mg 11/7-12/6, 15mg 12/7-1/6/21, 10mg 1/7-2/6, then 5mg daily beginning 2/7/21     sodium bicarbonate 650 MG tablet  Take 2 tablets (1,300 mg total) by mouth 3 (three) times daily.     sulfamethoxazole-trimethoprim 400-80mg 400-80 mg per tablet  Commonly known as: BACTRIM,SEPTRA  Take 1 tablet by mouth every morning. STOP 5/3/21     tacrolimus 1 MG Cap  Commonly known as: PROGRAF  Take 9 capsules (9 mg total) by mouth every 12 (twelve) hours.     valGANciclovir 450 mg Tab  Commonly known as: VALCYTE  Take 1 tablet (450 mg total) by mouth once daily. STOP 2/1/21        CONTINUE taking these medications    gabapentin 300 MG capsule  Commonly known as: NEURONTIN  Take 300 mg by mouth 2 (two) times daily.     pravastatin 40 MG tablet  Commonly known as: PRAVACHOL  Take 40 mg by mouth once daily.        STOP taking these medications    amLODIPine 10 MG tablet  Commonly known as: NORVASC     atenoloL 50 MG tablet  Commonly known as: TENORMIN     busPIRone 5 MG Tab  Commonly known as: BUSPAR     calcium acetate(phosphat bind) 667 mg capsule  Commonly known as: PHOSLO     fenofibrate 160 MG Tab     furosemide 80 MG tablet  Commonly known as: LASIX     GLIPIZIDE ORAL     haloperidoL 1 MG tablet  Commonly known as: HALDOL     hydrALAZINE 50 MG tablet  Commonly known as: APRESOLINE     LANTUS SOLOSTAR U-100 INSULIN glargine 100 units/mL (3mL) SubQ pen  Generic drug: insulin        ASK your doctor about these medications    ferrous sulfate 325 (65 FE) MG EC  tablet  Take 325 mg by mouth once daily.          Time spent caring for patient (Greater than 1/2 spent in direct face-to-face contact): > 30 minutes    Marisa Peña NP  Kidney Transplant  [facility]

## 2020-11-06 NOTE — CARE UPDATE
BG goal 140-180    [facility]  BG stable. Remains on D5 0.45% NS @ 50 mL/hr continuous infusion. Diet advancing. Diet Adult Regular (IDDSI Level 7) [facility]; Low Potassium. Solumedrol 125 mg and hydrocortisone 20 mg.     Plan:  BG monitoring every 2 hours per primary. Okay from and endocrine standpoint to change to ac/hs.   Monitor for prandial excursions. No correction scale ordered given remains on IVF for BG control.     Discharge planning:tbd     Endocrine to continue to follow    ** Please call Endocrine for any BG related issues **

## 2020-11-06 NOTE — PROGRESS NOTES
Update:    TERESITA met with patient and sister to assess coping. Patient presents as alert and oriented x 4, pleasant, well groomed, recall good, concentration/judgement good, average intelligence, calm, communicative, cooperative and asking and answering questions appropriately. Patient and sister report adequate coping.  Patient reports concern that L LE prosthetic does not fit post-transplant.  Patient and sister confirm having already spoken to rounding KTS team about same.  Patient and caregiver deny having any questions or additional concerns at this time.  TERESITA was also notified by KTS JAMIA Alee Arboleda NP of pt's interest in obtaining a talking BP monitor.  TERESITA confirmed with [facility] and [employer] personnel that talking BP monitors are not sold at either agency.  TERESITA called and left message with [facility] ([phone]) requesting call back to discuss any available resources to assist patient.  Pt will continue to be followed for any continuity of care issues, discharge planning, and emotional support. TERESITA remains available at [phone].   18

## 2020-11-06 NOTE — HOSPITAL COURSE
Patient is now s/p SPK 11/3/20 (Thymo induction, HCV Ab+/YIN+, CMV D+/R+). Surgery without complication. POD#1 kidney/panc US satisfactory. He progressed well post-operatively and transferred to TSU on POD#3.     Interval History: no acute events overnight. Feels well today. Cr and panc enzymes trending down, excellent uop (~3.4L). Discontinue zamudio today. ALESSANDRO x 1 with ss drg. Tolerating diet, denies N/V, advance to regular. Discontinue D10W, monitor BG closely. (+) flatus, continue bowel regimen, encourage ambulation. PT consulted. BP stable, adjusted anti-hypertensives.

## 2020-11-06 NOTE — NURSING TRANSFER
Nursing Transfer Note      11/5/2020     Transfer To: [id number]    Transfer via wheelchair    Transfer with na    Transported by RN    Medicines sent:     Chart send with patient: Yes    Notified: sister    Upon arrival to floor: patient oriented to room, call bell in reach and bed in lowest position

## 2020-11-06 NOTE — ASSESSMENT & PLAN NOTE
- s/p SPK 11/3/20 (Thymo induction, HCV Ab+/YIN +, CMV D+/R+)  - Surgery without complication  - POD#1 panc US satisfactory. Repeat panc US on POD#7 or near discharge.   - Panc enzymes trending down  - Hypoglycemic post-op requiring D10W gtt (d/c'd 11/6)  - Tolerating diet, no N/V, (+) flatus, no BM, continue bowel regimen. PT consulted.   - Continue ASA 81mg

## 2020-11-06 NOTE — CONSULTS
[facility]  Kidney Transplant  Consult Note    IP Consult to Transplant Nephrology (KTM)  Consult performed by: Alee Arboleda DNP  Consult ordered by: Adin Romero Jr., MD        See progress note by Jamal Cruz MD on 11/4/20 at 4:23pm.     Alee Arboleda DNP  Kidney Transplant  [facility]

## 2020-11-07 LAB
ALBUMIN SERPL BCP-MCNC: 2.9 G/DL (ref 3.5–5.2)
AMYLASE SERPL-CCNC: 55 U/L (ref 20–110)
ANION GAP SERPL CALC-SCNC: 6 MMOL/L (ref 8–16)
BASOPHILS # BLD AUTO: 0.01 K/UL (ref 0–0.2)
BASOPHILS NFR BLD: 0.3 % (ref 0–1.9)
BLD PROD TYP BPU: NORMAL
BLD PROD TYP BPU: NORMAL
BLOOD UNIT EXPIRATION DATE: NORMAL
BLOOD UNIT EXPIRATION DATE: NORMAL
BLOOD UNIT TYPE CODE: 5100
BLOOD UNIT TYPE CODE: 5100
BLOOD UNIT TYPE: NORMAL
BLOOD UNIT TYPE: NORMAL
BUN SERPL-MCNC: 33 MG/DL (ref 6–20)
CALCIUM SERPL-MCNC: 8.5 MG/DL (ref 8.7–10.5)
CHLORIDE SERPL-SCNC: 111 MMOL/L (ref 95–110)
CO2 SERPL-SCNC: 21 MMOL/L (ref 23–29)
CODING SYSTEM: NORMAL
CODING SYSTEM: NORMAL
CREAT SERPL-MCNC: 2.3 MG/DL (ref 0.5–1.4)
DIFFERENTIAL METHOD: ABNORMAL
DISPENSE STATUS: NORMAL
DISPENSE STATUS: NORMAL
EOSINOPHIL # BLD AUTO: 0 K/UL (ref 0–0.5)
EOSINOPHIL NFR BLD: 0.3 % (ref 0–8)
ERYTHROCYTE [DISTWIDTH] IN BLOOD BY AUTOMATED COUNT: 16.2 % (ref 11.5–14.5)
EST. GFR  (AFRICAN AMERICAN): 40.1 ML/MIN/1.73 M^2
EST. GFR  (NON AFRICAN AMERICAN): 34.7 ML/MIN/1.73 M^2
GLUCOSE SERPL-MCNC: 85 MG/DL (ref 70–110)
HCT VFR BLD AUTO: 28.1 % (ref 40–54)
HGB BLD-MCNC: 8.6 G/DL (ref 14–18)
IMM GRANULOCYTES # BLD AUTO: 0.01 K/UL (ref 0–0.04)
IMM GRANULOCYTES NFR BLD AUTO: 0.3 % (ref 0–0.5)
LIPASE SERPL-CCNC: 21 U/L (ref 4–60)
LYMPHOCYTES # BLD AUTO: 0.1 K/UL (ref 1–4.8)
LYMPHOCYTES NFR BLD: 2.3 % (ref 18–48)
MAGNESIUM SERPL-MCNC: 1.6 MG/DL (ref 1.6–2.6)
MCH RBC QN AUTO: 26.5 PG (ref 27–31)
MCHC RBC AUTO-ENTMCNC: 30.6 G/DL (ref 32–36)
MCV RBC AUTO: 87 FL (ref 82–98)
MONOCYTES # BLD AUTO: 0.3 K/UL (ref 0.3–1)
MONOCYTES NFR BLD: 6.8 % (ref 4–15)
NEUTROPHILS # BLD AUTO: 3.6 K/UL (ref 1.8–7.7)
NEUTROPHILS NFR BLD: 90 % (ref 38–73)
NRBC BLD-RTO: 0 /100 WBC
NUM UNITS TRANS PACKED RBC: NORMAL
NUM UNITS TRANS PACKED RBC: NORMAL
PHOSPHATE SERPL-MCNC: 2.2 MG/DL (ref 2.7–4.5)
PLATELET # BLD AUTO: 231 K/UL (ref 150–350)
PMV BLD AUTO: 10.4 FL (ref 9.2–12.9)
POCT GLUCOSE: 100 MG/DL (ref 70–110)
POCT GLUCOSE: 86 MG/DL (ref 70–110)
POCT GLUCOSE: 93 MG/DL (ref 70–110)
POCT GLUCOSE: 99 MG/DL (ref 70–110)
POTASSIUM SERPL-SCNC: 4.7 MMOL/L (ref 3.5–5.1)
RBC # BLD AUTO: 3.24 M/UL (ref 4.6–6.2)
SODIUM SERPL-SCNC: 138 MMOL/L (ref 136–145)
TACROLIMUS BLD-MCNC: 2.8 NG/ML (ref 5–15)
WBC # BLD AUTO: 3.99 K/UL (ref 3.9–12.7)

## 2020-11-07 PROCEDURE — 25000003 PHARM REV CODE 250: Performed by: TRANSPLANT SURGERY

## 2020-11-07 PROCEDURE — 25000003 PHARM REV CODE 250: Performed by: STUDENT IN AN ORGANIZED HEALTH CARE EDUCATION/TRAINING PROGRAM

## 2020-11-07 PROCEDURE — 80197 ASSAY OF TACROLIMUS: CPT

## 2020-11-07 PROCEDURE — 25000003 PHARM REV CODE 250: Performed by: PHYSICIAN ASSISTANT

## 2020-11-07 PROCEDURE — 99233 SBSQ HOSP IP/OBS HIGH 50: CPT | Mod: ,,, | Performed by: INTERNAL MEDICINE

## 2020-11-07 PROCEDURE — 83735 ASSAY OF MAGNESIUM: CPT

## 2020-11-07 PROCEDURE — 36415 COLL VENOUS BLD VENIPUNCTURE: CPT

## 2020-11-07 PROCEDURE — 85025 COMPLETE CBC W/AUTO DIFF WBC: CPT

## 2020-11-07 PROCEDURE — 99232 PR SUBSEQUENT HOSPITAL CARE,LEVL II: ICD-10-PCS | Mod: ,,, | Performed by: NURSE PRACTITIONER

## 2020-11-07 PROCEDURE — 99233 PR SUBSEQUENT HOSPITAL CARE,LEVL III: ICD-10-PCS | Mod: ,,, | Performed by: INTERNAL MEDICINE

## 2020-11-07 PROCEDURE — 63600175 PHARM REV CODE 636 W HCPCS: Performed by: STUDENT IN AN ORGANIZED HEALTH CARE EDUCATION/TRAINING PROGRAM

## 2020-11-07 PROCEDURE — 80069 RENAL FUNCTION PANEL: CPT

## 2020-11-07 PROCEDURE — 99232 SBSQ HOSP IP/OBS MODERATE 35: CPT | Mod: ,,, | Performed by: NURSE PRACTITIONER

## 2020-11-07 PROCEDURE — 63600175 PHARM REV CODE 636 W HCPCS: Performed by: INTERNAL MEDICINE

## 2020-11-07 PROCEDURE — 25000003 PHARM REV CODE 250: Performed by: NURSE PRACTITIONER

## 2020-11-07 PROCEDURE — 63600175 PHARM REV CODE 636 W HCPCS: Performed by: PHYSICIAN ASSISTANT

## 2020-11-07 PROCEDURE — 82150 ASSAY OF AMYLASE: CPT

## 2020-11-07 PROCEDURE — 63600175 PHARM REV CODE 636 W HCPCS: Performed by: TRANSPLANT SURGERY

## 2020-11-07 PROCEDURE — 83690 ASSAY OF LIPASE: CPT

## 2020-11-07 PROCEDURE — 20600001 HC STEP DOWN PRIVATE ROOM

## 2020-11-07 RX ORDER — CINACALCET 30 MG/1
30 TABLET, FILM COATED ORAL
Status: DISCONTINUED | OUTPATIENT
Start: 2020-11-07 | End: 2020-11-09 | Stop reason: HOSPADM

## 2020-11-07 RX ORDER — TACROLIMUS 1 MG/1
7 CAPSULE ORAL 2 TIMES DAILY
Status: DISCONTINUED | OUTPATIENT
Start: 2020-11-07 | End: 2020-11-08

## 2020-11-07 RX ADMIN — MYCOPHENOLATE MOFETIL 1000 MG: 250 CAPSULE ORAL at 08:11

## 2020-11-07 RX ADMIN — DIBASIC SODIUM PHOSPHATE, MONOBASIC POTASSIUM PHOSPHATE AND MONOBASIC SODIUM PHOSPHATE 2 TABLET: 852; 155; 130 TABLET ORAL at 02:11

## 2020-11-07 RX ADMIN — CALCITRIOL CAPSULES 0.25 MCG 0.25 MCG: 0.25 CAPSULE ORAL at 08:11

## 2020-11-07 RX ADMIN — MUPIROCIN 1 G: 20 OINTMENT TOPICAL at 08:11

## 2020-11-07 RX ADMIN — MUPIROCIN 1 G: 20 OINTMENT TOPICAL at 07:11

## 2020-11-07 RX ADMIN — DOCUSATE SODIUM 100 MG: 50 CAPSULE, LIQUID FILLED ORAL at 08:11

## 2020-11-07 RX ADMIN — DOCUSATE SODIUM 100 MG: 50 CAPSULE, LIQUID FILLED ORAL at 02:11

## 2020-11-07 RX ADMIN — FLUCONAZOLE 200 MG: 200 TABLET ORAL at 08:11

## 2020-11-07 RX ADMIN — BISACODYL 10 MG: 10 SUPPOSITORY RECTAL at 07:11

## 2020-11-07 RX ADMIN — HEPARIN SODIUM 5000 UNITS: 5000 INJECTION INTRAVENOUS; SUBCUTANEOUS at 07:11

## 2020-11-07 RX ADMIN — METHYLPREDNISOLONE SODIUM SUCCINATE 20 MG: 40 INJECTION, POWDER, FOR SOLUTION INTRAMUSCULAR; INTRAVENOUS at 08:11

## 2020-11-07 RX ADMIN — GABAPENTIN 300 MG: 300 CAPSULE ORAL at 07:11

## 2020-11-07 RX ADMIN — SULFAMETHOXAZOLE AND TRIMETHOPRIM 1 TABLET: 400; 80 TABLET ORAL at 08:11

## 2020-11-07 RX ADMIN — NIFEDIPINE 30 MG: 30 TABLET, FILM COATED, EXTENDED RELEASE ORAL at 08:11

## 2020-11-07 RX ADMIN — METOPROLOL TARTRATE 25 MG: 25 TABLET, FILM COATED ORAL at 08:11

## 2020-11-07 RX ADMIN — DIBASIC SODIUM PHOSPHATE, MONOBASIC POTASSIUM PHOSPHATE AND MONOBASIC SODIUM PHOSPHATE 2 TABLET: 852; 155; 130 TABLET ORAL at 07:11

## 2020-11-07 RX ADMIN — TACROLIMUS 7 MG: 1 CAPSULE ORAL at 05:11

## 2020-11-07 RX ADMIN — TACROLIMUS 5 MG: 1 CAPSULE ORAL at 08:11

## 2020-11-07 RX ADMIN — GABAPENTIN 300 MG: 300 CAPSULE ORAL at 08:11

## 2020-11-07 RX ADMIN — SODIUM BICARBONATE 650 MG TABLET 650 MG: at 08:11

## 2020-11-07 RX ADMIN — HEPARIN SODIUM 5000 UNITS: 5000 INJECTION INTRAVENOUS; SUBCUTANEOUS at 05:11

## 2020-11-07 RX ADMIN — MYCOPHENOLATE MOFETIL 1000 MG: 250 CAPSULE ORAL at 07:11

## 2020-11-07 RX ADMIN — DIBASIC SODIUM PHOSPHATE, MONOBASIC POTASSIUM PHOSPHATE AND MONOBASIC SODIUM PHOSPHATE 2 TABLET: 852; 155; 130 TABLET ORAL at 12:11

## 2020-11-07 RX ADMIN — DOCUSATE SODIUM 100 MG: 50 CAPSULE, LIQUID FILLED ORAL at 07:11

## 2020-11-07 RX ADMIN — CINACALCET 30 MG: 30 TABLET, FILM COATED ORAL at 12:11

## 2020-11-07 RX ADMIN — SODIUM BICARBONATE 650 MG TABLET 650 MG: at 07:11

## 2020-11-07 RX ADMIN — HEPARIN SODIUM 5000 UNITS: 5000 INJECTION INTRAVENOUS; SUBCUTANEOUS at 02:11

## 2020-11-07 RX ADMIN — METOPROLOL TARTRATE 25 MG: 25 TABLET, FILM COATED ORAL at 07:11

## 2020-11-07 RX ADMIN — CLONIDINE HYDROCHLORIDE 0.1 MG: 0.1 TABLET ORAL at 12:11

## 2020-11-07 RX ADMIN — FAMOTIDINE 20 MG: 20 TABLET, FILM COATED ORAL at 07:11

## 2020-11-07 RX ADMIN — ASPIRIN 81 MG CHEWABLE TABLET 81 MG: 81 TABLET CHEWABLE at 08:11

## 2020-11-07 NOTE — SUBJECTIVE & OBJECTIVE
"Interval HPI:   Overnight events: Remains in TSU. NAEON. BG well controlled without insulin. D10 infusion discontinued per primary. PO intake improving. Solumedrol 20 mg; steroids per primary. Creatinine 2.3.   Eatin% - 100%   Nausea: No  Hypoglycemia and intervention: No  Fever: No  TPN and/or TF: No    BP (!) 142/82 (BP Location: Right arm, Patient Position: Standing)   Pulse 84   Temp 98.6 °F (37 °C) (Oral)   Resp 18   Ht 5' 8" (1.727 m)   Wt 80 kg (176 lb 5.9 oz)   SpO2 98%   BMI 26.82 kg/m²     Labs Reviewed and Include    Recent Labs   Lab 20  0533   GLU 85   CALCIUM 8.5*   ALBUMIN 2.9*      K 4.7   CO2 21*   *   BUN 33*   CREATININE 2.3*     Lab Results   Component Value Date    WBC 3.99 2020    HGB 8.6 (L) 2020    HCT 28.1 (L) 2020    MCV 87 2020     2020     No results for input(s): TSH, FREET4 in the last 168 hours.  Lab Results   Component Value Date    HGBA1C 8.2 (H) 2020       Nutritional status:   Body mass index is 26.82 kg/m².  Lab Results   Component Value Date    ALBUMIN 2.9 (L) 2020    ALBUMIN 3.1 (L) 2020    ALBUMIN 3.2 (L) 2020     No results found for: PREALBUMIN    Estimated Creatinine Clearance: 42.1 mL/min (A) (based on SCr of 2.3 mg/dL (H)).    Accu-Checks  Recent Labs     20  0002 20  0204 20  0405 20  0610 20  0719 20  0957 20  1208 20  1656 20  2035 20  0758   POCTGLUCOSE 80 97 83 91 96 72 84 98 109 100       Current Medications and/or Treatments Impacting Glycemic Control  Immunotherapy:    Immunosuppressants         Stop Route Frequency     tacrolimus capsule 5 mg      -- Oral 2 times daily     mycophenolate capsule 1,000 mg      -- Oral 2 times daily        Steroids:   Hormones (From admission, onward)    Start     Stop Route Frequency Ordered    20 0900  methylPREDNISolone sodium succinate injection 20 mg      -- IV Daily " 11/04/20 0145        Pressors:    Autonomic Drugs (From admission, onward)    None        Hyperglycemia/Diabetes Medications:   Antihyperglycemics (From admission, onward)    None

## 2020-11-07 NOTE — ASSESSMENT & PLAN NOTE
Managed per primary.  Related to new pancreas  May require continuous dextrose infusion if BG dose not improve with steroid administration for transplant      started on d10 infusion per primary. BG stabilizing.     Resolved

## 2020-11-07 NOTE — PLAN OF CARE
Pt aao x4. Call bell within reach. He is up with assist. Sister at bedside and attentive to pt's needs. Sister pulling meds correctly. Pt up to chair and has ambulated in room. Pt unable to use urinal at this time. Hat placed in to measure urine. This seems to work better for him. Pt encouraged to drink more orally. He and his sister voiced understanding. Pt passing gas but has yet to have a bm. Pt has 18g in lfa that was placed by anesthesiologist on 11/3. He also has a fistula in zenaida ++. Would like to remove iv in lfa but pt is a difficult stick. Midline team consulted for iv placement in right arm. They were unable to see patient today.  Will continue to monitor.

## 2020-11-07 NOTE — PLAN OF CARE
AAO x 4. VSS, afebrile, SpO2>95% on RA.   BG monitoring AC/HS-no coverage indicated. D5 0.45% NS @ 50 mL/hr continuous. Pt tolerating regular diet.  ML incision MARV with evangelista. R ALESSANDRO with serosanguinous output this shift.   UOP unmeasured.   Fall precautions maintained and pt repositions self.   POC reviewed with pt and sister at bedside.   See flowsheet for assessment findings.   Plan for peripheral thymo today.   Will continue to monitor.   PRIOR AUTHORIZATION DENIED    Medication: Topiramate-PA denied    Denial Date: 12/17/2019    Denial Rational: Not covered for weight loss

## 2020-11-07 NOTE — ASSESSMENT & PLAN NOTE
BG goal 140-180. S/p pancreas transplant     Patient likely with type 2 DM. No PRECIOUS labs noted in chart.   Cpeptide on 6/17/20 was 4. Previously on glipizide but with hypoglycemia. Only on basal insulin prior to admission.      Blood sugar well controlled. Po intake at goal.    No futher insulin/endocrine needs, will sign off.    Thank you for the consult. Please call/ re-consult if needed.

## 2020-11-07 NOTE — PROGRESS NOTES
Consult Requested By: Adin Romero Jr., MD  Reason for Consult: management of immunosuppressive agents, CKD    SUBJECTIVE:   Patient/family  and nurse report  the following issues today:    No complaints today  Able to take PO's yesterday  No nausea vomit or diarrhea      Review of Systems:    Skin: no skin rash  CNS; no headaches, blurred vision, seizure, or syncope  ENT: No JVD,  Adenopathies,  nasal congestion. No oral lesions  Cardiac: No chest pain, dyspnea, claudication, edema or palpitations  Respiratory: No SOB, cough, hemoptysis   Gastro-intestinal: No diarrhea, constipation, abdominal pain, nausea, vomit. No ascitis  Genitourinary: no hematuria, dysuria, frequency, frequency  Musculoskeletal: joint pain, arthritis or vasculitic changes  Psych: alert awake, oriented, No cranial nerves deficit.  Patient Active Problem List   Diagnosis    Blindness of both eyes due to diabetes mellitus    Hyperkalemia    Hyperphosphatemia    Hypertension    Status post below-knee amputation    CKD (chronic kidney disease) stage 4, GFR 15-29 ml/min    CKD (chronic kidney disease) stage 5, GFR less than 15 ml/min    Prophylactic immunotherapy    Hypoglycemia    Adrenal cortical steroids causing adverse effect in therapeutic use    Kidney transplanted    Status post pancreas transplantation    Immunocompromised state    History of simultaneous kidney and pancreas transplant    Long-term use of immunosuppressant medication    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    Secondary hyperparathyroidism    Renovascular hypertension    At risk for opportunistic infections       OBJECTIVE:     Medications:   aspirin  81 mg Oral Daily    bisacodyL  10 mg Rectal QHS    calcitRIOL  0.25 mcg Oral Daily    docusate sodium  100 mg Oral TID    famotidine  20 mg Oral QHS    fluconazole  200 mg Oral Daily    gabapentin  300 mg Oral BID    heparin (porcine)  5,000 Units Subcutaneous Q8H     methylPREDNISolone sodium succinate  20 mg Intravenous Daily    metoprolol tartrate  25 mg Oral BID    mupirocin  1 g Nasal BID    mycophenolate  1,000 mg Oral BID    NIFEdipine  30 mg Oral Daily    [START ON 11/11/2020] nystatin  500,000 Units Mouth/Throat QID    sodium bicarbonate  650 mg Oral BID    sulfamethoxazole-trimethoprim 400-80mg  1 tablet Oral Daily AM    tacrolimus  5 mg Oral BID    [START ON 11/14/2020] valGANciclovir  450 mg Oral Daily      dextrose 5 % and 0.45 % NaCl 50 mL/hr at 11/06/20 1717     Vitals:    11/07/20 0800   BP: (!) 142/82   Pulse: 84   Resp: 18   Temp: 98.6 °F (37 °C)     I/O last 3 completed shifts:  In: 7410 [P.O.:2275; I.V.:3935; IV Piggyback:1200]  Out: 5515 [Urine:4875; Drains:640]    PHYSICAL EXAM:    Head: normocephalic  Neck: No JVD, cervical axillary, or femoral adenopathies  Heart: no murmurs, Normal s1 and s2, No gallops, no rubs, No murmurs  Lungs; CTA, good respiratory effort, no crackles  Abdomen: soft, non tender, no splenomegaly or hepatomegaly, no massess, no bruits  Extremities: No edema, skin rash, joint pain  SNC: awake, alert oriented. Cranial nerves are intact, no focalized, sensitivity and strength preserved    Laboratory:  Recent Labs   Lab 11/06/20  0458 11/06/20  1458 11/07/20  0533   WBC 7.80 4.20 3.99   HGB 9.3* 8.8* 8.6*   HCT 30.6* 28.3* 28.1*    233 231   MONO 6.2  0.5 1.7*  0.1* 6.8  0.3     Recent Labs   Lab 11/05/20  1156 11/06/20  0458 11/07/20  0533    138 138   K 4.6 4.9 4.7    108 111*   CO2 22* 18* 21*   BUN 40* 32* 33*   CREATININE 4.1* 2.8* 2.3*   CALCIUM 8.2* 8.4* 8.5*   PHOS 5.5* 3.6 2.2*       Labs reviewed  Diagnostic Results:  X-Ray: Reviewed  US: Reviewed  Echo: Reviewed      ASSESSMENT/PLAN:     1. Immunosuppression:will adjust prograf dose. Done with thymo infusion. Level still undetectable until yesterday. If level very low will discuss with surgery extra thymo    2. Allograft Function/complications:  good kidney and pancreatic function     3. Electrolyte and fluid balance Abnormalities:Phospjorus IV and oral     4. Essential Hypertension management/Blood pressure control:well controlled No chnages indicated today     5. Anemia associated with kidney disease:h/h stable will monitor in am   6.Proteinuria and CKD management:    7.Active Infections/malignancy complications immunosuppressed host:continue as per protocol        Thank you.    Damien Valladares MD  Transplant Nephrologist  [facility]  [facility].

## 2020-11-07 NOTE — PROGRESS NOTES
"[facility]  Endocrinology  Progress Note    Admit Date: 11/3/2020     Reason for Consult: Management of T2DM, Hyperglycemia     Surgical Procedure and Date: tentative kidney/pancreas transplant    Diabetes diagnosis year: age 15    Home Diabetes Medications:  Lantus 15 units. Previously on glipizide but stopped a few months ago.     Lab Results   Component Value Date    HGBA1C 8.2 (H) 2020         How often checking glucose at home? Does not check often  BG readings on regimen: variable   Hypoglycemia on the regimen?  Yes  Missed doses on regimen?  No    Diabetes Complications include:     Hypoglycemia , Diabetic nephropathy  , Diabetic chronic kidney disease     , Diabetic retinopathy  and Amputation status    Complicating diabetes co morbidities:   CKD, ESRD and Glucocorticoid use       HPI:   Patient is a 40 y.o. male with a diagnosis of ESRD and type 2 DM. Patient admitted for kidney/pancreas transplant. Endocrinology consulted for BG/ DM management.       Interval HPI:   Overnight events: Remains in TSU. NAEON. BG well controlled without insulin. D10 infusion discontinued per primary. PO intake improving. Solumedrol 20 mg; steroids per primary. Creatinine 2.3.   Eatin% - 100%   Nausea: No  Hypoglycemia and intervention: No  Fever: No  TPN and/or TF: No    BP (!) 142/82 (BP Location: Right arm, Patient Position: Standing)   Pulse 84   Temp 98.6 °F (37 °C) (Oral)   Resp 18   Ht 5' 8" (1.727 m)   Wt 80 kg (176 lb 5.9 oz)   SpO2 98%   BMI 26.82 kg/m²     Labs Reviewed and Include    Recent Labs   Lab 20  0533   GLU 85   CALCIUM 8.5*   ALBUMIN 2.9*      K 4.7   CO2 21*   *   BUN 33*   CREATININE 2.3*     Lab Results   Component Value Date    WBC 3.99 2020    HGB 8.6 (L) 2020    HCT 28.1 (L) 2020    MCV 87 2020     2020     No results for input(s): TSH, FREET4 in the last 168 hours.  Lab Results   Component Value Date    " HGBA1C 8.2 (H) 11/03/2020       Nutritional status:   Body mass index is 26.82 kg/m².  Lab Results   Component Value Date    ALBUMIN 2.9 (L) 11/07/2020    ALBUMIN 3.1 (L) 11/06/2020    ALBUMIN 3.2 (L) 11/05/2020     No results found for: PREALBUMIN    Estimated Creatinine Clearance: 42.1 mL/min (A) (based on SCr of 2.3 mg/dL (H)).    Accu-Checks  Recent Labs     11/06/20  0002 11/06/20  0204 11/06/20  0405 11/06/20  0610 11/06/20  0719 11/06/20  0957 11/06/20  1208 11/06/20  1656 11/06/20  2035 11/07/20  0758   POCTGLUCOSE 80 97 83 91 96 72 84 98 109 100       Current Medications and/or Treatments Impacting Glycemic Control  Immunotherapy:    Immunosuppressants         Stop Route Frequency     tacrolimus capsule 5 mg      -- Oral 2 times daily     mycophenolate capsule 1,000 mg      -- Oral 2 times daily        Steroids:   Hormones (From admission, onward)    Start     Stop Route Frequency Ordered    11/07/20 0900  methylPREDNISolone sodium succinate injection 20 mg      -- IV Daily 11/04/20 0145        Pressors:    Autonomic Drugs (From admission, onward)    None        Hyperglycemia/Diabetes Medications:   Antihyperglycemics (From admission, onward)    None          ASSESSMENT and PLAN    * History of simultaneous kidney and pancreas transplant  Optimize BG control      Type 2 diabetes mellitus with chronic kidney disease  BG goal 140-180. S/p pancreas transplant     Patient likely with type 2 DM. No PRECIOUS labs noted in chart.   Cpeptide on 6/17/20 was 4. Previously on glipizide but with hypoglycemia. Only on basal insulin prior to admission.      Blood sugar well controlled. Po intake at goal.    No futher insulin/endocrine needs, will sign off.    Thank you for the consult. Please call/ re-consult if needed.           Hypoglycemia    Managed per primary.  Related to new pancreas  May require continuous dextrose infusion if BG dose not improve with steroid administration for transplant      started on d10 infusion  per primary. BG stabilizing.     Resolved    Prophylactic immunotherapy  May increase insulin resistance.         Blindness of both eyes due to diabetes mellitus  If patient has any issues with BG following transplant would highly benefit from CGM. Patient with blindness and difficulty checking BG.         Iona Cabello NP  Endocrinology  [facility]

## 2020-11-08 LAB
ALBUMIN SERPL BCP-MCNC: 2.8 G/DL (ref 3.5–5.2)
AMYLASE SERPL-CCNC: 97 U/L (ref 20–110)
ANION GAP SERPL CALC-SCNC: 8 MMOL/L (ref 8–16)
ANISOCYTOSIS BLD QL SMEAR: SLIGHT
BASOPHILS # BLD AUTO: 0.01 K/UL (ref 0–0.2)
BASOPHILS NFR BLD: 0.2 % (ref 0–1.9)
BUN SERPL-MCNC: 37 MG/DL (ref 6–20)
CALCIUM SERPL-MCNC: 7.9 MG/DL (ref 8.7–10.5)
CHLORIDE SERPL-SCNC: 112 MMOL/L (ref 95–110)
CO2 SERPL-SCNC: 19 MMOL/L (ref 23–29)
CREAT SERPL-MCNC: 2.2 MG/DL (ref 0.5–1.4)
DIFFERENTIAL METHOD: ABNORMAL
EOSINOPHIL # BLD AUTO: 0 K/UL (ref 0–0.5)
EOSINOPHIL NFR BLD: 0.6 % (ref 0–8)
ERYTHROCYTE [DISTWIDTH] IN BLOOD BY AUTOMATED COUNT: 16.2 % (ref 11.5–14.5)
EST. GFR  (AFRICAN AMERICAN): 42.4 ML/MIN/1.73 M^2
EST. GFR  (NON AFRICAN AMERICAN): 36.6 ML/MIN/1.73 M^2
FERRITIN SERPL-MCNC: 696 NG/ML (ref 20–300)
GLUCOSE SERPL-MCNC: 78 MG/DL (ref 70–110)
HCT VFR BLD AUTO: 29.1 % (ref 40–54)
HGB BLD-MCNC: 8.9 G/DL (ref 14–18)
IMM GRANULOCYTES # BLD AUTO: 0.08 K/UL (ref 0–0.04)
IMM GRANULOCYTES NFR BLD AUTO: 1.7 % (ref 0–0.5)
IRON SERPL-MCNC: 111 UG/DL (ref 45–160)
LIPASE SERPL-CCNC: 50 U/L (ref 4–60)
LYMPHOCYTES # BLD AUTO: 0.2 K/UL (ref 1–4.8)
LYMPHOCYTES NFR BLD: 3.4 % (ref 18–48)
MAGNESIUM SERPL-MCNC: 1.6 MG/DL (ref 1.6–2.6)
MCH RBC QN AUTO: 26.6 PG (ref 27–31)
MCHC RBC AUTO-ENTMCNC: 30.6 G/DL (ref 32–36)
MCV RBC AUTO: 87 FL (ref 82–98)
MONOCYTES # BLD AUTO: 0.5 K/UL (ref 0.3–1)
MONOCYTES NFR BLD: 9.5 % (ref 4–15)
NEUTROPHILS # BLD AUTO: 4 K/UL (ref 1.8–7.7)
NEUTROPHILS NFR BLD: 84.6 % (ref 38–73)
NRBC BLD-RTO: 0 /100 WBC
PHOSPHATE SERPL-MCNC: 2.3 MG/DL (ref 2.7–4.5)
PLATELET # BLD AUTO: 262 K/UL (ref 150–350)
PLATELET BLD QL SMEAR: ABNORMAL
PMV BLD AUTO: 10 FL (ref 9.2–12.9)
POCT GLUCOSE: 138 MG/DL (ref 70–110)
POCT GLUCOSE: 89 MG/DL (ref 70–110)
POTASSIUM SERPL-SCNC: 4.3 MMOL/L (ref 3.5–5.1)
RBC # BLD AUTO: 3.34 M/UL (ref 4.6–6.2)
SATURATED IRON: 50 % (ref 20–50)
SODIUM SERPL-SCNC: 139 MMOL/L (ref 136–145)
TACROLIMUS BLD-MCNC: 4.4 NG/ML (ref 5–15)
TOTAL IRON BINDING CAPACITY: 222 UG/DL (ref 250–450)
TRANSFERRIN SERPL-MCNC: 150 MG/DL (ref 200–375)
WBC # BLD AUTO: 4.76 K/UL (ref 3.9–12.7)

## 2020-11-08 PROCEDURE — 85025 COMPLETE CBC W/AUTO DIFF WBC: CPT

## 2020-11-08 PROCEDURE — 20600001 HC STEP DOWN PRIVATE ROOM

## 2020-11-08 PROCEDURE — 25000003 PHARM REV CODE 250: Performed by: PHYSICIAN ASSISTANT

## 2020-11-08 PROCEDURE — 25000003 PHARM REV CODE 250: Performed by: TRANSPLANT SURGERY

## 2020-11-08 PROCEDURE — 83690 ASSAY OF LIPASE: CPT

## 2020-11-08 PROCEDURE — 83540 ASSAY OF IRON: CPT

## 2020-11-08 PROCEDURE — 80069 RENAL FUNCTION PANEL: CPT

## 2020-11-08 PROCEDURE — 83735 ASSAY OF MAGNESIUM: CPT

## 2020-11-08 PROCEDURE — 80197 ASSAY OF TACROLIMUS: CPT

## 2020-11-08 PROCEDURE — 63600175 PHARM REV CODE 636 W HCPCS: Performed by: TRANSPLANT SURGERY

## 2020-11-08 PROCEDURE — 36415 COLL VENOUS BLD VENIPUNCTURE: CPT

## 2020-11-08 PROCEDURE — 82728 ASSAY OF FERRITIN: CPT

## 2020-11-08 PROCEDURE — 94761 N-INVAS EAR/PLS OXIMETRY MLT: CPT

## 2020-11-08 PROCEDURE — 63600175 PHARM REV CODE 636 W HCPCS: Performed by: PHYSICIAN ASSISTANT

## 2020-11-08 PROCEDURE — 25000003 PHARM REV CODE 250: Performed by: STUDENT IN AN ORGANIZED HEALTH CARE EDUCATION/TRAINING PROGRAM

## 2020-11-08 PROCEDURE — 63600175 PHARM REV CODE 636 W HCPCS: Performed by: STUDENT IN AN ORGANIZED HEALTH CARE EDUCATION/TRAINING PROGRAM

## 2020-11-08 PROCEDURE — 82150 ASSAY OF AMYLASE: CPT

## 2020-11-08 PROCEDURE — 63600175 PHARM REV CODE 636 W HCPCS: Performed by: INTERNAL MEDICINE

## 2020-11-08 PROCEDURE — 25000003 PHARM REV CODE 250: Performed by: NURSE PRACTITIONER

## 2020-11-08 RX ORDER — ACETAMINOPHEN 325 MG/1
650 TABLET ORAL ONCE AS NEEDED
Status: DISCONTINUED | OUTPATIENT
Start: 2020-11-08 | End: 2020-11-09 | Stop reason: HOSPADM

## 2020-11-08 RX ORDER — EPINEPHRINE 1 MG/ML
1 INJECTION, SOLUTION INTRACARDIAC; INTRAMUSCULAR; INTRAVENOUS; SUBCUTANEOUS ONCE AS NEEDED
Status: DISCONTINUED | OUTPATIENT
Start: 2020-11-08 | End: 2020-11-09 | Stop reason: HOSPADM

## 2020-11-08 RX ORDER — DIPHENHYDRAMINE HCL 50 MG
50 CAPSULE ORAL ONCE AS NEEDED
Status: DISCONTINUED | OUTPATIENT
Start: 2020-11-08 | End: 2020-11-09 | Stop reason: HOSPADM

## 2020-11-08 RX ORDER — TACROLIMUS 1 MG/1
8 CAPSULE ORAL 2 TIMES DAILY
Status: DISCONTINUED | OUTPATIENT
Start: 2020-11-08 | End: 2020-11-09

## 2020-11-08 RX ORDER — SODIUM BICARBONATE 650 MG/1
1300 TABLET ORAL 2 TIMES DAILY
Status: DISCONTINUED | OUTPATIENT
Start: 2020-11-08 | End: 2020-11-09

## 2020-11-08 RX ORDER — DIPHENHYDRAMINE HCL 25 MG
25 CAPSULE ORAL ONCE
Status: COMPLETED | OUTPATIENT
Start: 2020-11-08 | End: 2020-11-08

## 2020-11-08 RX ORDER — ACETAMINOPHEN 325 MG/1
650 TABLET ORAL ONCE
Status: COMPLETED | OUTPATIENT
Start: 2020-11-08 | End: 2020-11-08

## 2020-11-08 RX ADMIN — MUPIROCIN 1 G: 20 OINTMENT TOPICAL at 09:11

## 2020-11-08 RX ADMIN — SODIUM BICARBONATE 650 MG TABLET 1300 MG: at 08:11

## 2020-11-08 RX ADMIN — DOCUSATE SODIUM 100 MG: 50 CAPSULE, LIQUID FILLED ORAL at 08:11

## 2020-11-08 RX ADMIN — MUPIROCIN 1 G: 20 OINTMENT TOPICAL at 08:11

## 2020-11-08 RX ADMIN — OXYCODONE HYDROCHLORIDE 10 MG: 10 TABLET ORAL at 05:11

## 2020-11-08 RX ADMIN — DOCUSATE SODIUM 100 MG: 50 CAPSULE, LIQUID FILLED ORAL at 02:11

## 2020-11-08 RX ADMIN — ANTI-THYMOCYTE GLOBULIN (RABBIT) 125 MG: 5 INJECTION, POWDER, LYOPHILIZED, FOR SOLUTION INTRAVENOUS at 03:11

## 2020-11-08 RX ADMIN — METHYLPREDNISOLONE SODIUM SUCCINATE 20 MG: 40 INJECTION, POWDER, FOR SOLUTION INTRAMUSCULAR; INTRAVENOUS at 09:11

## 2020-11-08 RX ADMIN — TACROLIMUS 8 MG: 1 CAPSULE ORAL at 05:11

## 2020-11-08 RX ADMIN — FAMOTIDINE 20 MG: 20 TABLET, FILM COATED ORAL at 08:11

## 2020-11-08 RX ADMIN — GABAPENTIN 300 MG: 300 CAPSULE ORAL at 08:11

## 2020-11-08 RX ADMIN — SODIUM BICARBONATE 650 MG TABLET 650 MG: at 09:11

## 2020-11-08 RX ADMIN — TACROLIMUS 7 MG: 1 CAPSULE ORAL at 09:11

## 2020-11-08 RX ADMIN — OXYCODONE HYDROCHLORIDE 10 MG: 10 TABLET ORAL at 08:11

## 2020-11-08 RX ADMIN — METOPROLOL TARTRATE 25 MG: 25 TABLET, FILM COATED ORAL at 08:11

## 2020-11-08 RX ADMIN — CINACALCET 30 MG: 30 TABLET, FILM COATED ORAL at 09:11

## 2020-11-08 RX ADMIN — DIPHENHYDRAMINE HYDROCHLORIDE 25 MG: 25 CAPSULE ORAL at 02:11

## 2020-11-08 RX ADMIN — DIBASIC SODIUM PHOSPHATE, MONOBASIC POTASSIUM PHOSPHATE AND MONOBASIC SODIUM PHOSPHATE 3 TABLET: 852; 155; 130 TABLET ORAL at 08:11

## 2020-11-08 RX ADMIN — MYCOPHENOLATE MOFETIL 1000 MG: 250 CAPSULE ORAL at 09:11

## 2020-11-08 RX ADMIN — BISACODYL 10 MG: 10 SUPPOSITORY RECTAL at 08:11

## 2020-11-08 RX ADMIN — FLUCONAZOLE 200 MG: 200 TABLET ORAL at 09:11

## 2020-11-08 RX ADMIN — ACETAMINOPHEN 650 MG: 325 TABLET ORAL at 02:11

## 2020-11-08 RX ADMIN — HEPARIN SODIUM 5000 UNITS: 5000 INJECTION INTRAVENOUS; SUBCUTANEOUS at 05:11

## 2020-11-08 RX ADMIN — DIBASIC SODIUM PHOSPHATE, MONOBASIC POTASSIUM PHOSPHATE AND MONOBASIC SODIUM PHOSPHATE 2 TABLET: 852; 155; 130 TABLET ORAL at 09:11

## 2020-11-08 RX ADMIN — HEPARIN SODIUM 5000 UNITS: 5000 INJECTION INTRAVENOUS; SUBCUTANEOUS at 02:11

## 2020-11-08 RX ADMIN — DIBASIC SODIUM PHOSPHATE, MONOBASIC POTASSIUM PHOSPHATE AND MONOBASIC SODIUM PHOSPHATE 3 TABLET: 852; 155; 130 TABLET ORAL at 02:11

## 2020-11-08 RX ADMIN — MYCOPHENOLATE MOFETIL 1000 MG: 250 CAPSULE ORAL at 08:11

## 2020-11-08 RX ADMIN — CALCITRIOL CAPSULES 0.25 MCG 0.25 MCG: 0.25 CAPSULE ORAL at 09:11

## 2020-11-08 RX ADMIN — HEPARIN SODIUM 5000 UNITS: 5000 INJECTION INTRAVENOUS; SUBCUTANEOUS at 08:11

## 2020-11-08 RX ADMIN — SULFAMETHOXAZOLE AND TRIMETHOPRIM 1 TABLET: 400; 80 TABLET ORAL at 09:11

## 2020-11-08 RX ADMIN — DOCUSATE SODIUM 100 MG: 50 CAPSULE, LIQUID FILLED ORAL at 09:11

## 2020-11-08 RX ADMIN — METOPROLOL TARTRATE 25 MG: 25 TABLET, FILM COATED ORAL at 09:11

## 2020-11-08 RX ADMIN — ASPIRIN 81 MG CHEWABLE TABLET 81 MG: 81 TABLET CHEWABLE at 09:11

## 2020-11-08 RX ADMIN — GABAPENTIN 300 MG: 300 CAPSULE ORAL at 09:11

## 2020-11-08 NOTE — PROCEDURES
RLQ Drain removed:  Site cleaned with alcohol, lidocaine 1% used to numb area to place prolene 3.0 suture to site.  Drain intact at time of removal.  Patient tolerated procedure well.  Will continue to monitor for any complications.

## 2020-11-08 NOTE — PROGRESS NOTES
Consult Requested By: Adin Romero Jr., MD  Reason for Consult: management of immunosuppressive agents, CKD    SUBJECTIVE:   Patient/family  and nurse report  the following issues today:    He feels well  I spoke with patient and care giver  tolerating PO'  No issues to report to me today      Review of Systems:    Skin: no skin rash  CNS; no headaches, blurred vision, seizure, or syncope  ENT: No JVD,  Adenopathies,  nasal congestion. No oral lesions  Cardiac: No chest pain, dyspnea, claudication, edema or palpitations  Respiratory: No SOB, cough, hemoptysis   Gastro-intestinal: No diarrhea, constipation, abdominal pain, nausea, vomit. No ascitis  Genitourinary: no hematuria, dysuria, frequency, frequency  Musculoskeletal: joint pain, arthritis or vasculitic changes  Psych: alert awake, oriented, No cranial nerves deficit.  Patient Active Problem List   Diagnosis    Blindness of both eyes due to diabetes mellitus    Type 2 diabetes mellitus with chronic kidney disease    Hyperkalemia    Hyperphosphatemia    Hypertension    Status post below-knee amputation    CKD (chronic kidney disease) stage 4, GFR 15-29 ml/min    CKD (chronic kidney disease) stage 5, GFR less than 15 ml/min    Prophylactic immunotherapy    Hypoglycemia    Adrenal cortical steroids causing adverse effect in therapeutic use    Kidney transplanted    Status post pancreas transplantation    Immunocompromised state    History of simultaneous kidney and pancreas transplant    Long-term use of immunosuppressant medication    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    Secondary hyperparathyroidism    Renovascular hypertension    At risk for opportunistic infections       OBJECTIVE:     Medications:   aspirin  81 mg Oral Daily    bisacodyL  10 mg Rectal QHS    calcitRIOL  0.25 mcg Oral Daily    cinacalcet  30 mg Oral Daily with breakfast    docusate sodium  100 mg Oral TID    famotidine  20 mg Oral QHS     fluconazole  200 mg Oral Daily    gabapentin  300 mg Oral BID    heparin (porcine)  5,000 Units Subcutaneous Q8H    k phos di & mono-sod phos mono  500 mg Oral TID    methylPREDNISolone sodium succinate  20 mg Intravenous Daily    metoprolol tartrate  25 mg Oral BID    mupirocin  1 g Nasal BID    mycophenolate  1,000 mg Oral BID    NIFEdipine  30 mg Oral Daily    [START ON 11/11/2020] nystatin  500,000 Units Mouth/Throat QID    sodium bicarbonate  650 mg Oral BID    sulfamethoxazole-trimethoprim 400-80mg  1 tablet Oral Daily AM    tacrolimus  7 mg Oral BID    [START ON 11/14/2020] valGANciclovir  450 mg Oral Daily       Vitals:    11/08/20 0508   BP:    Pulse:    Resp: 15   Temp:      I/O last 3 completed shifts:  In: 4205 [P.O.:3455; I.V.:750]  Out: 4410 [Urine:3800; Drains:610]    PHYSICAL EXAM:    Head: normocephalic  Neck: No JVD, cervical axillary, or femoral adenopathies  Heart: no murmurs, Normal s1 and s2, No gallops, no rubs, No murmurs  Lungs; CTA, good respiratory effort, no crackles  Abdomen: soft, non tender, no splenomegaly or hepatomegaly, no massess, no bruits  Extremities: No edema, skin rash, joint pain  SNC: awake, alert oriented. Cranial nerves are intact, no focalized, sensitivity and strength preserved    Laboratory:  Recent Labs   Lab 11/06/20  1458 11/07/20  0533 11/08/20  0624   WBC 4.20 3.99 4.76   HGB 8.8* 8.6* 8.9*   HCT 28.3* 28.1* 29.1*    231 262   MONO 1.7*  0.1* 6.8  0.3  --      Recent Labs   Lab 11/06/20  0458 11/07/20  0533 11/08/20  0624    138 139   K 4.9 4.7 4.3    111* 112*   CO2 18* 21* 19*   BUN 32* 33* 37*   CREATININE 2.8* 2.3* 2.2*   CALCIUM 8.4* 8.5* 7.9*   PHOS 3.6 2.2* 2.3*       Labs reviewed  Diagnostic Results:  X-Ray: Reviewed  US: Reviewed  Echo: Reviewed      ASSESSMENT/PLAN:       Will discuss with surgery Thymo dose today  Will discuss bolus fo IV crystalloids LR or saline     1. Immunosuppression:prograf dose to be adjusted  when level resulted    2. Allograft Function/complications: creatinine remains unchanged today and pancreatic enzymes trending up. Will discuss Thymo today with surgery.    3. Electrolyte and fluid balance Abnormalities:acidosis and hypophosphatemia, will get replacements     4. Essential Hypertension management/Blood pressure control:    5. Anemia associated with kidney disease:h/h stable. Will add iron stores to blood work     6.Proteinuria and CKD management:no change with creatinine. Will expand volume with crystaloids    7.Active Infections/malignancy complications immunosuppressed host:as per KP protocol.         Thank you.    Damien Valladares MD  Transplant Nephrologist  [facility]  [facility].

## 2020-11-08 NOTE — PLAN OF CARE
AAO x 4. VSS, afebrile, SpO2>95% on RA.   BG monitoring AC/HS-no coverage indicated.  ML incision MARV with evangelista. R ALESSANDRO with serosanguinous output this shift.    mL clear yellow so far this shift.  Fall precautions maintained and pt repositions self.   POC reviewed with pt and sister at bedside.   See flowsheet for assessment findings.   Plan for possible additional dose peripheral thymo pending prograf level on AM labs.  Will continue to monitor.

## 2020-11-08 NOTE — PROGRESS NOTES
Mr Rosales is a 39 yo M, s/p SPK 11/3/20 (POD 5) (Thymo induction, HCV Ab+/YIN+, CMV D+/R+). He feels better today. No acute event. Has not BM, but has gas passing. Right arm swelling decreased since yesterday. BG has been stable. Cr is trending down. No Sin. ALESSANDRO contains light serosang fluid.   UOP 3.8L/d, drain 380 ml/d, Hb 8.9 (stable), Cr 2.2 (trending down), glu 78, [place] 2.8.

## 2020-11-09 VITALS
BODY MASS INDEX: 28.26 KG/M2 | RESPIRATION RATE: 18 BRPM | DIASTOLIC BLOOD PRESSURE: 70 MMHG | OXYGEN SATURATION: 100 % | TEMPERATURE: 98 F | WEIGHT: 186.5 LBS | HEART RATE: 86 BPM | SYSTOLIC BLOOD PRESSURE: 124 MMHG | HEIGHT: 68 IN

## 2020-11-09 DIAGNOSIS — T86.10 COMPLICATION OF TRANSPLANTED KIDNEY, UNSPECIFIED COMPLICATION: ICD-10-CM

## 2020-11-09 DIAGNOSIS — N18.9 ANEMIA OF RENAL DISEASE: ICD-10-CM

## 2020-11-09 DIAGNOSIS — E83.52 HYPERCALCEMIA: ICD-10-CM

## 2020-11-09 DIAGNOSIS — D63.1 ANEMIA OF RENAL DISEASE: ICD-10-CM

## 2020-11-09 DIAGNOSIS — Z94.0 KIDNEY REPLACED BY TRANSPLANT: Primary | ICD-10-CM

## 2020-11-09 DIAGNOSIS — Z57.8 EMPLOYEE EXPOSURE TO BLOOD: ICD-10-CM

## 2020-11-09 DIAGNOSIS — Z94.83 STATUS POST PANCREAS TRANSPLANTATION: ICD-10-CM

## 2020-11-09 DIAGNOSIS — Z79.60 LONG-TERM USE OF IMMUNOSUPPRESSANT MEDICATION: ICD-10-CM

## 2020-11-09 LAB
ALBUMIN SERPL BCP-MCNC: 2.8 G/DL (ref 3.5–5.2)
AMYLASE SERPL-CCNC: 84 U/L (ref 20–110)
ANION GAP SERPL CALC-SCNC: 10 MMOL/L (ref 8–16)
BASOPHILS # BLD AUTO: 0.01 K/UL (ref 0–0.2)
BASOPHILS NFR BLD: 0.3 % (ref 0–1.9)
BUN SERPL-MCNC: 35 MG/DL (ref 6–20)
CALCIUM SERPL-MCNC: 7.8 MG/DL (ref 8.7–10.5)
CHLORIDE SERPL-SCNC: 112 MMOL/L (ref 95–110)
CO2 SERPL-SCNC: 19 MMOL/L (ref 23–29)
CREAT SERPL-MCNC: 2 MG/DL (ref 0.5–1.4)
DIFFERENTIAL METHOD: ABNORMAL
EOSINOPHIL # BLD AUTO: 0 K/UL (ref 0–0.5)
EOSINOPHIL NFR BLD: 0.5 % (ref 0–8)
ERYTHROCYTE [DISTWIDTH] IN BLOOD BY AUTOMATED COUNT: 15.9 % (ref 11.5–14.5)
EST. GFR  (AFRICAN AMERICAN): 47.5 ML/MIN/1.73 M^2
EST. GFR  (NON AFRICAN AMERICAN): 41.1 ML/MIN/1.73 M^2
FOLATE SERPL-MCNC: 4.8 NG/ML (ref 4–24)
GLUCOSE SERPL-MCNC: 78 MG/DL (ref 70–110)
HCT VFR BLD AUTO: 27.8 % (ref 40–54)
HGB BLD-MCNC: 8.6 G/DL (ref 14–18)
IMM GRANULOCYTES # BLD AUTO: 0.08 K/UL (ref 0–0.04)
IMM GRANULOCYTES NFR BLD AUTO: 2.1 % (ref 0–0.5)
LIPASE SERPL-CCNC: 35 U/L (ref 4–60)
LYMPHOCYTES # BLD AUTO: 0.1 K/UL (ref 1–4.8)
LYMPHOCYTES NFR BLD: 2.1 % (ref 18–48)
MAGNESIUM SERPL-MCNC: 1.5 MG/DL (ref 1.6–2.6)
MCH RBC QN AUTO: 26.4 PG (ref 27–31)
MCHC RBC AUTO-ENTMCNC: 30.9 G/DL (ref 32–36)
MCV RBC AUTO: 85 FL (ref 82–98)
MONOCYTES # BLD AUTO: 0.3 K/UL (ref 0.3–1)
MONOCYTES NFR BLD: 7.2 % (ref 4–15)
NEUTROPHILS # BLD AUTO: 3.3 K/UL (ref 1.8–7.7)
NEUTROPHILS NFR BLD: 87.8 % (ref 38–73)
NRBC BLD-RTO: 0 /100 WBC
PHOSPHATE SERPL-MCNC: 3.1 MG/DL (ref 2.7–4.5)
PLATELET # BLD AUTO: 268 K/UL (ref 150–350)
PMV BLD AUTO: 9.6 FL (ref 9.2–12.9)
POCT GLUCOSE: 119 MG/DL (ref 70–110)
POCT GLUCOSE: 99 MG/DL (ref 70–110)
POTASSIUM SERPL-SCNC: 4.3 MMOL/L (ref 3.5–5.1)
RBC # BLD AUTO: 3.26 M/UL (ref 4.6–6.2)
SODIUM SERPL-SCNC: 141 MMOL/L (ref 136–145)
TACROLIMUS BLD-MCNC: 4.5 NG/ML (ref 5–15)
VIT B12 SERPL-MCNC: 600 PG/ML (ref 210–950)
WBC # BLD AUTO: 3.74 K/UL (ref 3.9–12.7)

## 2020-11-09 PROCEDURE — 63600175 PHARM REV CODE 636 W HCPCS: Performed by: PHYSICIAN ASSISTANT

## 2020-11-09 PROCEDURE — 63600175 PHARM REV CODE 636 W HCPCS: Performed by: NURSE PRACTITIONER

## 2020-11-09 PROCEDURE — 82746 ASSAY OF FOLIC ACID SERUM: CPT

## 2020-11-09 PROCEDURE — 99239 HOSP IP/OBS DSCHRG MGMT >30: CPT | Mod: 24,,, | Performed by: NURSE PRACTITIONER

## 2020-11-09 PROCEDURE — 25000003 PHARM REV CODE 250: Performed by: NURSE PRACTITIONER

## 2020-11-09 PROCEDURE — 83690 ASSAY OF LIPASE: CPT

## 2020-11-09 PROCEDURE — 25000003 PHARM REV CODE 250: Performed by: STUDENT IN AN ORGANIZED HEALTH CARE EDUCATION/TRAINING PROGRAM

## 2020-11-09 PROCEDURE — 82607 VITAMIN B-12: CPT

## 2020-11-09 PROCEDURE — 97116 GAIT TRAINING THERAPY: CPT | Mod: CQ

## 2020-11-09 PROCEDURE — 36415 COLL VENOUS BLD VENIPUNCTURE: CPT

## 2020-11-09 PROCEDURE — 63600175 PHARM REV CODE 636 W HCPCS: Performed by: TRANSPLANT SURGERY

## 2020-11-09 PROCEDURE — 80069 RENAL FUNCTION PANEL: CPT

## 2020-11-09 PROCEDURE — 83735 ASSAY OF MAGNESIUM: CPT

## 2020-11-09 PROCEDURE — 85025 COMPLETE CBC W/AUTO DIFF WBC: CPT

## 2020-11-09 PROCEDURE — 99239 PR HOSPITAL DISCHARGE DAY,>30 MIN: ICD-10-PCS | Mod: 24,,, | Performed by: NURSE PRACTITIONER

## 2020-11-09 PROCEDURE — 82150 ASSAY OF AMYLASE: CPT

## 2020-11-09 PROCEDURE — 25000003 PHARM REV CODE 250: Performed by: PHYSICIAN ASSISTANT

## 2020-11-09 PROCEDURE — 97530 THERAPEUTIC ACTIVITIES: CPT | Mod: CQ

## 2020-11-09 PROCEDURE — 80197 ASSAY OF TACROLIMUS: CPT

## 2020-11-09 PROCEDURE — 63600175 PHARM REV CODE 636 W HCPCS: Performed by: STUDENT IN AN ORGANIZED HEALTH CARE EDUCATION/TRAINING PROGRAM

## 2020-11-09 PROCEDURE — 25000003 PHARM REV CODE 250: Performed by: TRANSPLANT SURGERY

## 2020-11-09 RX ORDER — SODIUM BICARBONATE 650 MG/1
1300 TABLET ORAL 3 TIMES DAILY
Status: DISCONTINUED | OUTPATIENT
Start: 2020-11-09 | End: 2020-11-09 | Stop reason: HOSPADM

## 2020-11-09 RX ORDER — CINACALCET 30 MG/1
30 TABLET, FILM COATED ORAL
Qty: 30 TABLET | Refills: 11 | Status: ON HOLD | OUTPATIENT
Start: 2020-11-10 | End: 2020-11-19 | Stop reason: HOSPADM

## 2020-11-09 RX ORDER — MAGNESIUM SULFATE HEPTAHYDRATE 40 MG/ML
2 INJECTION, SOLUTION INTRAVENOUS ONCE
Status: COMPLETED | OUTPATIENT
Start: 2020-11-09 | End: 2020-11-09

## 2020-11-09 RX ORDER — TACROLIMUS 1 MG/1
9 CAPSULE ORAL EVERY 12 HOURS
Qty: 540 CAPSULE | Refills: 11 | Status: ON HOLD | OUTPATIENT
Start: 2020-11-09 | End: 2020-11-19 | Stop reason: SDUPTHER

## 2020-11-09 RX ORDER — CALCITRIOL 0.25 UG/1
0.25 CAPSULE ORAL DAILY
Status: CANCELLED | OUTPATIENT
Start: 2020-11-09

## 2020-11-09 RX ORDER — FLUCONAZOLE 200 MG/1
200 TABLET ORAL DAILY
Qty: 1 TABLET | Refills: 0 | Status: ON HOLD | OUTPATIENT
Start: 2020-11-10 | End: 2020-11-19 | Stop reason: HOSPADM

## 2020-11-09 RX ORDER — SODIUM BICARBONATE 650 MG/1
1300 TABLET ORAL 3 TIMES DAILY
Qty: 180 TABLET | Refills: 11 | Status: ON HOLD | OUTPATIENT
Start: 2020-11-09 | End: 2020-11-19 | Stop reason: SDUPTHER

## 2020-11-09 RX ORDER — TACROLIMUS 1 MG/1
9 CAPSULE ORAL 2 TIMES DAILY
Status: DISCONTINUED | OUTPATIENT
Start: 2020-11-09 | End: 2020-11-09 | Stop reason: HOSPADM

## 2020-11-09 RX ADMIN — SULFAMETHOXAZOLE AND TRIMETHOPRIM 1 TABLET: 400; 80 TABLET ORAL at 08:11

## 2020-11-09 RX ADMIN — METHYLPREDNISOLONE SODIUM SUCCINATE 20 MG: 40 INJECTION, POWDER, FOR SOLUTION INTRAMUSCULAR; INTRAVENOUS at 08:11

## 2020-11-09 RX ADMIN — DIBASIC SODIUM PHOSPHATE, MONOBASIC POTASSIUM PHOSPHATE AND MONOBASIC SODIUM PHOSPHATE 3 TABLET: 852; 155; 130 TABLET ORAL at 08:11

## 2020-11-09 RX ADMIN — TACROLIMUS 8 MG: 1 CAPSULE ORAL at 08:11

## 2020-11-09 RX ADMIN — ASPIRIN 81 MG CHEWABLE TABLET 81 MG: 81 TABLET CHEWABLE at 08:11

## 2020-11-09 RX ADMIN — NIFEDIPINE 30 MG: 30 TABLET, FILM COATED, EXTENDED RELEASE ORAL at 08:11

## 2020-11-09 RX ADMIN — GABAPENTIN 300 MG: 300 CAPSULE ORAL at 08:11

## 2020-11-09 RX ADMIN — CINACALCET 30 MG: 30 TABLET, FILM COATED ORAL at 08:11

## 2020-11-09 RX ADMIN — MAGNESIUM SULFATE IN WATER 2 G: 40 INJECTION, SOLUTION INTRAVENOUS at 12:11

## 2020-11-09 RX ADMIN — CALCITRIOL CAPSULES 0.25 MCG 0.25 MCG: 0.25 CAPSULE ORAL at 08:11

## 2020-11-09 RX ADMIN — SODIUM BICARBONATE 650 MG TABLET 1300 MG: at 08:11

## 2020-11-09 RX ADMIN — DIBASIC SODIUM PHOSPHATE, MONOBASIC POTASSIUM PHOSPHATE AND MONOBASIC SODIUM PHOSPHATE 3 TABLET: 852; 155; 130 TABLET ORAL at 02:11

## 2020-11-09 RX ADMIN — SODIUM BICARBONATE 650 MG TABLET 1300 MG: at 02:11

## 2020-11-09 RX ADMIN — ERYTHROPOIETIN 20000 UNITS: 20000 INJECTION, SOLUTION INTRAVENOUS; SUBCUTANEOUS at 03:11

## 2020-11-09 RX ADMIN — FLUCONAZOLE 200 MG: 200 TABLET ORAL at 08:11

## 2020-11-09 RX ADMIN — MYCOPHENOLATE MOFETIL 1000 MG: 250 CAPSULE ORAL at 08:11

## 2020-11-09 RX ADMIN — HEPARIN SODIUM 5000 UNITS: 5000 INJECTION INTRAVENOUS; SUBCUTANEOUS at 05:11

## 2020-11-09 RX ADMIN — DOCUSATE SODIUM 100 MG: 50 CAPSULE, LIQUID FILLED ORAL at 08:11

## 2020-11-09 RX ADMIN — TACROLIMUS 9 MG: 1 CAPSULE ORAL at 05:11

## 2020-11-09 RX ADMIN — METOPROLOL TARTRATE 25 MG: 25 TABLET, FILM COATED ORAL at 08:11

## 2020-11-09 RX ADMIN — HEPARIN SODIUM 5000 UNITS: 5000 INJECTION INTRAVENOUS; SUBCUTANEOUS at 02:11

## 2020-11-09 SDOH — SOCIAL DETERMINANTS OF HEALTH (SDOH): OCCUPATIONAL EXPOSURE TO OTHER RISK FACTORS: Z57.8

## 2020-11-09 NOTE — PLAN OF CARE
Problem: Physical Therapy Goal  Goal: Physical Therapy Goal  Description: Goals to be met by: 12/3/20    Patient will increase functional independence with mobility by performin. Supine to sit with Stand-by Assistance-met  2. Sit to stand transfer with Contact Guard Assistance -met  3. Gait  x 250 feet with Stand-by Assistance using blind cane and LLE BK prosthesis- not met  4. Pt min assist don/doff BK prosthesis - not met    Outcome: Ongoing, Progressing

## 2020-11-09 NOTE — PROGRESS NOTES
Met with patient and his sister to review discharge plan. Scheduled for lab and to meet with his transplant coordinator and pharmacist tomorrow. All questions were answered and patient verbalized understanding.

## 2020-11-09 NOTE — PROGRESS NOTES
DISCHARGE NOTE:    Hernandez Rosales is a 40 y.o. male s/p LEFT KIDNEY   Donation after Brain Death   transplant on 11/3/2020 (Kidney / Pancreas) for ESRD secondary to Diabetes Mellitus - Type I.      Past Medical History:   Diagnosis Date    Anxiety     Cataract     Diabetes mellitus     Diabetic retinopathy     Disorder of kidney and ureter     Encounter for blood transfusion     Glaucoma     High cholesterol     Hypertension     Retinal detachment        Hospital Course: 6 Days  - Patient received transplant with no complications  - Received 4 doses of thymo  - Patient was initially hypoglycemic after transplant requiring a D10 drip. Hypoglycemia resolved after D10.  - Making good UOP and Scr downtrending.  - Amylase and lipase were trending down. Slight up-trend on 11/8. Blood glucose has remained low since transplant    Allergies: Review of patient's allergies indicates:  No Known Allergies    [facility]acy: [facility] local    Discharge Medications:   Hernandez Rosales   Home Medication Instructions VIVIANA:[id number]    Printed on:11/09/20 0499   Medication Information                      aspirin (ECOTRIN) 81 MG EC tablet  Take 1 tablet (81 mg total) by mouth once daily.             calcitRIOL (ROCALTROL) 0.25 MCG Cap  Take 1 capsule (0.25 mcg total) by mouth once daily.             cinacalcet (SENSIPAR) 30 MG Tab  Take 1 tablet (30 mg total) by mouth daily with breakfast.             docusate sodium (COLACE) 100 MG capsule  Take 1 capsule (100 mg total) by mouth 3 (three) times daily as needed for Constipation.             ergocalciferol (ERGOCALCIFEROL) 50,000 unit Cap  Take 1 capsule (50,000 Units total) by mouth every 7 days.             famotidine (PEPCID) 20 MG tablet  Take 1 tablet (20 mg total) by mouth every evening.             ferrous sulfate 325 (65 FE) MG EC tablet  Take 325 mg by mouth once daily.             fluconazole (DIFLUCAN) 200 MG Tab  Take 1 tablet (200 mg total) by  mouth once daily. for 1 day             gabapentin (NEURONTIN) 300 MG capsule  Take 300 mg by mouth 2 (two) times daily.              k phos di & mono-sod phos mono (K-PHOS-NEUTRAL) 250 mg Tab  Take 3 tablets by mouth 3 (three) times daily.             metoprolol tartrate (LOPRESSOR) 25 MG tablet  Take 1 tablet (25 mg total) by mouth 2 (two) times daily.             mycophenolate (CELLCEPT) 250 mg Cap  Take 4 capsules (1,000 mg total) by mouth 2 (two) times daily.             NIFEdipine (PROCARDIA-XL) 30 MG (OSM) 24 hr tablet  Take 1 tablet (30 mg total) by mouth once daily.             nystatin (MYCOSTATIN) 100,000 unit/mL suspension  Take 5 mLs (500,000 Units total) by mouth 3 (three) times daily after meals. STOP 12/7/20             oxyCODONE-acetaminophen (PERCOCET)  mg per tablet  Take 1 tablet by mouth every 4 (four) hours as needed for Pain.             pravastatin (PRAVACHOL) 40 MG tablet  Take 40 mg by mouth once daily.             predniSONE (DELTASONE) 5 MG tablet  Take by mouth daily: 20mg 11/7-12/6, 15mg 12/7-1/6/21, 10mg 1/7-2/6, then 5mg daily beginning 2/7/21             sodium bicarbonate 650 MG tablet  Take 2 tablets (1,300 mg total) by mouth 3 (three) times daily.             sulfamethoxazole-trimethoprim 400-80mg (BACTRIM,SEPTRA) 400-80 mg per tablet  Take 1 tablet by mouth every morning. STOP 5/3/21             tacrolimus (PROGRAF) 1 MG Cap  Take 9 capsules (9 mg total) by mouth every 12 (twelve) hours.             valGANciclovir (VALCYTE) 450 mg Tab  Take 1 tablet (450 mg total) by mouth once daily. STOP 2/1/21                 Pharmacy Interventions/Recommendations:  1) Transplant Immunosuppression: Induction thymo, and maintenance Prograf, mycophenolate, and prednisone    2) Opportunistic Infection prophylaxis: Bactrim, nystatin (has one last dose of fluconazole tomorrow before starting the nystatin on 11/11), Valcyte x 3 months (CMV +/+)    3) Osteoporosis Prevention measures (liver  txp): N/A    4) Patient Counseling/Education:  Demonstrated the use of the BP cuff, thermometer.    5) Follow-Up/Discharge Needs:    - Patient is visually impaired, and he and his sister were asking about a talking BP cuff. Explained that there is a talking cuff available on [employer] for about $25.   - Calcitriol and cinacalcet required PAs.  - Patient to finish fluconazole 11/10. Will likely need ketoconazole long-term for tacrolimus boosting.  - Patient received organs from DBD donor who was HCV YIN +    6) Patient Assistance Information: N/A    7) The following medications have been placed on HOLD and should be restarted in the outpatient setting (when appropriate): None    Hernandez and his caregiver verbalized their understanding and had the opportunity to ask questions.

## 2020-11-09 NOTE — PT/OT/SLP PROGRESS
Physical Therapy Treatment    Patient Name:  Hernandez Rosales   MRN:  [id number]    Recommendations:     Discharge Recommendations:  home   Discharge Equipment Recommendations: none   Barriers to discharge: None    Assessment:     Hernandez Rosales is a 40 y.o. male admitted with a medical diagnosis of History of simultaneous kidney and pancreas transplant.  He presents with the following impairments/functional limitations:  impaired endurance, impaired self care skills, impaired functional mobilty, gait instability, impaired balance . Pt Progressing with PT Intervention. Pt Progressing with improving gait distance. Pt would continue to benefit from skilled PT to address overall functional mobility and goals. Goals remain appropriate.        Rehab Prognosis: Good; patient would benefit from acute skilled PT services to address these deficits and reach maximum level of function.    Recent Surgery: Procedure(s) (LRB):  TRANSPLANT, KIDNEY (N/A)  TRANSPLANT, PANCREAS (N/A) 6 Days Post-Op    Plan:     During this hospitalization, patient to be seen 4 x/week to address the identified rehab impairments via gait training, therapeutic activities, therapeutic exercises and progress toward the following goals:    · Plan of Care Expires:  12/03/20    Subjective     Patient/Family Comments/goals: Pt agreeable to treatment session  Pain/Comfort:  · Pain Rating 1: 0/10  · Pain Rating Post-Intervention 1: 0/10      Objective:     Communicated with RN prior to session.  Patient found supine with peripheral IV, zamudio catheter upon PT entry to room.     General Precautions: Standard, fall, blind   Orthopedic Precautions:    Braces:       Functional Mobility:  · Bed Mobility:     · Scooting: stand by assistance  · Supine to Sit: stand by assistance  · Transfers:     · Sit to Stand: SBA with no AD  · Gait: pt amb with CGA for safety x 270 ft with blind cane with vcs for guidance     AM-PAC 6 CLICK MOBILITY  Turning over in bed (including  adjusting bedclothes, sheets and blankets)?: 3  Sitting down on and standing up from a chair with arms (e.g., wheelchair, bedside commode, etc.): 3  Moving from lying on back to sitting on the side of the bed?: 3  Moving to and from a bed to a chair (including a wheelchair)?: 3  Need to walk in hospital room?: 3  Climbing 3-5 steps with a railing?: 2  Basic Mobility Total Score: 17       Therapeutic Activities and Exercises:   Therapist provided instruction and educated of patient on progress, safety,d/c,PT POC, log rolling to decrease stress to abdominal region ,  proper body mechanics, energy conservation, and fall prevention strategies during tasks listed above, on the effects of prolonged immobility and the importance of performing OOB activity and exercises to promote healing and reduce recovery time  Updated white board with appropriate PT mobility information for medical team notification  Pt safe to ambulate in hallway with RN or PCT assistance   Call nursing/pct to transfer to chair/use bathroom. Pt stated understanding  Bedside table in front of patient and area set up for function, convenience, and safety. RN aware of patient's mobility needs and status. Questions/concerns addressed within PTA scope of practice; patient with no further questions. Time was provided for active listening, discussion of health disposition, and discussion of safe discharge. Pt?verbalized?agreement .        Patient left seated with all lines intact, call button in reach, nsg notified and sister present      GOALS:   Multidisciplinary Problems     Physical Therapy Goals        Problem: Physical Therapy Goal    Goal Priority Disciplines Outcome Goal Variances Interventions   Physical Therapy Goal     PT, PT/OT Ongoing, Progressing     Description: Goals to be met by: 12/3/20    Patient will increase functional independence with mobility by performin. Supine to sit with Stand-by Assistance-not met  2. Sit to stand transfer  with Contact Guard Assistance -not met  3. Gait  x 250 feet with Stand-by Assistance using blind cane and LLE BK prosthesis- not met4. Pt min assist don/doff BK prosthesis - not met                     Time Tracking:     PT Received On: 11/09/20  PT Start Time: 0805     PT Stop Time: 0830  PT Total Time (min): 25 min     Billable Minutes: Gait Training 15 and Therapeutic Activity 10    Treatment Type: Treatment  PT/PTA: PTA     PTA Visit Number: 2     Tanner Akins, PTA  11/09/2020

## 2020-11-09 NOTE — PROGRESS NOTES
Discharge Note:    Pt was alert and oriented x4, sitting on edge of bed and communicative. Patients primary caregiver is Dallas Oh, patients sister, phone number [phone].  TERESITA met with pt and sister to assess discharge plan and any concerns or needs.    Pt reports agreeing with the discharge plan and has no psychosocial concerns. Pt will discharge today to [facility] with no needs. SW provided [facility] resource for talking glucometer since pt is vision impaired. Pt's sister will transport patient.  Patients caretakers verbalize understanding and are involved in treatment planning and discharge process. Pt nor caregiver had concerns or questions.    TERESITA remains available at [phone].

## 2020-11-09 NOTE — PLAN OF CARE
AAO x 4. VSS, afebrile, SpO2>95% on RA.   BG monitoring AC/HS-no coverage indicated.  ML incision MARV with staples.    mL clear yellow so far this shift.  Fall precautions maintained and pt repositions self.   POC reviewed with pt and sister at bedside.   See flowsheet for assessment findings.   Will continue to monitor.

## 2020-11-10 ENCOUNTER — TELEPHONE (OUTPATIENT)
Dept: TRANSPLANT | Facility: CLINIC | Age: 40
End: 2020-11-10

## 2020-11-10 ENCOUNTER — CLINICAL SUPPORT (OUTPATIENT)
Dept: TRANSPLANT | Facility: CLINIC | Age: 40
DRG: 699 | End: 2020-11-10
Payer: MEDICARE

## 2020-11-10 ENCOUNTER — HOSPITAL ENCOUNTER (INPATIENT)
Facility: HOSPITAL | Age: 40
LOS: 9 days | Discharge: HOME | DRG: 699 | End: 2020-11-19
Attending: EMERGENCY MEDICINE | Admitting: TRANSPLANT SURGERY
Payer: MEDICARE

## 2020-11-10 VITALS
WEIGHT: 185.44 LBS | TEMPERATURE: 101 F | SYSTOLIC BLOOD PRESSURE: 137 MMHG | DIASTOLIC BLOOD PRESSURE: 75 MMHG | OXYGEN SATURATION: 96 % | HEIGHT: 70 IN | RESPIRATION RATE: 18 BRPM | BODY MASS INDEX: 26.55 KG/M2 | HEART RATE: 97 BPM

## 2020-11-10 DIAGNOSIS — Z91.89 AT RISK FOR OPPORTUNISTIC INFECTIONS: ICD-10-CM

## 2020-11-10 DIAGNOSIS — R00.0 TACHYCARDIA: ICD-10-CM

## 2020-11-10 DIAGNOSIS — R65.10 SIRS (SYSTEMIC INFLAMMATORY RESPONSE SYNDROME): Primary | ICD-10-CM

## 2020-11-10 DIAGNOSIS — Z79.60 LONG-TERM USE OF IMMUNOSUPPRESSANT MEDICATION: ICD-10-CM

## 2020-11-10 DIAGNOSIS — E87.5 HYPERKALEMIA: ICD-10-CM

## 2020-11-10 DIAGNOSIS — T86.19 RECEIVED KIDNEY FROM DONOR WITH HEPATITIS C: ICD-10-CM

## 2020-11-10 DIAGNOSIS — Z94.0 STATUS POST SIMULTANEOUS KIDNEY AND PANCREAS TRANSPLANT: ICD-10-CM

## 2020-11-10 DIAGNOSIS — R11.2 NAUSEA AND VOMITING, INTRACTABILITY OF VOMITING NOT SPECIFIED, UNSPECIFIED VOMITING TYPE: ICD-10-CM

## 2020-11-10 DIAGNOSIS — D63.8 ANEMIA OF CHRONIC DISEASE: ICD-10-CM

## 2020-11-10 DIAGNOSIS — E83.39 HYPOPHOSPHATEMIA: ICD-10-CM

## 2020-11-10 DIAGNOSIS — E87.20 METABOLIC ACIDOSIS: ICD-10-CM

## 2020-11-10 DIAGNOSIS — R50.9 FEVER, UNSPECIFIED FEVER CAUSE: ICD-10-CM

## 2020-11-10 DIAGNOSIS — Z29.89 PROPHYLACTIC IMMUNOTHERAPY: ICD-10-CM

## 2020-11-10 DIAGNOSIS — E11.43 GASTROPARESIS DUE TO DM: ICD-10-CM

## 2020-11-10 DIAGNOSIS — K31.84 GASTROPARESIS DUE TO DM: ICD-10-CM

## 2020-11-10 DIAGNOSIS — Z94.83 STATUS POST SIMULTANEOUS KIDNEY AND PANCREAS TRANSPLANT: ICD-10-CM

## 2020-11-10 DIAGNOSIS — E83.42 HYPOMAGNESEMIA: ICD-10-CM

## 2020-11-10 LAB
ALBUMIN SERPL BCP-MCNC: 3.3 G/DL (ref 3.5–5.2)
ALP SERPL-CCNC: 95 U/L (ref 55–135)
ALT SERPL W/O P-5'-P-CCNC: 89 U/L (ref 10–44)
ANION GAP SERPL CALC-SCNC: 11 MMOL/L (ref 8–16)
ANISOCYTOSIS BLD QL SMEAR: SLIGHT
AST SERPL-CCNC: 76 U/L (ref 10–40)
BACTERIA #/AREA URNS AUTO: NORMAL /HPF
BASOPHILS NFR BLD: 0 % (ref 0–1.9)
BILIRUB SERPL-MCNC: 0.3 MG/DL (ref 0.1–1)
BILIRUB UR QL STRIP: NEGATIVE
BNP SERPL-MCNC: 148 PG/ML (ref 0–99)
BUN SERPL-MCNC: 29 MG/DL (ref 6–20)
BUN SERPL-MCNC: 39 MG/DL (ref 6–30)
CALCIUM SERPL-MCNC: 7.9 MG/DL (ref 8.7–10.5)
CHLORIDE SERPL-SCNC: 110 MMOL/L (ref 95–110)
CHLORIDE SERPL-SCNC: 111 MMOL/L (ref 95–110)
CLARITY UR REFRACT.AUTO: CLEAR
CO2 SERPL-SCNC: 17 MMOL/L (ref 23–29)
COLOR UR AUTO: YELLOW
CREAT SERPL-MCNC: 2 MG/DL (ref 0.5–1.4)
CREAT SERPL-MCNC: 2 MG/DL (ref 0.5–1.4)
CTP QC/QA: YES
CTP QC/QA: YES
DIFFERENTIAL METHOD: ABNORMAL
EOSINOPHIL NFR BLD: 0 % (ref 0–8)
ERYTHROCYTE [DISTWIDTH] IN BLOOD BY AUTOMATED COUNT: 16.4 % (ref 11.5–14.5)
EST. GFR  (AFRICAN AMERICAN): 47.5 ML/MIN/1.73 M^2
EST. GFR  (NON AFRICAN AMERICAN): 41.1 ML/MIN/1.73 M^2
GLUCOSE SERPL-MCNC: 101 MG/DL (ref 70–110)
GLUCOSE SERPL-MCNC: 98 MG/DL (ref 70–110)
GLUCOSE UR QL STRIP: NEGATIVE
HCT VFR BLD AUTO: 31.1 % (ref 40–54)
HCT VFR BLD CALC: 30 %PCV (ref 36–54)
HGB BLD-MCNC: 9.8 G/DL (ref 14–18)
HGB UR QL STRIP: NEGATIVE
HYALINE CASTS UR QL AUTO: 0 /LPF
IMM GRANULOCYTES # BLD AUTO: ABNORMAL K/UL (ref 0–0.04)
IMM GRANULOCYTES NFR BLD AUTO: ABNORMAL % (ref 0–0.5)
KETONES UR QL STRIP: NEGATIVE
LACTATE SERPL-SCNC: 1 MMOL/L (ref 0.5–2.2)
LEUKOCYTE ESTERASE UR QL STRIP: NEGATIVE
LIPASE SERPL-CCNC: 45 U/L (ref 4–60)
LYMPHOCYTES NFR BLD: 3 % (ref 18–48)
MAGNESIUM SERPL-MCNC: 1.6 MG/DL (ref 1.6–2.6)
MCH RBC QN AUTO: 26.9 PG (ref 27–31)
MCHC RBC AUTO-ENTMCNC: 31.5 G/DL (ref 32–36)
MCV RBC AUTO: 85 FL (ref 82–98)
METAMYELOCYTES NFR BLD MANUAL: 1 %
MICROSCOPIC COMMENT: NORMAL
MONOCYTES NFR BLD: 5 % (ref 4–15)
MYELOCYTES NFR BLD MANUAL: 2 %
NEUTROPHILS NFR BLD: 87 % (ref 38–73)
NEUTS BAND NFR BLD MANUAL: 2 %
NITRITE UR QL STRIP: NEGATIVE
NRBC BLD-RTO: 1 /100 WBC
OVALOCYTES BLD QL SMEAR: ABNORMAL
PH UR STRIP: 6 [PH] (ref 5–8)
PHOSPHATE SERPL-MCNC: 2.8 MG/DL (ref 2.7–4.5)
PLATELET # BLD AUTO: 335 K/UL (ref 150–350)
PLATELET BLD QL SMEAR: ABNORMAL
PMV BLD AUTO: 10 FL (ref 9.2–12.9)
POC IONIZED CALCIUM: 1.12 MMOL/L (ref 1.06–1.42)
POC MOLECULAR INFLUENZA A AGN: NEGATIVE
POC MOLECULAR INFLUENZA B AGN: NEGATIVE
POC TCO2 (MEASURED): 20 MMOL/L (ref 23–29)
POIKILOCYTOSIS BLD QL SMEAR: SLIGHT
POLYCHROMASIA BLD QL SMEAR: ABNORMAL
POTASSIUM BLD-SCNC: 4.6 MMOL/L (ref 3.5–5.1)
POTASSIUM SERPL-SCNC: 4.7 MMOL/L (ref 3.5–5.1)
PROCALCITONIN SERPL IA-MCNC: 1.23 NG/ML
PROT SERPL-MCNC: 6.5 G/DL (ref 6–8.4)
PROT UR QL STRIP: ABNORMAL
RBC # BLD AUTO: 3.64 M/UL (ref 4.6–6.2)
RBC #/AREA URNS AUTO: 2 /HPF (ref 0–4)
SAMPLE: ABNORMAL
SARS-COV-2 RDRP RESP QL NAA+PROBE: NEGATIVE
SODIUM BLD-SCNC: 140 MMOL/L (ref 136–145)
SODIUM SERPL-SCNC: 139 MMOL/L (ref 136–145)
SP GR UR STRIP: 1.01 (ref 1–1.03)
TROPONIN I SERPL DL<=0.01 NG/ML-MCNC: <0.006 NG/ML (ref 0–0.03)
URN SPEC COLLECT METH UR: ABNORMAL
WBC # BLD AUTO: 6.75 K/UL (ref 3.9–12.7)
WBC #/AREA URNS AUTO: 1 /HPF (ref 0–5)

## 2020-11-10 PROCEDURE — 99291 CRITICAL CARE FIRST HOUR: CPT | Mod: CS,,, | Performed by: EMERGENCY MEDICINE

## 2020-11-10 PROCEDURE — 20600001 HC STEP DOWN PRIVATE ROOM

## 2020-11-10 PROCEDURE — 25000003 PHARM REV CODE 250: Performed by: PHYSICIAN ASSISTANT

## 2020-11-10 PROCEDURE — 99285 PR EMERGENCY DEPT VISIT,LEVEL V: ICD-10-PCS | Mod: 24,,, | Performed by: NURSE PRACTITIONER

## 2020-11-10 PROCEDURE — C9113 INJ PANTOPRAZOLE SODIUM, VIA: HCPCS | Performed by: NURSE PRACTITIONER

## 2020-11-10 PROCEDURE — 63600175 PHARM REV CODE 636 W HCPCS: Performed by: NURSE PRACTITIONER

## 2020-11-10 PROCEDURE — 83735 ASSAY OF MAGNESIUM: CPT

## 2020-11-10 PROCEDURE — 87040 BLOOD CULTURE FOR BACTERIA: CPT | Mod: 59

## 2020-11-10 PROCEDURE — 84484 ASSAY OF TROPONIN QUANT: CPT

## 2020-11-10 PROCEDURE — 99285 EMERGENCY DEPT VISIT HI MDM: CPT | Mod: 24,,, | Performed by: NURSE PRACTITIONER

## 2020-11-10 PROCEDURE — 99291 PR CRITICAL CARE, E/M 30-74 MINUTES: ICD-10-PCS | Mod: CS,,, | Performed by: EMERGENCY MEDICINE

## 2020-11-10 PROCEDURE — 99999 PR PBB SHADOW E&M-EST. PATIENT-LVL III: CPT | Mod: PBBFAC,,,

## 2020-11-10 PROCEDURE — 87502 INFLUENZA DNA AMP PROBE: CPT

## 2020-11-10 PROCEDURE — 99291 CRITICAL CARE FIRST HOUR: CPT | Mod: 25

## 2020-11-10 PROCEDURE — 84100 ASSAY OF PHOSPHORUS: CPT

## 2020-11-10 PROCEDURE — 80047 BASIC METABLC PNL IONIZED CA: CPT

## 2020-11-10 PROCEDURE — 63600175 PHARM REV CODE 636 W HCPCS: Performed by: EMERGENCY MEDICINE

## 2020-11-10 PROCEDURE — 83690 ASSAY OF LIPASE: CPT

## 2020-11-10 PROCEDURE — 94761 N-INVAS EAR/PLS OXIMETRY MLT: CPT

## 2020-11-10 PROCEDURE — 84145 PROCALCITONIN (PCT): CPT

## 2020-11-10 PROCEDURE — 83880 ASSAY OF NATRIURETIC PEPTIDE: CPT

## 2020-11-10 PROCEDURE — 25000003 PHARM REV CODE 250: Performed by: EMERGENCY MEDICINE

## 2020-11-10 PROCEDURE — 99213 OFFICE O/P EST LOW 20 MIN: CPT | Mod: PBBFAC,25

## 2020-11-10 PROCEDURE — 93010 ELECTROCARDIOGRAM REPORT: CPT | Mod: ,,, | Performed by: INTERNAL MEDICINE

## 2020-11-10 PROCEDURE — 25500020 PHARM REV CODE 255: Performed by: EMERGENCY MEDICINE

## 2020-11-10 PROCEDURE — 25000003 PHARM REV CODE 250: Performed by: NURSE PRACTITIONER

## 2020-11-10 PROCEDURE — 85027 COMPLETE CBC AUTOMATED: CPT

## 2020-11-10 PROCEDURE — 99999 PR PBB SHADOW E&M-EST. PATIENT-LVL III: ICD-10-PCS | Mod: PBBFAC,,,

## 2020-11-10 PROCEDURE — 80053 COMPREHEN METABOLIC PANEL: CPT

## 2020-11-10 PROCEDURE — 83605 ASSAY OF LACTIC ACID: CPT

## 2020-11-10 PROCEDURE — 93010 EKG 12-LEAD: ICD-10-PCS | Mod: ,,, | Performed by: INTERNAL MEDICINE

## 2020-11-10 PROCEDURE — 93005 ELECTROCARDIOGRAM TRACING: CPT

## 2020-11-10 PROCEDURE — 85007 BL SMEAR W/DIFF WBC COUNT: CPT

## 2020-11-10 PROCEDURE — U0002 COVID-19 LAB TEST NON-CDC: HCPCS | Performed by: NURSE PRACTITIONER

## 2020-11-10 PROCEDURE — 81001 URINALYSIS AUTO W/SCOPE: CPT

## 2020-11-10 RX ORDER — ACETAMINOPHEN 325 MG/1
650 TABLET ORAL
Status: COMPLETED | OUTPATIENT
Start: 2020-11-10 | End: 2020-11-10

## 2020-11-10 RX ORDER — FLUCONAZOLE 200 MG/1
200 TABLET ORAL DAILY
Status: DISCONTINUED | OUTPATIENT
Start: 2020-11-11 | End: 2020-11-11

## 2020-11-10 RX ORDER — TACROLIMUS 1 MG/1
9 CAPSULE ORAL 2 TIMES DAILY
Status: DISCONTINUED | OUTPATIENT
Start: 2020-11-10 | End: 2020-11-13

## 2020-11-10 RX ORDER — VALGANCICLOVIR 450 MG/1
450 TABLET, FILM COATED ORAL DAILY
Status: DISCONTINUED | OUTPATIENT
Start: 2020-11-11 | End: 2020-11-19 | Stop reason: HOSPADM

## 2020-11-10 RX ORDER — SODIUM BICARBONATE 650 MG/1
1300 TABLET ORAL 3 TIMES DAILY
Status: DISCONTINUED | OUTPATIENT
Start: 2020-11-10 | End: 2020-11-12

## 2020-11-10 RX ORDER — ONDANSETRON 8 MG/1
8 TABLET, ORALLY DISINTEGRATING ORAL
Status: COMPLETED | OUTPATIENT
Start: 2020-11-10 | End: 2020-11-10

## 2020-11-10 RX ORDER — PRAVASTATIN SODIUM 10 MG/1
40 TABLET ORAL DAILY
Status: DISCONTINUED | OUTPATIENT
Start: 2020-11-11 | End: 2020-11-19 | Stop reason: HOSPADM

## 2020-11-10 RX ORDER — SULFAMETHOXAZOLE AND TRIMETHOPRIM 400; 80 MG/1; MG/1
1 TABLET ORAL DAILY
Status: DISCONTINUED | OUTPATIENT
Start: 2020-11-11 | End: 2020-11-19

## 2020-11-10 RX ORDER — CALCITRIOL 0.5 UG/1
0.5 CAPSULE ORAL DAILY
Status: DISCONTINUED | OUTPATIENT
Start: 2020-11-11 | End: 2020-11-14

## 2020-11-10 RX ORDER — ACETAMINOPHEN 325 MG/1
650 TABLET ORAL EVERY 8 HOURS PRN
Status: DISCONTINUED | OUTPATIENT
Start: 2020-11-10 | End: 2020-11-14

## 2020-11-10 RX ORDER — PANTOPRAZOLE SODIUM 40 MG/10ML
40 INJECTION, POWDER, LYOPHILIZED, FOR SOLUTION INTRAVENOUS DAILY
Status: DISCONTINUED | OUTPATIENT
Start: 2020-11-10 | End: 2020-11-12

## 2020-11-10 RX ORDER — METOPROLOL TARTRATE 25 MG/1
25 TABLET, FILM COATED ORAL 2 TIMES DAILY
Status: DISCONTINUED | OUTPATIENT
Start: 2020-11-10 | End: 2020-11-19

## 2020-11-10 RX ORDER — GABAPENTIN 300 MG/1
300 CAPSULE ORAL 2 TIMES DAILY
Status: DISCONTINUED | OUTPATIENT
Start: 2020-11-10 | End: 2020-11-19 | Stop reason: HOSPADM

## 2020-11-10 RX ORDER — PROCHLORPERAZINE EDISYLATE 5 MG/ML
5 INJECTION INTRAMUSCULAR; INTRAVENOUS EVERY 6 HOURS PRN
Status: DISCONTINUED | OUTPATIENT
Start: 2020-11-10 | End: 2020-11-19 | Stop reason: HOSPADM

## 2020-11-10 RX ORDER — ONDANSETRON 2 MG/ML
4 INJECTION INTRAMUSCULAR; INTRAVENOUS EVERY 6 HOURS PRN
Status: DISCONTINUED | OUTPATIENT
Start: 2020-11-10 | End: 2020-11-19 | Stop reason: HOSPADM

## 2020-11-10 RX ORDER — PREDNISONE 20 MG/1
20 TABLET ORAL DAILY
Status: DISCONTINUED | OUTPATIENT
Start: 2020-11-11 | End: 2020-11-19 | Stop reason: HOSPADM

## 2020-11-10 RX ORDER — FERROUS SULFATE 325(65) MG
325 TABLET, DELAYED RELEASE (ENTERIC COATED) ORAL DAILY
Status: DISCONTINUED | OUTPATIENT
Start: 2020-11-11 | End: 2020-11-14

## 2020-11-10 RX ORDER — HEPARIN SODIUM 5000 [USP'U]/ML
5000 INJECTION, SOLUTION INTRAVENOUS; SUBCUTANEOUS EVERY 8 HOURS
Status: DISCONTINUED | OUTPATIENT
Start: 2020-11-10 | End: 2020-11-19 | Stop reason: HOSPADM

## 2020-11-10 RX ORDER — NIFEDIPINE 30 MG/1
30 TABLET, EXTENDED RELEASE ORAL DAILY
Status: DISCONTINUED | OUTPATIENT
Start: 2020-11-11 | End: 2020-11-13

## 2020-11-10 RX ORDER — CINACALCET 30 MG/1
30 TABLET, FILM COATED ORAL
Status: DISCONTINUED | OUTPATIENT
Start: 2020-11-11 | End: 2020-11-10

## 2020-11-10 RX ORDER — TALC
6 POWDER (GRAM) TOPICAL NIGHTLY PRN
Status: DISCONTINUED | OUTPATIENT
Start: 2020-11-10 | End: 2020-11-19 | Stop reason: HOSPADM

## 2020-11-10 RX ORDER — CALCITRIOL 0.25 UG/1
0.25 CAPSULE ORAL DAILY
Status: DISCONTINUED | OUTPATIENT
Start: 2020-11-11 | End: 2020-11-10

## 2020-11-10 RX ORDER — SODIUM CHLORIDE 9 MG/ML
INJECTION, SOLUTION INTRAVENOUS CONTINUOUS
Status: DISCONTINUED | OUTPATIENT
Start: 2020-11-10 | End: 2020-11-12

## 2020-11-10 RX ORDER — OXYCODONE AND ACETAMINOPHEN 10; 325 MG/1; MG/1
1 TABLET ORAL EVERY 6 HOURS PRN
Status: DISCONTINUED | OUTPATIENT
Start: 2020-11-10 | End: 2020-11-19 | Stop reason: HOSPADM

## 2020-11-10 RX ORDER — SODIUM CHLORIDE 0.9 % (FLUSH) 0.9 %
10 SYRINGE (ML) INJECTION
Status: DISCONTINUED | OUTPATIENT
Start: 2020-11-10 | End: 2020-11-19 | Stop reason: HOSPADM

## 2020-11-10 RX ADMIN — TACROLIMUS 9 MG: 1 CAPSULE ORAL at 09:11

## 2020-11-10 RX ADMIN — PROCHLORPERAZINE EDISYLATE 5 MG: 5 INJECTION INTRAMUSCULAR; INTRAVENOUS at 05:11

## 2020-11-10 RX ADMIN — ONDANSETRON 4 MG: 2 INJECTION INTRAMUSCULAR; INTRAVENOUS at 06:11

## 2020-11-10 RX ADMIN — SODIUM CHLORIDE: 0.9 INJECTION, SOLUTION INTRAVENOUS at 02:11

## 2020-11-10 RX ADMIN — SODIUM BICARBONATE 650 MG TABLET 1300 MG: at 09:11

## 2020-11-10 RX ADMIN — PIPERACILLIN AND TAZOBACTAM 4.5 G: 4; .5 INJECTION, POWDER, LYOPHILIZED, FOR SOLUTION INTRAVENOUS; PARENTERAL at 02:11

## 2020-11-10 RX ADMIN — PANTOPRAZOLE SODIUM 40 MG: 40 INJECTION, POWDER, LYOPHILIZED, FOR SOLUTION INTRAVENOUS at 02:11

## 2020-11-10 RX ADMIN — IOHEXOL 15 ML: 350 INJECTION, SOLUTION INTRAVENOUS at 02:11

## 2020-11-10 RX ADMIN — HEPARIN SODIUM 5000 UNITS: 5000 INJECTION INTRAVENOUS; SUBCUTANEOUS at 09:11

## 2020-11-10 RX ADMIN — ACETAMINOPHEN 650 MG: 325 TABLET ORAL at 12:11

## 2020-11-10 RX ADMIN — PIPERACILLIN AND TAZOBACTAM 4.5 G: 4; .5 INJECTION, POWDER, LYOPHILIZED, FOR SOLUTION INTRAVENOUS; PARENTERAL at 09:11

## 2020-11-10 RX ADMIN — VANCOMYCIN HYDROCHLORIDE 1500 MG: 1.5 INJECTION, POWDER, LYOPHILIZED, FOR SOLUTION INTRAVENOUS at 03:11

## 2020-11-10 RX ADMIN — OXYCODONE HYDROCHLORIDE AND ACETAMINOPHEN 1 TABLET: 10; 325 TABLET ORAL at 09:11

## 2020-11-10 RX ADMIN — ONDANSETRON 8 MG: 8 TABLET, ORALLY DISINTEGRATING ORAL at 11:11

## 2020-11-10 RX ADMIN — GABAPENTIN 300 MG: 300 CAPSULE ORAL at 09:11

## 2020-11-10 RX ADMIN — METOPROLOL TARTRATE 25 MG: 25 TABLET, FILM COATED ORAL at 09:11

## 2020-11-10 NOTE — PROGRESS NOTES
Pharmacokinetic Initial Assessment: IV Vancomycin    Assessment/Plan:    Initiate intravenous vancomycin with loading dose of 1500 mg (20 mg/kg) once followed by a maintenance dose of vancomycin 1250 mg (15 mg/kg) IV every 24 hours.   - Patient received kidney/pancreas transplant 11/3/2020. Serum creatinine is down-trending, but still very soon after transplant. Will begin conservatively at Q24H regimen, and will adjust as needed with further labs and I/O data.   - Desired empiric serum trough concentration is 15 to 20 mcg/mL  - Draw vancomycin trough level 60 min prior to third dose on 11/12 at approximately 1300.     Pharmacy will continue to follow and monitor vancomycin.      Please contact pharmacy at extension [phone] with any questions regarding this assessment.     Thank you for the consult,   Angeles Crooks       Patient brief summary:  Hernandez Rosales is a 40 y.o. male initiated on antimicrobial therapy with IV Vancomycin for treatment of suspected intra-abdominal infection    Drug Allergies:   Review of patient's allergies indicates:  No Known Allergies    Actual Body Weight:   83.9 kg    Renal Function:   Estimated Creatinine Clearance: 51.7 mL/min (A) (based on SCr of 2 mg/dL (H)).,     Dialysis Method (if applicable):  N/A    CBC (last 72 hours):  Recent Labs   Lab Result Units 11/08/20  0624 11/09/20  0640 11/10/20  0920 11/10/20  1314   WBC K/uL 4.76 3.74* 6.13 6.75   Hemoglobin g/dL 8.9* 8.6* 9.2* 9.8*   Hematocrit % 29.1* 27.8* 29.8* 31.1*   Platelets K/uL 262 268 335 335   Gran % % 84.6* 87.8* 88.0*  --    Lymph % % 3.4* 2.1* 1.0*  --    Mono % % 9.5 7.2 7.0  --    Eosinophil % % 0.6 0.5 0.0  --    Basophil % % 0.2 0.3 0.0  --    Differential Method  Automated Automated Manual  --        Metabolic Panel (last 72 hours):  Recent Labs   Lab Result Units 11/08/20  0624 11/09/20 0640 11/10/20  0920 11/10/20  1222   Sodium mmol/L 139 141 143 139   Potassium mmol/L 4.3 4.3 4.1 4.7   Chloride mmol/L 112*  112* 112* 111*   CO2 mmol/L 19* 19* 22* 17*   Glucose mg/dL 78 78 92 98   BUN mg/dL 37* 35* 32* 29*   Creatinine mg/dL 2.2* 2.0* 2.0* 2.0*   Albumin g/dL 2.8* 2.8* 3.2* 3.3*   Total Bilirubin mg/dL  --   --   --  0.3   Alkaline Phosphatase U/L  --   --   --  95   AST U/L  --   --   --  76*   ALT U/L  --   --   --  89*   Magnesium mg/dL 1.6 1.5* 1.5* 1.6   Phosphorus mg/dL 2.3* 3.1 2.6* 2.8       Drug levels (last 3 results):  No results for input(s): VANCOMYCINRA, VANCOMYCINPE, VANCOMYCINTR in the last 72 hours.    Microbiologic Results:  Microbiology Results (last 7 days)     Procedure Component Value Units Date/Time    Blood culture x two cultures. Draw prior to antibiotics. [[id number]] Collected: 11/10/20 1315    Order Status: Sent Specimen: Blood from Peripheral, Antecubital, Right Updated: 11/10/20 1320    Blood culture x two cultures. Draw prior to antibiotics. [[id number]] Collected: 11/10/20 1222    Order Status: Sent Specimen: Blood Updated: 11/10/20 1229

## 2020-11-10 NOTE — PROGRESS NOTES
Pharmacist Renal Dose Adjustment Note    Hernandez Rosales is a 40 y.o. male being treated with piperacillin/tazobactam for bacteremia.     Patient Data:    Vital Signs (Most Recent):  Temp: 98.7 °F (37.1 °C) (11/10/20 1406)  Pulse: 88 (11/10/20 1406)  Resp: (!) 22 (11/10/20 1406)  BP: 115/60 (11/10/20 1406)  SpO2: 97 % (11/10/20 1406)   Vital Signs (72h Range):  Temp:  [98.1 °F (36.7 °C)-101.1 °F (38.4 °C)]   Pulse:  [78-97]   Resp:  [14-22]   BP: (115-193)/(60-89)   SpO2:  [95 %-100 %]      Recent Labs   Lab 11/09/20  0640 11/10/20  0920 11/10/20  1222   CREATININE 2.0* 2.0* 2.0*     Serum creatinine:  2 mg/dL (H) 11/10/20 1222  Estimated creatinine clearance:  51.7 mL/min (A)    Piperacillin/tazobactam 4.5 grams IVPB every 12 hours tradition infusion will be changed to piperacillin/tazobactam 4.5 grams IVPB every 8 hours extended infusion.     Pharmacist's Name:  Charan Moseley  Pharmacist's Extension:  [phone]

## 2020-11-10 NOTE — ED TRIAGE NOTES
Pt had kidney and pancreas transplant last Tuesday, got discharged last night woke up with Fever (101) nausea and vomiting(x 1). Pt vomiting on arrival to examination room.

## 2020-11-10 NOTE — TELEPHONE ENCOUNTER
SW spoke with pt and caregiver to confirm [facility] apartment check-in time of 1:30pm today. SW received confirmation from , Juliane.   Pt is blind and needs caregiver for needs.  Dallas Oh, patients sister, phone number [phone].

## 2020-11-10 NOTE — ED NOTES
Telemetry Verification   Patient placed on Telemetry Box  Verified by   Box # [id number]   Monitor Tech    Rate    Rhythm

## 2020-11-10 NOTE — SUBJECTIVE & OBJECTIVE
Subjective:     Chief Complaint/Reason for Admission: fever, nausea/vomiting    History of Present Illness:  Mr. Rosales is a 40 year old male with ESRD 2/2 diabetic nephropathy s/p kidney/pancreas transplant on 11/3/20 (Thymo induction, CMV D+/R+, HCV Ab+/YIN+). Surgery without complication. Post operative course unremarkable and pt was discharged home on POD #6 with stable Cr and improving pancreatic enzymes. Pt presents to clinic today for routine appointment with complaints of nausea/vomiting. Pt reports eating a erin cheese steak for dinner. Febrile with tmax of 101. Pt was sent to ED for evaluation and hospital admission. Denies chills, sob, chest pain, sick contacts, or changes in bladder/bowel function. Plan to admit KTX for infectious workup and management of nausea/vomiting. Labs and COVID 19 pending. CT abdomen/pelvis without contrast ordered in ED.           Review of patient's allergies indicates:  No Known Allergies    Past Medical History:   Diagnosis Date    Anxiety     Cataract     Diabetes mellitus     Diabetic retinopathy     Disorder of kidney and ureter     Encounter for blood transfusion     Glaucoma     High cholesterol     Hypertension     Retinal detachment      Past Surgical History:   Procedure Laterality Date    EYE SURGERY      KIDNEY TRANSPLANT N/A 11/3/2020    Procedure: TRANSPLANT, KIDNEY;  Surgeon: Adin Romero Jr., MD;  Location: [facility] OR [address] [place] [address];  Service: Transplant;  Laterality: N/A;    LEG AMPUTATION Left     RETINAL DETACHMENT SURGERY      TOE AMPUTATION Right     TRANSPLANTATION OF PANCREAS N/A 11/3/2020    Procedure: TRANSPLANT, PANCREAS;  Surgeon: Adin Romero Jr., MD;  Location: [facility] OR [address] [place] [address];  Service: Transplant;  Laterality: N/A;     Family History     Problem Relation (Age of Onset)    Coronary artery disease Mother    Diabetes Mother, Sister, Brother, Maternal Aunt, Maternal Uncle    Glaucoma Mother    Heart disease Mother, Maternal Aunt,  "Maternal Uncle    Hypertension Mother, Brother, Maternal Aunt, Maternal Uncle    No Known Problems Father    Stroke Mother, Maternal Aunt        Tobacco Use    Smoking status: Former Smoker     Packs/day: 0.25     Years: 10.00     Pack years: 2.50    Smokeless tobacco: Never Used   Substance and Sexual Activity    Alcohol use: Yes     Comment: occasional    Drug use: No    Sexual activity: Yes     Partners: Female     Birth control/protection: Condom        Review of Systems   Constitutional: Negative for activity change, appetite change, chills, fatigue and fever.   HENT: Negative for congestion and facial swelling.    Eyes: Negative for pain, discharge and visual disturbance.   Respiratory: Negative for cough, chest tightness, shortness of breath and wheezing.    Cardiovascular: Negative for chest pain, palpitations and leg swelling.   Gastrointestinal: Positive for abdominal distention, abdominal pain, nausea and vomiting. Negative for constipation and diarrhea.   Endocrine: Negative.    Genitourinary: Negative for decreased urine volume, difficulty urinating and hematuria.   Musculoskeletal: Negative for back pain.   Skin: Positive for wound. Negative for pallor and rash.   Allergic/Immunologic: Positive for immunocompromised state.   Neurological: Negative for dizziness, tremors, weakness and light-headedness.   Hematological: Negative.    Psychiatric/Behavioral: Negative for agitation, confusion and dysphoric mood. The patient is not nervous/anxious.      Objective:     Vital Signs (Most Recent):  Temp: 98.7 °F (37.1 °C) (11/10/20 1406)  Pulse: 88 (11/10/20 1406)  Resp: (!) 22 (11/10/20 1406)  BP: 115/60 (11/10/20 1406)  SpO2: 97 % (11/10/20 1406)  Height: 5' 10" (177.8 cm)  Weight: 83.9 kg (185 lb)  Body mass index is 26.54 kg/m².     Physical Exam  Vitals signs and nursing note reviewed.   Constitutional:       Appearance: He is well-developed.   HENT:      Head: Normocephalic and atraumatic.   Eyes:    "   Pupils: Pupils are equal, round, and reactive to light.   Neck:      Musculoskeletal: Normal range of motion and neck supple.      Vascular: No JVD.   Cardiovascular:      Rate and Rhythm: Normal rate and regular rhythm.      Heart sounds: Normal heart sounds. No murmur. No friction rub.   Pulmonary:      Effort: Pulmonary effort is normal. No respiratory distress.      Breath sounds: Normal breath sounds. No wheezing or rales.   Abdominal:      General: Bowel sounds are normal. There is no distension.      Palpations: Abdomen is soft.      Tenderness: There is no abdominal tenderness.      Comments: Midline inc with staples intact no s/s/i   Musculoskeletal: Normal range of motion.   Skin:     General: Skin is warm and dry.   Neurological:      Mental Status: He is alert and oriented to person, place, and time.   Psychiatric:         Behavior: Behavior normal.         Laboratory  CBC:   Recent Labs   Lab 11/09/20  0640 11/10/20  0920 11/10/20  1209 11/10/20  1314   WBC 3.74* 6.13  --  6.75   RBC 3.26* 3.49*  --  3.64*   HGB 8.6* 9.2*  --  9.8*   HCT 27.8* 29.8* 30* 31.1*    335  --  335   MCV 85 85  --  85   MCH 26.4* 26.4*  --  26.9*   MCHC 30.9* 30.9*  --  31.5*     CMP:   Recent Labs   Lab 11/05/20  0414 11/05/20  1156  11/09/20  0640 11/10/20  0920 11/10/20  1222   GLU 98  94 112*   < > 78 92 98   CALCIUM 7.6*  7.4* 8.2*   < > 7.8* 8.2* 7.9*   ALBUMIN 3.1*  3.1* 3.2*   < > 2.8* 3.2* 3.3*   PROT 5.7* 5.9*  --   --   --  6.5     137 136   < > 141 143 139   K 4.5  4.5 4.6   < > 4.3 4.1 4.7   CO2 22*  22* 22*   < > 19* 22* 17*     102 103   < > 112* 112* 111*   BUN 45*  42* 40*   < > 35* 32* 29*   CREATININE 5.0*  5.0* 4.1*   < > 2.0* 2.0* 2.0*   ALKPHOS 71 76  --   --   --  95   ALT 72* 66*  --   --   --  89*   * 60*  --   --   --  76*    < > = values in this interval not displayed.       Diagnostic Results:  None

## 2020-11-10 NOTE — ASSESSMENT & PLAN NOTE
- Continue prograf. Check prograf level daily, monitor for toxic side effects, and adjust dose accordingly for a therapeutic level.

## 2020-11-10 NOTE — HPI
Mr. Rosales is a 40 year old male with ESRD 2/2 diabetic nephropathy s/p kidney/pancreas transplant on 11/3/20 (Thymo induction, CMV D+/R+, HCV Ab+/YIN+). Surgery without complication. Post operative course unremarkable and pt was discharged home on POD #6 with stable Cr and improving pancreatic enzymes. Pt presents to clinic today for routine appointment with complaints of nausea/vomiting. Pt reports eating a erin cheese steak for dinner. Febrile with tmax of 101. Pt was sent to ED for evaluation and hospital admission. Denies chills, sob, chest pain, sick contacts, or changes in bladder/bowel function. Plan to admit KTX for infectious workup and management of nausea/vomiting. Labs and COVID 19 pending. CT abdomen/pelvis without contrast ordered in ED.

## 2020-11-10 NOTE — H&P
[facility]  K[facility] Trans[facility]  H&P      Subjective:     Chief Complaint/Reason for Admission: fever, nausea/vomiting    History of Present Illness:  Mr. Rosales is a 40 year old male with ESRD 2/2 diabetic nephropathy s/p kidney/pancreas transplant on 11/3/20 (Thymo induction, CMV D+/R+, HCV Ab+/YIN+). Surgery without complication. Post operative course unremarkable and pt was discharged home on POD #6 with stable Cr and improving pancreatic enzymes. Pt presents to clinic today for routine appointment with complaints of nausea/vomiting. Pt reports eating a erin cheese steak for dinner. Febrile with tmax of 101. Pt was sent to ED for evaluation and hospital admission. Denies chills, sob, chest pain, sick contacts, or changes in bladder/bowel function. Plan to admit KTX for infectious workup and management of nausea/vomiting. Labs and COVID 19 pending. CT abdomen/pelvis without contrast ordered in ED.           Review of patient's allergies indicates:  No Known Allergies    Past Medical History:   Diagnosis Date    Anxiety     Cataract     Diabetes mellitus     Diabetic retinopathy     Disorder of kidney and ureter     Encounter for blood transfusion     Glaucoma     High cholesterol     Hypertension     Retinal detachment      Past Surgical History:   Procedure Laterality Date    EYE SURGERY      KIDNEY TRANSPLANT N/A 11/3/2020    Procedure: TRANSPLANT, KIDNEY;  Surgeon: Adin Romero Jr., MD;  Location: [facility] OR [address] [place] [address];  Service: Transplant;  Laterality: N/A;    LEG AMPUTATION Left     RETINAL DETACHMENT SURGERY      TOE AMPUTATION Right     TRANSPLANTATION OF PANCREAS N/A 11/3/2020    Procedure: TRANSPLANT, PANCREAS;  Surgeon: Adin Romero Jr., MD;  Location: [facility] OR [address] [place] [address];  Service: Transplant;  Laterality: N/A;     Family History     Problem Relation (Age of Onset)    Coronary artery disease Mother    Diabetes Mother, Sister, Brother, Maternal Aunt, Maternal Uncle  "   Glaucoma Mother    Heart disease Mother, Maternal Aunt, Maternal Uncle    Hypertension Mother, Brother, Maternal Aunt, Maternal Uncle    No Known Problems Father    Stroke Mother, Maternal Aunt        Tobacco Use    Smoking status: Former Smoker     Packs/day: 0.25     Years: 10.00     Pack years: 2.50    Smokeless tobacco: Never Used   Substance and Sexual Activity    Alcohol use: Yes     Comment: occasional    Drug use: No    Sexual activity: Yes     Partners: Female     Birth control/protection: Condom        Review of Systems   Constitutional: Negative for activity change, appetite change, chills, fatigue and fever.   HENT: Negative for congestion and facial swelling.    Eyes: Negative for pain, discharge and visual disturbance.   Respiratory: Negative for cough, chest tightness, shortness of breath and wheezing.    Cardiovascular: Negative for chest pain, palpitations and leg swelling.   Gastrointestinal: Positive for abdominal distention, abdominal pain, nausea and vomiting. Negative for constipation and diarrhea.   Endocrine: Negative.    Genitourinary: Negative for decreased urine volume, difficulty urinating and hematuria.   Musculoskeletal: Negative for back pain.   Skin: Positive for wound. Negative for pallor and rash.   Allergic/Immunologic: Positive for immunocompromised state.   Neurological: Negative for dizziness, tremors, weakness and light-headedness.   Hematological: Negative.    Psychiatric/Behavioral: Negative for agitation, confusion and dysphoric mood. The patient is not nervous/anxious.      Objective:     Vital Signs (Most Recent):  Temp: 98.7 °F (37.1 °C) (11/10/20 1406)  Pulse: 88 (11/10/20 1406)  Resp: (!) 22 (11/10/20 1406)  BP: 115/60 (11/10/20 1406)  SpO2: 97 % (11/10/20 1406)  Height: 5' 10" (177.8 cm)  Weight: 83.9 kg (185 lb)  Body mass index is 26.54 kg/m².     Physical Exam  Vitals signs and nursing note reviewed.   Constitutional:       Appearance: He is well-developed. "   HENT:      Head: Normocephalic and atraumatic.   Eyes:      Pupils: Pupils are equal, round, and reactive to light.   Neck:      Musculoskeletal: Normal range of motion and neck supple.      Vascular: No JVD.   Cardiovascular:      Rate and Rhythm: Normal rate and regular rhythm.      Heart sounds: Normal heart sounds. No murmur. No friction rub.   Pulmonary:      Effort: Pulmonary effort is normal. No respiratory distress.      Breath sounds: Normal breath sounds. No wheezing or rales.   Abdominal:      General: Bowel sounds are normal. There is no distension.      Palpations: Abdomen is soft.      Tenderness: There is no abdominal tenderness.      Comments: Midline inc with staples intact no s/s/i   Musculoskeletal: Normal range of motion.   Skin:     General: Skin is warm and dry.   Neurological:      Mental Status: He is alert and oriented to person, place, and time.   Psychiatric:         Behavior: Behavior normal.         Laboratory  CBC:   Recent Labs   Lab 11/09/20  0640 11/10/20  0920 11/10/20  1209 11/10/20  1314   WBC 3.74* 6.13  --  6.75   RBC 3.26* 3.49*  --  3.64*   HGB 8.6* 9.2*  --  9.8*   HCT 27.8* 29.8* 30* 31.1*    335  --  335   MCV 85 85  --  85   MCH 26.4* 26.4*  --  26.9*   MCHC 30.9* 30.9*  --  31.5*     CMP:   Recent Labs   Lab 11/05/20  0414 11/05/20  1156  11/09/20  0640 11/10/20  0920 11/10/20  1222   GLU 98  94 112*   < > 78 92 98   CALCIUM 7.6*  7.4* 8.2*   < > 7.8* 8.2* 7.9*   ALBUMIN 3.1*  3.1* 3.2*   < > 2.8* 3.2* 3.3*   PROT 5.7* 5.9*  --   --   --  6.5     137 136   < > 141 143 139   K 4.5  4.5 4.6   < > 4.3 4.1 4.7   CO2 22*  22* 22*   < > 19* 22* 17*     102 103   < > 112* 112* 111*   BUN 45*  42* 40*   < > 35* 32* 29*   CREATININE 5.0*  5.0* 4.1*   < > 2.0* 2.0* 2.0*   ALKPHOS 71 76  --   --   --  95   ALT 72* 66*  --   --   --  89*   * 60*  --   --   --  76*    < > = values in this interval not displayed.       Diagnostic  Results:  None    Assessment/Plan:     * SIRS (systemic inflammatory response syndrome)  - Pt discharged on POD #6 from [facility] 11/3.  - Returned to clinic today found to have fever of 101 and c/o nausea/vomiting.  - Infectious workup in process.  - continue antibiotics.  - start IVFs.  - CT abdomen/pelvis pending.  - Monitor.      Nausea and vomiting  - Antiemetics PRN.       Status post simultaneous kidney and pancreas transplant  - s/p SPK 11/3/20 2/2 DMI.   - Cr and pancreas enzymes stable.  - Monitor.     Long-term use of immunosuppressant medication  - Continue prograf. Check prograf level daily, monitor for toxic side effects, and adjust dose accordingly for a therapeutic level.       Prophylactic immunotherapy  - see long term immuno.     At risk for opportunistic infections  - continue OI prophylaxis as per protocol.       Anemia of chronic disease  - H&H stable.  - Monitor.           Discharge Planning: not a candidate for dc at this time.      Aleyda Stratton NP  Kidney Transplant  [facility]

## 2020-11-10 NOTE — PROGRESS NOTES
Clinic Note: First Return to Clinic Post-Kidney/Pancreas Transplant    Hernandez Rosales  is a 40 y.o. male  S/p LEFT KIDNEY   transplant on 11/3/2020 (Kidney / Pancreas) for Diabetes Mellitus - Type I.      Discharge Course (Issues/Concerns): Febrile and n/v, being sent to ER. Will need to reschedule MTM at time of discharge.     Objective:   Vitals:    11/10/20 0946   BP: 137/75   Pulse: 97   Resp: 18   Temp: (!) 101 °F (38.3 °C)

## 2020-11-10 NOTE — ASSESSMENT & PLAN NOTE
- Pt discharged on POD #6 from [facility] 11/3.  - Returned to clinic today found to have fever of 101 and c/o nausea/vomiting.  - Infectious workup in process.  - continue antibiotics.  - start IVFs.  - CT abdomen/pelvis pending.  - Monitor.

## 2020-11-10 NOTE — NURSING
Pt discharged via wheelchair by sister. Pt left with personal belongings & discharge instructions. Discharge instructions were reviewed with pt & pt's sister, pt verbalized understanding to all.  Pt denies pain or other need at time of discharge.

## 2020-11-10 NOTE — ED PROVIDER NOTES
Encounter Date: 11/10/2020       History     Chief Complaint   Patient presents with    Fever    Kidney Transplant     last week, sent from clinic     41 yo M presents POD#6 s/p kidney pancreas transplant, DM, HTN, HLD, left BKA presents from transplant clinic with a fever.  Patient had a kidney pancreas transplant on November 3rd.  He was discharged yesterday.  Beginning this morning, patient became nauseated and vomited.  He had an episode of vomiting in transplant clinic and then again in the emergency department when I saw him.  Was noted to be febrile and transplant clinic and referred to the emergency department for CT and antibiotics.  History is assisted by the patient's sister.  Patient was feeling okay yesterday.  Yesterday evening he had a Yi cheese steak.  He had a difficult evening sleeping and woke up feeling nauseated.  No known sick contacts.  No abdominal pain.  He had some coughing in the emergency department no coughing previously.  He has had adequate urine output.  Good p.o. intake.  Patient saw Dr. Valladares in transplant clinic who referred patient to the emergency department.  Specks of blood in vomit in emergency department.        Review of patient's allergies indicates:  No Known Allergies  Past Medical History:   Diagnosis Date    Anxiety     Cataract     Diabetes mellitus     Diabetic retinopathy     Disorder of kidney and ureter     Encounter for blood transfusion     Glaucoma     High cholesterol     Hypertension     Retinal detachment      Past Surgical History:   Procedure Laterality Date    EYE SURGERY      KIDNEY TRANSPLANT N/A 11/3/2020    Procedure: TRANSPLANT, KIDNEY;  Surgeon: Adin Romero Jr., MD;  Location: [facility] OR [address] [place] [address];  Service: Transplant;  Laterality: N/A;    LEG AMPUTATION Left     RETINAL DETACHMENT SURGERY      TOE AMPUTATION Right     TRANSPLANTATION OF PANCREAS N/A 11/3/2020    Procedure: TRANSPLANT, PANCREAS;  Surgeon: Adin Romero  MD Britton;  Location: [facility] OR [address] [place] [address];  Service: Transplant;  Laterality: N/A;     Family History   Problem Relation Age of Onset    Diabetes Mother     Glaucoma Mother     Coronary artery disease Mother     Heart disease Mother     Stroke Mother     Hypertension Mother     No Known Problems Father     Diabetes Sister     Diabetes Brother     Hypertension Brother     Heart disease Maternal Aunt     Diabetes Maternal Aunt     Stroke Maternal Aunt     Hypertension Maternal Aunt     Heart disease Maternal Uncle     Diabetes Maternal Uncle     Hypertension Maternal Uncle      Social History     Tobacco Use    Smoking status: Former Smoker     Packs/day: 0.25     Years: 10.00     Pack years: 2.50    Smokeless tobacco: Never Used   Substance Use Topics    Alcohol use: Yes     Comment: occasional    Drug use: No     Review of Systems   Constitutional: Positive for fever.   HENT: Negative for sore throat.    Respiratory: Negative for shortness of breath.    Cardiovascular: Negative for chest pain.   Gastrointestinal: Positive for nausea and vomiting. Negative for abdominal pain.   Genitourinary: Negative for dysuria.   Musculoskeletal: Negative for back pain.   Skin: Negative for rash.   Neurological: Negative for weakness.   Hematological: Does not bruise/bleed easily.       Physical Exam     Initial Vitals [11/10/20 1042]   BP Pulse Resp Temp SpO2   (!) 183/86 90 18 (!) 101.1 °F (38.4 °C) 100 %      MAP       --         Physical Exam    Nursing note and vitals reviewed.  Constitutional: He appears well-developed and well-nourished. He is not diaphoretic. He appears distressed.   Actively vomiting in stretcher   HENT:   Head: Normocephalic and atraumatic.   Eyes: Conjunctivae and EOM are normal. No scleral icterus.   Neck: Normal range of motion. Neck supple. No JVD present.   Cardiovascular: Regular rhythm, normal heart sounds and intact distal pulses. Tachycardia present.  Exam reveals no gallop and no  friction rub.    No murmur heard.  Pulmonary/Chest: Breath sounds normal. No respiratory distress. He has no wheezes. He has no rhonchi. He has no rales. He exhibits no tenderness.   Abdominal: Soft. He exhibits no distension and no mass. There is no abdominal tenderness. There is no rebound and no guarding.   Hypoactive bowel sounds, midline surgical incision with staples place without any significant tenderness   Musculoskeletal: Normal range of motion. No tenderness.   Neurological: He is alert and oriented to person, place, and time.   Skin: Skin is warm and dry. Capillary refill takes more than 3 seconds.   Psychiatric: He has a normal mood and affect.         ED Course   Procedures  Labs Reviewed   CBC W/ AUTO DIFFERENTIAL - Abnormal; Notable for the following components:       Result Value    RBC 3.64 (*)     Hemoglobin 9.8 (*)     Hematocrit 31.1 (*)     MCH 26.9 (*)     MCHC 31.5 (*)     RDW 16.4 (*)     All other components within normal limits   ISTAT PROCEDURE - Abnormal; Notable for the following components:    POC BUN 39 (*)     POC Creatinine 2.0 (*)     POC TCO2 (MEASURED) 20 (*)     POC Hematocrit 30 (*)     All other components within normal limits   CULTURE, BLOOD   CULTURE, BLOOD   LACTIC ACID, PLASMA   TROPONIN I   COMPREHENSIVE METABOLIC PANEL   LACTIC ACID, PLASMA   URINALYSIS, REFLEX TO URINE CULTURE   MAGNESIUM   PHOSPHORUS   B-TYPE NATRIURETIC PEPTIDE   LIPASE   PROCALCITONIN   SARS-COV-2 RDRP GENE    Narrative:     This test utilizes isothermal nucleic acid amplification   technology to detect the SARS-CoV-2 RdRp nucleic acid segment.   The analytical sensitivity (limit of detection) is 125 genome   equivalents/mL.   A POSITIVE result implies infection with the SARS-CoV-2 virus;   the patient is presumed to be contagious.     A NEGATIVE result means that SARS-CoV-2 nucleic acids are not   present above the limit of detection. A NEGATIVE result should be   treated as presumptive. It does  not rule out the possibility of   COVID-19 and should not be the sole basis for treatment decisions.   If COVID-19 is strongly suspected based on clinical and exposure   history, re-testing using an alternate molecular assay should be   considered.   This test is only for use under the Food and Drug   Administration s Emergency Use Authorization (EUA).   Commercial kits are provided by [employer]   Performance characteristics of the EUA have been independently   verified by [facility] Department of   Pathology and Laboratory Medicine.   _________________________________________________________________   The authorized Fact Sheet for Healthcare Providers and the authorized Fact   Sheet for Patients of the ID NOW COVID-19 are available on the FDA   website:     https://www.fda.gov/media/304983/download  https://www.fda.gov/media/582922/download       POCT INFLUENZA A/B MOLECULAR   ISTAT CHEM8     EKG Readings: (Independently Interpreted)   Initial Reading: No STEMI. Heart Rate: 85. ST Segments: Normal ST Segments. T Waves: Normal. Axis: Normal. [facility] ED EKG2 - Other Findings: LVH.       Imaging Results          X-Ray Chest AP Portable (Final result)  Result time 11/10/20 12:58:15    Final result by Eusebio Shahid MD (11/10/20 12:58:15)                 Impression:      Stable enlargement the cardiac silhouette.  No acute radiographic findings in the chest.      Electronically signed by: Eusebio Shahid MD  Date:    11/10/2020  Time:    12:58             Narrative:    EXAMINATION:  XR CHEST AP PORTABLE    CLINICAL HISTORY:  Sepsis;    TECHNIQUE:  Single frontal view of the chest was performed.    COMPARISON:  11/04/2020    FINDINGS:  Lung volumes are decreased.  Cardiac silhouette remains enlarged with atrial enlargement.  No evidence of significant cardiac decompensation.  No evidence of significant focal consolidation, large effusion or pneumothorax.  Bones demonstrate no acute abnormalities.                                  Medical Decision Making:   History:   I obtained history from: someone other than patient.  Old Medical Records: I decided to obtain old medical records.  Initial Assessment:   39 yo M presents POD#6 s/p kidney pancreas transplant, DM, HTN, HLD, left BKA presents from transplant clinic with a fever.   Differential Diagnosis:   Bacteremia, sepsis, immunosuppression, infectious issues, UTI, pneumonia, abscess, SBO, influenza, COVID-19  Clinical Tests:   Lab Tests: Ordered  Radiological Study: Ordered  Medical Tests: Ordered  ED Management:  Will administer Zofran and acetaminophen.  Broad-spectrum coverage with vanc and Zosyn.  Will obtain labs, cultures, chest x-ray.  Transplant surgery consulted.  Per their recommendations, will obtain CT abdomen/pelvis with p.o. contrast only.    Reassessment:  I-STAT Chem 8 with creatinine of 2 and a BUN of CBC without leukocytosis.  Lactic acid normal.  Patient initially experienced improvement in symptoms but then vomited again while drinking p.o. contrast.  Patient will be admitted to kidney transplant surgery.  Given volume status, will not administer 30 cc/kg fluid bolus.    Ultrasound-guided peripheral IV placed by myself              Attending Attestation:         Attending Critical Care:   Critical Care Times:   Direct Patient Care (initial evaluation, reassessments, and time considering the case)................................................................15 minutes.   Additional History from reviewing old medical records or taking additional history from the family, EMS, PCP, etc.......................5 minutes.   Ordering, Reviewing, and Interpreting Diagnostic Studies...............................................................................................................5 minutes.    Documentation..................................................................................................................................................................................5 minutes.   Consultation with other Physicians. .................................................................................................................................................5 minutes.   ==============================================================  · Total Critical Care Time - exclusive of procedural time: 35 minutes.  ==============================================================  Critical care was necessary to treat or prevent imminent or life-threatening deterioration of the following conditions: sepsis.                         Clinical Impression:       ICD-10-CM ICD-9-CM   1. Fever, unspecified fever cause  R50.9 780.60   2. Tachycardia  R00.0 785.0   3. SIRS (systemic inflammatory response syndrome)  R65.10 995.90                          ED Disposition Condition    Admit                             Andrew Acosta MD  11/10/20 8030       Andrew Acosta MD  11/10/20 0402

## 2020-11-11 PROBLEM — K31.84 GASTROPARESIS DUE TO DM: Status: ACTIVE | Noted: 2020-11-10

## 2020-11-11 PROBLEM — E83.42 HYPOMAGNESEMIA: Status: ACTIVE | Noted: 2020-11-11

## 2020-11-11 PROBLEM — E11.43 GASTROPARESIS DUE TO DM: Status: ACTIVE | Noted: 2020-11-10

## 2020-11-11 LAB
ALBUMIN SERPL BCP-MCNC: 3.1 G/DL (ref 3.5–5.2)
ANION GAP SERPL CALC-SCNC: 8 MMOL/L (ref 8–16)
ANISOCYTOSIS BLD QL SMEAR: SLIGHT
BASOPHILS # BLD AUTO: ABNORMAL K/UL (ref 0–0.2)
BASOPHILS NFR BLD: 0 % (ref 0–1.9)
BUN SERPL-MCNC: 24 MG/DL (ref 6–20)
CALCIUM SERPL-MCNC: 8.4 MG/DL (ref 8.7–10.5)
CHLORIDE SERPL-SCNC: 112 MMOL/L (ref 95–110)
CO2 SERPL-SCNC: 20 MMOL/L (ref 23–29)
CREAT SERPL-MCNC: 1.9 MG/DL (ref 0.5–1.4)
DIFFERENTIAL METHOD: ABNORMAL
EOSINOPHIL # BLD AUTO: ABNORMAL K/UL (ref 0–0.5)
EOSINOPHIL NFR BLD: 0 % (ref 0–8)
ERYTHROCYTE [DISTWIDTH] IN BLOOD BY AUTOMATED COUNT: 16.2 % (ref 11.5–14.5)
EST. GFR  (AFRICAN AMERICAN): 50.6 ML/MIN/1.73 M^2
EST. GFR  (NON AFRICAN AMERICAN): 43.7 ML/MIN/1.73 M^2
GLUCOSE SERPL-MCNC: 81 MG/DL (ref 70–110)
HCT VFR BLD AUTO: 30.5 % (ref 40–54)
HGB BLD-MCNC: 9.4 G/DL (ref 14–18)
HYPOCHROMIA BLD QL SMEAR: ABNORMAL
IMM GRANULOCYTES # BLD AUTO: ABNORMAL K/UL (ref 0–0.04)
IMM GRANULOCYTES NFR BLD AUTO: ABNORMAL % (ref 0–0.5)
LYMPHOCYTES # BLD AUTO: ABNORMAL K/UL (ref 1–4.8)
LYMPHOCYTES NFR BLD: 6 % (ref 18–48)
MAGNESIUM SERPL-MCNC: 1.4 MG/DL (ref 1.6–2.6)
MCH RBC QN AUTO: 26.3 PG (ref 27–31)
MCHC RBC AUTO-ENTMCNC: 30.8 G/DL (ref 32–36)
MCV RBC AUTO: 85 FL (ref 82–98)
MONOCYTES # BLD AUTO: ABNORMAL K/UL (ref 0.3–1)
MONOCYTES NFR BLD: 7 % (ref 4–15)
MYELOCYTES NFR BLD MANUAL: 2 %
NEUTROPHILS NFR BLD: 85 % (ref 38–73)
NRBC BLD-RTO: 1 /100 WBC
OVALOCYTES BLD QL SMEAR: ABNORMAL
PHOSPHATE SERPL-MCNC: 3.2 MG/DL (ref 2.7–4.5)
PLATELET # BLD AUTO: 346 K/UL (ref 150–350)
PMV BLD AUTO: 10.1 FL (ref 9.2–12.9)
POIKILOCYTOSIS BLD QL SMEAR: SLIGHT
POLYCHROMASIA BLD QL SMEAR: ABNORMAL
POTASSIUM SERPL-SCNC: 4.7 MMOL/L (ref 3.5–5.1)
RBC # BLD AUTO: 3.57 M/UL (ref 4.6–6.2)
SODIUM SERPL-SCNC: 140 MMOL/L (ref 136–145)
TACROLIMUS BLD-MCNC: 10.7 NG/ML (ref 5–15)
WBC # BLD AUTO: 5.07 K/UL (ref 3.9–12.7)

## 2020-11-11 PROCEDURE — 25000003 PHARM REV CODE 250: Performed by: NURSE PRACTITIONER

## 2020-11-11 PROCEDURE — 99233 PR SUBSEQUENT HOSPITAL CARE,LEVL III: ICD-10-PCS | Mod: ,,, | Performed by: NURSE PRACTITIONER

## 2020-11-11 PROCEDURE — 25000003 PHARM REV CODE 250: Performed by: EMERGENCY MEDICINE

## 2020-11-11 PROCEDURE — 80197 ASSAY OF TACROLIMUS: CPT

## 2020-11-11 PROCEDURE — 99233 SBSQ HOSP IP/OBS HIGH 50: CPT | Mod: ,,, | Performed by: NURSE PRACTITIONER

## 2020-11-11 PROCEDURE — 63600175 PHARM REV CODE 636 W HCPCS: Performed by: NURSE PRACTITIONER

## 2020-11-11 PROCEDURE — 83735 ASSAY OF MAGNESIUM: CPT

## 2020-11-11 PROCEDURE — 20600001 HC STEP DOWN PRIVATE ROOM

## 2020-11-11 PROCEDURE — 80069 RENAL FUNCTION PANEL: CPT

## 2020-11-11 PROCEDURE — 85007 BL SMEAR W/DIFF WBC COUNT: CPT

## 2020-11-11 PROCEDURE — 25000003 PHARM REV CODE 250: Performed by: INTERNAL MEDICINE

## 2020-11-11 PROCEDURE — 36415 COLL VENOUS BLD VENIPUNCTURE: CPT

## 2020-11-11 PROCEDURE — C9113 INJ PANTOPRAZOLE SODIUM, VIA: HCPCS | Performed by: NURSE PRACTITIONER

## 2020-11-11 PROCEDURE — 85027 COMPLETE CBC AUTOMATED: CPT

## 2020-11-11 PROCEDURE — 63600175 PHARM REV CODE 636 W HCPCS: Performed by: EMERGENCY MEDICINE

## 2020-11-11 RX ORDER — MAGNESIUM SULFATE HEPTAHYDRATE 40 MG/ML
2 INJECTION, SOLUTION INTRAVENOUS ONCE
Status: COMPLETED | OUTPATIENT
Start: 2020-11-11 | End: 2020-11-11

## 2020-11-11 RX ORDER — METOCLOPRAMIDE HYDROCHLORIDE 5 MG/ML
5 INJECTION INTRAMUSCULAR; INTRAVENOUS EVERY 6 HOURS
Status: DISCONTINUED | OUTPATIENT
Start: 2020-11-11 | End: 2020-11-12

## 2020-11-11 RX ORDER — BISACODYL 10 MG
10 SUPPOSITORY, RECTAL RECTAL DAILY PRN
Status: DISCONTINUED | OUTPATIENT
Start: 2020-11-11 | End: 2020-11-19 | Stop reason: HOSPADM

## 2020-11-11 RX ADMIN — SODIUM BICARBONATE 650 MG TABLET 1300 MG: at 08:11

## 2020-11-11 RX ADMIN — GABAPENTIN 300 MG: 300 CAPSULE ORAL at 08:11

## 2020-11-11 RX ADMIN — BISACODYL 10 MG: 10 SUPPOSITORY RECTAL at 03:11

## 2020-11-11 RX ADMIN — PROCHLORPERAZINE EDISYLATE 5 MG: 5 INJECTION INTRAMUSCULAR; INTRAVENOUS at 08:11

## 2020-11-11 RX ADMIN — METOCLOPRAMIDE 5 MG: 5 INJECTION, SOLUTION INTRAMUSCULAR; INTRAVENOUS at 05:11

## 2020-11-11 RX ADMIN — PREDNISONE 20 MG: 20 TABLET ORAL at 08:11

## 2020-11-11 RX ADMIN — PRAVASTATIN SODIUM 40 MG: 10 TABLET ORAL at 08:11

## 2020-11-11 RX ADMIN — PIPERACILLIN AND TAZOBACTAM 4.5 G: 4; .5 INJECTION, POWDER, LYOPHILIZED, FOR SOLUTION INTRAVENOUS; PARENTERAL at 06:11

## 2020-11-11 RX ADMIN — FLUCONAZOLE 200 MG: 200 TABLET ORAL at 08:11

## 2020-11-11 RX ADMIN — ONDANSETRON 4 MG: 2 INJECTION INTRAMUSCULAR; INTRAVENOUS at 05:11

## 2020-11-11 RX ADMIN — TACROLIMUS 9 MG: 1 CAPSULE ORAL at 08:11

## 2020-11-11 RX ADMIN — TACROLIMUS 9 MG: 1 CAPSULE ORAL at 06:11

## 2020-11-11 RX ADMIN — PIPERACILLIN AND TAZOBACTAM 4.5 G: 4; .5 INJECTION, POWDER, LYOPHILIZED, FOR SOLUTION INTRAVENOUS; PARENTERAL at 05:11

## 2020-11-11 RX ADMIN — VALGANCICLOVIR 450 MG: 450 TABLET, FILM COATED ORAL at 08:11

## 2020-11-11 RX ADMIN — CALCITRIOL 0.5 MCG: 0.5 CAPSULE, LIQUID FILLED ORAL at 08:11

## 2020-11-11 RX ADMIN — HEPARIN SODIUM 5000 UNITS: 5000 INJECTION INTRAVENOUS; SUBCUTANEOUS at 02:11

## 2020-11-11 RX ADMIN — METOPROLOL TARTRATE 25 MG: 25 TABLET, FILM COATED ORAL at 08:11

## 2020-11-11 RX ADMIN — VANCOMYCIN HYDROCHLORIDE 1250 MG: 1.25 INJECTION, POWDER, LYOPHILIZED, FOR SOLUTION INTRAVENOUS at 02:11

## 2020-11-11 RX ADMIN — ONDANSETRON 4 MG: 2 INJECTION INTRAMUSCULAR; INTRAVENOUS at 10:11

## 2020-11-11 RX ADMIN — MAGNESIUM SULFATE IN WATER 2 G: 40 INJECTION, SOLUTION INTRAVENOUS at 06:11

## 2020-11-11 RX ADMIN — FERROUS SULFATE TAB EC 325 MG (65 MG FE EQUIVALENT) 325 MG: 325 (65 FE) TABLET DELAYED RESPONSE at 08:11

## 2020-11-11 RX ADMIN — PANTOPRAZOLE SODIUM 40 MG: 40 INJECTION, POWDER, LYOPHILIZED, FOR SOLUTION INTRAVENOUS at 08:11

## 2020-11-11 RX ADMIN — HEPARIN SODIUM 5000 UNITS: 5000 INJECTION INTRAVENOUS; SUBCUTANEOUS at 08:11

## 2020-11-11 RX ADMIN — ACETAMINOPHEN 650 MG: 325 TABLET ORAL at 05:11

## 2020-11-11 RX ADMIN — HEPARIN SODIUM 5000 UNITS: 5000 INJECTION INTRAVENOUS; SUBCUTANEOUS at 06:11

## 2020-11-11 RX ADMIN — SULFAMETHOXAZOLE AND TRIMETHOPRIM 1 TABLET: 400; 80 TABLET ORAL at 08:11

## 2020-11-11 NOTE — PROGRESS NOTES
Unable to attain accurate blood pressure. Patient unable to stand for BP due to nausea. Other VS stable.

## 2020-11-11 NOTE — HOSPITAL COURSE
Patient readmitted with fever and n/v on 11/10/20.  Infectious work up unremarkable. Started on broad spectrum antibiotics which have since been discontinued.  CT scan reviewed and unremarkable to source of fever. Last fever 100.4F 11/11 over night.  Reglan started 11/11 for possible gastroparesis, improvement with increased dose. GI consulted. Stool studies sent per recommendations. EGD performed 11/13 with gastric ulcer. Patient is on Protonix. EGD needs to be repeated in 8 weeks.  IVF given for hydration without much improvement in Cr. Panc enzymes also elevated. Biopsy obtained 11/18.  Received hep c positive organs.  Now hep c antibody positive with elevated LFTs - will need f/u with hepatology for hep c treatment.      Interval History: No acute events overnight. Pt feeling fine this AM. Discontinued IVF and encouraged PO hydration. Class I DSA A3 detected  (2556). Biopsy 11/17 results pending. Replaced electrolytes. Monitor.

## 2020-11-11 NOTE — PROGRESS NOTES
Admit Note     PT % BLIND.     Met with patient and sister to assess needs. Patient is a 40 y.o.  male, admitted for Tachycardia [R00.0]  Prophylactic immunotherapy [Z29.8]  SIRS (systemic inflammatory response syndrome) [R65.10]  At risk for opportunistic infections [Z91.89]  Long-term use of immunosuppressant medication [Z79.899]  Anemia of chronic disease [D63.8]  Status post simultaneous kidney and pancreas transplant [Z94.0, Z94.83]  Fever, unspecified fever cause [R50.9]  Nausea and vomiting, intractability of vomiting not specified, unspecified vomiting type [R11.2] and is post kidney transplantation on 11/3/2020.      Patient admitted from home on 11/10/2020 .  At this time, patient presents as alert and oriented x 4, pleasant, well groomed, recall good, concentration/judgement good, average intelligence, calm, communicative, cooperative and asking and answering questions appropriately.  At this time, patients caregiver presents as alert and oriented x 4, pleasant, good eye contact, well groomed, recall good, concentration/judgement good, average intelligence, calm, communicative, cooperative and asking and answering questions appropriately.    Household/Family Systems     Patient resides with patient's self, at [address] [place] [address].  Support system includes pt's aunt, sister, cousin and niece.  Patient does not have dependents that are need of being cared for.     Patients primary caregiver is Dallas Oh, patients sister, phone number [phone]. Pt's aunt, Malena Rosales ph# [phone] and cousin, Jason Davalos ph# [phone] will also assist.  Confirmed patients contact information is [phone] (home);   Patient's cell is [phone].    During admission, patient's caregiver plans to stay in patient's room as allowed.  Confirmed patient and patients caregivers do have access to reliable transportation.    Cognitive Status/Learning     Patient reports  reading ability as cannot read and states patient does have difficulty with reading, writing and seeing as patient is blind.  Patient reports patient learns best by verbal instruction and repeitition.   Needed: No.   Highest education level: Attended College/Technical School    Vocation/Disability   .  Working for Income: no  Patient is disabled due to diabetes and blindness since .  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    Adherence     Patient reports a high level of adherence to patients health care regimen.  Adherence counseling and education provided. Patient verbalizes understanding.    Substance Use    Patient reports the following substance usage.    Tobacco: Pt is a former smoker. No current use.  Alcohol: Pt reports ocassional use.  Illicit Drugs/Non-prescribed Medications: none, patient denies any use.  Patient states clear understanding of the potential impact of substance use.  Substance abstinence/cessation counseling, education and resources provided and reviewed.     Services Utilizing/ADLS    Infusion Service: Prior to admission, patient utilizing? no  Home Health: Prior to admission, patient utilizing? yes Pt has home health for vitals  DME: Prior to admission, yes blind walking stick and pt working on getting continuous glucometer but has had issues, SW notified pharmacist  Pulmonary/Cardiac Rehab: Prior to admission, no  Dialysis:  Prior to admission, yes Pt last dialyzed on Tuesday   Transplant Specialty Pharmacy:  Prior to admission, no.    Prior to admission, patient reports patient was independent with ADLS and was not driving.  Patient reports patient is not able to care for self at this time due to compromised medical condition (as documented in medical record) and physical weakness. Patient indicates a willingness to care for self once medically cleared to do so.    Insurance/Medications    Insured by   Payor/Plan Subscr  Sex  Relation Sub. Ins. ID Effective Group Num   1. [place] - [place]* GIL HANDLEY 1980 Male  [id number] 10/1/15                                    [address]   2. [place] GIL HANDLEY 1980 Male  [id number] 2/1/18                                    [address] [address]      Primary Insurance (for UNOS reporting): Public Insurance - Medicare FFS (Fee For Service)  Secondary Insurance (for UNOS reporting): Public Insurance - Medicaid    Patient reports patient is able to obtain and afford medications at this time and at time of discharge.    Living Will/Healthcare Power of     Patient states patient does not have a LW and/or HCPA.   provided education regarding LW and HCPA and the completion of forms.    Coping/Mental Health    Patient is coping adequately with the aid of  family members. Pt's sister reports pt was fine. Pt was somnolent. Pt takes Buspar and no longer taking Haldol.   Patient indicates mental health difficulties.     Discharge Planning    At time of discharge, patient plans to return to [facility] apartments under the care of pt's sister.  Patients sister will transport patient.  Per rounds today, expected discharge date has not been medically determined at this time. Patient and patients caregiver  verbalize understanding and are involved in treatment planning and discharge process.    Additional Concerns    Patient is being followed for needs, education, resources, information, emotional support, supportive counseling, and for supportive and skilled discharge plan of care.  provided resource list, patient choice, psychosocial and supportive counseling, resources, education, assistance and discharge planning with patient and caregiver involvement, ongoing SW availability and services as appropriate.  remains available. Patient denies additional needs and/or concerns at this time. Patient verbalizes understanding and agreement with information  reviewed, social work availability, and how to access available resources as needed.

## 2020-11-11 NOTE — ASSESSMENT & PLAN NOTE
- Pt discharged on POD #6 from K 11/3.  - Returned to clinic today found to have fever of 101 and c/o nausea/vomiting.  - Infectious workup in process.  - continue antibiotics.  - start IVFs.  - CT abdomen/pelvis reviewed.  - Monitor.

## 2020-11-11 NOTE — PROGRESS NOTES
Patient arrived to the unit actively vomiting, vitals elevated.  Will administer zofran and reassess.

## 2020-11-11 NOTE — SUBJECTIVE & OBJECTIVE
Subjective:     Chief Complaint/Reason for Admission: fever, nausea/vomiting    History of Present Illness:  Mr. Rosales is a 40 year old male with ESRD 2/2 diabetic nephropathy s/p kidney/pancreas transplant on 11/3/20 (Thymo induction, CMV D+/R+, HCV Ab+/YIN+). Surgery without complication. Post operative course unremarkable and pt was discharged home on POD #6 with stable Cr and improving pancreatic enzymes. Pt presents to clinic today for routine appointment with complaints of nausea/vomiting. Pt reports eating a erin cheese steak for dinner. Febrile with tmax of 101. Pt was sent to ED for evaluation and hospital admission. Denies chills, sob, chest pain, sick contacts, or changes in bladder/bowel function. Plan to admit KTX for infectious workup and management of nausea/vomiting. Labs and COVID 19 pending. CT abdomen/pelvis without contrast ordered in ED.           Review of patient's allergies indicates:  No Known Allergies    Past Medical History:   Diagnosis Date    Anxiety     Cataract     Diabetes mellitus     Diabetic retinopathy     Disorder of kidney and ureter     Encounter for blood transfusion     Glaucoma     High cholesterol     Hypertension     Retinal detachment      Past Surgical History:   Procedure Laterality Date    EYE SURGERY      KIDNEY TRANSPLANT N/A 11/3/2020    Procedure: TRANSPLANT, KIDNEY;  Surgeon: Adin Romero Jr., MD;  Location: [facility] OR [address] [place] [address];  Service: Transplant;  Laterality: N/A;    LEG AMPUTATION Left     RETINAL DETACHMENT SURGERY      TOE AMPUTATION Right     TRANSPLANTATION OF PANCREAS N/A 11/3/2020    Procedure: TRANSPLANT, PANCREAS;  Surgeon: Adin Romero Jr., MD;  Location: [facility] OR [address] [place] [address];  Service: Transplant;  Laterality: N/A;     Family History     Problem Relation (Age of Onset)    Coronary artery disease Mother    Diabetes Mother, Sister, Brother, Maternal Aunt, Maternal Uncle    Glaucoma Mother    Heart disease Mother, Maternal Aunt,  "Maternal Uncle    Hypertension Mother, Brother, Maternal Aunt, Maternal Uncle    No Known Problems Father    Stroke Mother, Maternal Aunt        Tobacco Use    Smoking status: Former Smoker     Packs/day: 0.25     Years: 10.00     Pack years: 2.50    Smokeless tobacco: Never Used   Substance and Sexual Activity    Alcohol use: Yes     Comment: occasional    Drug use: No    Sexual activity: Yes     Partners: Female     Birth control/protection: Condom        Review of Systems   Constitutional: Positive for activity change, appetite change and fever. Negative for chills and fatigue.   HENT: Negative for congestion and facial swelling.    Eyes: Negative for pain, discharge and visual disturbance.   Respiratory: Negative for cough, chest tightness, shortness of breath and wheezing.    Cardiovascular: Negative for chest pain, palpitations and leg swelling.   Gastrointestinal: Positive for abdominal distention, abdominal pain, nausea and vomiting. Negative for constipation and diarrhea.   Endocrine: Negative.    Genitourinary: Negative for decreased urine volume, difficulty urinating and hematuria.   Musculoskeletal: Negative for back pain.   Skin: Positive for wound. Negative for pallor and rash.   Allergic/Immunologic: Positive for immunocompromised state.   Neurological: Negative for dizziness, tremors, weakness and light-headedness.   Hematological: Negative.    Psychiatric/Behavioral: Negative for agitation, confusion and dysphoric mood. The patient is not nervous/anxious.      Objective:     Vital Signs (Most Recent):  Temp: 98.7 °F (37.1 °C) (11/11/20 1058)  Pulse: 79 (11/11/20 1204)  Resp: 18 (11/11/20 1058)  BP: (!) 150/77 (11/11/20 1058)  SpO2: 99 % (11/11/20 1058)  Height: 5' 10" (177.8 cm)  Weight: 83.9 kg (185 lb)  Body mass index is 26.54 kg/m².     Physical Exam  Vitals signs and nursing note reviewed.   Constitutional:       Appearance: He is well-developed.   HENT:      Head: Normocephalic and " atraumatic.   Eyes:      Pupils: Pupils are equal, round, and reactive to light.   Neck:      Musculoskeletal: Normal range of motion and neck supple.      Vascular: No JVD.   Cardiovascular:      Rate and Rhythm: Normal rate and regular rhythm.      Heart sounds: Normal heart sounds. No murmur. No friction rub.   Pulmonary:      Effort: Pulmonary effort is normal. No respiratory distress.      Breath sounds: Normal breath sounds. No wheezing or rales.   Abdominal:      General: Bowel sounds are decreased. There is no distension.      Palpations: Abdomen is soft.      Tenderness: There is no abdominal tenderness.      Comments: Midline inc with staples intact no s/s/i   Musculoskeletal: Normal range of motion.   Skin:     General: Skin is warm and dry.   Neurological:      Mental Status: He is alert and oriented to person, place, and time.   Psychiatric:         Behavior: Behavior normal.         Laboratory  CBC:   Recent Labs   Lab 11/10/20  0920 11/10/20  1209 11/10/20  1314 11/11/20  0635   WBC 6.13  --  6.75 5.07   RBC 3.49*  --  3.64* 3.57*   HGB 9.2*  --  9.8* 9.4*   HCT 29.8* 30* 31.1* 30.5*     --  335 346   MCV 85  --  85 85   MCH 26.4*  --  26.9* 26.3*   MCHC 30.9*  --  31.5* 30.8*     CMP:   Recent Labs   Lab 11/05/20  0414 11/05/20  1156  11/10/20  0920 11/10/20  1222 11/11/20  0635   GLU 98  94 112*   < > 92 98 81   CALCIUM 7.6*  7.4* 8.2*   < > 8.2* 7.9* 8.4*   ALBUMIN 3.1*  3.1* 3.2*   < > 3.2* 3.3* 3.1*   PROT 5.7* 5.9*  --   --  6.5  --      137 136   < > 143 139 140   K 4.5  4.5 4.6   < > 4.1 4.7 4.7   CO2 22*  22* 22*   < > 22* 17* 20*     102 103   < > 112* 111* 112*   BUN 45*  42* 40*   < > 32* 29* 24*   CREATININE 5.0*  5.0* 4.1*   < > 2.0* 2.0* 1.9*   ALKPHOS 71 76  --   --  95  --    ALT 72* 66*  --   --  89*  --    * 60*  --   --  76*  --     < > = values in this interval not displayed.       Diagnostic Results:  None

## 2020-11-11 NOTE — PLAN OF CARE
Patient AAOx4. Patient on room air with sats 97% and above. Standing pressures only. Patient is legally blind. Sister at bedside and very active in care. Patient has remained afebrile this shift. Patient complains of nausea. Ondasteron given with mild relief. Patient had 180mL emesis. Patient request suppository since he has  not used the bathroom yet today. Will monitor for outcome.

## 2020-11-11 NOTE — PROGRESS NOTES
[facility]  Kidney Transplant  Progress Note      Reason for Follow-up: Reassessment of Kidney, Pancreas Transplant - 11/3/2020  (#1) recipient and management of immunosuppression.    ORGAN: LEFT KIDNEY    Donor Type: Donation after Brain Death    PHS Increased Risk: no   Cold Ischemia:   Induction Medications: Thymoglobulin      Subjective:     Chief Complaint/Reason for Admission: fever, nausea/vomiting    History of Present Illness:  Mr. Rosales is a 40 year old male with ESRD 2/2 diabetic nephropathy s/p kidney/pancreas transplant on 11/3/20 (Thymo induction, CMV D+/R+, HCV Ab+/YIN+). Surgery without complication. Post operative course unremarkable and pt was discharged home on POD #6 with stable Cr and improving pancreatic enzymes. Pt presents to clinic today for routine appointment with complaints of nausea/vomiting. Pt reports eating a erin cheese steak for dinner. Febrile with tmax of 101. Pt was sent to ED for evaluation and hospital admission. Denies chills, sob, chest pain, sick contacts, or changes in bladder/bowel function. Plan to admit KTX for infectious workup and management of nausea/vomiting. Labs and COVID 19 pending. CT abdomen/pelvis without contrast ordered in ED.           Review of patient's allergies indicates:  No Known Allergies    Past Medical History:   Diagnosis Date    Anxiety     Cataract     Diabetes mellitus     Diabetic retinopathy     Disorder of kidney and ureter     Encounter for blood transfusion     Glaucoma     High cholesterol     Hypertension     Retinal detachment      Past Surgical History:   Procedure Laterality Date    EYE SURGERY      KIDNEY TRANSPLANT N/A 11/3/2020    Procedure: TRANSPLANT, KIDNEY;  Surgeon: Adin Romero Jr., MD;  Location: [facility] OR [address] [place] [address];  Service: Transplant;  Laterality: N/A;    LEG AMPUTATION Left     RETINAL DETACHMENT SURGERY      TOE AMPUTATION Right     TRANSPLANTATION OF PANCREAS N/A 11/3/2020     Procedure: TRANSPLANT, PANCREAS;  Surgeon: Adin Romero Jr., MD;  Location: [facility] OR [address] [place] [address];  Service: Transplant;  Laterality: N/A;     Family History     Problem Relation (Age of Onset)    Coronary artery disease Mother    Diabetes Mother, Sister, Brother, Maternal Aunt, Maternal Uncle    Glaucoma Mother    Heart disease Mother, Maternal Aunt, Maternal Uncle    Hypertension Mother, Brother, Maternal Aunt, Maternal Uncle    No Known Problems Father    Stroke Mother, Maternal Aunt        Tobacco Use    Smoking status: Former Smoker     Packs/day: 0.25     Years: 10.00     Pack years: 2.50    Smokeless tobacco: Never Used   Substance and Sexual Activity    Alcohol use: Yes     Comment: occasional    Drug use: No    Sexual activity: Yes     Partners: Female     Birth control/protection: Condom        Review of Systems   Constitutional: Positive for activity change, appetite change and fever. Negative for chills and fatigue.   HENT: Negative for congestion and facial swelling.    Eyes: Negative for pain, discharge and visual disturbance.   Respiratory: Negative for cough, chest tightness, shortness of breath and wheezing.    Cardiovascular: Negative for chest pain, palpitations and leg swelling.   Gastrointestinal: Positive for abdominal distention, abdominal pain, nausea and vomiting. Negative for constipation and diarrhea.   Endocrine: Negative.    Genitourinary: Negative for decreased urine volume, difficulty urinating and hematuria.   Musculoskeletal: Negative for back pain.   Skin: Positive for wound. Negative for pallor and rash.   Allergic/Immunologic: Positive for immunocompromised state.   Neurological: Negative for dizziness, tremors, weakness and light-headedness.   Hematological: Negative.    Psychiatric/Behavioral: Negative for agitation, confusion and dysphoric mood. The patient is not nervous/anxious.      Objective:     Vital Signs (Most Recent):  Temp: 98.7 °F (37.1 °C) (11/11/20  "1058)  Pulse: 79 (11/11/20 1204)  Resp: 18 (11/11/20 1058)  BP: (!) 150/77 (11/11/20 1058)  SpO2: 99 % (11/11/20 1058)  Height: 5' 10" (177.8 cm)  Weight: 83.9 kg (185 lb)  Body mass index is 26.54 kg/m².     Physical Exam  Vitals signs and nursing note reviewed.   Constitutional:       Appearance: He is well-developed.   HENT:      Head: Normocephalic and atraumatic.   Eyes:      Pupils: Pupils are equal, round, and reactive to light.   Neck:      Musculoskeletal: Normal range of motion and neck supple.      Vascular: No JVD.   Cardiovascular:      Rate and Rhythm: Normal rate and regular rhythm.      Heart sounds: Normal heart sounds. No murmur. No friction rub.   Pulmonary:      Effort: Pulmonary effort is normal. No respiratory distress.      Breath sounds: Normal breath sounds. No wheezing or rales.   Abdominal:      General: Bowel sounds are decreased. There is no distension.      Palpations: Abdomen is soft.      Tenderness: There is no abdominal tenderness.      Comments: Midline inc with staples intact no s/s/i   Musculoskeletal: Normal range of motion.   Skin:     General: Skin is warm and dry.   Neurological:      Mental Status: He is alert and oriented to person, place, and time.   Psychiatric:         Behavior: Behavior normal.         Laboratory  CBC:   Recent Labs   Lab 11/10/20  0920 11/10/20  1209 11/10/20  1314 11/11/20  0635   WBC 6.13  --  6.75 5.07   RBC 3.49*  --  3.64* 3.57*   HGB 9.2*  --  9.8* 9.4*   HCT 29.8* 30* 31.1* 30.5*     --  335 346   MCV 85  --  85 85   MCH 26.4*  --  26.9* 26.3*   MCHC 30.9*  --  31.5* 30.8*     CMP:   Recent Labs   Lab 11/05/20  0414 11/05/20  1156  11/10/20  0920 11/10/20  1222 11/11/20  0635   GLU 98  94 112*   < > 92 98 81   CALCIUM 7.6*  7.4* 8.2*   < > 8.2* 7.9* 8.4*   ALBUMIN 3.1*  3.1* 3.2*   < > 3.2* 3.3* 3.1*   PROT 5.7* 5.9*  --   --  6.5  --      137 136   < > 143 139 140   K 4.5  4.5 4.6   < > 4.1 4.7 4.7   CO2 22*  22* 22*   < > " 22* 17* 20*     102 103   < > 112* 111* 112*   BUN 45*  42* 40*   < > 32* 29* 24*   CREATININE 5.0*  5.0* 4.1*   < > 2.0* 2.0* 1.9*   ALKPHOS 71 76  --   --  95  --    ALT 72* 66*  --   --  89*  --    * 60*  --   --  76*  --     < > = values in this interval not displayed.       Diagnostic Results:  None    Assessment/Plan:     * SIRS (systemic inflammatory response syndrome)  - Pt discharged on POD #6 from K 11/3.  - Returned to clinic today found to have fever of 101 and c/o nausea/vomiting.  - Infectious workup in process.  - continue antibiotics.  - start IVFs.  - CT abdomen/pelvis reviewed.  - Monitor.      Hypomagnesemia  Iv replacement      Anemia of chronic disease  - H&H stable.  - Monitor.       Status post simultaneous kidney and pancreas transplant  - s/p [facility] 11/3/20 2/2 DMI.   - Cr and pancreas enzymes stable.  - Monitor.     Gastroparesis due to DM  - Antiemetics PRN.   - start reglan  - monitor      At risk for opportunistic infections  - continue OI prophylaxis as per protocol.       Long-term use of immunosuppressant medication  - Continue prograf. Check prograf level daily, monitor for toxic side effects, and adjust dose accordingly for a therapeutic level.       Prophylactic immunotherapy  - see long term immuno.         Discharge Planning: not candidate at this time      Marisa Peña NP  Kidney Transplant  [facility]

## 2020-11-12 PROBLEM — E83.39 HYPOPHOSPHATEMIA: Status: ACTIVE | Noted: 2020-11-12

## 2020-11-12 PROBLEM — E87.20 METABOLIC ACIDOSIS: Status: ACTIVE | Noted: 2020-11-12

## 2020-11-12 PROBLEM — N18.5 CKD (CHRONIC KIDNEY DISEASE) STAGE 5, GFR LESS THAN 15 ML/MIN: Status: RESOLVED | Noted: 2020-06-17 | Resolved: 2020-11-12

## 2020-11-12 LAB
ALBUMIN SERPL BCP-MCNC: 2.5 G/DL (ref 3.5–5.2)
ALBUMIN SERPL BCP-MCNC: 2.7 G/DL (ref 3.5–5.2)
AMYLASE SERPL-CCNC: 151 U/L (ref 20–110)
ANION GAP SERPL CALC-SCNC: 10 MMOL/L (ref 8–16)
ANION GAP SERPL CALC-SCNC: 10 MMOL/L (ref 8–16)
ANISOCYTOSIS BLD QL SMEAR: SLIGHT
BASOPHILS NFR BLD: 0 % (ref 0–1.9)
BILIRUB UR QL STRIP: NEGATIVE
BUN SERPL-MCNC: 19 MG/DL (ref 6–20)
BUN SERPL-MCNC: 22 MG/DL (ref 6–20)
CALCIUM SERPL-MCNC: 6.5 MG/DL (ref 8.7–10.5)
CALCIUM SERPL-MCNC: 7.8 MG/DL (ref 8.7–10.5)
CHLORIDE SERPL-SCNC: 114 MMOL/L (ref 95–110)
CHLORIDE SERPL-SCNC: 118 MMOL/L (ref 95–110)
CLARITY UR REFRACT.AUTO: ABNORMAL
CO2 SERPL-SCNC: 14 MMOL/L (ref 23–29)
CO2 SERPL-SCNC: 17 MMOL/L (ref 23–29)
COLOR UR AUTO: YELLOW
CREAT SERPL-MCNC: 1.6 MG/DL (ref 0.5–1.4)
CREAT SERPL-MCNC: 1.9 MG/DL (ref 0.5–1.4)
DIFFERENTIAL METHOD: ABNORMAL
EOSINOPHIL NFR BLD: 0 % (ref 0–8)
ERYTHROCYTE [DISTWIDTH] IN BLOOD BY AUTOMATED COUNT: 16.8 % (ref 11.5–14.5)
EST. GFR  (AFRICAN AMERICAN): 50.6 ML/MIN/1.73 M^2
EST. GFR  (AFRICAN AMERICAN): >60 ML/MIN/1.73 M^2
EST. GFR  (NON AFRICAN AMERICAN): 43.7 ML/MIN/1.73 M^2
EST. GFR  (NON AFRICAN AMERICAN): 53.9 ML/MIN/1.73 M^2
GLUCOSE SERPL-MCNC: 65 MG/DL (ref 70–110)
GLUCOSE SERPL-MCNC: 79 MG/DL (ref 70–110)
GLUCOSE UR QL STRIP: NEGATIVE
HCT VFR BLD AUTO: 30.5 % (ref 40–54)
HGB BLD-MCNC: 9.1 G/DL (ref 14–18)
HGB UR QL STRIP: NEGATIVE
IMM GRANULOCYTES # BLD AUTO: ABNORMAL K/UL (ref 0–0.04)
IMM GRANULOCYTES NFR BLD AUTO: ABNORMAL % (ref 0–0.5)
KETONES UR QL STRIP: ABNORMAL
LEUKOCYTE ESTERASE UR QL STRIP: NEGATIVE
LIPASE SERPL-CCNC: 77 U/L (ref 4–60)
LYMPHOCYTES NFR BLD: 4 % (ref 18–48)
MAGNESIUM SERPL-MCNC: 1.3 MG/DL (ref 1.6–2.6)
MCH RBC QN AUTO: 25.9 PG (ref 27–31)
MCHC RBC AUTO-ENTMCNC: 29.8 G/DL (ref 32–36)
MCV RBC AUTO: 87 FL (ref 82–98)
METAMYELOCYTES NFR BLD MANUAL: 1 %
MONOCYTES NFR BLD: 12 % (ref 4–15)
NEUTROPHILS NFR BLD: 82 % (ref 38–73)
NEUTS BAND NFR BLD MANUAL: 1 %
NITRITE UR QL STRIP: NEGATIVE
NRBC BLD-RTO: 2 /100 WBC
OVALOCYTES BLD QL SMEAR: ABNORMAL
PH UR STRIP: 5 [PH] (ref 5–8)
PHOSPHATE SERPL-MCNC: 1.7 MG/DL (ref 2.7–4.5)
PHOSPHATE SERPL-MCNC: 2 MG/DL (ref 2.7–4.5)
PLATELET # BLD AUTO: 394 K/UL (ref 150–350)
PMV BLD AUTO: 9.4 FL (ref 9.2–12.9)
POIKILOCYTOSIS BLD QL SMEAR: SLIGHT
POLYCHROMASIA BLD QL SMEAR: ABNORMAL
POTASSIUM SERPL-SCNC: 3.4 MMOL/L (ref 3.5–5.1)
POTASSIUM SERPL-SCNC: 4.5 MMOL/L (ref 3.5–5.1)
PROT UR QL STRIP: NEGATIVE
RBC # BLD AUTO: 3.51 M/UL (ref 4.6–6.2)
SODIUM SERPL-SCNC: 141 MMOL/L (ref 136–145)
SODIUM SERPL-SCNC: 142 MMOL/L (ref 136–145)
SP GR UR STRIP: 1.02 (ref 1–1.03)
TACROLIMUS BLD-MCNC: 9.1 NG/ML (ref 5–15)
URN SPEC COLLECT METH UR: ABNORMAL
VANCOMYCIN TROUGH SERPL-MCNC: 9.2 UG/ML (ref 10–22)
WBC # BLD AUTO: 6.42 K/UL (ref 3.9–12.7)

## 2020-11-12 PROCEDURE — 85027 COMPLETE CBC AUTOMATED: CPT

## 2020-11-12 PROCEDURE — 81003 URINALYSIS AUTO W/O SCOPE: CPT

## 2020-11-12 PROCEDURE — 82150 ASSAY OF AMYLASE: CPT

## 2020-11-12 PROCEDURE — C9113 INJ PANTOPRAZOLE SODIUM, VIA: HCPCS | Performed by: NURSE PRACTITIONER

## 2020-11-12 PROCEDURE — 63600175 PHARM REV CODE 636 W HCPCS: Performed by: NURSE PRACTITIONER

## 2020-11-12 PROCEDURE — 99223 1ST HOSP IP/OBS HIGH 75: CPT | Mod: GC,,, | Performed by: INTERNAL MEDICINE

## 2020-11-12 PROCEDURE — 80202 ASSAY OF VANCOMYCIN: CPT

## 2020-11-12 PROCEDURE — 99233 PR SUBSEQUENT HOSPITAL CARE,LEVL III: ICD-10-PCS | Mod: ,,, | Performed by: NURSE PRACTITIONER

## 2020-11-12 PROCEDURE — 87040 BLOOD CULTURE FOR BACTERIA: CPT | Mod: 59

## 2020-11-12 PROCEDURE — 99223 PR INITIAL HOSPITAL CARE,LEVL III: ICD-10-PCS | Mod: GC,,, | Performed by: INTERNAL MEDICINE

## 2020-11-12 PROCEDURE — 99233 SBSQ HOSP IP/OBS HIGH 50: CPT | Mod: ,,, | Performed by: NURSE PRACTITIONER

## 2020-11-12 PROCEDURE — 80069 RENAL FUNCTION PANEL: CPT

## 2020-11-12 PROCEDURE — 83690 ASSAY OF LIPASE: CPT

## 2020-11-12 PROCEDURE — 83735 ASSAY OF MAGNESIUM: CPT

## 2020-11-12 PROCEDURE — 80069 RENAL FUNCTION PANEL: CPT | Mod: 91

## 2020-11-12 PROCEDURE — 85007 BL SMEAR W/DIFF WBC COUNT: CPT

## 2020-11-12 PROCEDURE — 25000003 PHARM REV CODE 250: Performed by: NURSE PRACTITIONER

## 2020-11-12 PROCEDURE — 80197 ASSAY OF TACROLIMUS: CPT

## 2020-11-12 PROCEDURE — 36415 COLL VENOUS BLD VENIPUNCTURE: CPT

## 2020-11-12 PROCEDURE — 20600001 HC STEP DOWN PRIVATE ROOM

## 2020-11-12 PROCEDURE — 94761 N-INVAS EAR/PLS OXIMETRY MLT: CPT

## 2020-11-12 PROCEDURE — 25000003 PHARM REV CODE 250: Performed by: INTERNAL MEDICINE

## 2020-11-12 PROCEDURE — 87086 URINE CULTURE/COLONY COUNT: CPT

## 2020-11-12 RX ORDER — GLUCAGON 1 MG
1 KIT INJECTION
Status: DISCONTINUED | OUTPATIENT
Start: 2020-11-12 | End: 2020-11-19

## 2020-11-12 RX ORDER — METOCLOPRAMIDE HYDROCHLORIDE 5 MG/ML
10 INJECTION INTRAMUSCULAR; INTRAVENOUS EVERY 6 HOURS
Status: DISCONTINUED | OUTPATIENT
Start: 2020-11-12 | End: 2020-11-15

## 2020-11-12 RX ORDER — IBUPROFEN 200 MG
16 TABLET ORAL
Status: DISCONTINUED | OUTPATIENT
Start: 2020-11-12 | End: 2020-11-19

## 2020-11-12 RX ORDER — MAGNESIUM SULFATE HEPTAHYDRATE 40 MG/ML
2 INJECTION, SOLUTION INTRAVENOUS ONCE
Status: COMPLETED | OUTPATIENT
Start: 2020-11-12 | End: 2020-11-12

## 2020-11-12 RX ORDER — PANTOPRAZOLE SODIUM 40 MG/10ML
40 INJECTION, POWDER, LYOPHILIZED, FOR SOLUTION INTRAVENOUS 2 TIMES DAILY
Status: DISCONTINUED | OUTPATIENT
Start: 2020-11-12 | End: 2020-11-18

## 2020-11-12 RX ORDER — IBUPROFEN 200 MG
24 TABLET ORAL
Status: DISCONTINUED | OUTPATIENT
Start: 2020-11-12 | End: 2020-11-19

## 2020-11-12 RX ORDER — POTASSIUM CHLORIDE 20 MEQ/1
20 TABLET, EXTENDED RELEASE ORAL 2 TIMES DAILY
Status: CANCELLED | OUTPATIENT
Start: 2020-11-12 | End: 2020-11-13

## 2020-11-12 RX ADMIN — PREDNISONE 20 MG: 20 TABLET ORAL at 10:11

## 2020-11-12 RX ADMIN — PIPERACILLIN AND TAZOBACTAM 4.5 G: 4; .5 INJECTION, POWDER, LYOPHILIZED, FOR SOLUTION INTRAVENOUS; PARENTERAL at 05:11

## 2020-11-12 RX ADMIN — PROCHLORPERAZINE EDISYLATE 5 MG: 5 INJECTION INTRAMUSCULAR; INTRAVENOUS at 09:11

## 2020-11-12 RX ADMIN — HEPARIN SODIUM 5000 UNITS: 5000 INJECTION INTRAVENOUS; SUBCUTANEOUS at 09:11

## 2020-11-12 RX ADMIN — PIPERACILLIN AND TAZOBACTAM 4.5 G: 4; .5 INJECTION, POWDER, LYOPHILIZED, FOR SOLUTION INTRAVENOUS; PARENTERAL at 12:11

## 2020-11-12 RX ADMIN — SODIUM BICARBONATE: 84 INJECTION, SOLUTION INTRAVENOUS at 04:11

## 2020-11-12 RX ADMIN — PANTOPRAZOLE SODIUM 40 MG: 40 INJECTION, POWDER, LYOPHILIZED, FOR SOLUTION INTRAVENOUS at 09:11

## 2020-11-12 RX ADMIN — SODIUM BICARBONATE 650 MG TABLET 1300 MG: at 10:11

## 2020-11-12 RX ADMIN — METOPROLOL TARTRATE 25 MG: 25 TABLET, FILM COATED ORAL at 10:11

## 2020-11-12 RX ADMIN — METOCLOPRAMIDE 10 MG: 5 INJECTION, SOLUTION INTRAMUSCULAR; INTRAVENOUS at 12:11

## 2020-11-12 RX ADMIN — CALCITRIOL 0.5 MCG: 0.5 CAPSULE, LIQUID FILLED ORAL at 10:11

## 2020-11-12 RX ADMIN — NIFEDIPINE 30 MG: 30 TABLET, FILM COATED, EXTENDED RELEASE ORAL at 10:11

## 2020-11-12 RX ADMIN — METOCLOPRAMIDE 5 MG: 5 INJECTION, SOLUTION INTRAMUSCULAR; INTRAVENOUS at 05:11

## 2020-11-12 RX ADMIN — FERROUS SULFATE TAB EC 325 MG (65 MG FE EQUIVALENT) 325 MG: 325 (65 FE) TABLET DELAYED RESPONSE at 10:11

## 2020-11-12 RX ADMIN — GABAPENTIN 300 MG: 300 CAPSULE ORAL at 09:11

## 2020-11-12 RX ADMIN — MAGNESIUM SULFATE IN WATER 2 G: 40 INJECTION, SOLUTION INTRAVENOUS at 10:11

## 2020-11-12 RX ADMIN — PIPERACILLIN AND TAZOBACTAM 4.5 G: 4; .5 INJECTION, POWDER, LYOPHILIZED, FOR SOLUTION INTRAVENOUS; PARENTERAL at 10:11

## 2020-11-12 RX ADMIN — METOCLOPRAMIDE 10 MG: 5 INJECTION, SOLUTION INTRAMUSCULAR; INTRAVENOUS at 06:11

## 2020-11-12 RX ADMIN — PRAVASTATIN SODIUM 40 MG: 10 TABLET ORAL at 10:11

## 2020-11-12 RX ADMIN — METOCLOPRAMIDE 5 MG: 5 INJECTION, SOLUTION INTRAMUSCULAR; INTRAVENOUS at 12:11

## 2020-11-12 RX ADMIN — ONDANSETRON 4 MG: 2 INJECTION INTRAMUSCULAR; INTRAVENOUS at 03:11

## 2020-11-12 RX ADMIN — TACROLIMUS 9 MG: 1 CAPSULE ORAL at 10:11

## 2020-11-12 RX ADMIN — PANTOPRAZOLE SODIUM 40 MG: 40 INJECTION, POWDER, LYOPHILIZED, FOR SOLUTION INTRAVENOUS at 10:11

## 2020-11-12 RX ADMIN — VALGANCICLOVIR 450 MG: 450 TABLET, FILM COATED ORAL at 10:11

## 2020-11-12 RX ADMIN — TACROLIMUS 9 MG: 1 CAPSULE ORAL at 06:11

## 2020-11-12 RX ADMIN — METOCLOPRAMIDE 10 MG: 5 INJECTION, SOLUTION INTRAMUSCULAR; INTRAVENOUS at 11:11

## 2020-11-12 RX ADMIN — SODIUM BICARBONATE: 84 INJECTION, SOLUTION INTRAVENOUS at 12:11

## 2020-11-12 RX ADMIN — SODIUM PHOSPHATE, MONOBASIC, MONOHYDRATE 20.01 MMOL: 276; 142 INJECTION, SOLUTION INTRAVENOUS at 09:11

## 2020-11-12 RX ADMIN — KETOCONAZOLE 100 MG: 200 TABLET ORAL at 12:11

## 2020-11-12 RX ADMIN — HEPARIN SODIUM 5000 UNITS: 5000 INJECTION INTRAVENOUS; SUBCUTANEOUS at 05:11

## 2020-11-12 RX ADMIN — GABAPENTIN 300 MG: 300 CAPSULE ORAL at 10:11

## 2020-11-12 RX ADMIN — SULFAMETHOXAZOLE AND TRIMETHOPRIM 1 TABLET: 400; 80 TABLET ORAL at 10:11

## 2020-11-12 NOTE — HPI
Patient is a 40 years old Male with past medical history of ESRD secondary to T1DM s/p Kidney pancrease transplant 11/3/20 ( Thymo induction, CMV +, HCV+) HTN, HLD presented with intractable vomiting and nausea. Patient underwent kidney, pancrease transplant 11/3 and was discharged POD# 6 without complications, Cr. And pancreatic enzymes has been stable. Patient had Yi cheese steak for dinner Monday night and presented the next morning to his clinic appointment complaining of nausea and vomiting. He was found to be febrile Tmax 101 and was sent to ED for further eval.  Patient family by bedside explained previous episodes of nausea vomiting and was previously diagnosed with gastroparesis secondary to his uncontrolled diabetes and placed on Reglan. Patient is poor historian and family not aware of previous EGD or C scope. Patient reports chronic diarrhea and usually takers Pepto bismol at home. Patient has previous diagnosis of gastroparesis but not certain of previous motility studies. He reports compliance with immunosuppressants and Valagancyclovir. Patient nausea and vomiting responsive to Phenergan and Zofran PRN.    Denies chills, sob, chest pain, sick contacts, or changes in bladder/bowel function.     In the ED patient was febrile but normotensive and hemodynamically stable. No leukocytosis on labs. Cl 114 and Co2 17. Cr 1.9 ( BL) and glucose levels has been controlled <100 as patient not tolerating PO diet. MG 1.3, Ph 2.0. LA (wnl), INR 1.3.   No melena or hematemesis noted, patient had two episodes of NBNB vomiting.   CT Abd and Pelvis with cholelithiasis, 1.6 cm fluid collection around transplant pancrease. IVF given, Zofran and phenergan PRN placed.  GI consulted for intractable nausea and vomiting.

## 2020-11-12 NOTE — PRE ADMISSION SCREENING
AAO x 4. VSS, afebrile, SpO2>95% on RA.   ML incision MARV with staples.   NS @ 100 mL/hr. Continue NPO. Scheduled Reglan. PRN Zofran and compazine for N/V  UOP unmeasured so far this shift.  Fall precautions maintained and pt repositions self.   POC reviewed with pt and sister at bedside.   See flowsheet for assessment findings.   Will continue to monitor.

## 2020-11-12 NOTE — PROGRESS NOTES
Pharmacokinetic Assessment Follow Up: IV Vancomycin    Vancomycin order has been discontinued. Pharmacy will sign off. Please reconsult as needed! Thank you!    Please contact pharmacy at extension [phone] for questions regarding this assessment.    Thank you for the consult,   Angeles Crooks

## 2020-11-12 NOTE — SUBJECTIVE & OBJECTIVE
Past Medical History:   Diagnosis Date    Anxiety     Cataract     Diabetes mellitus     Diabetic retinopathy     Disorder of kidney and ureter     Encounter for blood transfusion     Glaucoma     High cholesterol     Hypertension     Retinal detachment        Past Surgical History:   Procedure Laterality Date    EYE SURGERY      KIDNEY TRANSPLANT N/A 11/3/2020    Procedure: TRANSPLANT, KIDNEY;  Surgeon: Adin Romero Jr., MD;  Location: [address];  Service: Transplant;  Laterality: N/A;    LEG AMPUTATION Left     RETINAL DETACHMENT SURGERY      TOE AMPUTATION Right     TRANSPLANTATION OF PANCREAS N/A 11/3/2020    Procedure: TRANSPLANT, PANCREAS;  Surgeon: Adin Romero Jr., MD;  Location: [facility] OR [address] [place] [address];  Service: Transplant;  Laterality: N/A;       Review of patient's allergies indicates:  No Known Allergies  Family History     Problem Relation (Age of Onset)    Coronary artery disease Mother    Diabetes Mother, Sister, Brother, Maternal Aunt, Maternal Uncle    Glaucoma Mother    Heart disease Mother, Maternal Aunt, Maternal Uncle    Hypertension Mother, Brother, Maternal Aunt, Maternal Uncle    No Known Problems Father    Stroke Mother, Maternal Aunt        Tobacco Use    Smoking status: Former Smoker     Packs/day: 0.25     Years: 10.00     Pack years: 2.50    Smokeless tobacco: Never Used   Substance and Sexual Activity    Alcohol use: Yes     Comment: occasional    Drug use: No    Sexual activity: Yes     Partners: Female     Birth control/protection: Condom     Review of Systems   Constitutional: Positive for activity change and appetite change. Negative for chills, fatigue and fever.   HENT: Negative for congestion and facial swelling.    Eyes: Positive for visual disturbance. Negative for pain and discharge.   Respiratory: Negative for cough, chest tightness, shortness of breath and wheezing.    Cardiovascular: Negative for chest pain, palpitations and leg swelling.    Gastrointestinal: Positive for constipation, nausea and vomiting. Negative for abdominal distention, abdominal pain and diarrhea.   Endocrine: Negative.    Genitourinary: Negative for decreased urine volume, difficulty urinating and hematuria.   Musculoskeletal: Negative for back pain.   Skin: Positive for wound. Negative for pallor and rash.   Allergic/Immunologic: Positive for immunocompromised state.   Neurological: Negative for dizziness, tremors, weakness and light-headedness.   Hematological: Negative.    Psychiatric/Behavioral: Negative for agitation, confusion and dysphoric mood. The patient is not nervous/anxious.      Objective:     Vital Signs (Most Recent):  Temp: 98.6 °F (37 °C) (11/12/20 1505)  Pulse: 74 (11/12/20 1549)  Resp: 18 (11/12/20 1505)  BP: 130/66 (11/12/20 1505)  SpO2: 100 % (11/12/20 1505) Vital Signs (24h Range):  Temp:  [98.1 °F (36.7 °C)-100.4 °F (38 °C)] 98.6 °F (37 °C)  Pulse:  [] 74  Resp:  [16-18] 18  SpO2:  [98 %-100 %] 100 %  BP: (121-195)/(66-89) 130/66     Weight: 83.9 kg (185 lb) (11/10/20 1042)  Body mass index is 26.54 kg/m².      Intake/Output Summary (Last 24 hours) at 11/12/2020 1714  Last data filed at 11/12/2020 1232  Gross per 24 hour   Intake 2130 ml   Output 41 ml   Net 2089 ml       Lines/Drains/Airways     Drain                 Hemodialysis AV Fistula Left upper arm -- days                Physical Exam  Vitals signs and nursing note reviewed.   Constitutional:       Appearance: He is well-developed.   HENT:      Head: Normocephalic and atraumatic.   Eyes:      Pupils: Pupils are equal, round, and reactive to light.   Neck:      Musculoskeletal: Normal range of motion and neck supple.      Vascular: No JVD.   Cardiovascular:      Rate and Rhythm: Normal rate and regular rhythm.      Heart sounds: Normal heart sounds. No murmur. No friction rub.   Pulmonary:      Effort: Pulmonary effort is normal. No respiratory distress.      Breath sounds: Normal breath  sounds. No wheezing or rales.   Abdominal:      General: Bowel sounds are decreased. There is distension.      Palpations: Abdomen is soft.      Tenderness: There is no abdominal tenderness.      Comments: Midline inc with staples intact no s/s/i   Musculoskeletal: Normal range of motion.      Comments: L BKA    Skin:     General: Skin is warm and dry.   Neurological:      Mental Status: He is alert and oriented to person, place, and time.   Psychiatric:         Behavior: Behavior normal.         Significant Labs:  Acute Hepatitis Panel: No results for input(s): HEPBSAG, HEPAIGM, HEPCAB in the last 48 hours.    Invalid input(s): HAPBIGM  Amylase:   Recent Labs   Lab 11/12/20 0624   AMYLASE 151*     Blood Culture: No results for input(s): LABBLOO in the last 48 hours.  CBC:   Recent Labs   Lab 11/11/20 0635 11/12/20 0624   WBC 5.07 6.42   HGB 9.4* 9.1*   HCT 30.5* 30.5*    394*     BMP:   Recent Labs   Lab 11/12/20 0624 11/12/20  1037   GLU 65* 79    141   K 3.4* 4.5   * 114*   CO2 14* 17*   BUN 19 22*   CREATININE 1.6* 1.9*   CALCIUM 6.5* 7.8*   MG 1.3*  --      CMP:   Recent Labs   Lab 11/12/20  1037   GLU 79   CALCIUM 7.8*   ALBUMIN 2.7*      K 4.5   CO2 17*   *   BUN 22*   CREATININE 1.9*     Coagulation: No results for input(s): PT, INR, APTT in the last 48 hours.  CRP: No results for input(s): CRP in the last 48 hours.  ESR: No results for input(s): SEDRATE in the last 48 hours.  H.Pylori Ab IgG: No results for input(s): HPYLORIIGG in the last 48 hours.  Lipase:   Recent Labs   Lab 11/12/20 0624   LIPASE 77*     Liver Function Test:   Recent Labs   Lab 11/11/20 0635 11/12/20 0624 11/12/20  1037   ALBUMIN 3.1* 2.5* 2.7*     Peritoneal Fluid Cultures: No results for input(s): AEROBICCULTU, LABGRAM in the last 48 hours.  Stool C. diff: No results for input(s): CDIFFICILEAN, CDIFFTOX in the last 48 hours.  Stool Culture: No results for input(s): STOOLCULTURE in the last 48  hours.  Stool Ova/Cysts/Parasites: No results for input(s): STLEXAMOCP in the last 48 hours.  Stool Giardia/Crypto: No results for input(s): GIARDIAANTIG, CRSPAG in the last 48 hours.  Stool WBCs: No results for input(s): STOOLWBC in the last 48 hours.  Stool Lactoferrin: No results for input(s): LACTOFERRINS in the last 48 hours.  All pertinent lab results from the last 24 hours have been reviewed.    Significant Imaging:  Imaging results within the past 24 hours have been reviewed.

## 2020-11-12 NOTE — NURSING
Pt AAOx3, VSS- standing pressures. Tele monitoring in place.  Pt vomited x1 this AM.   Pt made NPO @ 1732. And placed on c-diff precautions.   Still waiting to collect pts stool cultures.   Bed in lowest position, locked. Call light within reach. Non skid sock on. WCTM.

## 2020-11-12 NOTE — SUBJECTIVE & OBJECTIVE
Subjective:     Chief Complaint/Reason for Admission: fever, nausea/vomiting    History of Present Illness:  Mr. Rosales is a 40 year old male with ESRD 2/2 diabetic nephropathy s/p kidney/pancreas transplant on 11/3/20 (Thymo induction, CMV D+/R+, HCV Ab+/YIN+). Surgery without complication. Post operative course unremarkable and pt was discharged home on POD #6 with stable Cr and improving pancreatic enzymes. Pt presents to clinic today for routine appointment with complaints of nausea/vomiting. Pt reports eating a erin cheese steak for dinner. Febrile with tmax of 101. Pt was sent to ED for evaluation and hospital admission. Denies chills, sob, chest pain, sick contacts, or changes in bladder/bowel function. Plan to admit [facility] for infectious workup and management of nausea/vomiting. Labs and COVID 19 pending. CT abdomen/pelvis without contrast ordered in ED.           Review of patient's allergies indicates:  No Known Allergies    Past Medical History:   Diagnosis Date    Anxiety     Cataract     Diabetes mellitus     Diabetic retinopathy     Disorder of kidney and ureter     Encounter for blood transfusion     Glaucoma     High cholesterol     Hypertension     Retinal detachment      Past Surgical History:   Procedure Laterality Date    EYE SURGERY      KIDNEY TRANSPLANT N/A 11/3/2020    Procedure: TRANSPLANT, KIDNEY;  Surgeon: Adin Romero Jr., MD;  Location: [facility] OR [address] [place] [address];  Service: Transplant;  Laterality: N/A;    LEG AMPUTATION Left     RETINAL DETACHMENT SURGERY      TOE AMPUTATION Right     TRANSPLANTATION OF PANCREAS N/A 11/3/2020    Procedure: TRANSPLANT, PANCREAS;  Surgeon: Adin Romero Jr., MD;  Location: [facility] OR [address] [place] [address];  Service: Transplant;  Laterality: N/A;     Family History     Problem Relation (Age of Onset)    Coronary artery disease Mother    Diabetes Mother, Sister, Brother, Maternal Aunt, Maternal Uncle    Glaucoma Mother    Heart disease Mother, Maternal Aunt,  "Maternal Uncle    Hypertension Mother, Brother, Maternal Aunt, Maternal Uncle    No Known Problems Father    Stroke Mother, Maternal Aunt        Tobacco Use    Smoking status: Former Smoker     Packs/day: 0.25     Years: 10.00     Pack years: 2.50    Smokeless tobacco: Never Used   Substance and Sexual Activity    Alcohol use: Yes     Comment: occasional    Drug use: No    Sexual activity: Yes     Partners: Female     Birth control/protection: Condom        Review of Systems   Constitutional: Positive for activity change, appetite change and fever. Negative for chills and fatigue.   HENT: Negative for congestion and facial swelling.    Eyes: Positive for visual disturbance. Negative for pain and discharge.   Respiratory: Negative for cough, chest tightness, shortness of breath and wheezing.    Cardiovascular: Negative for chest pain, palpitations and leg swelling.   Gastrointestinal: Positive for abdominal distention, abdominal pain, constipation, nausea and vomiting. Negative for diarrhea.   Endocrine: Negative.    Genitourinary: Negative for decreased urine volume, difficulty urinating and hematuria.   Musculoskeletal: Negative for back pain.   Skin: Positive for wound. Negative for pallor and rash.   Allergic/Immunologic: Positive for immunocompromised state.   Neurological: Negative for dizziness, tremors, weakness and light-headedness.   Hematological: Negative.    Psychiatric/Behavioral: Negative for agitation, confusion and dysphoric mood. The patient is not nervous/anxious.      Objective:     Vital Signs (Most Recent):  Temp: 99.1 °F (37.3 °C) (11/12/20 1058)  Pulse: 80 (11/12/20 1206)  Resp: 16 (11/12/20 1010)  BP: 130/66 (11/12/20 1010)  SpO2: 100 % (11/12/20 1010)  Height: 5' 10" (177.8 cm)  Weight: 83.9 kg (185 lb)  Body mass index is 26.54 kg/m².     Physical Exam  Vitals signs and nursing note reviewed.   Constitutional:       Appearance: He is well-developed.   HENT:      Head: Normocephalic and " atraumatic.   Eyes:      Pupils: Pupils are equal, round, and reactive to light.   Neck:      Musculoskeletal: Normal range of motion and neck supple.      Vascular: No JVD.   Cardiovascular:      Rate and Rhythm: Normal rate and regular rhythm.      Heart sounds: Normal heart sounds. No murmur. No friction rub.   Pulmonary:      Effort: Pulmonary effort is normal. No respiratory distress.      Breath sounds: Normal breath sounds. No wheezing or rales.   Abdominal:      General: Bowel sounds are decreased. There is distension.      Palpations: Abdomen is soft.      Tenderness: There is no abdominal tenderness.      Comments: Midline inc with staples intact no s/s/i   Musculoskeletal: Normal range of motion.      Comments: L BKA    Skin:     General: Skin is warm and dry.   Neurological:      Mental Status: He is alert and oriented to person, place, and time.   Psychiatric:         Behavior: Behavior normal.         Laboratory  CBC:   Recent Labs   Lab 11/10/20  1314 11/11/20  0635 11/12/20  0624   WBC 6.75 5.07 6.42   RBC 3.64* 3.57* 3.51*   HGB 9.8* 9.4* 9.1*   HCT 31.1* 30.5* 30.5*    346 394*   MCV 85 85 87   MCH 26.9* 26.3* 25.9*   MCHC 31.5* 30.8* 29.8*     CMP:   Recent Labs   Lab 11/10/20  1222 11/11/20  0635 11/12/20  0624 11/12/20  1037   GLU 98 81 65* 79   CALCIUM 7.9* 8.4* 6.5* 7.8*   ALBUMIN 3.3* 3.1* 2.5* 2.7*   PROT 6.5  --   --   --     140 142 141   K 4.7 4.7 3.4* 4.5   CO2 17* 20* 14* 17*   * 112* 118* 114*   BUN 29* 24* 19 22*   CREATININE 2.0* 1.9* 1.6* 1.9*   ALKPHOS 95  --   --   --    ALT 89*  --   --   --    AST 76*  --   --   --        Diagnostic Results:  CT - Abd/Pelvic: No results found for this or any previous visit.

## 2020-11-12 NOTE — CONSULTS
[facility]  Gastroenterology  Consult Note    Patient Name: Hernandez Rosales  MRN: [id number]  Admission Date: 11/10/2020  Hospital Length of Stay: 2 days  Code Status: Full Code   Attending Provider: Tanner Barr MD   Consulting Provider: Nadeen Robb MD  Primary Care Physician: Primary Doctor No  Principal Problem:SIRS (systemic inflammatory response syndrome)    Inpatient consult to Gastroenterology  Consult performed by: Nadeen Robb MD  Consult ordered by: Marisa Peña NP        Subjective:     HPI:  Patient is a 40 years old Male with past medical history of ESRD secondary to T1DM s/p Kidney pancrease transplant 11/3/20 ( Thymo induction, CMV +, HCV+) HTN, HLD presented with intractable vomiting and nausea. Patient underwent kidney, pancrease transplant 11/3 and was discharged POD# 6 without complications, Cr. And pancreatic enzymes has been stable. Patient had Yi cheese steak for dinner Monday night and presented the next morning to his clinic appointment complaining of nausea and vomiting. He was found to be febrile Tmax 101 and was sent to ED for further eval.  Patient family by bedside explained previous episodes of nausea vomiting and was previously diagnosed with gastroparesis secondary to his uncontrolled diabetes and placed on Reglan. Patient is poor historian and family not aware of previous EGD or C scope. Patient reports chronic diarrhea and usually takers Pepto bismol at home. Patient has previous diagnosis of gastroparesis but not certain of previous motility studies. He reports compliance with immunosuppressants and Valagancyclovir. Patient nausea and vomiting responsive to Phenergan and Zofran PRN.    Denies chills, sob, chest pain, sick contacts, or changes in bladder/bowel function.     In the ED patient was febrile but normotensive and hemodynamically stable. No leukocytosis on labs. Cl 114 and Co2 17. Cr 1.9 ( BL) and glucose levels has been controlled <100  as patient not tolerating PO diet. MG 1.3, Ph 2.0. LA (wnl), INR 1.3.   No melena or hematemesis noted, patient had two episodes of NBNB vomiting.   CT Abd and Pelvis with cholelithiasis, 1.6 cm fluid collection around transplant pancrease. IVF given, Zofran and phenergan PRN placed.  GI consulted for intractable nausea and vomiting.       Past Medical History:   Diagnosis Date    Anxiety     Cataract     Diabetes mellitus     Diabetic retinopathy     Disorder of kidney and ureter     Encounter for blood transfusion     Glaucoma     High cholesterol     Hypertension     Retinal detachment        Past Surgical History:   Procedure Laterality Date    EYE SURGERY      KIDNEY TRANSPLANT N/A 11/3/2020    Procedure: TRANSPLANT, KIDNEY;  Surgeon: Adin Romero Jr., MD;  Location: [facility] OR [address] [place] [address];  Service: Transplant;  Laterality: N/A;    LEG AMPUTATION Left     RETINAL DETACHMENT SURGERY      TOE AMPUTATION Right     TRANSPLANTATION OF PANCREAS N/A 11/3/2020    Procedure: TRANSPLANT, PANCREAS;  Surgeon: Adin Romero Jr., MD;  Location: [facility] OR [address] [place] [address];  Service: Transplant;  Laterality: N/A;       Review of patient's allergies indicates:  No Known Allergies  Family History     Problem Relation (Age of Onset)    Coronary artery disease Mother    Diabetes Mother, Sister, Brother, Maternal Aunt, Maternal Uncle    Glaucoma Mother    Heart disease Mother, Maternal Aunt, Maternal Uncle    Hypertension Mother, Brother, Maternal Aunt, Maternal Uncle    No Known Problems Father    Stroke Mother, Maternal Aunt        Tobacco Use    Smoking status: Former Smoker     Packs/day: 0.25     Years: 10.00     Pack years: 2.50    Smokeless tobacco: Never Used   Substance and Sexual Activity    Alcohol use: Yes     Comment: occasional    Drug use: No    Sexual activity: Yes     Partners: Female     Birth control/protection: Condom     Review of Systems   Constitutional: Positive for activity change and appetite  change. Negative for chills, fatigue and fever.   HENT: Negative for congestion and facial swelling.    Eyes: Positive for visual disturbance. Negative for pain and discharge.   Respiratory: Negative for cough, chest tightness, shortness of breath and wheezing.    Cardiovascular: Negative for chest pain, palpitations and leg swelling.   Gastrointestinal: Positive for constipation, nausea and vomiting. Negative for abdominal distention, abdominal pain and diarrhea.   Endocrine: Negative.    Genitourinary: Negative for decreased urine volume, difficulty urinating and hematuria.   Musculoskeletal: Negative for back pain.   Skin: Positive for wound. Negative for pallor and rash.   Allergic/Immunologic: Positive for immunocompromised state.   Neurological: Negative for dizziness, tremors, weakness and light-headedness.   Hematological: Negative.    Psychiatric/Behavioral: Negative for agitation, confusion and dysphoric mood. The patient is not nervous/anxious.      Objective:     Vital Signs (Most Recent):  Temp: 98.6 °F (37 °C) (11/12/20 1505)  Pulse: 74 (11/12/20 1549)  Resp: 18 (11/12/20 1505)  BP: 130/66 (11/12/20 1505)  SpO2: 100 % (11/12/20 1505) Vital Signs (24h Range):  Temp:  [98.1 °F (36.7 °C)-100.4 °F (38 °C)] 98.6 °F (37 °C)  Pulse:  [] 74  Resp:  [16-18] 18  SpO2:  [98 %-100 %] 100 %  BP: (121-195)/(66-89) 130/66     Weight: 83.9 kg (185 lb) (11/10/20 1042)  Body mass index is 26.54 kg/m².      Intake/Output Summary (Last 24 hours) at 11/12/2020 1714  Last data filed at 11/12/2020 1232  Gross per 24 hour   Intake 2130 ml   Output 41 ml   Net 2089 ml       Lines/Drains/Airways     Drain                 Hemodialysis AV Fistula Left upper arm -- days                Physical Exam  Vitals signs and nursing note reviewed.   Constitutional:       Appearance: He is well-developed.   HENT:      Head: Normocephalic and atraumatic.   Eyes:      Pupils: Pupils are equal, round, and reactive to light.   Neck:       Musculoskeletal: Normal range of motion and neck supple.      Vascular: No JVD.   Cardiovascular:      Rate and Rhythm: Normal rate and regular rhythm.      Heart sounds: Normal heart sounds. No murmur. No friction rub.   Pulmonary:      Effort: Pulmonary effort is normal. No respiratory distress.      Breath sounds: Normal breath sounds. No wheezing or rales.   Abdominal:      General: Bowel sounds are decreased. There is distension.      Palpations: Abdomen is soft.      Tenderness: There is no abdominal tenderness.      Comments: Midline inc with staples intact no s/s/i   Musculoskeletal: Normal range of motion.      Comments: L BKA    Skin:     General: Skin is warm and dry.   Neurological:      Mental Status: He is alert and oriented to person, place, and time.   Psychiatric:         Behavior: Behavior normal.         Significant Labs:  Acute Hepatitis Panel: No results for input(s): HEPBSAG, HEPAIGM, HEPCAB in the last 48 hours.    Invalid input(s): HAPBIGM  Amylase:   Recent Labs   Lab 11/12/20 0624   AMYLASE 151*     Blood Culture: No results for input(s): LABBLOO in the last 48 hours.  CBC:   Recent Labs   Lab 11/11/20  0635 11/12/20  0624   WBC 5.07 6.42   HGB 9.4* 9.1*   HCT 30.5* 30.5*    394*     BMP:   Recent Labs   Lab 11/12/20 0624 11/12/20  1037   GLU 65* 79    141   K 3.4* 4.5   * 114*   CO2 14* 17*   BUN 19 22*   CREATININE 1.6* 1.9*   CALCIUM 6.5* 7.8*   MG 1.3*  --      CMP:   Recent Labs   Lab 11/12/20  1037   GLU 79   CALCIUM 7.8*   ALBUMIN 2.7*      K 4.5   CO2 17*   *   BUN 22*   CREATININE 1.9*     Coagulation: No results for input(s): PT, INR, APTT in the last 48 hours.  CRP: No results for input(s): CRP in the last 48 hours.  ESR: No results for input(s): SEDRATE in the last 48 hours.  H.Pylori Ab IgG: No results for input(s): HPYLORIIGG in the last 48 hours.  Lipase:   Recent Labs   Lab 11/12/20 0624   LIPASE 77*     Liver Function Test:   Recent Labs    Lab 11/11/20  0635 11/12/20  0624 11/12/20  1037   ALBUMIN 3.1* 2.5* 2.7*     Peritoneal Fluid Cultures: No results for input(s): AEROBICCULTU, LABGRAM in the last 48 hours.  Stool C. diff: No results for input(s): CDIFFICILEAN, CDIFFTOX in the last 48 hours.  Stool Culture: No results for input(s): STOOLCULTURE in the last 48 hours.  Stool Ova/Cysts/Parasites: No results for input(s): STLEXAMOCP in the last 48 hours.  Stool Giardia/Crypto: No results for input(s): GIARDIAANTIG, CRSPAG in the last 48 hours.  Stool WBCs: No results for input(s): STOOLWBC in the last 48 hours.  Stool Lactoferrin: No results for input(s): LACTOFERRINS in the last 48 hours.  All pertinent lab results from the last 24 hours have been reviewed.    Significant Imaging:  Imaging results within the past 24 hours have been reviewed.    Assessment/Plan:     Nausea and vomiting  Patient is a 40 years old Male with past medical history of ESRD secondary to T1DM s/p Kidney pancrease transplant 11/3/20 ( Thymo induction, CMV +, HCV+) HTN, HLD presented with intractable vomiting and nausea.  GI consulted for intractable nausea and vomiting.  He was found to be febrile Tmax 101 and was sent to ED for further eval.  Patient family by bedside explained previous episodes of nausea vomiting and was diagnosed with gastroparesis secondary to his uncontrolled diabetes and placed on Reglan. He reports compliance with immunosuppressants and Valagancyclovir. Patient nausea and vomiting responsive to Phenergan and Zofran PRN.    In the ED patient was febrile but normotensive and hemodynamically stable. No leukocytosis on labs. Cl 114 and Co2 17. Cr 1.9 ( BL) and glucose levels has been controlled <100 as patient not tolerating PO diet. MG 1.3, Ph 2.0. LA (wnl), INR 1.3.   No melena or hematemesis noted, patient had two episodes of NBNB vomiting.   CT Abd and Pelvis with cholelithiasis, 1.6 cm fluid collection around transplant pancrease. IVF given, Zofran  and phenergan PRN placed.    - NPO midnight; possible EGD tomorrow as we monitor patient symptoms   - Scheduled Zofran, continue IV phenergan   - Continue Reglan   - GI pathogens panel stool PCR   - C. Diff Pending   - isospora and microspora stool cx pending  - Lactulose free diet     - Continue replacing lytes as needed     Hypophosphatemia  Replace to 3     Hypomagnesemia  Replace to 2         Thank you for your consult. I will follow-up with patient. Please contact us if you have any additional questions.    Nadeen Robb MD   IM PGY 1   Gastroenterology  [facility]    I have seen and examined the pt w the resident and agree w his note, assessment and plan.    40 years old Male with past medical history of ESRD secondary to T1DM s/p Kidney pancrease transplant 11/3/20 ( Thymo induction, CMV +, HCV+) HTN, HLD presented with intractable vomiting and nausea, chronic diarrhea, fever.  -Stool culture, c. Diff, o/p, giardia, studies, isospora and microspora stool cx, Gi pathogen panel  -Antiemetics prn  -On reglan  -GP diet/shakes tid  -PPI 40mg BID  -EGD if no improvement.  Higher than avg risk procedure

## 2020-11-12 NOTE — ASSESSMENT & PLAN NOTE
- Pt discharged on POD #6 from [facility] 11/3.  - Returned to clinic found to have fever of 101 and c/o nausea/vomiting.  - Infectious workup in process.  - continue antibiotics.  - continue IVFs.  - CT abdomen/pelvis reviewed.  - Monitor.

## 2020-11-12 NOTE — PROGRESS NOTES
[facility]  Kidney Transplant  Progress Note      Reason for Follow-up: Reassessment of Kidney, Pancreas Transplant - 11/3/2020  (#1) recipient and management of immunosuppression.    ORGAN: LEFT KIDNEY    Donor Type: Donation after Brain Death    PHS Increased Risk: no   Cold Ischemia:   Induction Medications: Thymoglobulin      Subjective:     Chief Complaint/Reason for Admission: fever, nausea/vomiting    History of Present Illness:  Mr. Rosales is a 40 year old male with ESRD 2/2 diabetic nephropathy s/p kidney/pancreas transplant on 11/3/20 (Thymo induction, CMV D+/R+, HCV Ab+/YIN+). Surgery without complication. Post operative course unremarkable and pt was discharged home on POD #6 with stable Cr and improving pancreatic enzymes. Pt presents to clinic today for routine appointment with complaints of nausea/vomiting. Pt reports eating a erin cheese steak for dinner. Febrile with tmax of 101. Pt was sent to ED for evaluation and hospital admission. Denies chills, sob, chest pain, sick contacts, or changes in bladder/bowel function. Plan to admit KTX for infectious workup and management of nausea/vomiting. Labs and COVID 19 pending. CT abdomen/pelvis without contrast ordered in ED.           Review of patient's allergies indicates:  No Known Allergies    Past Medical History:   Diagnosis Date    Anxiety     Cataract     Diabetes mellitus     Diabetic retinopathy     Disorder of kidney and ureter     Encounter for blood transfusion     Glaucoma     High cholesterol     Hypertension     Retinal detachment      Past Surgical History:   Procedure Laterality Date    EYE SURGERY      KIDNEY TRANSPLANT N/A 11/3/2020    Procedure: TRANSPLANT, KIDNEY;  Surgeon: Adin Romero Jr., MD;  Location: [facility] OR [address] [place] [address];  Service: Transplant;  Laterality: N/A;    LEG AMPUTATION Left     RETINAL DETACHMENT SURGERY      TOE AMPUTATION Right     TRANSPLANTATION OF PANCREAS N/A 11/3/2020     Procedure: TRANSPLANT, PANCREAS;  Surgeon: Adin Romero Jr., MD;  Location: [facility] OR [address] [place] [address];  Service: Transplant;  Laterality: N/A;     Family History     Problem Relation (Age of Onset)    Coronary artery disease Mother    Diabetes Mother, Sister, Brother, Maternal Aunt, Maternal Uncle    Glaucoma Mother    Heart disease Mother, Maternal Aunt, Maternal Uncle    Hypertension Mother, Brother, Maternal Aunt, Maternal Uncle    No Known Problems Father    Stroke Mother, Maternal Aunt        Tobacco Use    Smoking status: Former Smoker     Packs/day: 0.25     Years: 10.00     Pack years: 2.50    Smokeless tobacco: Never Used   Substance and Sexual Activity    Alcohol use: Yes     Comment: occasional    Drug use: No    Sexual activity: Yes     Partners: Female     Birth control/protection: Condom        Review of Systems   Constitutional: Positive for activity change, appetite change and fever. Negative for chills and fatigue.   HENT: Negative for congestion and facial swelling.    Eyes: Positive for visual disturbance. Negative for pain and discharge.   Respiratory: Negative for cough, chest tightness, shortness of breath and wheezing.    Cardiovascular: Negative for chest pain, palpitations and leg swelling.   Gastrointestinal: Positive for abdominal distention, abdominal pain, constipation, nausea and vomiting. Negative for diarrhea.   Endocrine: Negative.    Genitourinary: Negative for decreased urine volume, difficulty urinating and hematuria.   Musculoskeletal: Negative for back pain.   Skin: Positive for wound. Negative for pallor and rash.   Allergic/Immunologic: Positive for immunocompromised state.   Neurological: Negative for dizziness, tremors, weakness and light-headedness.   Hematological: Negative.    Psychiatric/Behavioral: Negative for agitation, confusion and dysphoric mood. The patient is not nervous/anxious.      Objective:     Vital Signs (Most Recent):  Temp: 99.1 °F (37.3 °C) (11/12/20  "1058)  Pulse: 80 (11/12/20 1206)  Resp: 16 (11/12/20 1010)  BP: 130/66 (11/12/20 1010)  SpO2: 100 % (11/12/20 1010)  Height: 5' 10" (177.8 cm)  Weight: 83.9 kg (185 lb)  Body mass index is 26.54 kg/m².     Physical Exam  Vitals signs and nursing note reviewed.   Constitutional:       Appearance: He is well-developed.   HENT:      Head: Normocephalic and atraumatic.   Eyes:      Pupils: Pupils are equal, round, and reactive to light.   Neck:      Musculoskeletal: Normal range of motion and neck supple.      Vascular: No JVD.   Cardiovascular:      Rate and Rhythm: Normal rate and regular rhythm.      Heart sounds: Normal heart sounds. No murmur. No friction rub.   Pulmonary:      Effort: Pulmonary effort is normal. No respiratory distress.      Breath sounds: Normal breath sounds. No wheezing or rales.   Abdominal:      General: Bowel sounds are decreased. There is distension.      Palpations: Abdomen is soft.      Tenderness: There is no abdominal tenderness.      Comments: Midline inc with staples intact no s/s/i   Musculoskeletal: Normal range of motion.      Comments: L BKA    Skin:     General: Skin is warm and dry.   Neurological:      Mental Status: He is alert and oriented to person, place, and time.   Psychiatric:         Behavior: Behavior normal.         Laboratory  CBC:   Recent Labs   Lab 11/10/20  1314 11/11/20  0635 11/12/20  0624   WBC 6.75 5.07 6.42   RBC 3.64* 3.57* 3.51*   HGB 9.8* 9.4* 9.1*   HCT 31.1* 30.5* 30.5*    346 394*   MCV 85 85 87   MCH 26.9* 26.3* 25.9*   MCHC 31.5* 30.8* 29.8*     CMP:   Recent Labs   Lab 11/10/20  1222 11/11/20  0635 11/12/20  0624 11/12/20  1037   GLU 98 81 65* 79   CALCIUM 7.9* 8.4* 6.5* 7.8*   ALBUMIN 3.3* 3.1* 2.5* 2.7*   PROT 6.5  --   --   --     140 142 141   K 4.7 4.7 3.4* 4.5   CO2 17* 20* 14* 17*   * 112* 118* 114*   BUN 29* 24* 19 22*   CREATININE 2.0* 1.9* 1.6* 1.9*   ALKPHOS 95  --   --   --    ALT 89*  --   --   --    AST 76*  --   --  "  --        Diagnostic Results:  CT - Abd/Pelvic: No results found for this or any previous visit.    Assessment/Plan:     * SIRS (systemic inflammatory response syndrome)  - Pt discharged on POD #6 from K 11/3.  - Returned to clinic found to have fever of 101 and c/o nausea/vomiting.  - Infectious workup in process.  - continue antibiotics.  - continue IVFs.  - CT abdomen/pelvis reviewed.  - Monitor.      Hypophosphatemia  Iv replacement  Monitor labs daily      Metabolic acidosis  - bicarb replacement      Hypomagnesemia  Iv replacement      Anemia of chronic disease  - H&H stable.  - Monitor.       Status post simultaneous kidney and pancreas transplant  - s/p SPK 11/3/20 2/2 DMI.   - Cr and pancreas enzymes stable.  - Monitor.     Gastroparesis due to DM  - Antiemetics PRN.   - started reglan 11/12  - GI consult  - monitor      At risk for opportunistic infections  - continue OI prophylaxis as per protocol.       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  - weekly hep c labs per protocol  - ordered for am 11/13      Long-term use of immunosuppressant medication  - Continue prograf. Check prograf level daily, monitor for toxic side effects, and adjust dose accordingly for a therapeutic level.       Prophylactic immunotherapy  - see long term immuno.         Discharge Planning: not candidate at this time      Marisa Peña NP  Kidney Transplant  [facility]

## 2020-11-12 NOTE — H&P (VIEW-ONLY)
[facility]  Gastroenterology  Consult Note    Patient Name: Hernandez Rosales  MRN: [id number]  Admission Date: 11/10/2020  Hospital Length of Stay: 2 days  Code Status: Full Code   Attending Provider: Tanner Barr MD   Consulting Provider: Nadeen Robb MD  Primary Care Physician: Primary Doctor No  Principal Problem:SIRS (systemic inflammatory response syndrome)    Inpatient consult to Gastroenterology  Consult performed by: Nadeen Robb MD  Consult ordered by: Marisa Peña NP        Subjective:     HPI:  Patient is a 40 years old Male with past medical history of ESRD secondary to T1DM s/p Kidney pancrease transplant 11/3/20 ( Thymo induction, CMV +, HCV+) HTN, HLD presented with intractable vomiting and nausea. Patient underwent kidney, pancrease transplant 11/3 and was discharged POD# 6 without complications, Cr. And pancreatic enzymes has been stable. Patient had Yi cheese steak for dinner Monday night and presented the next morning to his clinic appointment complaining of nausea and vomiting. He was found to be febrile Tmax 101 and was sent to ED for further eval.  Patient family by bedside explained previous episodes of nausea vomiting and was previously diagnosed with gastroparesis secondary to his uncontrolled diabetes and placed on Reglan. Patient is poor historian and family not aware of previous EGD or C scope. Patient reports chronic diarrhea and usually takers Pepto bismol at home. Patient has previous diagnosis of gastroparesis but not certain of previous motility studies. He reports compliance with immunosuppressants and Valagancyclovir. Patient nausea and vomiting responsive to Phenergan and Zofran PRN.    Denies chills, sob, chest pain, sick contacts, or changes in bladder/bowel function.     In the ED patient was febrile but normotensive and hemodynamically stable. No leukocytosis on labs. Cl 114 and Co2 17. Cr 1.9 ( BL) and glucose levels has been controlled <100  as patient not tolerating PO diet. MG 1.3, Ph 2.0. LA (wnl), INR 1.3.   No melena or hematemesis noted, patient had two episodes of NBNB vomiting.   CT Abd and Pelvis with cholelithiasis, 1.6 cm fluid collection around transplant pancrease. IVF given, Zofran and phenergan PRN placed.  GI consulted for intractable nausea and vomiting.       Past Medical History:   Diagnosis Date    Anxiety     Cataract     Diabetes mellitus     Diabetic retinopathy     Disorder of kidney and ureter     Encounter for blood transfusion     Glaucoma     High cholesterol     Hypertension     Retinal detachment        Past Surgical History:   Procedure Laterality Date    EYE SURGERY      KIDNEY TRANSPLANT N/A 11/3/2020    Procedure: TRANSPLANT, KIDNEY;  Surgeon: Adin Romero Jr., MD;  Location: [facility] OR [address] [place] [address];  Service: Transplant;  Laterality: N/A;    LEG AMPUTATION Left     RETINAL DETACHMENT SURGERY      TOE AMPUTATION Right     TRANSPLANTATION OF PANCREAS N/A 11/3/2020    Procedure: TRANSPLANT, PANCREAS;  Surgeon: Adin Romero Jr., MD;  Location: [facility] OR [address] [place] [address];  Service: Transplant;  Laterality: N/A;       Review of patient's allergies indicates:  No Known Allergies  Family History     Problem Relation (Age of Onset)    Coronary artery disease Mother    Diabetes Mother, Sister, Brother, Maternal Aunt, Maternal Uncle    Glaucoma Mother    Heart disease Mother, Maternal Aunt, Maternal Uncle    Hypertension Mother, Brother, Maternal Aunt, Maternal Uncle    No Known Problems Father    Stroke Mother, Maternal Aunt        Tobacco Use    Smoking status: Former Smoker     Packs/day: 0.25     Years: 10.00     Pack years: 2.50    Smokeless tobacco: Never Used   Substance and Sexual Activity    Alcohol use: Yes     Comment: occasional    Drug use: No    Sexual activity: Yes     Partners: Female     Birth control/protection: Condom     Review of Systems   Constitutional: Positive for activity change and appetite  change. Negative for chills, fatigue and fever.   HENT: Negative for congestion and facial swelling.    Eyes: Positive for visual disturbance. Negative for pain and discharge.   Respiratory: Negative for cough, chest tightness, shortness of breath and wheezing.    Cardiovascular: Negative for chest pain, palpitations and leg swelling.   Gastrointestinal: Positive for constipation, nausea and vomiting. Negative for abdominal distention, abdominal pain and diarrhea.   Endocrine: Negative.    Genitourinary: Negative for decreased urine volume, difficulty urinating and hematuria.   Musculoskeletal: Negative for back pain.   Skin: Positive for wound. Negative for pallor and rash.   Allergic/Immunologic: Positive for immunocompromised state.   Neurological: Negative for dizziness, tremors, weakness and light-headedness.   Hematological: Negative.    Psychiatric/Behavioral: Negative for agitation, confusion and dysphoric mood. The patient is not nervous/anxious.      Objective:     Vital Signs (Most Recent):  Temp: 98.6 °F (37 °C) (11/12/20 1505)  Pulse: 74 (11/12/20 1549)  Resp: 18 (11/12/20 1505)  BP: 130/66 (11/12/20 1505)  SpO2: 100 % (11/12/20 1505) Vital Signs (24h Range):  Temp:  [98.1 °F (36.7 °C)-100.4 °F (38 °C)] 98.6 °F (37 °C)  Pulse:  [] 74  Resp:  [16-18] 18  SpO2:  [98 %-100 %] 100 %  BP: (121-195)/(66-89) 130/66     Weight: 83.9 kg (185 lb) (11/10/20 1042)  Body mass index is 26.54 kg/m².      Intake/Output Summary (Last 24 hours) at 11/12/2020 1714  Last data filed at 11/12/2020 1232  Gross per 24 hour   Intake 2130 ml   Output 41 ml   Net 2089 ml       Lines/Drains/Airways     Drain                 Hemodialysis AV Fistula Left upper arm -- days                Physical Exam  Vitals signs and nursing note reviewed.   Constitutional:       Appearance: He is well-developed.   HENT:      Head: Normocephalic and atraumatic.   Eyes:      Pupils: Pupils are equal, round, and reactive to light.   Neck:       Musculoskeletal: Normal range of motion and neck supple.      Vascular: No JVD.   Cardiovascular:      Rate and Rhythm: Normal rate and regular rhythm.      Heart sounds: Normal heart sounds. No murmur. No friction rub.   Pulmonary:      Effort: Pulmonary effort is normal. No respiratory distress.      Breath sounds: Normal breath sounds. No wheezing or rales.   Abdominal:      General: Bowel sounds are decreased. There is distension.      Palpations: Abdomen is soft.      Tenderness: There is no abdominal tenderness.      Comments: Midline inc with staples intact no s/s/i   Musculoskeletal: Normal range of motion.      Comments: L BKA    Skin:     General: Skin is warm and dry.   Neurological:      Mental Status: He is alert and oriented to person, place, and time.   Psychiatric:         Behavior: Behavior normal.         Significant Labs:  Acute Hepatitis Panel: No results for input(s): HEPBSAG, HEPAIGM, HEPCAB in the last 48 hours.    Invalid input(s): HAPBIGM  Amylase:   Recent Labs   Lab 11/12/20 0624   AMYLASE 151*     Blood Culture: No results for input(s): LABBLOO in the last 48 hours.  CBC:   Recent Labs   Lab 11/11/20  0635 11/12/20  0624   WBC 5.07 6.42   HGB 9.4* 9.1*   HCT 30.5* 30.5*    394*     BMP:   Recent Labs   Lab 11/12/20 0624 11/12/20  1037   GLU 65* 79    141   K 3.4* 4.5   * 114*   CO2 14* 17*   BUN 19 22*   CREATININE 1.6* 1.9*   CALCIUM 6.5* 7.8*   MG 1.3*  --      CMP:   Recent Labs   Lab 11/12/20  1037   GLU 79   CALCIUM 7.8*   ALBUMIN 2.7*      K 4.5   CO2 17*   *   BUN 22*   CREATININE 1.9*     Coagulation: No results for input(s): PT, INR, APTT in the last 48 hours.  CRP: No results for input(s): CRP in the last 48 hours.  ESR: No results for input(s): SEDRATE in the last 48 hours.  H.Pylori Ab IgG: No results for input(s): HPYLORIIGG in the last 48 hours.  Lipase:   Recent Labs   Lab 11/12/20 0624   LIPASE 77*     Liver Function Test:   Recent Labs    Lab 11/11/20  0635 11/12/20  0624 11/12/20  1037   ALBUMIN 3.1* 2.5* 2.7*     Peritoneal Fluid Cultures: No results for input(s): AEROBICCULTU, LABGRAM in the last 48 hours.  Stool C. diff: No results for input(s): CDIFFICILEAN, CDIFFTOX in the last 48 hours.  Stool Culture: No results for input(s): STOOLCULTURE in the last 48 hours.  Stool Ova/Cysts/Parasites: No results for input(s): STLEXAMOCP in the last 48 hours.  Stool Giardia/Crypto: No results for input(s): GIARDIAANTIG, CRSPAG in the last 48 hours.  Stool WBCs: No results for input(s): STOOLWBC in the last 48 hours.  Stool Lactoferrin: No results for input(s): LACTOFERRINS in the last 48 hours.  All pertinent lab results from the last 24 hours have been reviewed.    Significant Imaging:  Imaging results within the past 24 hours have been reviewed.    Assessment/Plan:     Nausea and vomiting  Patient is a 40 years old Male with past medical history of ESRD secondary to T1DM s/p Kidney pancrease transplant 11/3/20 ( Thymo induction, CMV +, HCV+) HTN, HLD presented with intractable vomiting and nausea.  GI consulted for intractable nausea and vomiting.  He was found to be febrile Tmax 101 and was sent to ED for further eval.  Patient family by bedside explained previous episodes of nausea vomiting and was diagnosed with gastroparesis secondary to his uncontrolled diabetes and placed on Reglan. He reports compliance with immunosuppressants and Valagancyclovir. Patient nausea and vomiting responsive to Phenergan and Zofran PRN.    In the ED patient was febrile but normotensive and hemodynamically stable. No leukocytosis on labs. Cl 114 and Co2 17. Cr 1.9 ( BL) and glucose levels has been controlled <100 as patient not tolerating PO diet. MG 1.3, Ph 2.0. LA (wnl), INR 1.3.   No melena or hematemesis noted, patient had two episodes of NBNB vomiting.   CT Abd and Pelvis with cholelithiasis, 1.6 cm fluid collection around transplant pancrease. IVF given, Zofran  and phenergan PRN placed.    - NPO midnight; possible EGD tomorrow as we monitor patient symptoms   - Scheduled Zofran, continue IV phenergan   - Continue Reglan   - GI pathogens panel stool PCR   - C. Diff Pending   - isospora and microspora stool cx pending  - Lactulose free diet     - Continue replacing lytes as needed     Hypophosphatemia  Replace to 3     Hypomagnesemia  Replace to 2         Thank you for your consult. I will follow-up with patient. Please contact us if you have any additional questions.    Nadeen Robb MD   IM PGY 1   Gastroenterology  [facility]    I have seen and examined the pt w the resident and agree w his note, assessment and plan.    40 years old Male with past medical history of ESRD secondary to T1DM s/p Kidney pancrease transplant 11/3/20 ( Thymo induction, CMV +, HCV+) HTN, HLD presented with intractable vomiting and nausea, chronic diarrhea, fever.  -Stool culture, c. Diff, o/p, giardia, studies, isospora and microspora stool cx, Gi pathogen panel  -Antiemetics prn  -On reglan  -GP diet/shakes tid  -PPI 40mg BID  -EGD if no improvement.  Higher than avg risk procedure

## 2020-11-12 NOTE — ASSESSMENT & PLAN NOTE
Patient is a 40 years old Male with past medical history of ESRD secondary to T1DM s/p Kidney pancrease transplant 11/3/20 ( Thymo induction, CMV +, HCV+) HTN, HLD presented with intractable vomiting and nausea.  GI consulted for intractable nausea and vomiting.  He was found to be febrile Tmax 101 and was sent to ED for further eval.  Patient family by bedside explained previous episodes of nausea vomiting and was diagnosed with gastroparesis secondary to his uncontrolled diabetes and placed on Reglan. He reports compliance with immunosuppressants and Valagancyclovir. Patient nausea and vomiting responsive to Phenergan and Zofran PRN.    In the ED patient was febrile but normotensive and hemodynamically stable. No leukocytosis on labs. Cl 114 and Co2 17. Cr 1.9 ( BL) and glucose levels has been controlled <100 as patient not tolerating PO diet. MG 1.3, Ph 2.0. LA (wnl), INR 1.3.   No melena or hematemesis noted, patient had two episodes of NBNB vomiting.   CT Abd and Pelvis with cholelithiasis, 1.6 cm fluid collection around transplant pancrease. IVF given, Zofran and phenergan PRN placed.    - NPO midnight; possible EGD tomorrow as we monitor patient symptoms   - Scheduled Zofran, continue IV phenergan   - Continue Reglan   - GI pathogens panel stool PCR   - C. Diff Pending   - isospora and microspora stool cx pending  - Lactulose free diet     - Continue replacing lytes as needed

## 2020-11-13 ENCOUNTER — TELEPHONE (OUTPATIENT)
Dept: GASTROENTEROLOGY | Facility: HOSPITAL | Age: 40
End: 2020-11-13

## 2020-11-13 ENCOUNTER — ANESTHESIA EVENT (OUTPATIENT)
Dept: ENDOSCOPY | Facility: HOSPITAL | Age: 40
DRG: 699 | End: 2020-11-13
Payer: MEDICARE

## 2020-11-13 ENCOUNTER — ANESTHESIA (OUTPATIENT)
Dept: ENDOSCOPY | Facility: HOSPITAL | Age: 40
DRG: 699 | End: 2020-11-13
Payer: MEDICARE

## 2020-11-13 LAB
ALBUMIN SERPL BCP-MCNC: 3 G/DL (ref 3.5–5.2)
AMYLASE SERPL-CCNC: 217 U/L (ref 20–110)
ANION GAP SERPL CALC-SCNC: 8 MMOL/L (ref 8–16)
BACTERIA UR CULT: NO GROWTH
BASOPHILS # BLD AUTO: 0.02 K/UL (ref 0–0.2)
BASOPHILS NFR BLD: 0.3 % (ref 0–1.9)
BUN SERPL-MCNC: 18 MG/DL (ref 6–20)
C DIFF GDH STL QL: POSITIVE
C DIFF GDH STL QL: POSITIVE
C DIFF TOX A+B STL QL IA: NEGATIVE
C DIFF TOX A+B STL QL IA: NEGATIVE
C DIFF TOX GENS STL QL NAA+PROBE: NEGATIVE
C DIFF TOX GENS STL QL NAA+PROBE: NEGATIVE
CALCIUM SERPL-MCNC: 8.1 MG/DL (ref 8.7–10.5)
CHLORIDE SERPL-SCNC: 110 MMOL/L (ref 95–110)
CO2 SERPL-SCNC: 20 MMOL/L (ref 23–29)
CREAT SERPL-MCNC: 2 MG/DL (ref 0.5–1.4)
DIFFERENTIAL METHOD: ABNORMAL
EOSINOPHIL # BLD AUTO: 0.1 K/UL (ref 0–0.5)
EOSINOPHIL NFR BLD: 1.3 % (ref 0–8)
ERYTHROCYTE [DISTWIDTH] IN BLOOD BY AUTOMATED COUNT: 16.8 % (ref 11.5–14.5)
EST. GFR  (AFRICAN AMERICAN): 47.5 ML/MIN/1.73 M^2
EST. GFR  (NON AFRICAN AMERICAN): 41.1 ML/MIN/1.73 M^2
GLUCOSE SERPL-MCNC: 89 MG/DL (ref 70–110)
HCT VFR BLD AUTO: 30.8 % (ref 40–54)
HCV AB SERPL QL IA: POSITIVE
HGB BLD-MCNC: 9.7 G/DL (ref 14–18)
IMM GRANULOCYTES # BLD AUTO: 0.35 K/UL (ref 0–0.04)
IMM GRANULOCYTES NFR BLD AUTO: 5 % (ref 0–0.5)
LIPASE SERPL-CCNC: 115 U/L (ref 4–60)
LYMPHOCYTES # BLD AUTO: 0.2 K/UL (ref 1–4.8)
LYMPHOCYTES NFR BLD: 2.1 % (ref 18–48)
MAGNESIUM SERPL-MCNC: 1.6 MG/DL (ref 1.6–2.6)
MCH RBC QN AUTO: 27 PG (ref 27–31)
MCHC RBC AUTO-ENTMCNC: 31.5 G/DL (ref 32–36)
MCV RBC AUTO: 86 FL (ref 82–98)
MONOCYTES # BLD AUTO: 0.8 K/UL (ref 0.3–1)
MONOCYTES NFR BLD: 11.3 % (ref 4–15)
NEUTROPHILS # BLD AUTO: 5.7 K/UL (ref 1.8–7.7)
NEUTROPHILS NFR BLD: 80 % (ref 38–73)
NRBC BLD-RTO: 0 /100 WBC
PHOSPHATE SERPL-MCNC: 1.9 MG/DL (ref 2.7–4.5)
PLATELET # BLD AUTO: 412 K/UL (ref 150–350)
PMV BLD AUTO: 9.1 FL (ref 9.2–12.9)
POTASSIUM SERPL-SCNC: 4.1 MMOL/L (ref 3.5–5.1)
RBC # BLD AUTO: 3.59 M/UL (ref 4.6–6.2)
SODIUM SERPL-SCNC: 138 MMOL/L (ref 136–145)
TACROLIMUS BLD-MCNC: 14.1 NG/ML (ref 5–15)
WBC # BLD AUTO: 7.06 K/UL (ref 3.9–12.7)

## 2020-11-13 PROCEDURE — 20600001 HC STEP DOWN PRIVATE ROOM

## 2020-11-13 PROCEDURE — C9113 INJ PANTOPRAZOLE SODIUM, VIA: HCPCS | Performed by: NURSE PRACTITIONER

## 2020-11-13 PROCEDURE — 83735 ASSAY OF MAGNESIUM: CPT

## 2020-11-13 PROCEDURE — 88341 IMHCHEM/IMCYTCHM EA ADD ANTB: CPT | Performed by: PATHOLOGY

## 2020-11-13 PROCEDURE — 87045 FECES CULTURE AEROBIC BACT: CPT

## 2020-11-13 PROCEDURE — 87427 SHIGA-LIKE TOXIN AG IA: CPT | Mod: 59

## 2020-11-13 PROCEDURE — 27201012 HC FORCEPS, HOT/COLD, DISP: Performed by: INTERNAL MEDICINE

## 2020-11-13 PROCEDURE — 87324 CLOSTRIDIUM AG IA: CPT | Mod: 59

## 2020-11-13 PROCEDURE — 88305 TISSUE EXAM BY PATHOLOGIST: CPT | Mod: 59 | Performed by: PATHOLOGY

## 2020-11-13 PROCEDURE — 88342 IMHCHEM/IMCYTCHM 1ST ANTB: CPT | Mod: 26,,, | Performed by: PATHOLOGY

## 2020-11-13 PROCEDURE — 25000003 PHARM REV CODE 250: Performed by: NURSE PRACTITIONER

## 2020-11-13 PROCEDURE — 99233 SBSQ HOSP IP/OBS HIGH 50: CPT | Mod: ,,, | Performed by: NURSE PRACTITIONER

## 2020-11-13 PROCEDURE — 25000003 PHARM REV CODE 250: Performed by: PHYSICIAN ASSISTANT

## 2020-11-13 PROCEDURE — 87046 STOOL CULTR AEROBIC BACT EA: CPT

## 2020-11-13 PROCEDURE — 63600175 PHARM REV CODE 636 W HCPCS: Performed by: NURSE PRACTITIONER

## 2020-11-13 PROCEDURE — 82150 ASSAY OF AMYLASE: CPT

## 2020-11-13 PROCEDURE — 88342 CHG IMMUNOCYTOCHEMISTRY: ICD-10-PCS | Mod: 26,,, | Performed by: PATHOLOGY

## 2020-11-13 PROCEDURE — 63600175 PHARM REV CODE 636 W HCPCS: Performed by: STUDENT IN AN ORGANIZED HEALTH CARE EDUCATION/TRAINING PROGRAM

## 2020-11-13 PROCEDURE — D9220A PRA ANESTHESIA: Mod: CRNA,,, | Performed by: STUDENT IN AN ORGANIZED HEALTH CARE EDUCATION/TRAINING PROGRAM

## 2020-11-13 PROCEDURE — 83690 ASSAY OF LIPASE: CPT

## 2020-11-13 PROCEDURE — 87449 NOS EACH ORGANISM AG IA: CPT | Mod: 59

## 2020-11-13 PROCEDURE — 88342 IMHCHEM/IMCYTCHM 1ST ANTB: CPT | Performed by: PATHOLOGY

## 2020-11-13 PROCEDURE — 87507 IADNA-DNA/RNA PROBE TQ 12-25: CPT

## 2020-11-13 PROCEDURE — 43239 EGD BIOPSY SINGLE/MULTIPLE: CPT | Mod: ,,, | Performed by: INTERNAL MEDICINE

## 2020-11-13 PROCEDURE — 85027 COMPLETE CBC AUTOMATED: CPT

## 2020-11-13 PROCEDURE — 88305 TISSUE EXAM BY PATHOLOGIST: CPT | Mod: 26,,, | Performed by: PATHOLOGY

## 2020-11-13 PROCEDURE — D9220A PRA ANESTHESIA: ICD-10-PCS | Mod: ANES,,, | Performed by: ANESTHESIOLOGY

## 2020-11-13 PROCEDURE — 87493 C DIFF AMPLIFIED PROBE: CPT

## 2020-11-13 PROCEDURE — 87493 C DIFF AMPLIFIED PROBE: CPT | Mod: 91

## 2020-11-13 PROCEDURE — 87449 NOS EACH ORGANISM AG IA: CPT

## 2020-11-13 PROCEDURE — 43239 PR EGD, FLEX, W/BIOPSY, SGL/MULTI: ICD-10-PCS | Mod: ,,, | Performed by: INTERNAL MEDICINE

## 2020-11-13 PROCEDURE — 85007 BL SMEAR W/DIFF WBC COUNT: CPT

## 2020-11-13 PROCEDURE — 80197 ASSAY OF TACROLIMUS: CPT

## 2020-11-13 PROCEDURE — 88341 PR IHC OR ICC EACH ADD'L SINGLE ANTIBODY  STAINPR: ICD-10-PCS | Mod: 26,,, | Performed by: PATHOLOGY

## 2020-11-13 PROCEDURE — 87522 HEPATITIS C REVRS TRNSCRPJ: CPT

## 2020-11-13 PROCEDURE — 87324 CLOSTRIDIUM AG IA: CPT

## 2020-11-13 PROCEDURE — 80069 RENAL FUNCTION PANEL: CPT

## 2020-11-13 PROCEDURE — 43239 EGD BIOPSY SINGLE/MULTIPLE: CPT | Performed by: INTERNAL MEDICINE

## 2020-11-13 PROCEDURE — 37000009 HC ANESTHESIA EA ADD 15 MINS: Performed by: INTERNAL MEDICINE

## 2020-11-13 PROCEDURE — 99233 PR SUBSEQUENT HOSPITAL CARE,LEVL III: ICD-10-PCS | Mod: ,,, | Performed by: NURSE PRACTITIONER

## 2020-11-13 PROCEDURE — 25000003 PHARM REV CODE 250: Performed by: STUDENT IN AN ORGANIZED HEALTH CARE EDUCATION/TRAINING PROGRAM

## 2020-11-13 PROCEDURE — D9220A PRA ANESTHESIA: Mod: ANES,,, | Performed by: ANESTHESIOLOGY

## 2020-11-13 PROCEDURE — 25000003 PHARM REV CODE 250: Performed by: INTERNAL MEDICINE

## 2020-11-13 PROCEDURE — 86803 HEPATITIS C AB TEST: CPT

## 2020-11-13 PROCEDURE — D9220A PRA ANESTHESIA: ICD-10-PCS | Mod: CRNA,,, | Performed by: STUDENT IN AN ORGANIZED HEALTH CARE EDUCATION/TRAINING PROGRAM

## 2020-11-13 PROCEDURE — 37000008 HC ANESTHESIA 1ST 15 MINUTES: Performed by: INTERNAL MEDICINE

## 2020-11-13 PROCEDURE — 88341 IMHCHEM/IMCYTCHM EA ADD ANTB: CPT | Mod: 26,,, | Performed by: PATHOLOGY

## 2020-11-13 PROCEDURE — 88305 TISSUE EXAM BY PATHOLOGIST: ICD-10-PCS | Mod: 26,,, | Performed by: PATHOLOGY

## 2020-11-13 RX ORDER — FOLIC ACID 1 MG/1
1 TABLET ORAL DAILY
Status: DISCONTINUED | OUTPATIENT
Start: 2020-11-13 | End: 2020-11-19 | Stop reason: HOSPADM

## 2020-11-13 RX ORDER — PROPOFOL 10 MG/ML
VIAL (ML) INTRAVENOUS
Status: DISCONTINUED | OUTPATIENT
Start: 2020-11-13 | End: 2020-11-13

## 2020-11-13 RX ORDER — PROPOFOL 10 MG/ML
VIAL (ML) INTRAVENOUS CONTINUOUS PRN
Status: DISCONTINUED | OUTPATIENT
Start: 2020-11-13 | End: 2020-11-13

## 2020-11-13 RX ORDER — ONDANSETRON 2 MG/ML
INJECTION INTRAMUSCULAR; INTRAVENOUS
Status: DISCONTINUED | OUTPATIENT
Start: 2020-11-13 | End: 2020-11-13

## 2020-11-13 RX ORDER — PHENYLEPHRINE HYDROCHLORIDE 10 MG/ML
INJECTION INTRAVENOUS
Status: DISCONTINUED | OUTPATIENT
Start: 2020-11-13 | End: 2020-11-13

## 2020-11-13 RX ORDER — TACROLIMUS 1 MG/1
6 CAPSULE ORAL 2 TIMES DAILY
Status: DISCONTINUED | OUTPATIENT
Start: 2020-11-14 | End: 2020-11-15

## 2020-11-13 RX ORDER — FENTANYL CITRATE 50 UG/ML
INJECTION, SOLUTION INTRAMUSCULAR; INTRAVENOUS
Status: DISCONTINUED | OUTPATIENT
Start: 2020-11-13 | End: 2020-11-13

## 2020-11-13 RX ORDER — SODIUM CHLORIDE 9 MG/ML
INJECTION, SOLUTION INTRAVENOUS CONTINUOUS PRN
Status: DISCONTINUED | OUTPATIENT
Start: 2020-11-13 | End: 2020-11-13

## 2020-11-13 RX ORDER — TACROLIMUS 1 MG/1
6 CAPSULE ORAL 2 TIMES DAILY
Status: DISCONTINUED | OUTPATIENT
Start: 2020-11-13 | End: 2020-11-13

## 2020-11-13 RX ORDER — LIDOCAINE HYDROCHLORIDE 20 MG/ML
INJECTION INTRAVENOUS
Status: DISCONTINUED | OUTPATIENT
Start: 2020-11-13 | End: 2020-11-13

## 2020-11-13 RX ADMIN — SODIUM CHLORIDE: 0.9 INJECTION, SOLUTION INTRAVENOUS at 11:11

## 2020-11-13 RX ADMIN — LIDOCAINE HYDROCHLORIDE 60 MG: 20 INJECTION, SOLUTION INTRAVENOUS at 11:11

## 2020-11-13 RX ADMIN — GABAPENTIN 300 MG: 300 CAPSULE ORAL at 08:11

## 2020-11-13 RX ADMIN — FERROUS SULFATE TAB EC 325 MG (65 MG FE EQUIVALENT) 325 MG: 325 (65 FE) TABLET DELAYED RESPONSE at 09:11

## 2020-11-13 RX ADMIN — HEPARIN SODIUM 5000 UNITS: 5000 INJECTION INTRAVENOUS; SUBCUTANEOUS at 08:11

## 2020-11-13 RX ADMIN — PANTOPRAZOLE SODIUM 40 MG: 40 INJECTION, POWDER, LYOPHILIZED, FOR SOLUTION INTRAVENOUS at 09:11

## 2020-11-13 RX ADMIN — METOCLOPRAMIDE 10 MG: 5 INJECTION, SOLUTION INTRAMUSCULAR; INTRAVENOUS at 06:11

## 2020-11-13 RX ADMIN — SODIUM BICARBONATE: 84 INJECTION, SOLUTION INTRAVENOUS at 01:11

## 2020-11-13 RX ADMIN — PIPERACILLIN AND TAZOBACTAM 4.5 G: 4; .5 INJECTION, POWDER, LYOPHILIZED, FOR SOLUTION INTRAVENOUS; PARENTERAL at 01:11

## 2020-11-13 RX ADMIN — PHENYLEPHRINE HYDROCHLORIDE 100 MCG: 10 INJECTION INTRAVENOUS at 12:11

## 2020-11-13 RX ADMIN — CALCITRIOL 0.5 MCG: 0.5 CAPSULE, LIQUID FILLED ORAL at 09:11

## 2020-11-13 RX ADMIN — PROPOFOL 70 MG: 10 INJECTION, EMULSION INTRAVENOUS at 11:11

## 2020-11-13 RX ADMIN — PRAVASTATIN SODIUM 40 MG: 10 TABLET ORAL at 09:11

## 2020-11-13 RX ADMIN — KETOCONAZOLE 100 MG: 200 TABLET ORAL at 09:11

## 2020-11-13 RX ADMIN — PIPERACILLIN AND TAZOBACTAM 4.5 G: 4; .5 INJECTION, POWDER, LYOPHILIZED, FOR SOLUTION INTRAVENOUS; PARENTERAL at 09:11

## 2020-11-13 RX ADMIN — METOPROLOL TARTRATE 25 MG: 25 TABLET, FILM COATED ORAL at 08:11

## 2020-11-13 RX ADMIN — FENTANYL CITRATE 50 MCG: 50 INJECTION, SOLUTION INTRAMUSCULAR; INTRAVENOUS at 11:11

## 2020-11-13 RX ADMIN — GABAPENTIN 300 MG: 300 CAPSULE ORAL at 09:11

## 2020-11-13 RX ADMIN — SULFAMETHOXAZOLE AND TRIMETHOPRIM 1 TABLET: 400; 80 TABLET ORAL at 09:11

## 2020-11-13 RX ADMIN — SODIUM PHOSPHATE, MONOBASIC, MONOHYDRATE 30 MMOL: 276; 142 INJECTION, SOLUTION INTRAVENOUS at 01:11

## 2020-11-13 RX ADMIN — VALGANCICLOVIR 450 MG: 450 TABLET, FILM COATED ORAL at 09:11

## 2020-11-13 RX ADMIN — FOLIC ACID 1 MG: 1 TABLET ORAL at 01:11

## 2020-11-13 RX ADMIN — PREDNISONE 20 MG: 20 TABLET ORAL at 09:11

## 2020-11-13 RX ADMIN — TACROLIMUS 9 MG: 1 CAPSULE ORAL at 09:11

## 2020-11-13 RX ADMIN — ONDANSETRON 8 MG: 2 INJECTION, SOLUTION INTRAMUSCULAR; INTRAVENOUS at 11:11

## 2020-11-13 RX ADMIN — HEPARIN SODIUM 5000 UNITS: 5000 INJECTION INTRAVENOUS; SUBCUTANEOUS at 01:11

## 2020-11-13 RX ADMIN — METOCLOPRAMIDE 10 MG: 5 INJECTION, SOLUTION INTRAMUSCULAR; INTRAVENOUS at 01:11

## 2020-11-13 RX ADMIN — PIPERACILLIN AND TAZOBACTAM 4.5 G: 4; .5 INJECTION, POWDER, LYOPHILIZED, FOR SOLUTION INTRAVENOUS; PARENTERAL at 06:11

## 2020-11-13 RX ADMIN — PROPOFOL 200 MCG/KG/MIN: 10 INJECTION, EMULSION INTRAVENOUS at 11:11

## 2020-11-13 RX ADMIN — SODIUM CHLORIDE 1000 ML: 0.9 INJECTION, SOLUTION INTRAVENOUS at 05:11

## 2020-11-13 RX ADMIN — HEPARIN SODIUM 5000 UNITS: 5000 INJECTION INTRAVENOUS; SUBCUTANEOUS at 06:11

## 2020-11-13 RX ADMIN — PANTOPRAZOLE SODIUM 40 MG: 40 INJECTION, POWDER, LYOPHILIZED, FOR SOLUTION INTRAVENOUS at 08:11

## 2020-11-13 NOTE — DISCHARGE SUMMARY
[facility]  Kidney Transplant  Discharge Summary    Patient Name: Hernandez Rosales  MRN: [id number]  Admission Date: 11/10/2020  Hospital Length of Stay: 3 days  Discharge Date and Time:  11/19/2020 12:50 PM  Attending Physician: Iona Abreu MD  Discharging Provider: Dhara New PA-C  Primary Care Provider: Primary Doctor No    HPI:   Mr. Rosales is a 40 year old male with ESRD 2/2 diabetic nephropathy s/p kidney/pancreas transplant on 11/3/20 (Thymo induction, CMV D+/R+, HCV Ab+/YIN+). Surgery without complication. Post operative course unremarkable and pt was discharged home on POD #6 with stable Cr and improving pancreatic enzymes. Pt presents to clinic today for routine appointment with complaints of nausea/vomiting. Pt reports eating a erin cheese steak for dinner. Febrile with tmax of 101. Pt was sent to ED for evaluation and hospital admission. Denies chills, sob, chest pain, sick contacts, or changes in bladder/bowel function. Plan to admit KTX for infectious workup and management of nausea/vomiting. Labs and COVID 19 pending. CT abdomen/pelvis without contrast ordered in ED.         Procedure(s) (LRB):  EGD (ESOPHAGOGASTRODUODENOSCOPY) (N/A)     Hospital Course:    Patient readmitted with fever and n/v on 11/10/20.  Infectious work up unremarkable. Started on broad spectrum antibiotics which have since been discontinued.  CT scan reviewed and unremarkable to source of fever. Last fever 100.4F 11/11 over night. Reglan started 11/11 for possible gastroparesis, improvement with increased dose. GI consulted. Stool studies sent per recommendations (unremarkable). EGD performed 11/13 with gastric ulcer. Patient is on daily Protonix. EGD needs to be repeated in 8 weeks.    IVF given for hydration without much improvement in Cr. Pancreatic enzymes also elevated this admission. US kidney and pancreas 11/14 reviewed by surgeon. Class I DSA A3 detected (2556) 11/16/20. Kidney biopsy obtained  11/17/20. Results with 20 glomeruli , no rejection; diffuse acute tubular injury ATI 50%, c4d negative, IgA staining likely donor derived. Staff reviewed biopsy results with patient and caregiver. Unsure of etiology of ATN, possibly transient infection/GI issues/hypotension. IVF discontinued 11/18 as patient doing better with PO intake. Cr remains stable at 2.0. Amylase and lipase are now improving daily. BG controlled without insulin.    Patient doing well drinking fluids with >2L intake 11/18 prior to discharge. He was given a 500 cc NS bolus prior to discharge for slight hypotension (90s/asymptomatic) and hyperkalemia. RD consulted to review low K diet. Lopressor discontinued and Florinef started for hypotension (asymptomatic). Bactrim to be continued at this time for PCP prophylaxis as suspect Florinef will bring potassium down. He was also counseled on a high salt low K diet prior to discharge.    Of note, patient received HCV positive organs.  He is now HCV antibody positive with elevated LFTs - will need to trend labs outpatient per protocol and f/u with hepatology for hep c treatment.       Patient is stable and ready for discharge at this time. He remains afebrile and n/v has resolved. Follow up to include labs and transplant clinic appointment tomorrow, Friday 11/20/20. Stent removal with urology has been rescheduled to 11/25/20. Additional follow up as scheduled per coordinator.    Final Active Diagnoses:    Diagnosis Date Noted POA    PRINCIPAL PROBLEM:  SIRS (systemic inflammatory response syndrome) [R65.10] 11/10/2020 Yes    Metabolic acidosis [E87.2] 11/12/2020 Yes    Hypophosphatemia [E83.39] 11/12/2020 No    Nausea and vomiting [R11.2]  Unknown    Hypomagnesemia [E83.42] 11/11/2020 Yes    Gastroparesis due to DM [E11.43, K31.84] 11/10/2020 Yes    Status post simultaneous kidney and pancreas transplant [Z94.0, Z94.83] 11/10/2020 Not Applicable    Anemia of chronic disease [D63.8] 11/10/2020  Yes    At risk for opportunistic infections [Z91.89] 11/06/2020 Yes    Long-term use of immunosuppressant medication [Z79.899] 11/06/2020 Not Applicable    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 [T86.19] 11/06/2020 Yes    Prophylactic immunotherapy [Z29.8] 11/04/2020 Not Applicable      Problems Resolved During this Admission:       Treatments: See above.    Consults (From admission, onward)        Status Ordering Provider     Inpatient consult to Gastroenterology  Once     Provider:  (Not yet assigned)    Completed TERESA LANGE     Inpatient consult to Kidney Transplant Surgery  Once     Provider:  (Not yet assigned)    Acknowledged FOREIGN NICHOLS          Pending Diagnostic Studies:     Procedure Component Value Units Date/Time    CMV DNA, quantitative, PCR [[id number]] Collected: 11/13/20 0643    Order Status: Sent Lab Status: In process Updated: 11/13/20 0658    Specimen: Blood     Gastrointestinal Pathogens Panel, PCR [[id number]] Collected: 11/13/20 0023    Order Status: Sent Lab Status: In process Updated: 11/13/20 0155    Specimen: Stool     Hepatitis C RNA, quantitative, PCR [[id number]] Collected: 11/13/20 0643    Order Status: Sent Lab Status: In process Updated: 11/13/20 0658    Specimen: Blood     Lactic acid, plasma #1 [[id number]]     Order Status: Sent Lab Status: No result     Specimen: Blood     Specimen to Pathology, Surgery Gastrointestinal tract [[id number]] Collected: 11/13/20 1209    Order Status: Sent Lab Status: In process Updated: 11/13/20 1355     Pancreas Transplant Post [[id number]]     Order Status: Sent Lab Status: No result         Significant Diagnostic Studies: Labs:   CMP   Recent Labs   Lab 11/18/20  0654 11/19/20  0631 11/19/20  1228    141 140   K 4.9 5.3* 5.2*   * 110 110   CO2 19* 23 22*   GLU 86 83 122*   BUN 23* 25* 22*   CREATININE 2.1* 2.0* 2.0*   CALCIUM 8.2* 8.6* 8.9   ALBUMIN 3.0* 3.1*  --    ANIONGAP 8 8 8   ESTGFRAFRICA 44.8* 47.5* 47.5*    EGFRNONAA 38.8* 41.1* 41.1*    and CBC   Recent Labs   Lab 11/18/20  0654 11/19/20  0631   WBC 7.84 8.92   HGB 9.6* 9.5*   HCT 31.7* 31.7*   * 368*       Discharged Condition: stable    Disposition: SC locally    Follow Up: As above    Patient Instructions:   No discharge procedures on file.  Medications:  Reconciled Home Medications:      Medication List      START taking these medications    fludrocortisone 0.1 mg Tab  Commonly known as: FLORINEF  Take 1 tablet (100 mcg total) by mouth once daily.  Start taking on: November 20, 2020     folic acid 1 MG tablet  Commonly known as: FOLVITE  Take 1 tablet (1 mg total) by mouth once daily.  Start taking on: November 20, 2020     ketoconazole 200 mg Tab  Commonly known as: NIZORAL  Take 0.5 tablets (100 mg total) by mouth once daily.  Start taking on: November 20, 2020     magnesium oxide 400 mg (241.3 mg magnesium) tablet  Commonly known as: MAG-OX  Take 2 tablets (800 mg total) by mouth 2 (two) times daily.     metoclopramide HCl 10 MG tablet  Commonly known as: REGLAN  Take 1 tablet (10 mg total) by mouth 3 (three) times daily before meals.     mycophenolate 180 MG Tbec  Commonly known as: MYFORTIC  Take 4 tablets (720 mg total) by mouth 2 (two) times daily.     pantoprazole 40 MG tablet  Commonly known as: PROTONIX  Take 1 tablet (40 mg total) by mouth once daily.  Start taking on: November 20, 2020        CHANGE how you take these medications    k phos di & mono-sod phos mono 250 mg Tab  Commonly known as: K-PHOS-NEUTRAL  Take 2 tablets by mouth 2 (two) times a day.  What changed:   · how much to take  · when to take this     sodium bicarbonate 650 MG tablet  Take 2 tablets (1,300 mg total) by mouth 2 (two) times daily.  What changed: when to take this     tacrolimus 1 MG Cap  Commonly known as: PROGRAF  Take 8 capsules (8 mg total) by mouth every 12 (twelve) hours.  What changed: how much to take     valGANciclovir 450 mg Tab  Commonly known as:  VALCYTE  Take 1 tablet (450 mg total) by mouth every Mon, Wed, Fri. STOP 2/1/21  Start taking on: November 20, 2020  What changed: when to take this        CONTINUE taking these medications    ADULT ASPIRIN REGIMEN 81 MG EC tablet  Generic drug: aspirin  Take 1 tablet (81 mg total) by mouth once daily.     calcitRIOL 0.25 MCG Cap  Commonly known as: ROCALTROL  Take 1 capsule (0.25 mcg total) by mouth once daily.     docusate sodium 100 MG capsule  Commonly known as: COLACE  Take 1 capsule (100 mg total) by mouth 3 (three) times daily as needed for Constipation.     ferrous sulfate 325 (65 FE) MG EC tablet  Take 325 mg by mouth once daily.     gabapentin 300 MG capsule  Commonly known as: NEURONTIN  Take 300 mg by mouth 2 (two) times daily.     nystatin 100,000 unit/mL suspension  Commonly known as: MYCOSTATIN  Take 5 mLs (500,000 Units total) by mouth 3 (three) times daily after meals. STOP 12/7/20     oxyCODONE-acetaminophen  mg per tablet  Commonly known as: PERCOCET  Take 1 tablet by mouth every 4 (four) hours as needed for Pain.     pravastatin 40 MG tablet  Commonly known as: PRAVACHOL  Take 40 mg by mouth once daily.     predniSONE 5 MG tablet  Commonly known as: DELTASONE  Take by mouth daily: 20mg 11/7-12/6, 15mg 12/7-1/6/21, 10mg 1/7-2/6, then 5mg daily beginning 2/7/21     sulfamethoxazole-trimethoprim 400-80mg 400-80 mg per tablet  Commonly known as: BACTRIM,SEPTRA  Take 1 tablet by mouth every morning. STOP 5/3/21     VITAMIN D2 50,000 unit Cap  Generic drug: ergocalciferol  Take 1 capsule (50,000 Units total) by mouth every 7 days.        STOP taking these medications    cinacalcet 30 MG Tab  Commonly known as: SENSIPAR     famotidine 20 MG tablet  Commonly known as: PEPCID     fluconazole 200 MG Tab  Commonly known as: DIFLUCAN     metoprolol tartrate 25 MG tablet  Commonly known as: LOPRESSOR     mycophenolate 250 mg Cap  Commonly known as: CELLCEPT     NIFEdipine 30 MG (OSM) 24 hr  tablet  Commonly known as: PROCARDIA-XL     opw transplant care kit 1 Kit          Time spent caring for patient (Greater than 1/2 spent in direct face-to-face contact): > 30 minutes    Dhara New PA-C  Kidney Transplant  [facility]

## 2020-11-13 NOTE — PROGRESS NOTES
Patient's standing BP 88-92/50s. Patient asymptomatic - denies any dizziness or lightheadedness upon standing. IVF still infusing at 75cc/hr. M. JEMMA New notified - PA to order bolus. Will continue to monitor.

## 2020-11-13 NOTE — INTERVAL H&P NOTE
The patient has been examined and the H&P has been reviewed:    I concur with the findings and no changes have occurred since H&P was written.    Anesthesia risks, benefits and alternative options discussed and understood by patient/family.    Pre-Procedure H and P Addendum    Patient seen and examined.  History and exam unchanged from prior history and physical.      Procedure: EGD  Indication: nausea, satiety, poor appetite, diarrhea, r/o CMV   ASA Class: per anesthesiology  Airway: per anesthesia  Neck Mobility: full range of motion  Mallampatti score: per anesthesia  History of anesthesia problems: no  Family history of anesthesia problems: no  Anesthesia Plan: per anesthesia        Active Hospital Problems    Diagnosis  POA    *SIRS (systemic inflammatory response syndrome) [R65.10]  Yes    Metabolic acidosis [E87.2]  Yes    Hypophosphatemia [E83.39]  No    Nausea and vomiting [R11.2]  Unknown    Hypomagnesemia [E83.42]  Yes    Gastroparesis due to DM [E11.43, K31.84]  Yes    Status post simultaneous kidney and pancreas transplant [Z94.0, Z94.83]  Not Applicable    Anemia of chronic disease [D63.8]  Yes    At risk for opportunistic infections [Z91.89]  Yes    Long-term use of immunosuppressant medication [Z79.899]  Not Applicable    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 [T86.19]  Yes    Prophylactic immunotherapy [Z29.8]  Not Applicable      Resolved Hospital Problems   No resolved problems to display.

## 2020-11-13 NOTE — ASSESSMENT & PLAN NOTE
- Pt discharged on POD #6 from [facility] 11/3.  - Returned to clinic found to have fever of 101 and c/o nausea/vomiting.  - Infectious workup in process.  - continue antibiotics.  - continue IVFs.  - CT abdomen/pelvis reviewed.  - stool studies sent per GI recs  - Monitor.

## 2020-11-13 NOTE — ANESTHESIA POSTPROCEDURE EVALUATION
Anesthesia Post Evaluation    Patient: Hernandez Rosales    Procedure(s) Performed: Procedure(s) (LRB):  EGD (ESOPHAGOGASTRODUODENOSCOPY) (N/A)    Final Anesthesia Type: general    Patient location during evaluation: PACU  Patient participation: Yes- Able to Participate  Level of consciousness: awake and alert  Post-procedure vital signs: reviewed and stable  Pain management: adequate  Airway patency: patent    PONV status at discharge: No PONV  Anesthetic complications: no      Cardiovascular status: blood pressure returned to baseline  Respiratory status: unassisted, spontaneous ventilation and room air  Hydration status: euvolemic  Follow-up not needed.          Vitals Value Taken Time   /62 11/13/20 1327   Temp 37.2 °C (98.9 °F) 11/13/20 1300   Pulse 80 11/13/20 1326   Resp 20 11/13/20 1326   SpO2 100 % 11/13/20 1315   Vitals shown include unvalidated device data.      Event Time   Out of Recovery 13:17:00         Pain/Roque Score: Roque Score: 9 (11/13/2020 12:45 PM)

## 2020-11-13 NOTE — ASSESSMENT & PLAN NOTE
- Antiemetics PRN.   - started reglan 11/12  - increase reglan dose to 10mg  - GI consult - EGD 11/13 - results pending  - monitor

## 2020-11-13 NOTE — NURSING TRANSFER
Nursing Transfer Note      11/13/2020     Transfer To: [id number]    Transfer via stretcher    Transported by PCT    Medicines sent: IVF infusing    Chart send with patient: Yes    Patient reassessed at: 11/13/2020 @ 9129

## 2020-11-13 NOTE — [employer] PATIENT INSTRUCTIONS
Discharge Summary/Instructions after an Endoscopic Procedure  Patient Name: Hernandez Rosales  Patient MRN: [id number]  Patient YOB: 1980 Friday, November 13, 2020  Candis Clement MD  RESTRICTIONS:  During your procedure today, you received medications for sedation.  These   medications may affect your judgment, balance and coordination.  Therefore,   for 24 hours, you have the following restrictions:   - DO NOT drive a car, operate machinery, make legal/financial decisions,   sign important papers or drink alcohol.    ACTIVITY:  Today: no heavy lifting, straining or running due to procedural   sedation/anesthesia.  The following day: return to full activity including work.  DIET:  Eat and drink normally unless instructed otherwise.     TREATMENT FOR COMMON SIDE EFFECTS:  - Mild abdominal pain, nausea, belching, bloating or excessive gas:  rest,   eat lightly and use a heating pad.  - Sore Throat: treat with throat lozenges and/or gargle with warm salt   water.  - Because air was used during the procedure, expelling large amounts of air   from your rectum or belching is normal.  - If a bowel prep was taken, you may not have a bowel movement for 1-3 days.    This is normal.  SYMPTOMS TO WATCH FOR AND REPORT TO YOUR PHYSICIAN:  1. Abdominal pain or bloating, other than gas cramps.  2. Chest pain.  3. Back pain.  4. Signs of infection such as: chills or fever occurring within 24 hours   after the procedure.  5. Rectal bleeding, which would show as bright red, maroon, or black stools.   (A tablespoon of blood from the rectum is not serious, especially if   hemorrhoids are present.)  6. Vomiting.  7. Weakness or dizziness.  GO DIRECTLY TO THE NEAREST EMERGENCY ROOM IF YOU HAVE ANY OF THE FOLLOWING:      Difficulty breathing              Chills and/or fever over 101 F   Persistent vomiting and/or vomiting blood   Severe abdominal pain   Severe chest pain   Black, tarry stools   Bleeding- more than one  tablespoon   Any other symptom or condition that you feel may need urgent attention  Your doctor recommends these additional instructions:  If any biopsies were taken, your doctors clinic will contact you in 1 to 2   weeks with any results.  - Await pathology results.   - Return patient to hospital [facility] for ongoing care.   - Resume previous diet.   - Continue present medications.   - The findings and recommendations were discussed with the patient's primary   physician.   - Repeat upper endoscopy in 8 weeks for surveillance.  For questions, problems or results please call your physician - Candis Clement MD at Work:  [phone].  [facility], EMERGENCY ROOM PHONE NUMBER: [phone]  IF A COMPLICATION OR EMERGENCY SITUATION ARISES AND YOU ARE UNABLE TO REACH   YOUR PHYSICIAN - GO DIRECTLY TO THE EMERGENCY ROOM.  Candis Clement MD  11/13/2020 3:59:42 PM  This report has been verified and signed electronically.  [employer]

## 2020-11-13 NOTE — TRANSFER OF CARE
"Anesthesia Transfer of Care Note    Patient: Hernandez Rosales    Procedure(s) Performed: Procedure(s) (LRB):  EGD (ESOPHAGOGASTRODUODENOSCOPY) (N/A)    Patient location: PACU    Anesthesia Type: general    Transport from OR: Transported from OR on room air with adequate spontaneous ventilation    Post pain: adequate analgesia    Post assessment: no apparent anesthetic complications and tolerated procedure well    Post vital signs: stable    Level of consciousness: awake and oriented    Nausea/Vomiting: no nausea/vomiting    Complications: none    Transfer of care protocol was followed      Last vitals:   Visit Vitals  BP (!) 100/56 (BP Location: Right arm, Patient Position: Lying)   Pulse 81   Temp 37.2 °C (99 °F) (Temporal)   Resp 18   Ht 5' 10" (1.778 m)   Wt 78.2 kg (172 lb 6.4 oz)   SpO2 100%   BMI 24.74 kg/m²     "

## 2020-11-13 NOTE — TREATMENT PLAN
GI Post Procedure Treatment Plan    Interval history:  EGD completed.   - Normal esophagus.   - Z-line irregular, 40 cm from the incisors. Biopsied.   - Erythematous mucosa in the gastric body. Biopsied.   - Gastric ulcer with no stigmata of bleeding. Biopsied.   - Normal stomach. Biopsied.   - Normal examined duodenum. Biopsied.     Plan:  - the patient's symptoms improved with symptomatic management, normal EGD  - f/u pathology  - advance diet as tolerated  - we will sign off, please call with questions

## 2020-11-13 NOTE — ANESTHESIA PREPROCEDURE EVALUATION
11/13/2020  Hernandez Rosales is a 40 y.o., male.  Patient is a 40 years old Male with past medical history of ESRD secondary to T1DM s/p Kidney pancrease transplant 11/3/20 ( Thymo induction, CMV +, HCV+) HTN, HLD presented with intractable vomiting and nausea. Patient underwent kidney, pancrease transplant 11/3 and was discharged POD# 6 without complications,    Patient Active Problem List   Diagnosis    Blindness of both eyes due to diabetes mellitus    Type 2 diabetes mellitus with chronic kidney disease    Hyperkalemia    Hypertension    Status post below-knee amputation    Prophylactic immunotherapy    Hypoglycemia    Adrenal cortical steroids causing adverse effect in therapeutic use    Kidney transplanted    Status post pancreas transplantation    Immunocompromised state    History of simultaneous kidney and pancreas transplant    Long-term use of immunosuppressant medication    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    Secondary hyperparathyroidism    Renovascular hypertension    At risk for opportunistic infections    Type 1 diabetes mellitus with severe nonproliferative retinopathy    SIRS (systemic inflammatory response syndrome)    Gastroparesis due to DM    Status post simultaneous kidney and pancreas transplant    Anemia of chronic disease    Fever    Hypomagnesemia    Metabolic acidosis    Hypophosphatemia    Nausea and vomiting       Anesthesia Evaluation    I have reviewed the Patient Summary Reports.    I have reviewed the Nursing Notes. I have reviewed the NPO Status.      Review of Systems      Physical Exam  General:  Well nourished    Airway/Jaw/Neck:  Airway Findings: Mouth Opening: Normal Tongue: Normal  Mallampati: III  TM Distance: Normal, at least 6 cm      Dental:  Dental Findings:        Mental Status:  Mental Status Findings:  Cooperative,  Alert and Oriented         Anesthesia Plan  Type of Anesthesia, risks & benefits discussed:  Anesthesia Type:  general  Patient's Preference:   Intra-op Monitoring Plan: standard ASA monitors  Intra-op Monitoring Plan Comments:   Post Op Pain Control Plan: IV/PO Opioids PRN  Post Op Pain Control Plan Comments:   Induction:   IV  Beta Blocker:  Patient is not currently on a Beta-Blocker (No further documentation required).       Informed Consent: Patient understands risks and agrees with Anesthesia plan.  Questions answered. Anesthesia consent signed with patient.  ASA Score: 3     Day of Surgery Review of History & Physical:    H&P update referred to the provider.         Ready For Surgery From Anesthesia Perspective.

## 2020-11-13 NOTE — PROGRESS NOTES
Update    SW met with pt's sister, Ms. Oh, to assess for coping, continuity of care and any needs since pt was in OR. Pt's sister presents alert and oriented x4, communicative and worried. Pt's sister presents alone. Pt's sister reports wanting brother to feel better and is very supportive. SW made pt's sister aware of  on call over the weekend if any needs emerge. Pt's sister denied any concerns or needs.     TERESITA remains available at [phone].

## 2020-11-13 NOTE — ASSESSMENT & PLAN NOTE
- s/p SPK 11/3/20 2/2 DMI.   - Cr and pancreas enzymes slightly elevated - likely 2/2 dehydration  - continue iv fluids  - Monitor labs daily.

## 2020-11-13 NOTE — PLAN OF CARE
Pt AAOx4, ambulates with standby assist, VSS, afebrile. Standing BP taken. NSR on tele. L UA fistula ++. Pt NPO since 0000 per GI note. ML incision with staples, CDI. Bicarb @ 75. Zosyn given, NaP given. Stool samples collected and sent to lab, Pt under C. Diff precautions until results in. UOP= 400 so far this shift. Fall risk precautions maintained and reviewed with pt. Plan of care reviewed with pt. Pt verbalized understanding. Look at flowsheet for assessment findings. NAD noted throughout shift. Pt remains free from injury for shift. Skin free from breakdown. Will continue to monitor.

## 2020-11-13 NOTE — SUBJECTIVE & OBJECTIVE
Subjective:     Chief Complaint/Reason for Admission: fever, nausea/vomiting    History of Present Illness:  Mr. Rosales is a 40 year old male with ESRD 2/2 diabetic nephropathy s/p kidney/pancreas transplant on 11/3/20 (Thymo induction, CMV D+/R+, HCV Ab+/YIN+). Surgery without complication. Post operative course unremarkable and pt was discharged home on POD #6 with stable Cr and improving pancreatic enzymes. Pt presents to clinic today for routine appointment with complaints of nausea/vomiting. Pt reports eating a erin cheese steak for dinner. Febrile with tmax of 101. Pt was sent to ED for evaluation and hospital admission. Denies chills, sob, chest pain, sick contacts, or changes in bladder/bowel function. Plan to admit KTX for infectious workup and management of nausea/vomiting. Labs and COVID 19 pending. CT abdomen/pelvis without contrast ordered in ED.           Review of patient's allergies indicates:  No Known Allergies    Past Medical History:   Diagnosis Date    Anxiety     Cataract     Diabetes mellitus     Diabetic retinopathy     Disorder of kidney and ureter     Encounter for blood transfusion     Glaucoma     High cholesterol     Hypertension     Retinal detachment      Past Surgical History:   Procedure Laterality Date    EYE SURGERY      KIDNEY TRANSPLANT N/A 11/3/2020    Procedure: TRANSPLANT, KIDNEY;  Surgeon: Adin Romero Jr., MD;  Location: [facility] OR [address] [place] [address];  Service: Transplant;  Laterality: N/A;    LEG AMPUTATION Left     RETINAL DETACHMENT SURGERY      TOE AMPUTATION Right     TRANSPLANTATION OF PANCREAS N/A 11/3/2020    Procedure: TRANSPLANT, PANCREAS;  Surgeon: Adin Romero Jr., MD;  Location: [facility] OR [address] [place] [address];  Service: Transplant;  Laterality: N/A;     Family History     Problem Relation (Age of Onset)    Coronary artery disease Mother    Diabetes Mother, Sister, Brother, Maternal Aunt, Maternal Uncle    Glaucoma Mother    Heart disease Mother, Maternal Aunt,  "Maternal Uncle    Hypertension Mother, Brother, Maternal Aunt, Maternal Uncle    No Known Problems Father    Stroke Mother, Maternal Aunt        Tobacco Use    Smoking status: Former Smoker     Packs/day: 0.25     Years: 10.00     Pack years: 2.50    Smokeless tobacco: Never Used   Substance and Sexual Activity    Alcohol use: Yes     Comment: occasional    Drug use: No    Sexual activity: Yes     Partners: Female     Birth control/protection: Condom        Review of Systems   Constitutional: Positive for activity change, appetite change and fever. Negative for chills and fatigue.   HENT: Negative for congestion and facial swelling.    Eyes: Positive for visual disturbance. Negative for pain and discharge.   Respiratory: Negative for cough, chest tightness, shortness of breath and wheezing.    Cardiovascular: Negative for chest pain, palpitations and leg swelling.   Gastrointestinal: Positive for abdominal distention, abdominal pain and nausea. Negative for diarrhea.   Endocrine: Negative.    Genitourinary: Negative for decreased urine volume, difficulty urinating and hematuria.   Musculoskeletal: Negative for back pain.   Skin: Positive for wound. Negative for pallor and rash.   Allergic/Immunologic: Positive for immunocompromised state.   Neurological: Negative for dizziness, tremors, weakness and light-headedness.   Hematological: Negative.    Psychiatric/Behavioral: Negative for agitation, confusion and dysphoric mood. The patient is not nervous/anxious.      Objective:     Vital Signs (Most Recent):  Temp: 99 °F (37.2 °C) (11/13/20 1226)  Pulse: 68 (11/13/20 1300)  Resp: 14 (11/13/20 1300)  BP: 121/65 (11/13/20 1300)  SpO2: 99 % (11/13/20 1300)  Height: 5' 10" (177.8 cm)  Weight: 78.2 kg (172 lb 6.4 oz)  Body mass index is 24.74 kg/m².     Physical Exam  Vitals signs and nursing note reviewed.   Constitutional:       Appearance: He is well-developed.   HENT:      Head: Normocephalic and atraumatic.   Eyes: "      Pupils: Pupils are equal, round, and reactive to light.   Neck:      Musculoskeletal: Normal range of motion and neck supple.      Vascular: No JVD.   Cardiovascular:      Rate and Rhythm: Normal rate and regular rhythm.      Heart sounds: Normal heart sounds. No murmur. No friction rub.   Pulmonary:      Effort: Pulmonary effort is normal. No respiratory distress.      Breath sounds: Normal breath sounds. No wheezing or rales.   Abdominal:      General: Bowel sounds are normal.      Palpations: Abdomen is soft.      Tenderness: There is no abdominal tenderness.      Comments: Midline inc with staples intact no s/s/i   Musculoskeletal: Normal range of motion.      Comments: L BKA    Skin:     General: Skin is warm and dry.   Neurological:      Mental Status: He is alert and oriented to person, place, and time.   Psychiatric:         Behavior: Behavior normal.         Laboratory  CBC:   Recent Labs   Lab 11/11/20  0635 11/12/20  0624 11/13/20  0643   WBC 5.07 6.42 7.06   RBC 3.57* 3.51* 3.59*   HGB 9.4* 9.1* 9.7*   HCT 30.5* 30.5* 30.8*    394* 412*   MCV 85 87 86   MCH 26.3* 25.9* 27.0   MCHC 30.8* 29.8* 31.5*     CMP:   Recent Labs   Lab 11/10/20  1222  11/12/20  0624 11/12/20  1037 11/13/20  0643   GLU 98   < > 65* 79 89   CALCIUM 7.9*   < > 6.5* 7.8* 8.1*   ALBUMIN 3.3*   < > 2.5* 2.7* 3.0*   PROT 6.5  --   --   --   --       < > 142 141 138   K 4.7   < > 3.4* 4.5 4.1   CO2 17*   < > 14* 17* 20*   *   < > 118* 114* 110   BUN 29*   < > 19 22* 18   CREATININE 2.0*   < > 1.6* 1.9* 2.0*   ALKPHOS 95  --   --   --   --    ALT 89*  --   --   --   --    AST 76*  --   --   --   --     < > = values in this interval not displayed.       Diagnostic Results:  CT - Abd/Pelvic: No results found for this or any previous visit.

## 2020-11-13 NOTE — PROGRESS NOTES
Patient transported off unit via stretcher to endoscopy. NPO since MD. Sister to travel with patient.

## 2020-11-13 NOTE — PROGRESS NOTES
[facility]  Kidney Transplant  Progress Note      Reason for Follow-up: Reassessment of Kidney, Pancreas Transplant - 11/3/2020  (#1) recipient and management of immunosuppression.    ORGAN: LEFT KIDNEY    Donor Type: Donation after Brain Death    PHS Increased Risk: no   Cold Ischemia:   Induction Medications: Thymoglobulin      Subjective:     Chief Complaint/Reason for Admission: fever, nausea/vomiting    History of Present Illness:  Mr. Roslaes is a 40 year old male with ESRD 2/2 diabetic nephropathy s/p kidney/pancreas transplant on 11/3/20 (Thymo induction, CMV D+/R+, HCV Ab+/YIN+). Surgery without complication. Post operative course unremarkable and pt was discharged home on POD #6 with stable Cr and improving pancreatic enzymes. Pt presents to clinic today for routine appointment with complaints of nausea/vomiting. Pt reports eating a erin cheese steak for dinner. Febrile with tmax of 101. Pt was sent to ED for evaluation and hospital admission. Denies chills, sob, chest pain, sick contacts, or changes in bladder/bowel function. Plan to admit KTX for infectious workup and management of nausea/vomiting. Labs and COVID 19 pending. CT abdomen/pelvis without contrast ordered in ED.           Review of patient's allergies indicates:  No Known Allergies    Past Medical History:   Diagnosis Date    Anxiety     Cataract     Diabetes mellitus     Diabetic retinopathy     Disorder of kidney and ureter     Encounter for blood transfusion     Glaucoma     High cholesterol     Hypertension     Retinal detachment      Past Surgical History:   Procedure Laterality Date    EYE SURGERY      KIDNEY TRANSPLANT N/A 11/3/2020    Procedure: TRANSPLANT, KIDNEY;  Surgeon: Adin Romero Jr., MD;  Location: [facility] OR [address] [place] [address];  Service: Transplant;  Laterality: N/A;    LEG AMPUTATION Left     RETINAL DETACHMENT SURGERY      TOE AMPUTATION Right     TRANSPLANTATION OF PANCREAS N/A 11/3/2020     Procedure: TRANSPLANT, PANCREAS;  Surgeon: Adin Romero Jr., MD;  Location: [facility] OR [address] [place] [address];  Service: Transplant;  Laterality: N/A;     Family History     Problem Relation (Age of Onset)    Coronary artery disease Mother    Diabetes Mother, Sister, Brother, Maternal Aunt, Maternal Uncle    Glaucoma Mother    Heart disease Mother, Maternal Aunt, Maternal Uncle    Hypertension Mother, Brother, Maternal Aunt, Maternal Uncle    No Known Problems Father    Stroke Mother, Maternal Aunt        Tobacco Use    Smoking status: Former Smoker     Packs/day: 0.25     Years: 10.00     Pack years: 2.50    Smokeless tobacco: Never Used   Substance and Sexual Activity    Alcohol use: Yes     Comment: occasional    Drug use: No    Sexual activity: Yes     Partners: Female     Birth control/protection: Condom        Review of Systems   Constitutional: Positive for activity change, appetite change and fever. Negative for chills and fatigue.   HENT: Negative for congestion and facial swelling.    Eyes: Positive for visual disturbance. Negative for pain and discharge.   Respiratory: Negative for cough, chest tightness, shortness of breath and wheezing.    Cardiovascular: Negative for chest pain, palpitations and leg swelling.   Gastrointestinal: Positive for abdominal distention, abdominal pain and nausea. Negative for diarrhea.   Endocrine: Negative.    Genitourinary: Negative for decreased urine volume, difficulty urinating and hematuria.   Musculoskeletal: Negative for back pain.   Skin: Positive for wound. Negative for pallor and rash.   Allergic/Immunologic: Positive for immunocompromised state.   Neurological: Negative for dizziness, tremors, weakness and light-headedness.   Hematological: Negative.    Psychiatric/Behavioral: Negative for agitation, confusion and dysphoric mood. The patient is not nervous/anxious.      Objective:     Vital Signs (Most Recent):  Temp: 99 °F (37.2 °C) (11/13/20 1226)  Pulse: 68 (11/13/20  "1300)  Resp: 14 (11/13/20 1300)  BP: 121/65 (11/13/20 1300)  SpO2: 99 % (11/13/20 1300)  Height: 5' 10" (177.8 cm)  Weight: 78.2 kg (172 lb 6.4 oz)  Body mass index is 24.74 kg/m².     Physical Exam  Vitals signs and nursing note reviewed.   Constitutional:       Appearance: He is well-developed.   HENT:      Head: Normocephalic and atraumatic.   Eyes:      Pupils: Pupils are equal, round, and reactive to light.   Neck:      Musculoskeletal: Normal range of motion and neck supple.      Vascular: No JVD.   Cardiovascular:      Rate and Rhythm: Normal rate and regular rhythm.      Heart sounds: Normal heart sounds. No murmur. No friction rub.   Pulmonary:      Effort: Pulmonary effort is normal. No respiratory distress.      Breath sounds: Normal breath sounds. No wheezing or rales.   Abdominal:      General: Bowel sounds are normal.      Palpations: Abdomen is soft.      Tenderness: There is no abdominal tenderness.      Comments: Midline inc with staples intact no s/s/i   Musculoskeletal: Normal range of motion.      Comments: L BKA    Skin:     General: Skin is warm and dry.   Neurological:      Mental Status: He is alert and oriented to person, place, and time.   Psychiatric:         Behavior: Behavior normal.         Laboratory  CBC:   Recent Labs   Lab 11/11/20  0635 11/12/20  0624 11/13/20  0643   WBC 5.07 6.42 7.06   RBC 3.57* 3.51* 3.59*   HGB 9.4* 9.1* 9.7*   HCT 30.5* 30.5* 30.8*    394* 412*   MCV 85 87 86   MCH 26.3* 25.9* 27.0   MCHC 30.8* 29.8* 31.5*     CMP:   Recent Labs   Lab 11/10/20  1222  11/12/20  0624 11/12/20  1037 11/13/20  0643   GLU 98   < > 65* 79 89   CALCIUM 7.9*   < > 6.5* 7.8* 8.1*   ALBUMIN 3.3*   < > 2.5* 2.7* 3.0*   PROT 6.5  --   --   --   --       < > 142 141 138   K 4.7   < > 3.4* 4.5 4.1   CO2 17*   < > 14* 17* 20*   *   < > 118* 114* 110   BUN 29*   < > 19 22* 18   CREATININE 2.0*   < > 1.6* 1.9* 2.0*   ALKPHOS 95  --   --   --   --    ALT 89*  --   --   --   " --    AST 76*  --   --   --   --     < > = values in this interval not displayed.       Diagnostic Results:  CT - Abd/Pelvic: No results found for this or any previous visit.    Assessment/Plan:     * SIRS (systemic inflammatory response syndrome)  - Pt discharged on POD #6 from K 11/3.  - Returned to clinic found to have fever of 101 and c/o nausea/vomiting.  - Infectious workup in process.  - continue antibiotics.  - continue IVFs.  - CT abdomen/pelvis reviewed.  - stool studies sent per GI recs  - Monitor.      Hypophosphatemia  Iv replacement  Monitor labs daily      Metabolic acidosis  - continue bicarb replacement      Hypomagnesemia  Iv replacement      Anemia of chronic disease  - H&H stable.  - Monitor.       Status post simultaneous kidney and pancreas transplant  - s/p K 11/3/20 2/2 DMI.   - Cr and pancreas enzymes slightly elevated - likely 2/2 dehydration  - continue iv fluids  - Monitor labs daily.     Gastroparesis due to DM  - Antiemetics PRN.   - started reglan 11/12  - increase reglan dose to 10mg  - GI consult - EGD 11/13 - results pending  - monitor      At risk for opportunistic infections  - continue OI prophylaxis as per protocol.       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  - weekly hep c labs per protocol  - ordered for am 11/13      Long-term use of immunosuppressant medication  - Continue prograf. Check prograf level daily, monitor for toxic side effects, and adjust dose accordingly for a therapeutic level.       Prophylactic immunotherapy  - see long term immuno.         Discharge Planning: not candidate at this time      Marisa Peña NP  Kidney Transplant  [facility]

## 2020-11-14 LAB
ALBUMIN SERPL BCP-MCNC: 2.8 G/DL (ref 3.5–5.2)
AMYLASE SERPL-CCNC: 164 U/L (ref 20–110)
ANION GAP SERPL CALC-SCNC: 7 MMOL/L (ref 8–16)
ANISOCYTOSIS BLD QL SMEAR: SLIGHT
BASOPHILS # BLD AUTO: ABNORMAL K/UL (ref 0–0.2)
BASOPHILS NFR BLD: 0 % (ref 0–1.9)
BUN SERPL-MCNC: 13 MG/DL (ref 6–20)
CALCIUM SERPL-MCNC: 7.7 MG/DL (ref 8.7–10.5)
CHLORIDE SERPL-SCNC: 109 MMOL/L (ref 95–110)
CO2 SERPL-SCNC: 24 MMOL/L (ref 23–29)
CREAT SERPL-MCNC: 2.3 MG/DL (ref 0.5–1.4)
DIFFERENTIAL METHOD: ABNORMAL
EOSINOPHIL # BLD AUTO: ABNORMAL K/UL (ref 0–0.5)
EOSINOPHIL NFR BLD: 0 % (ref 0–8)
ERYTHROCYTE [DISTWIDTH] IN BLOOD BY AUTOMATED COUNT: 17.2 % (ref 11.5–14.5)
EST. GFR  (AFRICAN AMERICAN): 40.1 ML/MIN/1.73 M^2
EST. GFR  (NON AFRICAN AMERICAN): 34.7 ML/MIN/1.73 M^2
GLUCOSE SERPL-MCNC: 87 MG/DL (ref 70–110)
HCT VFR BLD AUTO: 29.3 % (ref 40–54)
HGB BLD-MCNC: 9 G/DL (ref 14–18)
HYPOCHROMIA BLD QL SMEAR: ABNORMAL
IMM GRANULOCYTES # BLD AUTO: ABNORMAL K/UL (ref 0–0.04)
IMM GRANULOCYTES NFR BLD AUTO: ABNORMAL % (ref 0–0.5)
LIPASE SERPL-CCNC: 105 U/L (ref 4–60)
LYMPHOCYTES # BLD AUTO: ABNORMAL K/UL (ref 1–4.8)
LYMPHOCYTES NFR BLD: 4 % (ref 18–48)
MAGNESIUM SERPL-MCNC: 1.4 MG/DL (ref 1.6–2.6)
MCH RBC QN AUTO: 26.5 PG (ref 27–31)
MCHC RBC AUTO-ENTMCNC: 30.7 G/DL (ref 32–36)
MCV RBC AUTO: 86 FL (ref 82–98)
METAMYELOCYTES NFR BLD MANUAL: 1 %
MONOCYTES # BLD AUTO: ABNORMAL K/UL (ref 0.3–1)
MONOCYTES NFR BLD: 4 % (ref 4–15)
NEUTROPHILS NFR BLD: 90 % (ref 38–73)
NEUTS BAND NFR BLD MANUAL: 1 %
NRBC BLD-RTO: 0 /100 WBC
OVALOCYTES BLD QL SMEAR: ABNORMAL
PHOSPHATE SERPL-MCNC: 2.4 MG/DL (ref 2.7–4.5)
PLATELET # BLD AUTO: 405 K/UL (ref 150–350)
PMV BLD AUTO: 9.4 FL (ref 9.2–12.9)
POIKILOCYTOSIS BLD QL SMEAR: SLIGHT
POLYCHROMASIA BLD QL SMEAR: ABNORMAL
POTASSIUM SERPL-SCNC: 3.9 MMOL/L (ref 3.5–5.1)
RBC # BLD AUTO: 3.4 M/UL (ref 4.6–6.2)
SODIUM SERPL-SCNC: 140 MMOL/L (ref 136–145)
TACROLIMUS BLD-MCNC: 8.6 NG/ML (ref 5–15)
WBC # BLD AUTO: 8.46 K/UL (ref 3.9–12.7)

## 2020-11-14 PROCEDURE — 63600175 PHARM REV CODE 636 W HCPCS: Performed by: NURSE PRACTITIONER

## 2020-11-14 PROCEDURE — 25000003 PHARM REV CODE 250: Performed by: PHYSICIAN ASSISTANT

## 2020-11-14 PROCEDURE — 36415 COLL VENOUS BLD VENIPUNCTURE: CPT

## 2020-11-14 PROCEDURE — 25000003 PHARM REV CODE 250: Performed by: NURSE PRACTITIONER

## 2020-11-14 PROCEDURE — 99233 PR SUBSEQUENT HOSPITAL CARE,LEVL III: ICD-10-PCS | Mod: GC,,, | Performed by: INTERNAL MEDICINE

## 2020-11-14 PROCEDURE — 85007 BL SMEAR W/DIFF WBC COUNT: CPT

## 2020-11-14 PROCEDURE — 82150 ASSAY OF AMYLASE: CPT

## 2020-11-14 PROCEDURE — 80197 ASSAY OF TACROLIMUS: CPT

## 2020-11-14 PROCEDURE — 63600175 PHARM REV CODE 636 W HCPCS: Performed by: INTERNAL MEDICINE

## 2020-11-14 PROCEDURE — 83690 ASSAY OF LIPASE: CPT

## 2020-11-14 PROCEDURE — C9113 INJ PANTOPRAZOLE SODIUM, VIA: HCPCS | Performed by: NURSE PRACTITIONER

## 2020-11-14 PROCEDURE — 99233 SBSQ HOSP IP/OBS HIGH 50: CPT | Mod: GC,,, | Performed by: INTERNAL MEDICINE

## 2020-11-14 PROCEDURE — 85027 COMPLETE CBC AUTOMATED: CPT

## 2020-11-14 PROCEDURE — 25000003 PHARM REV CODE 250: Performed by: INTERNAL MEDICINE

## 2020-11-14 PROCEDURE — 80069 RENAL FUNCTION PANEL: CPT

## 2020-11-14 PROCEDURE — 20600001 HC STEP DOWN PRIVATE ROOM

## 2020-11-14 PROCEDURE — 83735 ASSAY OF MAGNESIUM: CPT

## 2020-11-14 RX ORDER — ACETAMINOPHEN 325 MG/1
650 TABLET ORAL EVERY 6 HOURS PRN
Status: DISCONTINUED | OUTPATIENT
Start: 2020-11-14 | End: 2020-11-19 | Stop reason: HOSPADM

## 2020-11-14 RX ORDER — ERGOCALCIFEROL 1.25 MG/1
50000 CAPSULE ORAL
Status: DISCONTINUED | OUTPATIENT
Start: 2020-11-14 | End: 2020-11-19 | Stop reason: HOSPADM

## 2020-11-14 RX ORDER — SIMETHICONE 80 MG
1 TABLET,CHEWABLE ORAL 3 TIMES DAILY PRN
Status: DISCONTINUED | OUTPATIENT
Start: 2020-11-14 | End: 2020-11-19 | Stop reason: HOSPADM

## 2020-11-14 RX ORDER — MAGNESIUM SULFATE HEPTAHYDRATE 40 MG/ML
2 INJECTION, SOLUTION INTRAVENOUS ONCE
Status: COMPLETED | OUTPATIENT
Start: 2020-11-14 | End: 2020-11-14

## 2020-11-14 RX ORDER — CALCIUM CARBONATE 200(500)MG
500 TABLET,CHEWABLE ORAL 3 TIMES DAILY PRN
Status: DISCONTINUED | OUTPATIENT
Start: 2020-11-14 | End: 2020-11-19 | Stop reason: HOSPADM

## 2020-11-14 RX ORDER — DIPHENHYDRAMINE HCL 25 MG
25 CAPSULE ORAL EVERY 6 HOURS PRN
Status: DISCONTINUED | OUTPATIENT
Start: 2020-11-14 | End: 2020-11-19 | Stop reason: HOSPADM

## 2020-11-14 RX ORDER — CALCITRIOL 0.5 UG/1
1 CAPSULE ORAL DAILY
Status: DISCONTINUED | OUTPATIENT
Start: 2020-11-15 | End: 2020-11-19 | Stop reason: HOSPADM

## 2020-11-14 RX ORDER — MYCOPHENOLIC ACID 180 MG/1
360 TABLET, DELAYED RELEASE ORAL 2 TIMES DAILY
Status: DISCONTINUED | OUTPATIENT
Start: 2020-11-14 | End: 2020-11-15

## 2020-11-14 RX ORDER — SODIUM CHLORIDE 9 MG/ML
INJECTION, SOLUTION INTRAVENOUS CONTINUOUS
Status: DISCONTINUED | OUTPATIENT
Start: 2020-11-14 | End: 2020-11-18

## 2020-11-14 RX ADMIN — ERGOCALCIFEROL 50000 UNITS: 1.25 CAPSULE ORAL at 09:11

## 2020-11-14 RX ADMIN — PRAVASTATIN SODIUM 40 MG: 10 TABLET ORAL at 08:11

## 2020-11-14 RX ADMIN — SODIUM BICARBONATE: 84 INJECTION, SOLUTION INTRAVENOUS at 08:11

## 2020-11-14 RX ADMIN — METOCLOPRAMIDE 10 MG: 5 INJECTION, SOLUTION INTRAMUSCULAR; INTRAVENOUS at 12:11

## 2020-11-14 RX ADMIN — METOCLOPRAMIDE 10 MG: 5 INJECTION, SOLUTION INTRAMUSCULAR; INTRAVENOUS at 05:11

## 2020-11-14 RX ADMIN — HEPARIN SODIUM 5000 UNITS: 5000 INJECTION INTRAVENOUS; SUBCUTANEOUS at 09:11

## 2020-11-14 RX ADMIN — PIPERACILLIN AND TAZOBACTAM 4.5 G: 4; .5 INJECTION, POWDER, LYOPHILIZED, FOR SOLUTION INTRAVENOUS; PARENTERAL at 12:11

## 2020-11-14 RX ADMIN — TACROLIMUS 6 MG: 1 CAPSULE ORAL at 05:11

## 2020-11-14 RX ADMIN — MAGNESIUM SULFATE IN WATER 2 G: 40 INJECTION, SOLUTION INTRAVENOUS at 10:11

## 2020-11-14 RX ADMIN — TACROLIMUS 6 MG: 1 CAPSULE ORAL at 09:11

## 2020-11-14 RX ADMIN — PANTOPRAZOLE SODIUM 40 MG: 40 INJECTION, POWDER, LYOPHILIZED, FOR SOLUTION INTRAVENOUS at 08:11

## 2020-11-14 RX ADMIN — VALGANCICLOVIR 450 MG: 450 TABLET, FILM COATED ORAL at 08:11

## 2020-11-14 RX ADMIN — GABAPENTIN 300 MG: 300 CAPSULE ORAL at 08:11

## 2020-11-14 RX ADMIN — HEPARIN SODIUM 5000 UNITS: 5000 INJECTION INTRAVENOUS; SUBCUTANEOUS at 06:11

## 2020-11-14 RX ADMIN — PIPERACILLIN AND TAZOBACTAM 4.5 G: 4; .5 INJECTION, POWDER, LYOPHILIZED, FOR SOLUTION INTRAVENOUS; PARENTERAL at 08:11

## 2020-11-14 RX ADMIN — FOLIC ACID 1 MG: 1 TABLET ORAL at 08:11

## 2020-11-14 RX ADMIN — GABAPENTIN 300 MG: 300 CAPSULE ORAL at 09:11

## 2020-11-14 RX ADMIN — METOPROLOL TARTRATE 25 MG: 25 TABLET, FILM COATED ORAL at 09:11

## 2020-11-14 RX ADMIN — METOCLOPRAMIDE 10 MG: 5 INJECTION, SOLUTION INTRAMUSCULAR; INTRAVENOUS at 06:11

## 2020-11-14 RX ADMIN — MYCOPHENILIC ACID 360 MG: 180 TABLET, DELAYED RELEASE ORAL at 10:11

## 2020-11-14 RX ADMIN — PREDNISONE 20 MG: 20 TABLET ORAL at 08:11

## 2020-11-14 RX ADMIN — SODIUM CHLORIDE: 0.9 INJECTION, SOLUTION INTRAVENOUS at 08:11

## 2020-11-14 RX ADMIN — SULFAMETHOXAZOLE AND TRIMETHOPRIM 1 TABLET: 400; 80 TABLET ORAL at 08:11

## 2020-11-14 RX ADMIN — PANTOPRAZOLE SODIUM 40 MG: 40 INJECTION, POWDER, LYOPHILIZED, FOR SOLUTION INTRAVENOUS at 09:11

## 2020-11-14 RX ADMIN — METOPROLOL TARTRATE 25 MG: 25 TABLET, FILM COATED ORAL at 08:11

## 2020-11-14 RX ADMIN — MYCOPHENILIC ACID 360 MG: 180 TABLET, DELAYED RELEASE ORAL at 09:11

## 2020-11-14 RX ADMIN — CALCITRIOL 0.5 MCG: 0.5 CAPSULE, LIQUID FILLED ORAL at 08:11

## 2020-11-14 NOTE — PLAN OF CARE
Pt AAOx4, ambulates with standby assist, VSS, afebrile. Standing BP taken. NSR on tele. L UA fistula ++. Pt went for EGD yesterday. ML incision with staples, CDI. 1L NS bolus given. Bicarb @ 75. Zosyn given.Pt under C. Diff precautions until micro results in. Fall risk precautions maintained and reviewed with pt. Plan of care reviewed with pt. Pt verbalized understanding. Look at flowsheet for assessment findings. NAD noted throughout shift. Pt remains free from injury for shift. Skin free from breakdown. Will continue to monitor.

## 2020-11-14 NOTE — PROGRESS NOTES
Kidney Transplant   Progress Note      HPI: Mr. Rosales is a 40 y.o. year old male who is s/p Kidney and Pancreas transplant on 11/3/20.         Interval History: No acute events overnight. EGD yesterday demonstrated gastritis and superficial ulcer. UOP 200cc with 2 unmeasured voids. Creatinine 2.3 from 2.0.  Amylase and lipase downtrending.       Sub/Obj: No acute events over night. Feeling well this AM. No new complaints.       Physical Exam  Vitals signs and nursing note reviewed.   Constitutional:       Appearance: He is well-developed.   HENT:      Head: Normocephalic and atraumatic.   Eyes:      Pupils: Pupils are equal, round, and reactive to light.   Neck:      Musculoskeletal: Normal range of motion and neck supple.      Vascular: No JVD.   Cardiovascular:      Rate and Rhythm: Normal rate and regular rhythm.      Heart sounds: Normal heart sounds. No murmur. No friction rub.   Pulmonary:      Effort: Pulmonary effort is normal. No respiratory distress.      Breath sounds: Normal breath sounds. No wheezing or rales.   Abdominal:      General: Bowel sounds are normal.      Palpations: Abdomen is soft.      Tenderness: There is no abdominal tenderness.      Comments: Midline inc with staples intact no s/s/i   Musculoskeletal: Normal range of motion.      Comments: L BKA    Skin:     General: Skin is warm and dry.   Neurological:      Mental Status: He is alert and oriented to person, place, and time.   Psychiatric:         Behavior: Behavior normal.       Labs:   CBC:   Recent Labs   Lab 11/14/20  0659   WBC 8.46   RBC 3.40*   HGB 9.0*   HCT 29.3*   *   MCV 86   MCH 26.5*   MCHC 30.7*     BMP:   Recent Labs   Lab 11/14/20  0659   GLU 87      K 3.9      CO2 24   BUN 13   CREATININE 2.3*   CALCIUM 7.7*     Labs within the past 24 hours have been reviewed.        Assessment/Plan:     SIRS (systemic inflammatory response syndrome)  - Pt discharged on POD #6 from [facility] 11/3.  - Returned to clinic  found to have fever of 101 and c/o nausea/vomiting.  - Infectious workup in process.  - continue antibiotics.  - continue IVFs.  - CT abdomen/pelvis reviewed.  - stool studies sent per GI recs  - Monitor.        Hypophosphatemia  Iv replacement  Monitor labs daily        Metabolic acidosis  - continue bicarb replacement        Hypomagnesemia  Iv replacement        Anemia of chronic disease  - H&H stable.  - Monitor.         Status post simultaneous kidney and pancreas transplant  - s/p SPK 11/3/20 2/2 DMI.   - Cr and pancreas enzymes slightly elevated - likely 2/2 dehydration  - continue iv fluids  - Monitor labs daily.      Gastroparesis due to DM  - Antiemetics PRN.   - started reglan 11/12  - increase reglan dose to 10mg  - GI consult - EGD 11/13 - results pending  - monitor        At risk for opportunistic infections  - continue OI prophylaxis as per protocol.         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  - weekly hep c labs per protocol  - ordered for am 11/13  - HCV Ab+ 11/13        Long-term use of immunosuppressant medication  - Continue prograf. Check prograf level daily, monitor for toxic side effects, and adjust dose accordingly for a therapeutic level.         Prophylactic immunotherapy  - see long term immuno.            Discharge Planning: not candidate at this time    Pt seen, examined and discussed with collaborating staff transplant nephrologist.

## 2020-11-14 NOTE — PLAN OF CARE
Pt AAO x 4 throughout shift. Kidney and Pancreas U/S this AM. Fluids changed to NS at 50 cc/hr. Sister at bedside throughout shift assisting patient. Pt up with assistance throughout shift. Pt not measuring urine output.

## 2020-11-15 LAB
ALBUMIN SERPL BCP-MCNC: 2.8 G/DL (ref 3.5–5.2)
AMYLASE SERPL-CCNC: 179 U/L (ref 20–110)
ANION GAP SERPL CALC-SCNC: 6 MMOL/L (ref 8–16)
ANISOCYTOSIS BLD QL SMEAR: SLIGHT
BACTERIA BLD CULT: NORMAL
BACTERIA BLD CULT: NORMAL
BASOPHILS # BLD AUTO: ABNORMAL K/UL (ref 0–0.2)
BASOPHILS NFR BLD: 0 % (ref 0–1.9)
BUN SERPL-MCNC: 15 MG/DL (ref 6–20)
CALCIUM SERPL-MCNC: 7.8 MG/DL (ref 8.7–10.5)
CHLORIDE SERPL-SCNC: 113 MMOL/L (ref 95–110)
CO2 SERPL-SCNC: 21 MMOL/L (ref 23–29)
CREAT SERPL-MCNC: 2 MG/DL (ref 0.5–1.4)
DIFFERENTIAL METHOD: ABNORMAL
EOSINOPHIL # BLD AUTO: ABNORMAL K/UL (ref 0–0.5)
EOSINOPHIL NFR BLD: 0 % (ref 0–8)
ERYTHROCYTE [DISTWIDTH] IN BLOOD BY AUTOMATED COUNT: 17.5 % (ref 11.5–14.5)
EST. GFR  (AFRICAN AMERICAN): 47.5 ML/MIN/1.73 M^2
EST. GFR  (NON AFRICAN AMERICAN): 41.1 ML/MIN/1.73 M^2
GLUCOSE SERPL-MCNC: 95 MG/DL (ref 70–110)
HCT VFR BLD AUTO: 29.3 % (ref 40–54)
HGB BLD-MCNC: 8.8 G/DL (ref 14–18)
HYPOCHROMIA BLD QL SMEAR: ABNORMAL
IMM GRANULOCYTES # BLD AUTO: ABNORMAL K/UL (ref 0–0.04)
IMM GRANULOCYTES NFR BLD AUTO: ABNORMAL % (ref 0–0.5)
LIPASE SERPL-CCNC: 136 U/L (ref 4–60)
LYMPHOCYTES # BLD AUTO: ABNORMAL K/UL (ref 1–4.8)
LYMPHOCYTES NFR BLD: 2 % (ref 18–48)
MAGNESIUM SERPL-MCNC: 1.6 MG/DL (ref 1.6–2.6)
MCH RBC QN AUTO: 26.3 PG (ref 27–31)
MCHC RBC AUTO-ENTMCNC: 30 G/DL (ref 32–36)
MCV RBC AUTO: 88 FL (ref 82–98)
MONOCYTES # BLD AUTO: ABNORMAL K/UL (ref 0.3–1)
MONOCYTES NFR BLD: 1 % (ref 4–15)
NEUTROPHILS NFR BLD: 96 % (ref 38–73)
NEUTS BAND NFR BLD MANUAL: 1 %
NRBC BLD-RTO: 0 /100 WBC
OVALOCYTES BLD QL SMEAR: ABNORMAL
PHOSPHATE SERPL-MCNC: 2 MG/DL (ref 2.7–4.5)
PLATELET # BLD AUTO: 404 K/UL (ref 150–350)
PMV BLD AUTO: 9.7 FL (ref 9.2–12.9)
POIKILOCYTOSIS BLD QL SMEAR: SLIGHT
POLYCHROMASIA BLD QL SMEAR: ABNORMAL
POTASSIUM SERPL-SCNC: 4.4 MMOL/L (ref 3.5–5.1)
RBC # BLD AUTO: 3.35 M/UL (ref 4.6–6.2)
SODIUM SERPL-SCNC: 140 MMOL/L (ref 136–145)
TACROLIMUS BLD-MCNC: 6.4 NG/ML (ref 5–15)
WBC # BLD AUTO: 8.98 K/UL (ref 3.9–12.7)

## 2020-11-15 PROCEDURE — 80197 ASSAY OF TACROLIMUS: CPT

## 2020-11-15 PROCEDURE — 80069 RENAL FUNCTION PANEL: CPT

## 2020-11-15 PROCEDURE — 99233 SBSQ HOSP IP/OBS HIGH 50: CPT | Mod: GC,,, | Performed by: INTERNAL MEDICINE

## 2020-11-15 PROCEDURE — 63600175 PHARM REV CODE 636 W HCPCS: Performed by: INTERNAL MEDICINE

## 2020-11-15 PROCEDURE — 99233 PR SUBSEQUENT HOSPITAL CARE,LEVL III: ICD-10-PCS | Mod: GC,,, | Performed by: INTERNAL MEDICINE

## 2020-11-15 PROCEDURE — 82150 ASSAY OF AMYLASE: CPT

## 2020-11-15 PROCEDURE — 85007 BL SMEAR W/DIFF WBC COUNT: CPT

## 2020-11-15 PROCEDURE — 20600001 HC STEP DOWN PRIVATE ROOM

## 2020-11-15 PROCEDURE — 83735 ASSAY OF MAGNESIUM: CPT

## 2020-11-15 PROCEDURE — 25000003 PHARM REV CODE 250: Performed by: PHYSICIAN ASSISTANT

## 2020-11-15 PROCEDURE — 83690 ASSAY OF LIPASE: CPT

## 2020-11-15 PROCEDURE — 36415 COLL VENOUS BLD VENIPUNCTURE: CPT

## 2020-11-15 PROCEDURE — 25000003 PHARM REV CODE 250: Performed by: NURSE PRACTITIONER

## 2020-11-15 PROCEDURE — 63600175 PHARM REV CODE 636 W HCPCS: Performed by: NURSE PRACTITIONER

## 2020-11-15 PROCEDURE — 25000003 PHARM REV CODE 250: Performed by: INTERNAL MEDICINE

## 2020-11-15 PROCEDURE — C9113 INJ PANTOPRAZOLE SODIUM, VIA: HCPCS | Performed by: NURSE PRACTITIONER

## 2020-11-15 PROCEDURE — A4216 STERILE WATER/SALINE, 10 ML: HCPCS | Performed by: NURSE PRACTITIONER

## 2020-11-15 PROCEDURE — 85027 COMPLETE CBC AUTOMATED: CPT

## 2020-11-15 RX ORDER — TACROLIMUS 1 MG/1
1 CAPSULE ORAL ONCE
Status: COMPLETED | OUTPATIENT
Start: 2020-11-15 | End: 2020-11-15

## 2020-11-15 RX ORDER — METOCLOPRAMIDE 5 MG/1
10 TABLET ORAL
Status: DISCONTINUED | OUTPATIENT
Start: 2020-11-15 | End: 2020-11-19 | Stop reason: HOSPADM

## 2020-11-15 RX ORDER — MYCOPHENOLIC ACID 180 MG/1
540 TABLET, DELAYED RELEASE ORAL 2 TIMES DAILY
Status: DISCONTINUED | OUTPATIENT
Start: 2020-11-15 | End: 2020-11-16

## 2020-11-15 RX ORDER — CALCITRIOL 0.5 UG/1
0.5 CAPSULE ORAL DAILY
Status: DISCONTINUED | OUTPATIENT
Start: 2020-11-15 | End: 2020-11-15

## 2020-11-15 RX ORDER — TACROLIMUS 1 MG/1
7 CAPSULE ORAL 2 TIMES DAILY
Status: DISCONTINUED | OUTPATIENT
Start: 2020-11-15 | End: 2020-11-19

## 2020-11-15 RX ORDER — METOCLOPRAMIDE 5 MG/1
10 TABLET ORAL
Status: DISCONTINUED | OUTPATIENT
Start: 2020-11-15 | End: 2020-11-15

## 2020-11-15 RX ADMIN — VALGANCICLOVIR 450 MG: 450 TABLET, FILM COATED ORAL at 08:11

## 2020-11-15 RX ADMIN — METOPROLOL TARTRATE 25 MG: 25 TABLET, FILM COATED ORAL at 08:11

## 2020-11-15 RX ADMIN — PANTOPRAZOLE SODIUM 40 MG: 40 INJECTION, POWDER, LYOPHILIZED, FOR SOLUTION INTRAVENOUS at 08:11

## 2020-11-15 RX ADMIN — TACROLIMUS 1 MG: 1 CAPSULE ORAL at 12:11

## 2020-11-15 RX ADMIN — GABAPENTIN 300 MG: 300 CAPSULE ORAL at 08:11

## 2020-11-15 RX ADMIN — CALCITRIOL 1 MCG: 0.5 CAPSULE, LIQUID FILLED ORAL at 08:11

## 2020-11-15 RX ADMIN — PRAVASTATIN SODIUM 40 MG: 10 TABLET ORAL at 08:11

## 2020-11-15 RX ADMIN — PREDNISONE 20 MG: 20 TABLET ORAL at 08:11

## 2020-11-15 RX ADMIN — METOCLOPRAMIDE 10 MG: 5 TABLET ORAL at 05:11

## 2020-11-15 RX ADMIN — TACROLIMUS 7 MG: 1 CAPSULE ORAL at 05:11

## 2020-11-15 RX ADMIN — HEPARIN SODIUM 5000 UNITS: 5000 INJECTION INTRAVENOUS; SUBCUTANEOUS at 09:11

## 2020-11-15 RX ADMIN — METOCLOPRAMIDE 10 MG: 5 INJECTION, SOLUTION INTRAMUSCULAR; INTRAVENOUS at 12:11

## 2020-11-15 RX ADMIN — Medication 10 ML: at 08:11

## 2020-11-15 RX ADMIN — FOLIC ACID 1 MG: 1 TABLET ORAL at 08:11

## 2020-11-15 RX ADMIN — MYCOPHENILIC ACID 360 MG: 180 TABLET, DELAYED RELEASE ORAL at 08:11

## 2020-11-15 RX ADMIN — TACROLIMUS 6 MG: 1 CAPSULE ORAL at 08:11

## 2020-11-15 RX ADMIN — MYCOPHENILIC ACID 540 MG: 180 TABLET, DELAYED RELEASE ORAL at 09:11

## 2020-11-15 RX ADMIN — METOCLOPRAMIDE 10 MG: 5 INJECTION, SOLUTION INTRAMUSCULAR; INTRAVENOUS at 06:11

## 2020-11-15 RX ADMIN — HEPARIN SODIUM 5000 UNITS: 5000 INJECTION INTRAVENOUS; SUBCUTANEOUS at 06:11

## 2020-11-15 RX ADMIN — SULFAMETHOXAZOLE AND TRIMETHOPRIM 1 TABLET: 400; 80 TABLET ORAL at 08:11

## 2020-11-15 RX ADMIN — METOCLOPRAMIDE 10 MG: 5 TABLET ORAL at 12:11

## 2020-11-15 NOTE — PROGRESS NOTES
Kidney Transplant   Progress Note    HPI: Mr. Rosales is a 40 y.o. year old male who is s/p Kidney and Pancreas transplant on 11/3/20.          Interval History: No acute events overnight.  UOP 200cc with 2 unmeasured voids. Creatinine 2.0.  Amylase and lipase increased today.         Sub/Obj: No acute events over night. Feeling well this AM. Tolerating PO intake with no nausea or vomiting. No new complaints.         Physical Exam  Vitals signs and nursing note reviewed.   Constitutional:       Appearance: He is well-developed.   HENT:      Head: Normocephalic and atraumatic.   Eyes:      Pupils: Pupils are equal, round, and reactive to light.   Neck:      Musculoskeletal: Normal range of motion and neck supple.      Vascular: No JVD.   Cardiovascular:      Rate and Rhythm: Normal rate and regular rhythm.      Heart sounds: Normal heart sounds. No murmur. No friction rub.   Pulmonary:      Effort: Pulmonary effort is normal. No respiratory distress.      Breath sounds: Normal breath sounds. No wheezing or rales.   Abdominal:      General: Bowel sounds are normal.      Palpations: Abdomen is soft.      Tenderness: There is no abdominal tenderness.      Comments: Midline inc with staples intact no s/s/i   Musculoskeletal: Normal range of motion.      Comments: L BKA    Skin:     General: Skin is warm and dry.   Neurological:      Mental Status: He is alert and oriented to person, place, and time.   Psychiatric:         Behavior: Behavior normal.       Labs:   CBC:   Recent Labs   Lab 11/15/20  0632   WBC 8.98   RBC 3.35*   HGB 8.8*   HCT 29.3*   *   MCV 88   MCH 26.3*   MCHC 30.0*     CMP:   Recent Labs   Lab 11/10/20  1222  11/15/20  0632   GLU 98   < > 95   CALCIUM 7.9*   < > 7.8*   ALBUMIN 3.3*   < > 2.8*   PROT 6.5  --   --       < > 140   K 4.7   < > 4.4   CO2 17*   < > 21*   *   < > 113*   BUN 29*   < > 15   CREATININE 2.0*   < > 2.0*   ALKPHOS 95  --   --    ALT 89*  --   --    AST 76*  --    --    BILITOT 0.3  --   --     < > = values in this interval not displayed.     Labs within the past 24 hours have been reviewed.      Assessment/Plan:      SIRS (systemic inflammatory response syndrome)  - Pt discharged on POD #6 from K 11/3.  - Returned to clinic found to have fever of 101 and c/o nausea/vomiting.  - Infectious workup in process.  - continue antibiotics.  - continue IVFs.  - CT abdomen/pelvis reviewed.  - stool studies sent per GI recs  - Monitor.        Hypophosphatemia  Iv replacement  Monitor labs daily        Metabolic acidosis  - continue bicarb replacement        Hypomagnesemia  Iv replacement        Anemia of chronic disease  - H&H stable.  - Monitor.         Status post simultaneous kidney and pancreas transplant  - s/p SPK 11/3/20 2/2 DMI.   - Cr and pancreas enzymes slightly elevated - likely 2/2 dehydration  - continue iv fluids  - Monitor labs daily.   - Amylase and lipase increased today.    - Cr plateau'ed at 2.0  Consider biopsy Tuesday if no improvement.      Gastroparesis due to DM  - Antiemetics PRN.   - started reglan 11/12  - increase reglan dose to 10mg  - GI consult - EGD 11/13 - results pending  - monitor        At risk for opportunistic infections  - continue OI prophylaxis as per protocol.         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  - weekly hep c labs per protocol  - ordered for am 11/13  - HCV Ab+ 11/13        Long-term use of immunosuppressant medication  - Continue prograf. Check prograf level daily, monitor for toxic side effects, and adjust dose accordingly for a therapeutic level.         Prophylactic immunotherapy  - see long term immuno.            Discharge Planning: not candidate at this time     Pt seen, examined and discussed with collaborating staff transplant nephrologist.

## 2020-11-15 NOTE — CONSULTS
Vascular and Interventional Radiology Consult      Date: 11/15/2020   [place] [facility] [place]PL[place] SURGERY, Tanner Barr MD   Room/bed: 66723/05898 A    Reason for Consult:   Kidney transplant biopsy    History of Present Illness:  Hernandez Rosales is a 40 y.o. male with hx ESRD 2/2 diabetic nephropathy s/p kidney/pancreas transplant on 11/3/20. IR is consulted for transplanted kidney biopsy on 11/17 due to STEVEN.    Past Medical History:  Past Medical History:   Diagnosis Date    Anxiety     Cataract     Diabetes mellitus     Diabetic retinopathy     Disorder of kidney and ureter     Encounter for blood transfusion     Glaucoma     High cholesterol     Hypertension     Retinal detachment        Past Surgical History:  Past Surgical History:   Procedure Laterality Date    EYE SURGERY      KIDNEY TRANSPLANT N/A 11/3/2020    Procedure: TRANSPLANT, KIDNEY;  Surgeon: Adin Romero Jr., MD;  Location: [facility] OR [address] [place] [address];  Service: Transplant;  Laterality: N/A;    LEG AMPUTATION Left     RETINAL DETACHMENT SURGERY      TOE AMPUTATION Right     TRANSPLANTATION OF PANCREAS N/A 11/3/2020    Procedure: TRANSPLANT, PANCREAS;  Surgeon: Adin Romero Jr., MD;  Location: [facility] OR [address] [place] [address];  Service: Transplant;  Laterality: N/A;        Sedation History:    No known adverse reactions.     Social History:  Social History     Tobacco Use    Smoking status: Former Smoker     Packs/day: 0.25     Years: 10.00     Pack years: 2.50    Smokeless tobacco: Never Used   Substance Use Topics    Alcohol use: Yes     Comment: occasional    Drug use: No        Home Medications:   Prior to Admission medications    Medication Sig Start Date End Date Taking? Authorizing Provider   aspirin (ECOTRIN) 81 MG EC tablet Take 1 tablet (81 mg total) by mouth once daily. 11/6/20 11/6/21  Adin Romero Jr., MD   calcitRIOL (ROCALTROL) 0.25 MCG Cap Take 1 capsule (0.25 mcg total) by mouth once daily. 11/6/20   Adin  PHYLLIS Romero Jr., MD   cinacalcet (SENSIPAR) 30 MG Tab Take 1 tablet (30 mg total) by mouth daily with breakfast. 11/10/20 11/10/21  Adin Romero Jr., MD   docusate sodium (COLACE) 100 MG capsule Take 1 capsule (100 mg total) by mouth 3 (three) times daily as needed for Constipation. 11/6/20   Adin Romero Jr., MD   ergocalciferol (ERGOCALCIFEROL) 50,000 unit Cap Take 1 capsule (50,000 Units total) by mouth every 7 days. 11/6/20   Adin Romero Jr., MD   famotidine (PEPCID) 20 MG tablet Take 1 tablet (20 mg total) by mouth every evening. 11/6/20 11/6/21  Adin Romero Jr., MD   ferrous sulfate 325 (65 FE) MG EC tablet Take 325 mg by mouth once daily.    Historical Provider   gabapentin (NEURONTIN) 300 MG capsule Take 300 mg by mouth 2 (two) times daily.     Historical Provider   k phos di & mono-sod phos mono (K-PHOS-NEUTRAL) 250 mg Tab Take 3 tablets by mouth 3 (three) times daily. 11/9/20 11/9/21  Adin Romero Jr., MD   metoprolol tartrate (LOPRESSOR) 25 MG tablet Take 1 tablet (25 mg total) by mouth 2 (two) times daily. 11/6/20 11/6/21  Adin Romero Jr., MD   mycophenolate (CELLCEPT) 250 mg Cap Take 4 capsules (1,000 mg total) by mouth 2 (two) times daily. 11/4/20   Damien Valladares MD   NIFEdipine (PROCARDIA-XL) 30 MG (OSM) 24 hr tablet Take 1 tablet (30 mg total) by mouth once daily. 11/6/20 11/6/21  Adin Romero Jr., MD   nystatin (MYCOSTATIN) 100,000 unit/mL suspension Take 5 mLs (500,000 Units total) by mouth 3 (three) times daily after meals. STOP 12/7/20 11/6/20   Adin Romero Jr., MD   oxyCODONE-acetaminophen (PERCOCET)  mg per tablet Take 1 tablet by mouth every 4 (four) hours as needed for Pain. 11/6/20   Alee Arboleda DNP   pravastatin (PRAVACHOL) 40 MG tablet Take 40 mg by mouth once daily.    Historical Provider   predniSONE (DELTASONE) 5 MG tablet Take by mouth daily: 20mg 11/7-12/6, 15mg 12/7-1/6/21, 10mg 1/7-2/6, then 5mg daily beginning 2/7/21 11/4/20    Damien Valladares MD   sodium bicarbonate 650 MG tablet Take 2 tablets (1,300 mg total) by mouth 3 (three) times daily. 11/9/20 11/9/21  Adin Romero Jr., MD   sulfamethoxazole-trimethoprim 400-80mg (BACTRIM,SEPTRA) 400-80 mg per tablet Take 1 tablet by mouth every morning. STOP 5/3/21 11/4/20 5/3/21  Damien Valladares MD   tacrolimus (PROGRAF) 1 MG Cap Take 9 capsules (9 mg total) by mouth every 12 (twelve) hours. 11/9/20 11/9/21  Devora Hernandez MD   valGANciclovir (VALCYTE) 450 mg Tab Take 1 tablet (450 mg total) by mouth once daily. STOP 2/1/21 11/6/20 2/4/21  Adin Romero Jr., MD       Inpatient Medications:    Current Facility-Administered Medications:     0.9%  NaCl infusion, , Intravenous, Continuous, Devora Hernandez MD, Last Rate: 50 mL/hr at 11/14/20 0840    acetaminophen tablet 650 mg, 650 mg, Oral, Q6H PRN, Alee Arboleda DNP    bisacodyL suppository 10 mg, 10 mg, Rectal, Daily PRN, Marisa Peña NP, 10 mg at 11/11/20 1551    calcitRIOL capsule 1 mcg, 1 mcg, Oral, Daily, Devora Hernandez MD, 1 mcg at 11/15/20 0805    calcium carbonate 200 mg calcium (500 mg) chewable tablet 500 mg, 500 mg, Oral, TID PRN, Alee Arboleda DNP    dextrose 50% injection 12.5 g, 12.5 g, Intravenous, PRN, Marisa Peña NP    dextrose 50% injection 25 g, 25 g, Intravenous, PRN, Marisa Peña NP    diphenhydrAMINE capsule 25 mg, 25 mg, Oral, Q6H PRN, Alee Arboleda DNP    ergocalciferol capsule 50,000 Units, 50,000 Units, Oral, Q7 Days, Devora Hernandez MD, 50,000 Units at 11/14/20 0910    folic acid tablet 1 mg, 1 mg, Oral, Daily, Marisa Peña NP, 1 mg at 11/15/20 0806    gabapentin capsule 300 mg, 300 mg, Oral, BID, Aleyda Stratton NP, 300 mg at 11/15/20 0805    glucagon (human recombinant) injection 1 mg, 1 mg, Intramuscular, PRN, Marisa Peña, NP    glucose chewable tablet 16 g, 16 g, Oral, PRN, Marisa Peña, NP    glucose chewable tablet 24 g, 24 g, Oral, PRN, Marisa HAYDEN  Voltz, NP    heparin (porcine) injection 5,000 Units, 5,000 Units, Subcutaneous, Q8H, Aleyda Stratton NP, 5,000 Units at 11/15/20 0610    melatonin tablet 6 mg, 6 mg, Oral, Nightly PRN, Aleyda Stratton NP    metoclopramide HCl tablet 10 mg, 10 mg, Oral, TID AC, Devora Hernandez MD, 10 mg at 11/15/20 1212    metoprolol tartrate (LOPRESSOR) tablet 25 mg, 25 mg, Oral, BID, Dhara New PA-C, 25 mg at 11/15/20 0805    mycophenolate EC tablet 540 mg, 540 mg, Oral, BID, Devora Hernandez MD    ondansetron injection 4 mg, 4 mg, Intravenous, Q6H PRN, Aleyda Stratton NP, Stopped at 11/12/20 0903    oxyCODONE-acetaminophen  mg per tablet 1 tablet, 1 tablet, Oral, Q6H PRN, Susie Moise PA-C, 1 tablet at 11/10/20 2159    pantoprazole injection 40 mg, 40 mg, Intravenous, BID, Marisa Peña NP, 40 mg at 11/15/20 0806    pravastatin tablet 40 mg, 40 mg, Oral, Daily, Aleyda Stratton NP, 40 mg at 11/15/20 0804    predniSONE tablet 20 mg, 20 mg, Oral, Daily, Aleyda Stratton NP, 20 mg at 11/15/20 0806    prochlorperazine injection Soln 5 mg, 5 mg, Intravenous, Q6H PRN, Aleyda Stratton NP, 5 mg at 11/12/20 0904    simethicone chewable tablet 80 mg, 1 tablet, Oral, TID PRN, Alee Arboleda DNP    sodium chloride 0.9% flush 10 mL, 10 mL, Intravenous, PRN, Aleyda Stratton NP    sulfamethoxazole-trimethoprim 400-80mg per tablet 1 tablet, 1 tablet, Oral, Daily, Aleyda Stratton NP, 1 tablet at 11/15/20 0805    tacrolimus capsule 7 mg, 7 mg, Oral, BID, Aleyda Stratton NP    valGANciclovir tablet 450 mg, 450 mg, Oral, Daily, Aleyda Stratton, NP, 450 mg at 11/15/20 0805     Anticoagulants/Antiplatelets:   aspirin    Allergies:   Review of patient's allergies indicates:  No Known Allergies    Review of Systems:   As documented in primary provider H&P.    Vital Signs:  Temp: 98 °F (36.7 °C) (11/15/20 1115)  Pulse: 89 (11/15/20 1523)  Resp: 18 (11/15/20 1115)  BP: 134/70 (11/15/20  1115)  SpO2: 98 % (11/15/20 1115)    Temp:  [97.9 °F (36.6 °C)-98.6 °F (37 °C)]   Pulse:  [69-94]   Resp:  [16-18]   BP: (112-135)/(57-70)   SpO2:  [96 %-99 %]      Laboratory:  Lab Results   Component Value Date    INR 1.3 (H) 11/05/2020       Lab Results   Component Value Date    WBC 8.98 11/15/2020    HGB 8.8 (L) 11/15/2020    HCT 29.3 (L) 11/15/2020    MCV 88 11/15/2020     (H) 11/15/2020      Lab Results   Component Value Date    GLU 95 11/15/2020     11/15/2020    K 4.4 11/15/2020     (H) 11/15/2020    CO2 21 (L) 11/15/2020    BUN 15 11/15/2020    CREATININE 2.0 (H) 11/15/2020    CALCIUM 7.8 (L) 11/15/2020    MG 1.6 11/15/2020    ALT 89 (H) 11/10/2020    AST 76 (H) 11/10/2020    ALBUMIN 2.8 (L) 11/15/2020    BILITOT 0.3 11/10/2020    BILIDIR 0.1 06/17/2020       Imaging:   Reviewed.    ASSESSMENT/PLAN/RECOMMENDATIONS:     Sedation Plan: Up to moderate  Patient will undergo: Kidney transplant biopsy tentatively on 11/17. NPO at midnight on 11/17 and hold anticoagulants.    Jose Melara MD  Radiology PGY-3

## 2020-11-15 NOTE — CONSULTS
Vascular and Interventional Radiology Consult      Date: 11/15/2020   [place] [facility] [place], Tanner Barr MD   Room/bed: 80014/26804 A    Reason for Consult:   Kidney transplant biopsy    History of Present Illness:  Hernandez Rosales is a 40 y.o. male with hx ESRD 2/2 diabetic nephropathy s/p kidney/pancreas transplant on 11/3/20. IR is consulted for transplanted kidney biopsy on 11/17 due to STEVEN.    Past Medical History:  Past Medical History:   Diagnosis Date    Anxiety     Cataract     Diabetes mellitus     Diabetic retinopathy     Disorder of kidney and ureter     Encounter for blood transfusion     Glaucoma     High cholesterol     Hypertension     Retinal detachment        Past Surgical History:  Past Surgical History:   Procedure Laterality Date    EYE SURGERY      KIDNEY TRANSPLANT N/A 11/3/2020    Procedure: TRANSPLANT, KIDNEY;  Surgeon: Adin Romero Jr., MD;  Location: [facility] OR [address] [place] [address];  Service: Transplant;  Laterality: N/A;    LEG AMPUTATION Left     RETINAL DETACHMENT SURGERY      TOE AMPUTATION Right     TRANSPLANTATION OF PANCREAS N/A 11/3/2020    Procedure: TRANSPLANT, PANCREAS;  Surgeon: Adin Romero Jr., MD;  Location: [facility] OR [address] [place] [address];  Service: Transplant;  Laterality: N/A;        Sedation History:    No known adverse reactions.     Social History:  Social History     Tobacco Use    Smoking status: Former Smoker     Packs/day: 0.25     Years: 10.00     Pack years: 2.50    Smokeless tobacco: Never Used   Substance Use Topics    Alcohol use: Yes     Comment: occasional    Drug use: No        Home Medications:   Prior to Admission medications    Medication Sig Start Date End Date Taking? Authorizing Provider   aspirin (ECOTRIN) 81 MG EC tablet Take 1 tablet (81 mg total) by mouth once daily. 11/6/20 11/6/21  Adin Romero Jr., MD   calcitRIOL (ROCALTROL) 0.25 MCG Cap Take 1 capsule (0.25 mcg total) by mouth once daily. 11/6/20   Adin  PHYLLIS Romero Jr., MD   cinacalcet (SENSIPAR) 30 MG Tab Take 1 tablet (30 mg total) by mouth daily with breakfast. 11/10/20 11/10/21  Adin Romero Jr., MD   docusate sodium (COLACE) 100 MG capsule Take 1 capsule (100 mg total) by mouth 3 (three) times daily as needed for Constipation. 11/6/20   Adin Romero Jr., MD   ergocalciferol (ERGOCALCIFEROL) 50,000 unit Cap Take 1 capsule (50,000 Units total) by mouth every 7 days. 11/6/20   Adin Romero Jr., MD   famotidine (PEPCID) 20 MG tablet Take 1 tablet (20 mg total) by mouth every evening. 11/6/20 11/6/21  Adin Romero Jr., MD   ferrous sulfate 325 (65 FE) MG EC tablet Take 325 mg by mouth once daily.    Historical Provider   gabapentin (NEURONTIN) 300 MG capsule Take 300 mg by mouth 2 (two) times daily.     Historical Provider   k phos di & mono-sod phos mono (K-PHOS-NEUTRAL) 250 mg Tab Take 3 tablets by mouth 3 (three) times daily. 11/9/20 11/9/21  Adin Romero Jr., MD   metoprolol tartrate (LOPRESSOR) 25 MG tablet Take 1 tablet (25 mg total) by mouth 2 (two) times daily. 11/6/20 11/6/21  Adin Romero Jr., MD   mycophenolate (CELLCEPT) 250 mg Cap Take 4 capsules (1,000 mg total) by mouth 2 (two) times daily. 11/4/20   Damien Valladares MD   NIFEdipine (PROCARDIA-XL) 30 MG (OSM) 24 hr tablet Take 1 tablet (30 mg total) by mouth once daily. 11/6/20 11/6/21  Adin Romero Jr., MD   nystatin (MYCOSTATIN) 100,000 unit/mL suspension Take 5 mLs (500,000 Units total) by mouth 3 (three) times daily after meals. STOP 12/7/20 11/6/20   Adin Romero Jr., MD   oxyCODONE-acetaminophen (PERCOCET)  mg per tablet Take 1 tablet by mouth every 4 (four) hours as needed for Pain. 11/6/20   Alee Arboleda DNP   pravastatin (PRAVACHOL) 40 MG tablet Take 40 mg by mouth once daily.    Historical Provider   predniSONE (DELTASONE) 5 MG tablet Take by mouth daily: 20mg 11/7-12/6, 15mg 12/7-1/6/21, 10mg 1/7-2/6, then 5mg daily beginning 2/7/21 11/4/20    Damien Valladares MD   sodium bicarbonate 650 MG tablet Take 2 tablets (1,300 mg total) by mouth 3 (three) times daily. 11/9/20 11/9/21  Adin Romero Jr., MD   sulfamethoxazole-trimethoprim 400-80mg (BACTRIM,SEPTRA) 400-80 mg per tablet Take 1 tablet by mouth every morning. STOP 5/3/21 11/4/20 5/3/21  Damien Valladares MD   tacrolimus (PROGRAF) 1 MG Cap Take 9 capsules (9 mg total) by mouth every 12 (twelve) hours. 11/9/20 11/9/21  Devora Hernandez MD   valGANciclovir (VALCYTE) 450 mg Tab Take 1 tablet (450 mg total) by mouth once daily. STOP 2/1/21 11/6/20 2/4/21  Adin Romero Jr., MD       Inpatient Medications:    Current Facility-Administered Medications:     0.9%  NaCl infusion, , Intravenous, Continuous, Devora Hernandez MD, Last Rate: 50 mL/hr at 11/14/20 0840    acetaminophen tablet 650 mg, 650 mg, Oral, Q6H PRN, Alee Arboleda DNP    bisacodyL suppository 10 mg, 10 mg, Rectal, Daily PRN, Marisa Peña NP, 10 mg at 11/11/20 1551    calcitRIOL capsule 1 mcg, 1 mcg, Oral, Daily, Devora Hernandez MD, 1 mcg at 11/15/20 0805    calcium carbonate 200 mg calcium (500 mg) chewable tablet 500 mg, 500 mg, Oral, TID PRN, Alee Arboleda DNP    dextrose 50% injection 12.5 g, 12.5 g, Intravenous, PRN, Marisa Peña NP    dextrose 50% injection 25 g, 25 g, Intravenous, PRN, Marisa Peña NP    diphenhydrAMINE capsule 25 mg, 25 mg, Oral, Q6H PRN, Alee Arboleda DNP    ergocalciferol capsule 50,000 Units, 50,000 Units, Oral, Q7 Days, Devora Hernandez MD, 50,000 Units at 11/14/20 0910    folic acid tablet 1 mg, 1 mg, Oral, Daily, Marisa Peña NP, 1 mg at 11/15/20 0806    gabapentin capsule 300 mg, 300 mg, Oral, BID, Aleyda Stratton NP, 300 mg at 11/15/20 0805    glucagon (human recombinant) injection 1 mg, 1 mg, Intramuscular, PRN, Marisa Peña, NP    glucose chewable tablet 16 g, 16 g, Oral, PRN, Marisa Peña, NP    glucose chewable tablet 24 g, 24 g, Oral, PRN, Marisa HAYDEN  Voltz, NP    heparin (porcine) injection 5,000 Units, 5,000 Units, Subcutaneous, Q8H, Aleyda Stratton NP, 5,000 Units at 11/15/20 0610    melatonin tablet 6 mg, 6 mg, Oral, Nightly PRN, Aleyda Stratton NP    metoclopramide HCl tablet 10 mg, 10 mg, Oral, TID AC, Devora Hernandez MD, 10 mg at 11/15/20 1212    metoprolol tartrate (LOPRESSOR) tablet 25 mg, 25 mg, Oral, BID, Dhara New PA-C, 25 mg at 11/15/20 0805    mycophenolate EC tablet 540 mg, 540 mg, Oral, BID, Devora Hernandez MD    ondansetron injection 4 mg, 4 mg, Intravenous, Q6H PRN, Aleyda Stratton NP, Stopped at 11/12/20 0903    oxyCODONE-acetaminophen  mg per tablet 1 tablet, 1 tablet, Oral, Q6H PRN, Susie Moise PA-C, 1 tablet at 11/10/20 2159    pantoprazole injection 40 mg, 40 mg, Intravenous, BID, Marisa Peña NP, 40 mg at 11/15/20 0806    pravastatin tablet 40 mg, 40 mg, Oral, Daily, Aleyda Stratton NP, 40 mg at 11/15/20 0804    predniSONE tablet 20 mg, 20 mg, Oral, Daily, Aleyda Stratton NP, 20 mg at 11/15/20 0806    prochlorperazine injection Soln 5 mg, 5 mg, Intravenous, Q6H PRN, Aleyda Stratton NP, 5 mg at 11/12/20 0904    simethicone chewable tablet 80 mg, 1 tablet, Oral, TID PRN, Alee Arboleda DNP    sodium chloride 0.9% flush 10 mL, 10 mL, Intravenous, PRN, Aleyda Stratton NP    sulfamethoxazole-trimethoprim 400-80mg per tablet 1 tablet, 1 tablet, Oral, Daily, Aleyda Stratton NP, 1 tablet at 11/15/20 0805    tacrolimus capsule 7 mg, 7 mg, Oral, BID, Aleyda Stratton NP    valGANciclovir tablet 450 mg, 450 mg, Oral, Daily, Aleyda Stratton, NP, 450 mg at 11/15/20 0805     Anticoagulants/Antiplatelets:   aspirin    Allergies:   Review of patient's allergies indicates:  No Known Allergies    Review of Systems:   As documented in primary provider H&P.    Vital Signs:  Temp: 98 °F (36.7 °C) (11/15/20 1115)  Pulse: 89 (11/15/20 1523)  Resp: 18 (11/15/20 1115)  BP: 134/70 (11/15/20  1115)  SpO2: 98 % (11/15/20 1115)    Temp:  [97.9 °F (36.6 °C)-98.6 °F (37 °C)]   Pulse:  [69-94]   Resp:  [16-18]   BP: (112-135)/(57-70)   SpO2:  [96 %-99 %]      Laboratory:  Lab Results   Component Value Date    INR 1.3 (H) 11/05/2020       Lab Results   Component Value Date    WBC 8.98 11/15/2020    HGB 8.8 (L) 11/15/2020    HCT 29.3 (L) 11/15/2020    MCV 88 11/15/2020     (H) 11/15/2020      Lab Results   Component Value Date    GLU 95 11/15/2020     11/15/2020    K 4.4 11/15/2020     (H) 11/15/2020    CO2 21 (L) 11/15/2020    BUN 15 11/15/2020    CREATININE 2.0 (H) 11/15/2020    CALCIUM 7.8 (L) 11/15/2020    MG 1.6 11/15/2020    ALT 89 (H) 11/10/2020    AST 76 (H) 11/10/2020    ALBUMIN 2.8 (L) 11/15/2020    BILITOT 0.3 11/10/2020    BILIDIR 0.1 06/17/2020       Imaging:   Reviewed.    ASSESSMENT/PLAN/RECOMMENDATIONS:     Sedation Plan: Up to moderate  Patient will undergo: Kidney transplant biopsy tentatively on 11/17. NPO at midnight on 11/17 and hold anticoagulants.    Jose Melara MD  Radiology PGY-3

## 2020-11-15 NOTE — PLAN OF CARE
Pt AAO x 4 throughout shift. Pt up out of bed and ambulating with assistance. Sister at bedside assisting with care. Prograf increased to 7/7. Pt free from falls and injury throughout shift.

## 2020-11-15 NOTE — PLAN OF CARE
Pt AAOx4, ambulates with standby assist, VSS, afebrile. Standing BP taken. NSR on tele. L UA fistula ++. ML incision with staples, CDI. NS @ 50. Zosyn d/c. Fall risk precautions maintained and reviewed with pt. Plan of care reviewed with pt. Pt verbalized understanding. Look at flowsheet for assessment findings. NAD noted throughout shift. Pt remains free from injury for shift. Skin free from breakdown. Will continue to monitor.

## 2020-11-16 LAB
ALBUMIN SERPL BCP-MCNC: 3 G/DL (ref 3.5–5.2)
ALBUMIN SERPL BCP-MCNC: 3 G/DL (ref 3.5–5.2)
ALP SERPL-CCNC: 107 U/L (ref 55–135)
ALT SERPL W/O P-5'-P-CCNC: 231 U/L (ref 10–44)
AMYLASE SERPL-CCNC: 205 U/L (ref 20–110)
ANION GAP SERPL CALC-SCNC: 7 MMOL/L (ref 8–16)
ANISOCYTOSIS BLD QL SMEAR: SLIGHT
AST SERPL-CCNC: 163 U/L (ref 10–40)
BACTERIA STL CULT: NORMAL
BASOPHILS NFR BLD: 2 % (ref 0–1.9)
BILIRUB DIRECT SERPL-MCNC: 0.2 MG/DL (ref 0.1–0.3)
BILIRUB SERPL-MCNC: 0.3 MG/DL (ref 0.1–1)
BUN SERPL-MCNC: 15 MG/DL (ref 6–20)
CALCIUM SERPL-MCNC: 8.2 MG/DL (ref 8.7–10.5)
CHLORIDE SERPL-SCNC: 113 MMOL/L (ref 95–110)
CMV DNA SERPL NAA+PROBE-ACNC: NORMAL IU/ML
CO2 SERPL-SCNC: 20 MMOL/L (ref 23–29)
CREAT SERPL-MCNC: 1.9 MG/DL (ref 0.5–1.4)
DIFFERENTIAL METHOD: ABNORMAL
E COLI SXT1 STL QL IA: NEGATIVE
E COLI SXT2 STL QL IA: NEGATIVE
EOSINOPHIL NFR BLD: 1 % (ref 0–8)
ERYTHROCYTE [DISTWIDTH] IN BLOOD BY AUTOMATED COUNT: 18 % (ref 11.5–14.5)
EST. GFR  (AFRICAN AMERICAN): 50.6 ML/MIN/1.73 M^2
EST. GFR  (NON AFRICAN AMERICAN): 43.7 ML/MIN/1.73 M^2
GLUCOSE SERPL-MCNC: 85 MG/DL (ref 70–110)
HCT VFR BLD AUTO: 31.7 % (ref 40–54)
HCV RNA SERPL NAA+PROBE-LOG IU: 5.81 LOG (10) IU/ML
HCV RNA SERPL QL NAA+PROBE: DETECTED IU/ML
HCV RNA SPEC NAA+PROBE-ACNC: ABNORMAL IU/ML
HGB BLD-MCNC: 9.5 G/DL (ref 14–18)
IMM GRANULOCYTES # BLD AUTO: ABNORMAL K/UL (ref 0–0.04)
IMM GRANULOCYTES NFR BLD AUTO: ABNORMAL % (ref 0–0.5)
LIPASE SERPL-CCNC: 164 U/L (ref 4–60)
LYMPHOCYTES NFR BLD: 7 % (ref 18–48)
MAGNESIUM SERPL-MCNC: 1.3 MG/DL (ref 1.6–2.6)
MCH RBC QN AUTO: 26.6 PG (ref 27–31)
MCHC RBC AUTO-ENTMCNC: 30 G/DL (ref 32–36)
MCV RBC AUTO: 89 FL (ref 82–98)
METAMYELOCYTES NFR BLD MANUAL: 1 %
MONOCYTES NFR BLD: 3 % (ref 4–15)
MYELOCYTES NFR BLD MANUAL: 1 %
NEUTROPHILS NFR BLD: 83 % (ref 38–73)
NEUTS BAND NFR BLD MANUAL: 2 %
NRBC BLD-RTO: 0 /100 WBC
OVALOCYTES BLD QL SMEAR: ABNORMAL
PHOSPHATE SERPL-MCNC: 1.9 MG/DL (ref 2.7–4.5)
PLATELET # BLD AUTO: 406 K/UL (ref 150–350)
PMV BLD AUTO: 9.5 FL (ref 9.2–12.9)
POIKILOCYTOSIS BLD QL SMEAR: SLIGHT
POLYCHROMASIA BLD QL SMEAR: ABNORMAL
POTASSIUM SERPL-SCNC: 4.9 MMOL/L (ref 3.5–5.1)
PROT SERPL-MCNC: 5.7 G/DL (ref 6–8.4)
RBC # BLD AUTO: 3.57 M/UL (ref 4.6–6.2)
SODIUM SERPL-SCNC: 140 MMOL/L (ref 136–145)
TACROLIMUS BLD-MCNC: 5.8 NG/ML (ref 5–15)
WBC # BLD AUTO: 7.96 K/UL (ref 3.9–12.7)

## 2020-11-16 PROCEDURE — 85027 COMPLETE CBC AUTOMATED: CPT

## 2020-11-16 PROCEDURE — 86832 HLA CLASS I HIGH DEFIN QUAL: CPT

## 2020-11-16 PROCEDURE — 25000003 PHARM REV CODE 250: Performed by: PHYSICIAN ASSISTANT

## 2020-11-16 PROCEDURE — 86833 HLA CLASS II HIGH DEFIN QUAL: CPT

## 2020-11-16 PROCEDURE — 99233 PR SUBSEQUENT HOSPITAL CARE,LEVL III: ICD-10-PCS | Mod: ,,, | Performed by: NURSE PRACTITIONER

## 2020-11-16 PROCEDURE — 80197 ASSAY OF TACROLIMUS: CPT

## 2020-11-16 PROCEDURE — 63600175 PHARM REV CODE 636 W HCPCS: Performed by: INTERNAL MEDICINE

## 2020-11-16 PROCEDURE — 80069 RENAL FUNCTION PANEL: CPT

## 2020-11-16 PROCEDURE — C9113 INJ PANTOPRAZOLE SODIUM, VIA: HCPCS | Performed by: NURSE PRACTITIONER

## 2020-11-16 PROCEDURE — 83690 ASSAY OF LIPASE: CPT

## 2020-11-16 PROCEDURE — 25000003 PHARM REV CODE 250: Performed by: INTERNAL MEDICINE

## 2020-11-16 PROCEDURE — 25000003 PHARM REV CODE 250: Performed by: NURSE PRACTITIONER

## 2020-11-16 PROCEDURE — 86977 RBC SERUM PRETX INCUBJ/INHIB: CPT | Mod: 59

## 2020-11-16 PROCEDURE — 82150 ASSAY OF AMYLASE: CPT

## 2020-11-16 PROCEDURE — 20600001 HC STEP DOWN PRIVATE ROOM

## 2020-11-16 PROCEDURE — 36415 COLL VENOUS BLD VENIPUNCTURE: CPT

## 2020-11-16 PROCEDURE — 99233 SBSQ HOSP IP/OBS HIGH 50: CPT | Mod: ,,, | Performed by: NURSE PRACTITIONER

## 2020-11-16 PROCEDURE — 85007 BL SMEAR W/DIFF WBC COUNT: CPT

## 2020-11-16 PROCEDURE — 84075 ASSAY ALKALINE PHOSPHATASE: CPT

## 2020-11-16 PROCEDURE — 83735 ASSAY OF MAGNESIUM: CPT

## 2020-11-16 PROCEDURE — 63600175 PHARM REV CODE 636 W HCPCS: Performed by: NURSE PRACTITIONER

## 2020-11-16 RX ORDER — MYCOPHENOLIC ACID 180 MG/1
180 TABLET, DELAYED RELEASE ORAL ONCE
Status: COMPLETED | OUTPATIENT
Start: 2020-11-16 | End: 2020-11-16

## 2020-11-16 RX ORDER — MYCOPHENOLIC ACID 180 MG/1
720 TABLET, DELAYED RELEASE ORAL 2 TIMES DAILY
Status: DISCONTINUED | OUTPATIENT
Start: 2020-11-16 | End: 2020-11-19 | Stop reason: HOSPADM

## 2020-11-16 RX ADMIN — MYCOPHENILIC ACID 180 MG: 180 TABLET, DELAYED RELEASE ORAL at 12:11

## 2020-11-16 RX ADMIN — GABAPENTIN 300 MG: 300 CAPSULE ORAL at 08:11

## 2020-11-16 RX ADMIN — HEPARIN SODIUM 5000 UNITS: 5000 INJECTION INTRAVENOUS; SUBCUTANEOUS at 05:11

## 2020-11-16 RX ADMIN — METOPROLOL TARTRATE 25 MG: 25 TABLET, FILM COATED ORAL at 08:11

## 2020-11-16 RX ADMIN — PANTOPRAZOLE SODIUM 40 MG: 40 INJECTION, POWDER, LYOPHILIZED, FOR SOLUTION INTRAVENOUS at 08:11

## 2020-11-16 RX ADMIN — DIBASIC SODIUM PHOSPHATE, MONOBASIC POTASSIUM PHOSPHATE AND MONOBASIC SODIUM PHOSPHATE 2 TABLET: 852; 155; 130 TABLET ORAL at 08:11

## 2020-11-16 RX ADMIN — SULFAMETHOXAZOLE AND TRIMETHOPRIM 1 TABLET: 400; 80 TABLET ORAL at 08:11

## 2020-11-16 RX ADMIN — SODIUM CHLORIDE: 0.9 INJECTION, SOLUTION INTRAVENOUS at 05:11

## 2020-11-16 RX ADMIN — VALGANCICLOVIR 450 MG: 450 TABLET, FILM COATED ORAL at 08:11

## 2020-11-16 RX ADMIN — MYCOPHENILIC ACID 720 MG: 180 TABLET, DELAYED RELEASE ORAL at 08:11

## 2020-11-16 RX ADMIN — TACROLIMUS 7 MG: 1 CAPSULE ORAL at 05:11

## 2020-11-16 RX ADMIN — CALCITRIOL 1 MCG: 0.5 CAPSULE, LIQUID FILLED ORAL at 08:11

## 2020-11-16 RX ADMIN — SODIUM PHOSPHATE, MONOBASIC, MONOHYDRATE 20.01 MMOL: 276; 142 INJECTION, SOLUTION INTRAVENOUS at 05:11

## 2020-11-16 RX ADMIN — PREDNISONE 20 MG: 20 TABLET ORAL at 08:11

## 2020-11-16 RX ADMIN — MYCOPHENILIC ACID 540 MG: 180 TABLET, DELAYED RELEASE ORAL at 08:11

## 2020-11-16 RX ADMIN — METOCLOPRAMIDE 10 MG: 5 TABLET ORAL at 12:11

## 2020-11-16 RX ADMIN — PRAVASTATIN SODIUM 40 MG: 10 TABLET ORAL at 08:11

## 2020-11-16 RX ADMIN — FOLIC ACID 1 MG: 1 TABLET ORAL at 08:11

## 2020-11-16 RX ADMIN — TACROLIMUS 7 MG: 1 CAPSULE ORAL at 08:11

## 2020-11-16 RX ADMIN — KETOCONAZOLE 100 MG: 200 TABLET ORAL at 12:11

## 2020-11-16 RX ADMIN — HEPARIN SODIUM 5000 UNITS: 5000 INJECTION INTRAVENOUS; SUBCUTANEOUS at 02:11

## 2020-11-16 RX ADMIN — METOCLOPRAMIDE 10 MG: 5 TABLET ORAL at 05:11

## 2020-11-16 NOTE — CARE UPDATE
Bx planned for 11/17/20 as originally planned.    NPO at midnight      Andrew Cantu M.D.  PGY-III Radiology  Pager: [phone]

## 2020-11-16 NOTE — SUBJECTIVE & OBJECTIVE
Subjective:     Chief Complaint/Reason for Admission: fever, nausea/vomiting    History of Present Illness:  Mr. Rosales is a 40 year old male with ESRD 2/2 diabetic nephropathy s/p kidney/pancreas transplant on 11/3/20 (Thymo induction, CMV D+/R+, HCV Ab+/YIN+). Surgery without complication. Post operative course unremarkable and pt was discharged home on POD #6 with stable Cr and improving pancreatic enzymes. Pt presents to clinic today for routine appointment with complaints of nausea/vomiting. Pt reports eating a erin cheese steak for dinner. Febrile with tmax of 101. Pt was sent to ED for evaluation and hospital admission. Denies chills, sob, chest pain, sick contacts, or changes in bladder/bowel function. Plan to admit KTX for infectious workup and management of nausea/vomiting. Labs and COVID 19 pending. CT abdomen/pelvis without contrast ordered in ED.           Review of patient's allergies indicates:  No Known Allergies    Past Medical History:   Diagnosis Date    Anxiety     Cataract     Diabetes mellitus     Diabetic retinopathy     Disorder of kidney and ureter     Encounter for blood transfusion     Glaucoma     High cholesterol     Hypertension     Retinal detachment      Past Surgical History:   Procedure Laterality Date    ESOPHAGOGASTRODUODENOSCOPY N/A 11/13/2020    Procedure: EGD (ESOPHAGOGASTRODUODENOSCOPY);  Surgeon: Candis Clement MD;  Location: [address]);  Service: Endoscopy;  Laterality: N/A;    EYE SURGERY      KIDNEY TRANSPLANT N/A 11/3/2020    Procedure: TRANSPLANT, KIDNEY;  Surgeon: Adin Romero Jr., MD;  Location: [address];  Service: Transplant;  Laterality: N/A;    LEG AMPUTATION Left     RETINAL DETACHMENT SURGERY      TOE AMPUTATION Right     TRANSPLANTATION OF PANCREAS N/A 11/3/2020    Procedure: TRANSPLANT, PANCREAS;  Surgeon: Adin Romero Jr., MD;  Location: [address];  Service: Transplant;  Laterality: N/A;     Family History   "   Problem Relation (Age of Onset)    Coronary artery disease Mother    Diabetes Mother, Sister, Brother, Maternal Aunt, Maternal Uncle    Glaucoma Mother    Heart disease Mother, Maternal Aunt, Maternal Uncle    Hypertension Mother, Brother, Maternal Aunt, Maternal Uncle    No Known Problems Father    Stroke Mother, Maternal Aunt        Tobacco Use    Smoking status: Former Smoker     Packs/day: 0.25     Years: 10.00     Pack years: 2.50    Smokeless tobacco: Never Used   Substance and Sexual Activity    Alcohol use: Yes     Comment: occasional    Drug use: No    Sexual activity: Yes     Partners: Female     Birth control/protection: Condom        Review of Systems   Constitutional: Positive for activity change and appetite change. Negative for chills and fatigue.   HENT: Negative for congestion and facial swelling.    Eyes: Positive for visual disturbance. Negative for pain and discharge.   Respiratory: Negative for cough, chest tightness, shortness of breath and wheezing.    Cardiovascular: Negative for chest pain, palpitations and leg swelling.   Gastrointestinal: Positive for abdominal distention. Negative for diarrhea.   Endocrine: Negative.    Genitourinary: Negative for decreased urine volume, difficulty urinating and hematuria.   Musculoskeletal: Negative for back pain.   Skin: Positive for wound. Negative for pallor and rash.   Allergic/Immunologic: Positive for immunocompromised state.   Neurological: Negative for dizziness, tremors, weakness and light-headedness.   Hematological: Negative.    Psychiatric/Behavioral: Negative for agitation, confusion and dysphoric mood. The patient is not nervous/anxious.      Objective:     Vital Signs (Most Recent):  Temp: 98.2 °F (36.8 °C) (11/16/20 1314)  Pulse: 80 (11/16/20 1400)  Resp: 18 (11/16/20 1314)  BP: 136/63 (11/16/20 1314)  SpO2: 98 % (11/16/20 1314)  Height: 5' 10" (177.8 cm)  Weight: 83.4 kg (183 lb 13.8 oz)(with prothesis on)  Body mass index is " 26.38 kg/m².     Physical Exam  Vitals signs and nursing note reviewed.   Constitutional:       Appearance: He is well-developed.   HENT:      Head: Normocephalic and atraumatic.   Eyes:      Pupils: Pupils are equal, round, and reactive to light.   Neck:      Musculoskeletal: Normal range of motion and neck supple.      Vascular: No JVD.   Cardiovascular:      Rate and Rhythm: Normal rate and regular rhythm.      Heart sounds: Normal heart sounds. No murmur. No friction rub.   Pulmonary:      Effort: Pulmonary effort is normal. No respiratory distress.      Breath sounds: Normal breath sounds. No wheezing or rales.   Abdominal:      General: Bowel sounds are normal.      Palpations: Abdomen is soft.      Tenderness: There is no abdominal tenderness.      Comments: Midline inc with staples intact no s/s/i   Musculoskeletal: Normal range of motion.      Comments: L BKA    Skin:     General: Skin is warm and dry.   Neurological:      Mental Status: He is alert and oriented to person, place, and time.   Psychiatric:         Behavior: Behavior normal.         Laboratory  CBC:   Recent Labs   Lab 11/14/20  0659 11/15/20  0632 11/16/20  0635   WBC 8.46 8.98 7.96   RBC 3.40* 3.35* 3.57*   HGB 9.0* 8.8* 9.5*   HCT 29.3* 29.3* 31.7*   * 404* 406*   MCV 86 88 89   MCH 26.5* 26.3* 26.6*   MCHC 30.7* 30.0* 30.0*     CMP:   Recent Labs   Lab 11/10/20  1222  11/14/20  0659 11/15/20  0632 11/16/20  0635   GLU 98   < > 87 95 85   CALCIUM 7.9*   < > 7.7* 7.8* 8.2*   ALBUMIN 3.3*   < > 2.8* 2.8* 3.0*  3.0*   PROT 6.5  --   --   --  5.7*      < > 140 140 140   K 4.7   < > 3.9 4.4 4.9   CO2 17*   < > 24 21* 20*   *   < > 109 113* 113*   BUN 29*   < > 13 15 15   CREATININE 2.0*   < > 2.3* 2.0* 1.9*   ALKPHOS 95  --   --   --  107   ALT 89*  --   --   --  231*   AST 76*  --   --   --  163*    < > = values in this interval not displayed.       Diagnostic Results:  CT - Abd/Pelvic: No results found for this or any  previous visit.

## 2020-11-16 NOTE — PLAN OF CARE
Patient is AAOx3, requires 1 person for stand by assistance. Patient denies any pain or nausea. Patient will be NPO after MN tonight for a kidney biopsy tomorrow. Patient continues to receive IVF. Patient remains NSR on telemetry. Patient started on ketoconazole this morning. Patient has been sitting up in the chair all day today and has been walking in the halls with his sister. Reminded the patient and his sister to call for assistance. Call light and personal items are within reach.

## 2020-11-16 NOTE — ASSESSMENT & PLAN NOTE
- s/p SPK 11/3/20 2/2 DMI.   - Cr and pancreas enzymes slightly elevated  - plan for kidney biopsy in am, npo after midnight  - continue iv fluids  - Monitor labs daily.

## 2020-11-16 NOTE — ASSESSMENT & PLAN NOTE
- Pt discharged on POD #6 from [facility] 11/3.  - Returned to clinic found to have fever of 101 and c/o nausea/vomiting.  - Infectious workup in process.  - continue antibiotics.  - continue IVFs.  - CT abdomen/pelvis reviewed.  - stool studies sent per GI recs  - Monitor.

## 2020-11-16 NOTE — ASSESSMENT & PLAN NOTE
- weekly hep c labs per protocol - next due 11/20  - hep c antibody positive 11/13, PCR pending

## 2020-11-16 NOTE — ASSESSMENT & PLAN NOTE
- Continue prograf. Check prograf level daily, monitor for toxic side effects, and adjust dose accordingly for a therapeutic level.   - increase myfortic

## 2020-11-16 NOTE — ASSESSMENT & PLAN NOTE
- Antiemetics PRN.   - started reglan 11/12  - increase reglan dose to 10mg  - GI consult - EGD 11/13 - gastric ulcer  - monitor

## 2020-11-16 NOTE — H&P
Inpatient Radiology Pre-procedure Note    History of Present Illness:  Hernandez Rosales is a 40 y.o. male who presents for kidney allograft biopsy.  Please consult note 11/15 for further detail.  Admission H&P reviewed.  Past Medical History:   Diagnosis Date    Anxiety     Cataract     Diabetes mellitus     Diabetic retinopathy     Disorder of kidney and ureter     Encounter for blood transfusion     Glaucoma     High cholesterol     Hypertension     Retinal detachment      Past Surgical History:   Procedure Laterality Date    EYE SURGERY      KIDNEY TRANSPLANT N/A 11/3/2020    Procedure: TRANSPLANT, KIDNEY;  Surgeon: Adin Romero Jr., MD;  Location: [address];  Service: Transplant;  Laterality: N/A;    LEG AMPUTATION Left     RETINAL DETACHMENT SURGERY      TOE AMPUTATION Right     TRANSPLANTATION OF PANCREAS N/A 11/3/2020    Procedure: TRANSPLANT, PANCREAS;  Surgeon: Adin Romero Jr., MD;  Location: [facility] OR [address] [place] [address];  Service: Transplant;  Laterality: N/A;       Review of Systems:   As documented in primary team H&P    Home Meds:   Prior to Admission medications    Medication Sig Start Date End Date Taking? Authorizing Provider   aspirin (ECOTRIN) 81 MG EC tablet Take 1 tablet (81 mg total) by mouth once daily. 11/6/20 11/6/21  Adin Romero Jr., MD   calcitRIOL (ROCALTROL) 0.25 MCG Cap Take 1 capsule (0.25 mcg total) by mouth once daily. 11/6/20   Adin Romero Jr., MD   cinacalcet (SENSIPAR) 30 MG Tab Take 1 tablet (30 mg total) by mouth daily with breakfast. 11/10/20 11/10/21  Adin Romero Jr., MD   docusate sodium (COLACE) 100 MG capsule Take 1 capsule (100 mg total) by mouth 3 (three) times daily as needed for Constipation. 11/6/20   Adin Romero Jr., MD   ergocalciferol (ERGOCALCIFEROL) 50,000 unit Cap Take 1 capsule (50,000 Units total) by mouth every 7 days. 11/6/20   Adin Romero Jr., MD   famotidine (PEPCID) 20 MG tablet Take 1 tablet (20 mg total) by  mouth every evening. 11/6/20 11/6/21  Adin Romero Jr., MD   ferrous sulfate 325 (65 FE) MG EC tablet Take 325 mg by mouth once daily.    Historical Provider   gabapentin (NEURONTIN) 300 MG capsule Take 300 mg by mouth 2 (two) times daily.     Historical Provider   k phos di & mono-sod phos mono (K-PHOS-NEUTRAL) 250 mg Tab Take 3 tablets by mouth 3 (three) times daily. 11/9/20 11/9/21  Adin Romero Jr., MD   metoprolol tartrate (LOPRESSOR) 25 MG tablet Take 1 tablet (25 mg total) by mouth 2 (two) times daily. 11/6/20 11/6/21  Adin Romero Jr., MD   mycophenolate (CELLCEPT) 250 mg Cap Take 4 capsules (1,000 mg total) by mouth 2 (two) times daily. 11/4/20   Damien Valladares MD   NIFEdipine (PROCARDIA-XL) 30 MG (OSM) 24 hr tablet Take 1 tablet (30 mg total) by mouth once daily. 11/6/20 11/6/21  Adin Romero Jr., MD   nystatin (MYCOSTATIN) 100,000 unit/mL suspension Take 5 mLs (500,000 Units total) by mouth 3 (three) times daily after meals. STOP 12/7/20 11/6/20   Adin Romero Jr., MD   oxyCODONE-acetaminophen (PERCOCET)  mg per tablet Take 1 tablet by mouth every 4 (four) hours as needed for Pain. 11/6/20   Alee Arboleda DNP   pravastatin (PRAVACHOL) 40 MG tablet Take 40 mg by mouth once daily.    Historical Provider   predniSONE (DELTASONE) 5 MG tablet Take by mouth daily: 20mg 11/7-12/6, 15mg 12/7-1/6/21, 10mg 1/7-2/6, then 5mg daily beginning 2/7/21 11/4/20   Damien Valladares MD   sodium bicarbonate 650 MG tablet Take 2 tablets (1,300 mg total) by mouth 3 (three) times daily. 11/9/20 11/9/21  Adin Romero Jr., MD   sulfamethoxazole-trimethoprim 400-80mg (BACTRIM,SEPTRA) 400-80 mg per tablet Take 1 tablet by mouth every morning. STOP 5/3/21 11/4/20 5/3/21  Damien Valladares MD   tacrolimus (PROGRAF) 1 MG Cap Take 9 capsules (9 mg total) by mouth every 12 (twelve) hours. 11/9/20 11/9/21  Devora Hernandez MD   valGANciclovir (VALCYTE) 450 mg Tab Take 1 tablet (450 mg total) by mouth  once daily. STOP 2/1/21 11/6/20 2/4/21  Adin Romero Jr., MD     Scheduled Meds:    calcitRIOL  1 mcg Oral Daily    ergocalciferol  50,000 Units Oral Q7 Days    folic acid  1 mg Oral Daily    gabapentin  300 mg Oral BID    heparin (porcine)  5,000 Units Subcutaneous Q8H    metoclopramide HCl  10 mg Oral TID AC    metoprolol tartrate  25 mg Oral BID    mycophenolate  540 mg Oral BID    pantoprazole  40 mg Intravenous BID    pravastatin  40 mg Oral Daily    predniSONE  20 mg Oral Daily    sulfamethoxazole-trimethoprim 400-80mg  1 tablet Oral Daily    tacrolimus  7 mg Oral BID    valGANciclovir  450 mg Oral Daily     Continuous Infusions:    sodium chloride 0.9% 50 mL/hr at 11/16/20 0555     PRN Meds:acetaminophen, bisacodyL, calcium carbonate, dextrose 50%, dextrose 50%, diphenhydrAMINE, glucagon (human recombinant), glucose, glucose, melatonin, ondansetron, oxyCODONE-acetaminophen, prochlorperazine, simethicone, sodium chloride 0.9%  Anticoagulants/Antiplatelets: no anticoagulation    Allergies: Review of patient's allergies indicates:  No Known Allergies  Sedation Hx: have not been any systemic reactions    Labs:  No results for input(s): INR in the last 168 hours.    Invalid input(s):  PT,  PTT    Recent Labs   Lab 11/16/20  0635   WBC 7.96   HGB 9.5*   HCT 31.7*   MCV 89   *      Recent Labs   Lab 11/10/20  1222  11/16/20  0635   GLU 98   < > 85      < > 140   K 4.7   < > 4.9   *   < > 113*   CO2 17*   < > 20*   BUN 29*   < > 15   CREATININE 2.0*   < > 1.9*   CALCIUM 7.9*   < > 8.2*   MG 1.6   < > 1.3*   ALT 89*  --   --    AST 76*  --   --    ALBUMIN 3.3*   < > 3.0*   BILITOT 0.3  --   --     < > = values in this interval not displayed.         Vitals:  Temp: 98.6 °F (37 °C) (11/16/20 0400)  Pulse: 72 (11/16/20 0810)  Resp: 18 (11/16/20 0400)  BP: (!) 113/59 (11/16/20 0400)  SpO2: 96 % (11/16/20 0400)     Physical Exam:  ASA: 2  Mallampati: 2    General: no acute  distress  Mental Status: alert and oriented to person, place and time  HEENT: normocephalic, atraumatic  Chest: unlabored breathing  Heart: regular heart rate  Abdomen: nondistended  Extremity: moves all extremities    Plan: kidney allograft biopsy  Sedation Plan: up to moderate    Andrew Cantu M.D.  PGY-III Radiology  Pager: [phone]

## 2020-11-16 NOTE — PROGRESS NOTES
[facility]  Kidney Transplant  Progress Note      Reason for Follow-up: Reassessment of Kidney, Pancreas Transplant - 11/3/2020  (#1) recipient and management of immunosuppression.    ORGAN: LEFT KIDNEY    Donor Type: Donation after Brain Death    Donor CMV Status: Positive  Donor HBcAB:Negative  Donor HCV Status:Positive  Donor HBV YIN: Negative  Donor HCV YIN: Positive      Subjective:     Chief Complaint/Reason for Admission: fever, nausea/vomiting    History of Present Illness:  Mr. Rosales is a 40 year old male with ESRD 2/2 diabetic nephropathy s/p kidney/pancreas transplant on 11/3/20 (Thymo induction, CMV D+/R+, HCV Ab+/YIN+). Surgery without complication. Post operative course unremarkable and pt was discharged home on POD #6 with stable Cr and improving pancreatic enzymes. Pt presents to clinic today for routine appointment with complaints of nausea/vomiting. Pt reports eating a erin cheese steak for dinner. Febrile with tmax of 101. Pt was sent to ED for evaluation and hospital admission. Denies chills, sob, chest pain, sick contacts, or changes in bladder/bowel function. Plan to admit KTX for infectious workup and management of nausea/vomiting. Labs and COVID 19 pending. CT abdomen/pelvis without contrast ordered in ED.           Review of patient's allergies indicates:  No Known Allergies    Past Medical History:   Diagnosis Date    Anxiety     Cataract     Diabetes mellitus     Diabetic retinopathy     Disorder of kidney and ureter     Encounter for blood transfusion     Glaucoma     High cholesterol     Hypertension     Retinal detachment      Past Surgical History:   Procedure Laterality Date    ESOPHAGOGASTRODUODENOSCOPY N/A 11/13/2020    Procedure: EGD (ESOPHAGOGASTRODUODENOSCOPY);  Surgeon: Candis Clement MD;  Location: [address];  Service: Endoscopy;  Laterality: N/A;    EYE SURGERY      KIDNEY TRANSPLANT N/A 11/3/2020    Procedure: TRANSPLANT,  KIDNEY;  Surgeon: Adin Romero Jr., MD;  Location: [address];  Service: Transplant;  Laterality: N/A;    LEG AMPUTATION Left     RETINAL DETACHMENT SURGERY      TOE AMPUTATION Right     TRANSPLANTATION OF PANCREAS N/A 11/3/2020    Procedure: TRANSPLANT, PANCREAS;  Surgeon: Adin Romero Jr., MD;  Location: [address];  Service: Transplant;  Laterality: N/A;     Family History     Problem Relation (Age of Onset)    Coronary artery disease Mother    Diabetes Mother, Sister, Brother, Maternal Aunt, Maternal Uncle    Glaucoma Mother    Heart disease Mother, Maternal Aunt, Maternal Uncle    Hypertension Mother, Brother, Maternal Aunt, Maternal Uncle    No Known Problems Father    Stroke Mother, Maternal Aunt        Tobacco Use    Smoking status: Former Smoker     Packs/day: 0.25     Years: 10.00     Pack years: 2.50    Smokeless tobacco: Never Used   Substance and Sexual Activity    Alcohol use: Yes     Comment: occasional    Drug use: No    Sexual activity: Yes     Partners: Female     Birth control/protection: Condom        Review of Systems   Constitutional: Positive for activity change and appetite change. Negative for chills and fatigue.   HENT: Negative for congestion and facial swelling.    Eyes: Positive for visual disturbance. Negative for pain and discharge.   Respiratory: Negative for cough, chest tightness, shortness of breath and wheezing.    Cardiovascular: Negative for chest pain, palpitations and leg swelling.   Gastrointestinal: Positive for abdominal distention. Negative for diarrhea.   Endocrine: Negative.    Genitourinary: Negative for decreased urine volume, difficulty urinating and hematuria.   Musculoskeletal: Negative for back pain.   Skin: Positive for wound. Negative for pallor and rash.   Allergic/Immunologic: Positive for immunocompromised state.   Neurological: Negative for dizziness, tremors, weakness and light-headedness.   Hematological: Negative.   "  Psychiatric/Behavioral: Negative for agitation, confusion and dysphoric mood. The patient is not nervous/anxious.      Objective:     Vital Signs (Most Recent):  Temp: 98.2 °F (36.8 °C) (11/16/20 1314)  Pulse: 80 (11/16/20 1400)  Resp: 18 (11/16/20 1314)  BP: 136/63 (11/16/20 1314)  SpO2: 98 % (11/16/20 1314)  Height: 5' 10" (177.8 cm)  Weight: 83.4 kg (183 lb 13.8 oz)(with prothesis on)  Body mass index is 26.38 kg/m².     Physical Exam  Vitals signs and nursing note reviewed.   Constitutional:       Appearance: He is well-developed.   HENT:      Head: Normocephalic and atraumatic.   Eyes:      Pupils: Pupils are equal, round, and reactive to light.   Neck:      Musculoskeletal: Normal range of motion and neck supple.      Vascular: No JVD.   Cardiovascular:      Rate and Rhythm: Normal rate and regular rhythm.      Heart sounds: Normal heart sounds. No murmur. No friction rub.   Pulmonary:      Effort: Pulmonary effort is normal. No respiratory distress.      Breath sounds: Normal breath sounds. No wheezing or rales.   Abdominal:      General: Bowel sounds are normal.      Palpations: Abdomen is soft.      Tenderness: There is no abdominal tenderness.      Comments: Midline inc with staples intact no s/s/i   Musculoskeletal: Normal range of motion.      Comments: L BKA    Skin:     General: Skin is warm and dry.   Neurological:      Mental Status: He is alert and oriented to person, place, and time.   Psychiatric:         Behavior: Behavior normal.         Laboratory  CBC:   Recent Labs   Lab 11/14/20  0659 11/15/20  0632 11/16/20  0635   WBC 8.46 8.98 7.96   RBC 3.40* 3.35* 3.57*   HGB 9.0* 8.8* 9.5*   HCT 29.3* 29.3* 31.7*   * 404* 406*   MCV 86 88 89   MCH 26.5* 26.3* 26.6*   MCHC 30.7* 30.0* 30.0*     CMP:   Recent Labs   Lab 11/10/20  1222  11/14/20  0659 11/15/20  0632 11/16/20  0635   GLU 98   < > 87 95 85   CALCIUM 7.9*   < > 7.7* 7.8* 8.2*   ALBUMIN 3.3*   < > 2.8* 2.8* 3.0*  3.0*   PROT 6.5  -- "   --   --  5.7*      < > 140 140 140   K 4.7   < > 3.9 4.4 4.9   CO2 17*   < > 24 21* 20*   *   < > 109 113* 113*   BUN 29*   < > 13 15 15   CREATININE 2.0*   < > 2.3* 2.0* 1.9*   ALKPHOS 95  --   --   --  107   ALT 89*  --   --   --  231*   AST 76*  --   --   --  163*    < > = values in this interval not displayed.       Diagnostic Results:  CT - Abd/Pelvic: No results found for this or any previous visit.    Assessment/Plan:     * SIRS (systemic inflammatory response syndrome)  - Pt discharged on POD #6 from [facility] 11/3.  - Returned to clinic found to have fever of 101 and c/o nausea/vomiting.  - Infectious workup in process.  - continue antibiotics.  - continue IVFs.  - CT abdomen/pelvis reviewed.  - stool studies sent per GI recs  - Monitor.      Hypophosphatemia  Iv replacement  Monitor labs daily      Metabolic acidosis  - continue bicarb replacement      Hypomagnesemia  Iv replacement  Monitor labs daily    Anemia of chronic disease  - H&H stable.  - Monitor.       Status post simultaneous kidney and pancreas transplant  - s/p K 11/3/20 2/2 DMI.   - Cr and pancreas enzymes slightly elevated  - plan for kidney biopsy in am, npo after midnight  - continue iv fluids  - Monitor labs daily.     Gastroparesis due to DM  - Antiemetics PRN.   - started reglan 11/12  - increase reglan dose to 10mg  - GI consult - EGD 11/13 - gastric ulcer  - monitor      At risk for opportunistic infections  - continue OI prophylaxis as per protocol.       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  - weekly hep c labs per protocol - next due 11/20  - hep c antibody positive 11/13, PCR pending        Long-term use of immunosuppressant medication  - Continue prograf. Check prograf level daily, monitor for toxic side effects, and adjust dose accordingly for a therapeutic level.   - increase myfortic      Prophylactic immunotherapy  - see long term immuno.         Discharge Planning: not candidate at this  time      Marisa Peña, FLYNN  Kidney Transplant  [facility]

## 2020-11-17 PROBLEM — R65.10 SIRS (SYSTEMIC INFLAMMATORY RESPONSE SYNDROME): Status: RESOLVED | Noted: 2020-11-10 | Resolved: 2020-11-17

## 2020-11-17 LAB
ALBUMIN SERPL BCP-MCNC: 3 G/DL (ref 3.5–5.2)
ALBUMIN SERPL BCP-MCNC: 3 G/DL (ref 3.5–5.2)
ALP SERPL-CCNC: 110 U/L (ref 55–135)
ALT SERPL W/O P-5'-P-CCNC: 249 U/L (ref 10–44)
AMYLASE SERPL-CCNC: 238 U/L (ref 20–110)
ANION GAP SERPL CALC-SCNC: 8 MMOL/L (ref 8–16)
ANION GAP SERPL CALC-SCNC: 9 MMOL/L (ref 8–16)
AST SERPL-CCNC: 129 U/L (ref 10–40)
BACTERIA BLD CULT: NORMAL
BACTERIA BLD CULT: NORMAL
BASOPHILS # BLD AUTO: 0.05 K/UL (ref 0–0.2)
BASOPHILS NFR BLD: 0.7 % (ref 0–1.9)
BILIRUB SERPL-MCNC: 0.2 MG/DL (ref 0.1–1)
BUN SERPL-MCNC: 20 MG/DL (ref 6–20)
BUN SERPL-MCNC: 21 MG/DL (ref 6–20)
CALCIUM SERPL-MCNC: 8.1 MG/DL (ref 8.7–10.5)
CALCIUM SERPL-MCNC: 8.2 MG/DL (ref 8.7–10.5)
CHLORIDE SERPL-SCNC: 112 MMOL/L (ref 95–110)
CHLORIDE SERPL-SCNC: 112 MMOL/L (ref 95–110)
CLASS I ANTIBODIES - LUMINEX: NORMAL
CLASS I ANTIBODY COMMENTS - LUMINEX: NORMAL
CLASS II ANTIBODY COMMENTS - LUMINEX: NORMAL
CO2 SERPL-SCNC: 17 MMOL/L (ref 23–29)
CO2 SERPL-SCNC: 17 MMOL/L (ref 23–29)
CREAT SERPL-MCNC: 2 MG/DL (ref 0.5–1.4)
CREAT SERPL-MCNC: 2 MG/DL (ref 0.5–1.4)
DIFFERENTIAL METHOD: ABNORMAL
DSA1 TESTING DATE: NORMAL
DSA12 TESTING DATE: NORMAL
DSA2 TESTING DATE: NORMAL
EOSINOPHIL # BLD AUTO: 0.1 K/UL (ref 0–0.5)
EOSINOPHIL NFR BLD: 2 % (ref 0–8)
ERYTHROCYTE [DISTWIDTH] IN BLOOD BY AUTOMATED COUNT: 18.5 % (ref 11.5–14.5)
EST. GFR  (AFRICAN AMERICAN): 47.5 ML/MIN/1.73 M^2
EST. GFR  (AFRICAN AMERICAN): 47.5 ML/MIN/1.73 M^2
EST. GFR  (NON AFRICAN AMERICAN): 41.1 ML/MIN/1.73 M^2
EST. GFR  (NON AFRICAN AMERICAN): 41.1 ML/MIN/1.73 M^2
GLUCOSE SERPL-MCNC: 106 MG/DL (ref 70–110)
GLUCOSE SERPL-MCNC: 107 MG/DL (ref 70–110)
GPP - ADENOVIRUS 40/41: NOT DETECTED
GPP - CAMPYLOBACTER: NOT DETECTED
GPP - CLOSTRIDIUM DIFFICILE TOXIN A/B: NOT DETECTED
GPP - CRYPTOSPORIDIUM: NOT DETECTED
GPP - E COLI O157: NOT DETECTED
GPP - ENTAMOEBA HISTOLYTICA: NOT DETECTED
GPP - ENTEROTOXIGENIC E COLI (ETEC): NOT DETECTED
GPP - GIARDIA LAMBLIA: NOT DETECTED
GPP - NOROVIRUS GI/GII: NOT DETECTED
GPP - ROTAVIRUS A: NOT DETECTED
GPP - SALMONELLA: NOT DETECTED
GPP - SHIGELLA: NOT DETECTED
GPP - VIBRIO CHOLERA: NOT DETECTED
GPP - YERSINIA ENTEROCOLITICA: NOT DETECTED
HCT VFR BLD AUTO: 30.8 % (ref 40–54)
HGB BLD-MCNC: 9.6 G/DL (ref 14–18)
IMM GRANULOCYTES # BLD AUTO: 0.22 K/UL (ref 0–0.04)
IMM GRANULOCYTES NFR BLD AUTO: 3.1 % (ref 0–0.5)
LACTATE PLASV-SCNC: NOT DETECTED MMOL/L
LIPASE SERPL-CCNC: 159 U/L (ref 4–60)
LYMPHOCYTES # BLD AUTO: 0.3 K/UL (ref 1–4.8)
LYMPHOCYTES NFR BLD: 3.8 % (ref 18–48)
MAGNESIUM SERPL-MCNC: 1.2 MG/DL (ref 1.6–2.6)
MCH RBC QN AUTO: 27.4 PG (ref 27–31)
MCHC RBC AUTO-ENTMCNC: 31.2 G/DL (ref 32–36)
MCV RBC AUTO: 88 FL (ref 82–98)
MONOCYTES # BLD AUTO: 0.5 K/UL (ref 0.3–1)
MONOCYTES NFR BLD: 7.3 % (ref 4–15)
NEUTROPHILS # BLD AUTO: 6 K/UL (ref 1.8–7.7)
NEUTROPHILS NFR BLD: 83.1 % (ref 38–73)
NRBC BLD-RTO: 0 /100 WBC
PHOSPHATE SERPL-MCNC: 2.2 MG/DL (ref 2.7–4.5)
PLATELET # BLD AUTO: 385 K/UL (ref 150–350)
PMV BLD AUTO: 9.8 FL (ref 9.2–12.9)
POTASSIUM SERPL-SCNC: 4.4 MMOL/L (ref 3.5–5.1)
POTASSIUM SERPL-SCNC: 4.5 MMOL/L (ref 3.5–5.1)
PROT SERPL-MCNC: 5.7 G/DL (ref 6–8.4)
RBC # BLD AUTO: 3.51 M/UL (ref 4.6–6.2)
SERUM COLLECTION DT - LUMINEX CLASS I: NORMAL
SERUM COLLECTION DT - LUMINEX CLASS II: NORMAL
SODIUM SERPL-SCNC: 137 MMOL/L (ref 136–145)
SODIUM SERPL-SCNC: 138 MMOL/L (ref 136–145)
TACROLIMUS BLD-MCNC: 6.6 NG/ML (ref 5–15)
WBC # BLD AUTO: 7.15 K/UL (ref 3.9–12.7)

## 2020-11-17 PROCEDURE — 83735 ASSAY OF MAGNESIUM: CPT

## 2020-11-17 PROCEDURE — 25000003 PHARM REV CODE 250: Performed by: PHYSICIAN ASSISTANT

## 2020-11-17 PROCEDURE — 63600175 PHARM REV CODE 636 W HCPCS: Performed by: STUDENT IN AN ORGANIZED HEALTH CARE EDUCATION/TRAINING PROGRAM

## 2020-11-17 PROCEDURE — 25000003 PHARM REV CODE 250: Performed by: NURSE PRACTITIONER

## 2020-11-17 PROCEDURE — 63600175 PHARM REV CODE 636 W HCPCS: Performed by: NURSE PRACTITIONER

## 2020-11-17 PROCEDURE — 99233 SBSQ HOSP IP/OBS HIGH 50: CPT | Mod: ,,, | Performed by: NURSE PRACTITIONER

## 2020-11-17 PROCEDURE — 80053 COMPREHEN METABOLIC PANEL: CPT

## 2020-11-17 PROCEDURE — 82150 ASSAY OF AMYLASE: CPT

## 2020-11-17 PROCEDURE — 20600001 HC STEP DOWN PRIVATE ROOM

## 2020-11-17 PROCEDURE — 83690 ASSAY OF LIPASE: CPT

## 2020-11-17 PROCEDURE — C9113 INJ PANTOPRAZOLE SODIUM, VIA: HCPCS | Performed by: NURSE PRACTITIONER

## 2020-11-17 PROCEDURE — 80069 RENAL FUNCTION PANEL: CPT

## 2020-11-17 PROCEDURE — 25000003 PHARM REV CODE 250: Performed by: INTERNAL MEDICINE

## 2020-11-17 PROCEDURE — 85025 COMPLETE CBC W/AUTO DIFF WBC: CPT

## 2020-11-17 PROCEDURE — 80197 ASSAY OF TACROLIMUS: CPT

## 2020-11-17 PROCEDURE — 99233 PR SUBSEQUENT HOSPITAL CARE,LEVL III: ICD-10-PCS | Mod: ,,, | Performed by: NURSE PRACTITIONER

## 2020-11-17 RX ORDER — LANOLIN ALCOHOL/MO/W.PET/CERES
400 CREAM (GRAM) TOPICAL 2 TIMES DAILY
Status: DISCONTINUED | OUTPATIENT
Start: 2020-11-17 | End: 2020-11-18

## 2020-11-17 RX ORDER — SODIUM BICARBONATE 650 MG/1
1300 TABLET ORAL 2 TIMES DAILY
Status: DISCONTINUED | OUTPATIENT
Start: 2020-11-17 | End: 2020-11-19 | Stop reason: HOSPADM

## 2020-11-17 RX ORDER — NYSTATIN 100000 [USP'U]/ML
500000 SUSPENSION ORAL
Status: DISCONTINUED | OUTPATIENT
Start: 2020-11-17 | End: 2020-11-19 | Stop reason: HOSPADM

## 2020-11-17 RX ORDER — FENTANYL CITRATE 50 UG/ML
INJECTION, SOLUTION INTRAMUSCULAR; INTRAVENOUS CODE/TRAUMA/SEDATION MEDICATION
Status: COMPLETED | OUTPATIENT
Start: 2020-11-17 | End: 2020-11-17

## 2020-11-17 RX ORDER — MIDAZOLAM HYDROCHLORIDE 1 MG/ML
INJECTION INTRAMUSCULAR; INTRAVENOUS CODE/TRAUMA/SEDATION MEDICATION
Status: COMPLETED | OUTPATIENT
Start: 2020-11-17 | End: 2020-11-17

## 2020-11-17 RX ADMIN — MIDAZOLAM HYDROCHLORIDE 1 MG: 1 INJECTION, SOLUTION INTRAMUSCULAR; INTRAVENOUS at 09:11

## 2020-11-17 RX ADMIN — SODIUM CHLORIDE: 0.9 INJECTION, SOLUTION INTRAVENOUS at 06:11

## 2020-11-17 RX ADMIN — METOCLOPRAMIDE 10 MG: 5 TABLET ORAL at 12:11

## 2020-11-17 RX ADMIN — DIBASIC SODIUM PHOSPHATE, MONOBASIC POTASSIUM PHOSPHATE AND MONOBASIC SODIUM PHOSPHATE 2 TABLET: 852; 155; 130 TABLET ORAL at 08:11

## 2020-11-17 RX ADMIN — METOCLOPRAMIDE 10 MG: 5 TABLET ORAL at 06:11

## 2020-11-17 RX ADMIN — MYCOPHENILIC ACID 720 MG: 180 TABLET, DELAYED RELEASE ORAL at 08:11

## 2020-11-17 RX ADMIN — HEPARIN SODIUM 5000 UNITS: 5000 INJECTION INTRAVENOUS; SUBCUTANEOUS at 02:11

## 2020-11-17 RX ADMIN — SULFAMETHOXAZOLE AND TRIMETHOPRIM 1 TABLET: 400; 80 TABLET ORAL at 08:11

## 2020-11-17 RX ADMIN — Medication 400 MG: at 12:11

## 2020-11-17 RX ADMIN — SODIUM BICARBONATE 650 MG TABLET 1300 MG: at 09:11

## 2020-11-17 RX ADMIN — FOLIC ACID 1 MG: 1 TABLET ORAL at 08:11

## 2020-11-17 RX ADMIN — PRAVASTATIN SODIUM 40 MG: 10 TABLET ORAL at 08:11

## 2020-11-17 RX ADMIN — MYCOPHENILIC ACID 720 MG: 180 TABLET, DELAYED RELEASE ORAL at 09:11

## 2020-11-17 RX ADMIN — HEPARIN SODIUM 5000 UNITS: 5000 INJECTION INTRAVENOUS; SUBCUTANEOUS at 09:11

## 2020-11-17 RX ADMIN — GABAPENTIN 300 MG: 300 CAPSULE ORAL at 09:11

## 2020-11-17 RX ADMIN — TACROLIMUS 7 MG: 1 CAPSULE ORAL at 06:11

## 2020-11-17 RX ADMIN — SODIUM BICARBONATE 650 MG TABLET 1300 MG: at 12:11

## 2020-11-17 RX ADMIN — FENTANYL CITRATE 25 MCG: 50 INJECTION, SOLUTION INTRAMUSCULAR; INTRAVENOUS at 10:11

## 2020-11-17 RX ADMIN — VALGANCICLOVIR 450 MG: 450 TABLET, FILM COATED ORAL at 08:11

## 2020-11-17 RX ADMIN — GABAPENTIN 300 MG: 300 CAPSULE ORAL at 08:11

## 2020-11-17 RX ADMIN — PREDNISONE 20 MG: 20 TABLET ORAL at 08:11

## 2020-11-17 RX ADMIN — KETOCONAZOLE 100 MG: 200 TABLET ORAL at 08:11

## 2020-11-17 RX ADMIN — PANTOPRAZOLE SODIUM 40 MG: 40 INJECTION, POWDER, LYOPHILIZED, FOR SOLUTION INTRAVENOUS at 09:11

## 2020-11-17 RX ADMIN — METOCLOPRAMIDE 10 MG: 5 TABLET ORAL at 04:11

## 2020-11-17 RX ADMIN — METOPROLOL TARTRATE 25 MG: 25 TABLET, FILM COATED ORAL at 08:11

## 2020-11-17 RX ADMIN — TACROLIMUS 7 MG: 1 CAPSULE ORAL at 08:11

## 2020-11-17 RX ADMIN — FENTANYL CITRATE 50 MCG: 50 INJECTION, SOLUTION INTRAMUSCULAR; INTRAVENOUS at 09:11

## 2020-11-17 RX ADMIN — NYSTATIN 500000 UNITS: 100000 SUSPENSION ORAL at 02:11

## 2020-11-17 RX ADMIN — NYSTATIN 500000 UNITS: 100000 SUSPENSION ORAL at 06:11

## 2020-11-17 RX ADMIN — PANTOPRAZOLE SODIUM 40 MG: 40 INJECTION, POWDER, LYOPHILIZED, FOR SOLUTION INTRAVENOUS at 08:11

## 2020-11-17 RX ADMIN — DIBASIC SODIUM PHOSPHATE, MONOBASIC POTASSIUM PHOSPHATE AND MONOBASIC SODIUM PHOSPHATE 2 TABLET: 852; 155; 130 TABLET ORAL at 09:11

## 2020-11-17 RX ADMIN — CALCITRIOL 1 MCG: 0.5 CAPSULE, LIQUID FILLED ORAL at 08:11

## 2020-11-17 RX ADMIN — Medication 400 MG: at 09:11

## 2020-11-17 NOTE — ASSESSMENT & PLAN NOTE
- s/p SPK 11/3/20 2/2 DMI.   - Cr and pancreas enzymes slightly elevated  - kidney biopsy obtained this am  - continue iv fluids  - Monitor labs daily.

## 2020-11-17 NOTE — ASSESSMENT & PLAN NOTE
- Pt discharged on POD #6 from [facility] 11/3.  - Returned to clinic found to have fever of 101 and c/o nausea/vomiting.  - Infectious workup in process.  - continue antibiotics.  - continue IVFs.  - CT abdomen/pelvis reviewed.  - stool studies sent per GI recs  - Monitor.

## 2020-11-17 NOTE — PLAN OF CARE
Pt arrived to IR procedure room 189 for transplant kidney biopsy, allergies reviewed, preparing for procedure

## 2020-11-17 NOTE — PLAN OF CARE
Patient is AAOx3, requires 1 person for stand by assistance. Patient denies any pain or nausea. Patient had a kidney biopsy today. Patient continues to receive IVF. Patient remains NSR on telemetry. Patient started on PO mag replacement. Reminded the patient and his sister to call for assistance. Call light and personal items are within reach.

## 2020-11-17 NOTE — NURSING
Procedure complete, pt tolerated well and without event, RLQ incision site and dressing are clean, dry, intact, and without bleeding or hematoma, pt escorted to ROCU to begin recovery, bedside handoff provided to OBED Huitron RN, report called to primary RN

## 2020-11-17 NOTE — PROCEDURES
"  Pre Op Diagnosis: renal transplant dysfunction  Post Op Diagnosis: Same    Procedure: renal transplant biopsy    Procedure performed by: Maylin    Written Informed Consent Obtained: Yes  Specimen Removed: YES 3 18g cores  Estimated Blood Loss: Minimal    Findings:   Successful biopsy of rlq renal allograft. 3 18 cores removed.    Patient tolerated procedure well.    Massimo Carlisle MD ([place])  Interventional Radiology  [phone]        "

## 2020-11-17 NOTE — PLAN OF CARE
Pt arrived to ROCU bed 1 for 2 hour post kidney biopsy recovery. Report received from KELSEA Prather. Pt denies pain/discomfort. Dressing CDI. VSS. No acute events. RN to bedside. See flow sheets for post procedure monitoring.

## 2020-11-17 NOTE — PLAN OF CARE
Pt remains AAO x 4 with VSS (standing BP only), afebrile, sats upper 90s on RA throughout shift  Pt denies any N/V, pain or SOB  NSR on tele monitor  L UA fistula +/+  Midline incision MARV with staples - CDI, well approximated  Phos level 1.9 - pt received IV Phos replacement   R AC 20 g PIV - CDI with NS infusing at 50 cc/hr  Pt NPO since midnight for Kidney biopsy in the AM, Heparin held.   Pt h/o of left BKA with prosthesis, and blind in both eyes- sister remains at bedside to assist   Pt up standby assist x1, ambulatory to toilet- voids in hat  Pt remains free from falls and injuries, call light in reach, bed in lowest position, nonskid socks on when OOB  Will continue to monitor

## 2020-11-17 NOTE — PROGRESS NOTES
[facility]  Kidney Transplant  Progress Note      Reason for Follow-up: Reassessment of Kidney, Pancreas Transplant - 11/3/2020  (#1) recipient and management of immunosuppression.    ORGAN: LEFT KIDNEY    Donor Type: Donation after Brain Death    Donor CMV Status: Positive  Donor HBcAB:Negative  Donor HCV Status:Positive  Donor HBV YIN: Negative  Donor HCV YIN: Positive      Subjective:     Chief Complaint/Reason for Admission: fever, nausea/vomiting    History of Present Illness:  Mr. Rosales is a 40 year old male with ESRD 2/2 diabetic nephropathy s/p kidney/pancreas transplant on 11/3/20 (Thymo induction, CMV D+/R+, HCV Ab+/YIN+). Surgery without complication. Post operative course unremarkable and pt was discharged home on POD #6 with stable Cr and improving pancreatic enzymes. Pt presents to clinic today for routine appointment with complaints of nausea/vomiting. Pt reports eating a erin cheese steak for dinner. Febrile with tmax of 101. Pt was sent to ED for evaluation and hospital admission. Denies chills, sob, chest pain, sick contacts, or changes in bladder/bowel function. Plan to admit KTX for infectious workup and management of nausea/vomiting. Labs and COVID 19 pending. CT abdomen/pelvis without contrast ordered in ED.           Review of patient's allergies indicates:  No Known Allergies    Past Medical History:   Diagnosis Date    Anxiety     Cataract     Diabetes mellitus     Diabetic retinopathy     Disorder of kidney and ureter     Encounter for blood transfusion     Glaucoma     High cholesterol     Hypertension     Retinal detachment      Past Surgical History:   Procedure Laterality Date    ESOPHAGOGASTRODUODENOSCOPY N/A 11/13/2020    Procedure: EGD (ESOPHAGOGASTRODUODENOSCOPY);  Surgeon: Candis Clement MD;  Location: [address];  Service: Endoscopy;  Laterality: N/A;    EYE SURGERY      KIDNEY TRANSPLANT N/A 11/3/2020    Procedure: TRANSPLANT,  KIDNEY;  Surgeon: Adin Romero Jr., MD;  Location: [address];  Service: Transplant;  Laterality: N/A;    LEG AMPUTATION Left     RETINAL DETACHMENT SURGERY      TOE AMPUTATION Right     TRANSPLANTATION OF PANCREAS N/A 11/3/2020    Procedure: TRANSPLANT, PANCREAS;  Surgeon: Adin Romero Jr., MD;  Location: [address];  Service: Transplant;  Laterality: N/A;     Family History     Problem Relation (Age of Onset)    Coronary artery disease Mother    Diabetes Mother, Sister, Brother, Maternal Aunt, Maternal Uncle    Glaucoma Mother    Heart disease Mother, Maternal Aunt, Maternal Uncle    Hypertension Mother, Brother, Maternal Aunt, Maternal Uncle    No Known Problems Father    Stroke Mother, Maternal Aunt        Tobacco Use    Smoking status: Former Smoker     Packs/day: 0.25     Years: 10.00     Pack years: 2.50    Smokeless tobacco: Never Used   Substance and Sexual Activity    Alcohol use: Yes     Comment: occasional    Drug use: No    Sexual activity: Yes     Partners: Female     Birth control/protection: Condom        Review of Systems   Constitutional: Positive for activity change and appetite change. Negative for chills and fatigue.   HENT: Negative for congestion and facial swelling.    Eyes: Positive for visual disturbance. Negative for pain and discharge.   Respiratory: Negative for cough, chest tightness, shortness of breath and wheezing.    Cardiovascular: Negative for chest pain, palpitations and leg swelling.   Gastrointestinal: Positive for abdominal distention. Negative for diarrhea.   Endocrine: Negative.    Genitourinary: Negative for decreased urine volume, difficulty urinating and hematuria.   Musculoskeletal: Negative for back pain.   Skin: Positive for wound. Negative for pallor and rash.   Allergic/Immunologic: Positive for immunocompromised state.   Neurological: Negative for dizziness, tremors, weakness and light-headedness.   Hematological: Negative.   "  Psychiatric/Behavioral: Negative for agitation, confusion and dysphoric mood. The patient is not nervous/anxious.      Objective:     Vital Signs (Most Recent):  Temp: 98.5 °F (36.9 °C) (11/17/20 1015)  Pulse: 72 (11/17/20 1200)  Resp: 15 (11/17/20 1145)  BP: 115/68 (11/17/20 1145)  SpO2: 100 % (11/17/20 1145)  Height: 5' 10" (177.8 cm)  Weight: 83.4 kg (183 lb 13.8 oz)(with prothesis on)  Body mass index is 26.38 kg/m².     Physical Exam  Vitals signs and nursing note reviewed.   Constitutional:       Appearance: He is well-developed.   HENT:      Head: Normocephalic and atraumatic.   Eyes:      Pupils: Pupils are equal, round, and reactive to light.   Neck:      Musculoskeletal: Normal range of motion and neck supple.      Vascular: No JVD.   Cardiovascular:      Rate and Rhythm: Normal rate and regular rhythm.      Heart sounds: Normal heart sounds. No murmur. No friction rub.   Pulmonary:      Effort: Pulmonary effort is normal. No respiratory distress.      Breath sounds: Normal breath sounds. No wheezing or rales.   Abdominal:      General: Bowel sounds are normal.      Palpations: Abdomen is soft.      Tenderness: There is no abdominal tenderness.      Comments: Midline inc with staples intact no s/s/i   Musculoskeletal: Normal range of motion.      Comments: L BKA    Skin:     General: Skin is warm and dry.   Neurological:      Mental Status: He is alert and oriented to person, place, and time.   Psychiatric:         Behavior: Behavior normal.         Laboratory  CBC:   Recent Labs   Lab 11/15/20  0632 11/16/20  0635 11/17/20  0635   WBC 8.98 7.96 7.15   RBC 3.35* 3.57* 3.51*   HGB 8.8* 9.5* 9.6*   HCT 29.3* 31.7* 30.8*   * 406* 385*   MCV 88 89 88   MCH 26.3* 26.6* 27.4   MCHC 30.0* 30.0* 31.2*     CMP:   Recent Labs   Lab 11/15/20  0632 11/16/20  0635 11/17/20  0635   GLU 95 85 107  106   CALCIUM 7.8* 8.2* 8.2*  8.1*   ALBUMIN 2.8* 3.0*  3.0* 3.0*  3.0*   PROT  --  5.7* 5.7*    140 138  " 137   K 4.4 4.9 4.5  4.4   CO2 21* 20* 17*  17*   * 113* 112*  112*   BUN 15 15 21*  20   CREATININE 2.0* 1.9* 2.0*  2.0*   ALKPHOS  --  107 110   ALT  --  231* 249*   AST  --  163* 129*       Diagnostic Results:  CT - Abd/Pelvic:   Impression:     Findings compatible with recent pancreatic and renal transplant.  No parenchymal abnormality.  Mild renal peritransplant fat stranding.  Approximately 1.6 cm circumscribed low-density fluid collection just inferior to the pancreatic transplant, which may represent a seroma.  No evidence of transplant hydronephrosis, noting a ureteral stent in place.     There is mild circumferential bladder wall thickening.     Osseous sclerosis and endplate degenerative changes, which can be seen in the setting of renal osteodystrophy.     Punctate cholelithiasis.    Assessment/Plan:     Hypophosphatemia  Iv replacement  Monitor labs daily      Metabolic acidosis  - continue bicarb replacement      Hypomagnesemia  Iv replacement  Monitor labs daily    Anemia of chronic disease  - H&H stable.  - Monitor.       Status post simultaneous kidney and pancreas transplant  - s/p SPK 11/3/20 2/2 DMI.   - Cr and pancreas enzymes slightly elevated  - kidney biopsy obtained this am  - continue iv fluids  - Monitor labs daily.     Gastroparesis due to DM  - Antiemetics PRN.   - started reglan 11/12  - increase reglan dose to 10mg  - GI consult - EGD 11/13 - gastric ulcer  - monitor      At risk for opportunistic infections  - continue OI prophylaxis as per protocol.       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  - weekly hep c labs per protocol - next due 11/20  - hep c antibody positive 11/13, PCR >643, 789  - LFTs elevated, continue to trend  - will need f/u appt in hepatology for hep c treatment    Long-term use of immunosuppressant medication  - Continue prograf. Check prograf level daily, monitor for toxic side effects, and adjust dose accordingly for a therapeutic level.   -  increase myfortic      Prophylactic immunotherapy  - see long term immuno.         Discharge Planning: await biopsy resutls      Marisa Peña NP  Kidney Transplant  [facility]

## 2020-11-17 NOTE — SUBJECTIVE & OBJECTIVE
Subjective:     Chief Complaint/Reason for Admission: fever, nausea/vomiting    History of Present Illness:  Mr. Rosales is a 40 year old male with ESRD 2/2 diabetic nephropathy s/p kidney/pancreas transplant on 11/3/20 (Thymo induction, CMV D+/R+, HCV Ab+/YIN+). Surgery without complication. Post operative course unremarkable and pt was discharged home on POD #6 with stable Cr and improving pancreatic enzymes. Pt presents to clinic today for routine appointment with complaints of nausea/vomiting. Pt reports eating a erin cheese steak for dinner. Febrile with tmax of 101. Pt was sent to ED for evaluation and hospital admission. Denies chills, sob, chest pain, sick contacts, or changes in bladder/bowel function. Plan to admit KTX for infectious workup and management of nausea/vomiting. Labs and COVID 19 pending. CT abdomen/pelvis without contrast ordered in ED.           Review of patient's allergies indicates:  No Known Allergies    Past Medical History:   Diagnosis Date    Anxiety     Cataract     Diabetes mellitus     Diabetic retinopathy     Disorder of kidney and ureter     Encounter for blood transfusion     Glaucoma     High cholesterol     Hypertension     Retinal detachment      Past Surgical History:   Procedure Laterality Date    ESOPHAGOGASTRODUODENOSCOPY N/A 11/13/2020    Procedure: EGD (ESOPHAGOGASTRODUODENOSCOPY);  Surgeon: Candis Clement MD;  Location: [address]);  Service: Endoscopy;  Laterality: N/A;    EYE SURGERY      KIDNEY TRANSPLANT N/A 11/3/2020    Procedure: TRANSPLANT, KIDNEY;  Surgeon: Adin Romero Jr., MD;  Location: [address];  Service: Transplant;  Laterality: N/A;    LEG AMPUTATION Left     RETINAL DETACHMENT SURGERY      TOE AMPUTATION Right     TRANSPLANTATION OF PANCREAS N/A 11/3/2020    Procedure: TRANSPLANT, PANCREAS;  Surgeon: Adin Romero Jr., MD;  Location: [address];  Service: Transplant;  Laterality: N/A;     Family History   "   Problem Relation (Age of Onset)    Coronary artery disease Mother    Diabetes Mother, Sister, Brother, Maternal Aunt, Maternal Uncle    Glaucoma Mother    Heart disease Mother, Maternal Aunt, Maternal Uncle    Hypertension Mother, Brother, Maternal Aunt, Maternal Uncle    No Known Problems Father    Stroke Mother, Maternal Aunt        Tobacco Use    Smoking status: Former Smoker     Packs/day: 0.25     Years: 10.00     Pack years: 2.50    Smokeless tobacco: Never Used   Substance and Sexual Activity    Alcohol use: Yes     Comment: occasional    Drug use: No    Sexual activity: Yes     Partners: Female     Birth control/protection: Condom        Review of Systems   Constitutional: Positive for activity change and appetite change. Negative for chills and fatigue.   HENT: Negative for congestion and facial swelling.    Eyes: Positive for visual disturbance. Negative for pain and discharge.   Respiratory: Negative for cough, chest tightness, shortness of breath and wheezing.    Cardiovascular: Negative for chest pain, palpitations and leg swelling.   Gastrointestinal: Positive for abdominal distention. Negative for diarrhea.   Endocrine: Negative.    Genitourinary: Negative for decreased urine volume, difficulty urinating and hematuria.   Musculoskeletal: Negative for back pain.   Skin: Positive for wound. Negative for pallor and rash.   Allergic/Immunologic: Positive for immunocompromised state.   Neurological: Negative for dizziness, tremors, weakness and light-headedness.   Hematological: Negative.    Psychiatric/Behavioral: Negative for agitation, confusion and dysphoric mood. The patient is not nervous/anxious.      Objective:     Vital Signs (Most Recent):  Temp: 98.5 °F (36.9 °C) (11/17/20 1015)  Pulse: 72 (11/17/20 1200)  Resp: 15 (11/17/20 1145)  BP: 115/68 (11/17/20 1145)  SpO2: 100 % (11/17/20 1145)  Height: 5' 10" (177.8 cm)  Weight: 83.4 kg (183 lb 13.8 oz)(with prothesis on)  Body mass index is " 26.38 kg/m².     Physical Exam  Vitals signs and nursing note reviewed.   Constitutional:       Appearance: He is well-developed.   HENT:      Head: Normocephalic and atraumatic.   Eyes:      Pupils: Pupils are equal, round, and reactive to light.   Neck:      Musculoskeletal: Normal range of motion and neck supple.      Vascular: No JVD.   Cardiovascular:      Rate and Rhythm: Normal rate and regular rhythm.      Heart sounds: Normal heart sounds. No murmur. No friction rub.   Pulmonary:      Effort: Pulmonary effort is normal. No respiratory distress.      Breath sounds: Normal breath sounds. No wheezing or rales.   Abdominal:      General: Bowel sounds are normal.      Palpations: Abdomen is soft.      Tenderness: There is no abdominal tenderness.      Comments: Midline inc with staples intact no s/s/i   Musculoskeletal: Normal range of motion.      Comments: L BKA    Skin:     General: Skin is warm and dry.   Neurological:      Mental Status: He is alert and oriented to person, place, and time.   Psychiatric:         Behavior: Behavior normal.         Laboratory  CBC:   Recent Labs   Lab 11/15/20  0632 11/16/20  0635 11/17/20  0635   WBC 8.98 7.96 7.15   RBC 3.35* 3.57* 3.51*   HGB 8.8* 9.5* 9.6*   HCT 29.3* 31.7* 30.8*   * 406* 385*   MCV 88 89 88   MCH 26.3* 26.6* 27.4   MCHC 30.0* 30.0* 31.2*     CMP:   Recent Labs   Lab 11/15/20  0632 11/16/20  0635 11/17/20  0635   GLU 95 85 107  106   CALCIUM 7.8* 8.2* 8.2*  8.1*   ALBUMIN 2.8* 3.0*  3.0* 3.0*  3.0*   PROT  --  5.7* 5.7*    140 138  137   K 4.4 4.9 4.5  4.4   CO2 21* 20* 17*  17*   * 113* 112*  112*   BUN 15 15 21*  20   CREATININE 2.0* 1.9* 2.0*  2.0*   ALKPHOS  --  107 110   ALT  --  231* 249*   AST  --  163* 129*       Diagnostic Results:  CT - Abd/Pelvic:   Impression:     Findings compatible with recent pancreatic and renal transplant.  No parenchymal abnormality.  Mild renal peritransplant fat stranding.  Approximately  1.6 cm circumscribed low-density fluid collection just inferior to the pancreatic transplant, which may represent a seroma.  No evidence of transplant hydronephrosis, noting a ureteral stent in place.     There is mild circumferential bladder wall thickening.     Osseous sclerosis and endplate degenerative changes, which can be seen in the setting of renal osteodystrophy.     Punctate cholelithiasis.

## 2020-11-17 NOTE — ASSESSMENT & PLAN NOTE
- weekly hep c labs per protocol - next due 11/20  - hep c antibody positive 11/13, PCR >643, 789  - LFTs elevated, continue to trend  - will need f/u appt in hepatology for hep c treatment

## 2020-11-18 LAB
ALBUMIN SERPL BCP-MCNC: 3 G/DL (ref 3.5–5.2)
AMYLASE SERPL-CCNC: 237 U/L (ref 20–110)
ANION GAP SERPL CALC-SCNC: 8 MMOL/L (ref 8–16)
BASOPHILS # BLD AUTO: 0.05 K/UL (ref 0–0.2)
BASOPHILS NFR BLD: 0.6 % (ref 0–1.9)
BUN SERPL-MCNC: 23 MG/DL (ref 6–20)
CALCIUM SERPL-MCNC: 8.2 MG/DL (ref 8.7–10.5)
CHLORIDE SERPL-SCNC: 115 MMOL/L (ref 95–110)
CO2 SERPL-SCNC: 19 MMOL/L (ref 23–29)
CREAT SERPL-MCNC: 2.1 MG/DL (ref 0.5–1.4)
DIFFERENTIAL METHOD: ABNORMAL
EOSINOPHIL # BLD AUTO: 0.2 K/UL (ref 0–0.5)
EOSINOPHIL NFR BLD: 2 % (ref 0–8)
ERYTHROCYTE [DISTWIDTH] IN BLOOD BY AUTOMATED COUNT: 18.4 % (ref 11.5–14.5)
EST. GFR  (AFRICAN AMERICAN): 44.8 ML/MIN/1.73 M^2
EST. GFR  (NON AFRICAN AMERICAN): 38.8 ML/MIN/1.73 M^2
GLUCOSE SERPL-MCNC: 86 MG/DL (ref 70–110)
HCT VFR BLD AUTO: 31.7 % (ref 40–54)
HGB BLD-MCNC: 9.6 G/DL (ref 14–18)
IMM GRANULOCYTES # BLD AUTO: 0.14 K/UL (ref 0–0.04)
IMM GRANULOCYTES NFR BLD AUTO: 1.8 % (ref 0–0.5)
LIPASE SERPL-CCNC: 171 U/L (ref 4–60)
LYMPHOCYTES # BLD AUTO: 0.3 K/UL (ref 1–4.8)
LYMPHOCYTES NFR BLD: 4.2 % (ref 18–48)
MAGNESIUM SERPL-MCNC: 1.3 MG/DL (ref 1.6–2.6)
MCH RBC QN AUTO: 27 PG (ref 27–31)
MCHC RBC AUTO-ENTMCNC: 30.3 G/DL (ref 32–36)
MCV RBC AUTO: 89 FL (ref 82–98)
MONOCYTES # BLD AUTO: 0.6 K/UL (ref 0.3–1)
MONOCYTES NFR BLD: 7.3 % (ref 4–15)
NEUTROPHILS # BLD AUTO: 6.6 K/UL (ref 1.8–7.7)
NEUTROPHILS NFR BLD: 84.1 % (ref 38–73)
NRBC BLD-RTO: 0 /100 WBC
PHOSPHATE SERPL-MCNC: 2.4 MG/DL (ref 2.7–4.5)
PLATELET # BLD AUTO: 376 K/UL (ref 150–350)
PMV BLD AUTO: 9.4 FL (ref 9.2–12.9)
POTASSIUM SERPL-SCNC: 4.9 MMOL/L (ref 3.5–5.1)
RBC # BLD AUTO: 3.56 M/UL (ref 4.6–6.2)
SODIUM SERPL-SCNC: 142 MMOL/L (ref 136–145)
TACROLIMUS BLD-MCNC: 8.3 NG/ML (ref 5–15)
WBC # BLD AUTO: 7.84 K/UL (ref 3.9–12.7)

## 2020-11-18 PROCEDURE — 63600175 PHARM REV CODE 636 W HCPCS: Performed by: PHYSICIAN ASSISTANT

## 2020-11-18 PROCEDURE — 83690 ASSAY OF LIPASE: CPT

## 2020-11-18 PROCEDURE — 80197 ASSAY OF TACROLIMUS: CPT

## 2020-11-18 PROCEDURE — 63600175 PHARM REV CODE 636 W HCPCS: Performed by: NURSE PRACTITIONER

## 2020-11-18 PROCEDURE — 25000003 PHARM REV CODE 250: Performed by: NURSE PRACTITIONER

## 2020-11-18 PROCEDURE — 85025 COMPLETE CBC W/AUTO DIFF WBC: CPT

## 2020-11-18 PROCEDURE — 25000003 PHARM REV CODE 250: Performed by: INTERNAL MEDICINE

## 2020-11-18 PROCEDURE — 82150 ASSAY OF AMYLASE: CPT

## 2020-11-18 PROCEDURE — 99233 SBSQ HOSP IP/OBS HIGH 50: CPT | Mod: ,,, | Performed by: PHYSICIAN ASSISTANT

## 2020-11-18 PROCEDURE — 99233 PR SUBSEQUENT HOSPITAL CARE,LEVL III: ICD-10-PCS | Mod: ,,, | Performed by: PHYSICIAN ASSISTANT

## 2020-11-18 PROCEDURE — 20600001 HC STEP DOWN PRIVATE ROOM

## 2020-11-18 PROCEDURE — 36415 COLL VENOUS BLD VENIPUNCTURE: CPT

## 2020-11-18 PROCEDURE — 83735 ASSAY OF MAGNESIUM: CPT

## 2020-11-18 PROCEDURE — 25000003 PHARM REV CODE 250: Performed by: PHYSICIAN ASSISTANT

## 2020-11-18 PROCEDURE — 80069 RENAL FUNCTION PANEL: CPT

## 2020-11-18 RX ORDER — LANOLIN ALCOHOL/MO/W.PET/CERES
800 CREAM (GRAM) TOPICAL 2 TIMES DAILY
Status: DISCONTINUED | OUTPATIENT
Start: 2020-11-18 | End: 2020-11-19 | Stop reason: HOSPADM

## 2020-11-18 RX ORDER — PANTOPRAZOLE SODIUM 40 MG/1
40 TABLET, DELAYED RELEASE ORAL DAILY
Status: DISCONTINUED | OUTPATIENT
Start: 2020-11-18 | End: 2020-11-19 | Stop reason: HOSPADM

## 2020-11-18 RX ORDER — MAGNESIUM SULFATE HEPTAHYDRATE 40 MG/ML
2 INJECTION, SOLUTION INTRAVENOUS ONCE
Status: COMPLETED | OUTPATIENT
Start: 2020-11-18 | End: 2020-11-18

## 2020-11-18 RX ADMIN — MAGNESIUM SULFATE IN WATER 2 G: 40 INJECTION, SOLUTION INTRAVENOUS at 09:11

## 2020-11-18 RX ADMIN — GABAPENTIN 300 MG: 300 CAPSULE ORAL at 08:11

## 2020-11-18 RX ADMIN — FOLIC ACID 1 MG: 1 TABLET ORAL at 08:11

## 2020-11-18 RX ADMIN — CALCITRIOL 1 MCG: 0.5 CAPSULE, LIQUID FILLED ORAL at 08:11

## 2020-11-18 RX ADMIN — PRAVASTATIN SODIUM 40 MG: 10 TABLET ORAL at 08:11

## 2020-11-18 RX ADMIN — Medication 800 MG: at 08:11

## 2020-11-18 RX ADMIN — NYSTATIN 500000 UNITS: 100000 SUSPENSION ORAL at 07:11

## 2020-11-18 RX ADMIN — METOCLOPRAMIDE 10 MG: 5 TABLET ORAL at 05:11

## 2020-11-18 RX ADMIN — MYCOPHENILIC ACID 720 MG: 180 TABLET, DELAYED RELEASE ORAL at 08:11

## 2020-11-18 RX ADMIN — GABAPENTIN 300 MG: 300 CAPSULE ORAL at 07:11

## 2020-11-18 RX ADMIN — TACROLIMUS 7 MG: 1 CAPSULE ORAL at 05:11

## 2020-11-18 RX ADMIN — PANTOPRAZOLE SODIUM 40 MG: 40 TABLET, DELAYED RELEASE ORAL at 08:11

## 2020-11-18 RX ADMIN — PREDNISONE 20 MG: 20 TABLET ORAL at 08:11

## 2020-11-18 RX ADMIN — NYSTATIN 500000 UNITS: 100000 SUSPENSION ORAL at 08:11

## 2020-11-18 RX ADMIN — METOCLOPRAMIDE 10 MG: 5 TABLET ORAL at 12:11

## 2020-11-18 RX ADMIN — SODIUM BICARBONATE 650 MG TABLET 1300 MG: at 08:11

## 2020-11-18 RX ADMIN — METOCLOPRAMIDE 10 MG: 5 TABLET ORAL at 06:11

## 2020-11-18 RX ADMIN — MYCOPHENILIC ACID 720 MG: 180 TABLET, DELAYED RELEASE ORAL at 07:11

## 2020-11-18 RX ADMIN — DIBASIC SODIUM PHOSPHATE, MONOBASIC POTASSIUM PHOSPHATE AND MONOBASIC SODIUM PHOSPHATE 2 TABLET: 852; 155; 130 TABLET ORAL at 07:11

## 2020-11-18 RX ADMIN — HEPARIN SODIUM 5000 UNITS: 5000 INJECTION INTRAVENOUS; SUBCUTANEOUS at 07:11

## 2020-11-18 RX ADMIN — SULFAMETHOXAZOLE AND TRIMETHOPRIM 1 TABLET: 400; 80 TABLET ORAL at 08:11

## 2020-11-18 RX ADMIN — Medication 800 MG: at 07:11

## 2020-11-18 RX ADMIN — KETOCONAZOLE 100 MG: 200 TABLET ORAL at 08:11

## 2020-11-18 RX ADMIN — TACROLIMUS 7 MG: 1 CAPSULE ORAL at 08:11

## 2020-11-18 RX ADMIN — SODIUM BICARBONATE 650 MG TABLET 1300 MG: at 07:11

## 2020-11-18 RX ADMIN — DIBASIC SODIUM PHOSPHATE, MONOBASIC POTASSIUM PHOSPHATE AND MONOBASIC SODIUM PHOSPHATE 2 TABLET: 852; 155; 130 TABLET ORAL at 09:11

## 2020-11-18 RX ADMIN — HEPARIN SODIUM 5000 UNITS: 5000 INJECTION INTRAVENOUS; SUBCUTANEOUS at 06:11

## 2020-11-18 RX ADMIN — VALGANCICLOVIR 450 MG: 450 TABLET, FILM COATED ORAL at 08:11

## 2020-11-18 NOTE — PROGRESS NOTES
[facility]  Kidney Transplant  Progress Note      Reason for Follow-up: Reassessment of Kidney, Pancreas Transplant - 11/3/2020  (#1) recipient and management of immunosuppression.    ORGAN: LEFT KIDNEY    Donor Type: Donation after Brain Death    Donor CMV Status: Positive  Donor HBcAB:Negative  Donor HCV Status:Positive  Donor HBV YIN: Negative  Donor HCV YIN: Positive      Subjective:     Chief Complaint/Reason for Admission: fever, nausea/vomiting    History of Present Illness:  Mr. Rosales is a 40 year old male with ESRD 2/2 diabetic nephropathy s/p kidney/pancreas transplant on 11/3/20 (Thymo induction, CMV D+/R+, HCV Ab+/YIN+). Surgery without complication. Post operative course unremarkable and pt was discharged home on POD #6 with stable Cr and improving pancreatic enzymes. Pt presents to clinic today for routine appointment with complaints of nausea/vomiting. Pt reports eating a erin cheese steak for dinner. Febrile with tmax of 101. Pt was sent to ED for evaluation and hospital admission. Denies chills, sob, chest pain, sick contacts, or changes in bladder/bowel function. Plan to admit KTX for infectious workup and management of nausea/vomiting. Labs and COVID 19 pending. CT abdomen/pelvis without contrast ordered in ED.      Hospital Course:  Patient readmitted with fever and n/v on 11/10/20.  Infectious work up unremarkable. Started on broad spectrum antibiotics which have since been discontinued.  CT scan reviewed and unremarkable to source of fever. Last fever 100.4F 11/11 over night.  Reglan started 11/11 for possible gastroparesis, improvement with increased dose. GI consulted. Stool studies sent per recommendations. EGD performed 11/13 with gastric ulcer. Patient is on Protonix. EGD needs to be repeated in 8 weeks.  IVF given for hydration without much improvement in Cr. Panc enzymes also elevated. Biopsy obtained 11/18.  Received hep c positive organs.  Now hep c antibody  positive with elevated LFTs - will need f/u with hepatology for hep c treatment.      Interval History: No acute events overnight. Pt feeling fine this AM. Discontinued IVF and encouraged PO hydration. Class I DSA A3 detected (2556). Biopsy 11/17 results pending. Replaced electrolytes. Monitor.      Review of patient's allergies indicates:  No Known Allergies    Past Medical History:   Diagnosis Date    Anxiety     Cataract     Diabetes mellitus     Diabetic retinopathy     Disorder of kidney and ureter     Encounter for blood transfusion     Glaucoma     High cholesterol     Hypertension     Retinal detachment      Past Surgical History:   Procedure Laterality Date    ESOPHAGOGASTRODUODENOSCOPY N/A 11/13/2020    Procedure: EGD (ESOPHAGOGASTRODUODENOSCOPY);  Surgeon: Candis Clement MD;  Location: [facility] ([address] [place] [address]);  Service: Endoscopy;  Laterality: N/A;    EYE SURGERY      KIDNEY TRANSPLANT N/A 11/3/2020    Procedure: TRANSPLANT, KIDNEY;  Surgeon: Adin Romero Jr., MD;  Location: [facility] OR [address] [place] [address];  Service: Transplant;  Laterality: N/A;    LEG AMPUTATION Left     RETINAL DETACHMENT SURGERY      TOE AMPUTATION Right     TRANSPLANTATION OF PANCREAS N/A 11/3/2020    Procedure: TRANSPLANT, PANCREAS;  Surgeon: Adin Romero Jr., MD;  Location: [facility] OR [address] [place] [address];  Service: Transplant;  Laterality: N/A;     Family History     Problem Relation (Age of Onset)    Coronary artery disease Mother    Diabetes Mother, Sister, Brother, Maternal Aunt, Maternal Uncle    Glaucoma Mother    Heart disease Mother, Maternal Aunt, Maternal Uncle    Hypertension Mother, Brother, Maternal Aunt, Maternal Uncle    No Known Problems Father    Stroke Mother, Maternal Aunt        Tobacco Use    Smoking status: Former Smoker     Packs/day: 0.25     Years: 10.00     Pack years: 2.50    Smokeless tobacco: Never Used   Substance and Sexual Activity    Alcohol use: Yes     Comment: occasional    Drug use: No    Sexual  "activity: Yes     Partners: Female     Birth control/protection: Condom        Review of Systems   Constitutional: Positive for activity change and appetite change. Negative for chills and fatigue.   HENT: Negative for congestion and facial swelling.    Eyes: Positive for visual disturbance. Negative for pain and discharge.   Respiratory: Negative for cough, chest tightness, shortness of breath and wheezing.    Cardiovascular: Negative for chest pain, palpitations and leg swelling.   Gastrointestinal: Negative for abdominal distention, abdominal pain, constipation, diarrhea, nausea and vomiting.   Endocrine: Negative.    Genitourinary: Negative for decreased urine volume, difficulty urinating and hematuria.   Musculoskeletal: Negative for back pain.   Skin: Positive for wound. Negative for pallor and rash.   Allergic/Immunologic: Positive for immunocompromised state.   Neurological: Negative for dizziness, tremors, weakness and light-headedness.   Hematological: Negative.    Psychiatric/Behavioral: Negative for agitation, confusion and dysphoric mood. The patient is not nervous/anxious.      Objective:     Vital Signs (Most Recent):  Temp: 98.3 °F (36.8 °C) (11/18/20 1146)  Pulse: 81 (11/18/20 1154)  Resp: 20 (11/18/20 1146)  BP: (!) 103/55 (11/18/20 1146)  SpO2: 97 % (11/18/20 1146)  Height: 5' 10" (177.8 cm)  Weight: 65.1 kg (143 lb 8.3 oz)  Body mass index is 20.59 kg/m².     Physical Exam  Vitals signs and nursing note reviewed.   Constitutional:       Appearance: He is well-developed.   HENT:      Head: Normocephalic and atraumatic.   Eyes:      Pupils: Pupils are equal, round, and reactive to light.   Neck:      Musculoskeletal: Normal range of motion and neck supple.      Vascular: No JVD.   Cardiovascular:      Rate and Rhythm: Normal rate and regular rhythm.      Heart sounds: Normal heart sounds. No murmur. No friction rub.   Pulmonary:      Effort: Pulmonary effort is normal. No respiratory distress.      " Breath sounds: Normal breath sounds. No wheezing or rales.   Abdominal:      General: Bowel sounds are normal.      Palpations: Abdomen is soft.      Tenderness: There is no abdominal tenderness.      Comments: Midline inc with staples intact no s/s/i   Musculoskeletal: Normal range of motion.      Comments: L BKA    Skin:     General: Skin is warm and dry.   Neurological:      Mental Status: He is alert and oriented to person, place, and time.   Psychiatric:         Behavior: Behavior normal.         Laboratory  CBC:   Recent Labs   Lab 11/16/20  0635 11/17/20  0635 11/18/20  0654   WBC 7.96 7.15 7.84   RBC 3.57* 3.51* 3.56*   HGB 9.5* 9.6* 9.6*   HCT 31.7* 30.8* 31.7*   * 385* 376*   MCV 89 88 89   MCH 26.6* 27.4 27.0   MCHC 30.0* 31.2* 30.3*     CMP:   Recent Labs   Lab 11/16/20 0635 11/17/20 0635 11/18/20  0654   GLU 85 107  106 86   CALCIUM 8.2* 8.2*  8.1* 8.2*   ALBUMIN 3.0*  3.0* 3.0*  3.0* 3.0*   PROT 5.7* 5.7*  --     138  137 142   K 4.9 4.5  4.4 4.9   CO2 20* 17*  17* 19*   * 112*  112* 115*   BUN 15 21*  20 23*   CREATININE 1.9* 2.0*  2.0* 2.1*   ALKPHOS 107 110  --    * 249*  --    * 129*  --        Diagnostic Results:  CT - Abd/Pelvic:   Impression:     Findings compatible with recent pancreatic and renal transplant.  No parenchymal abnormality.  Mild renal peritransplant fat stranding.  Approximately 1.6 cm circumscribed low-density fluid collection just inferior to the pancreatic transplant, which may represent a seroma.  No evidence of transplant hydronephrosis, noting a ureteral stent in place.     There is mild circumferential bladder wall thickening.     Osseous sclerosis and endplate degenerative changes, which can be seen in the setting of renal osteodystrophy.     Punctate cholelithiasis.    Assessment/Plan:     Hypophosphatemia  - Monitor and replace PRN.  - Encourage PO intake.      Metabolic acidosis  - Continue bicarb  replacement      Hypomagnesemia  - PO and IV replacement  - Monitor labs daily    Anemia of chronic disease  - H&H stable.  - Monitor.       Status post simultaneous kidney and pancreas transplant  - s/p SPK 11/3/20 2/2 DMI.   - Cr and pancreas enzymes slightly elevated  - kidney biopsy obtained 11/17 - results pending  - IVF discontinued 11/18  - Monitor labs daily.     Gastroparesis due to DM  - Antiemetics PRN.   - started reglan 11/12  - increase reglan dose to 10 mg  - GI consult - EGD 11/13 - gastric ulcer  - continue protonix  - repeat EGD in 8 wks  - monitor      At risk for opportunistic infections  - continue OI prophylaxis as per protocol.       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  - weekly hep c labs per protocol - next due 11/20  - hep c antibody positive 11/13, PCR >643, 789  - LFTs elevated, continue to trend  - will need f/u appt in hepatology for hep c treatment    Long-term use of immunosuppressant medication  - Continue Prograf. Check prograf level daily, monitor for toxic side effects, and adjust dose accordingly for a therapeutic level.   - Myfortic increased.      Prophylactic immunotherapy  - see long term immuno.         Discharge Planning:  Not a candidate for dc today.    Dhara New PA-C  Kidney Transplant  [facility]

## 2020-11-18 NOTE — NURSING
Pt AAOx4, VSS, afebrile. Mag 1.3, replaced with 2g rider. Cr 2.1. NS d/c. Awaiting Bx results, which remain pending. Midline incision CDI MARV with staples. No c/o pain. Pt for walk downstairs with sister, ok per team. Sister at bedside and very helpful. Bed in low/locked position, call light/personal belongings within reach, non-slip socks on when OOB, pt remains free from falls, WCTM.

## 2020-11-18 NOTE — SUBJECTIVE & OBJECTIVE
Subjective:     Chief Complaint/Reason for Admission: fever, nausea/vomiting    History of Present Illness:  Mr. Rosales is a 40 year old male with ESRD 2/2 diabetic nephropathy s/p kidney/pancreas transplant on 11/3/20 (Thymo induction, CMV D+/R+, HCV Ab+/YIN+). Surgery without complication. Post operative course unremarkable and pt was discharged home on POD #6 with stable Cr and improving pancreatic enzymes. Pt presents to clinic today for routine appointment with complaints of nausea/vomiting. Pt reports eating a erin cheese steak for dinner. Febrile with tmax of 101. Pt was sent to ED for evaluation and hospital admission. Denies chills, sob, chest pain, sick contacts, or changes in bladder/bowel function. Plan to admit KTX for infectious workup and management of nausea/vomiting. Labs and COVID 19 pending. CT abdomen/pelvis without contrast ordered in ED.           Review of patient's allergies indicates:  No Known Allergies    Past Medical History:   Diagnosis Date    Anxiety     Cataract     Diabetes mellitus     Diabetic retinopathy     Disorder of kidney and ureter     Encounter for blood transfusion     Glaucoma     High cholesterol     Hypertension     Retinal detachment      Past Surgical History:   Procedure Laterality Date    ESOPHAGOGASTRODUODENOSCOPY N/A 11/13/2020    Procedure: EGD (ESOPHAGOGASTRODUODENOSCOPY);  Surgeon: Candis Clement MD;  Location: [address]);  Service: Endoscopy;  Laterality: N/A;    EYE SURGERY      KIDNEY TRANSPLANT N/A 11/3/2020    Procedure: TRANSPLANT, KIDNEY;  Surgeon: Adin Romero Jr., MD;  Location: [address];  Service: Transplant;  Laterality: N/A;    LEG AMPUTATION Left     RETINAL DETACHMENT SURGERY      TOE AMPUTATION Right     TRANSPLANTATION OF PANCREAS N/A 11/3/2020    Procedure: TRANSPLANT, PANCREAS;  Surgeon: Adin Romero Jr., MD;  Location: [address];  Service: Transplant;  Laterality: N/A;     Family History   "   Problem Relation (Age of Onset)    Coronary artery disease Mother    Diabetes Mother, Sister, Brother, Maternal Aunt, Maternal Uncle    Glaucoma Mother    Heart disease Mother, Maternal Aunt, Maternal Uncle    Hypertension Mother, Brother, Maternal Aunt, Maternal Uncle    No Known Problems Father    Stroke Mother, Maternal Aunt        Tobacco Use    Smoking status: Former Smoker     Packs/day: 0.25     Years: 10.00     Pack years: 2.50    Smokeless tobacco: Never Used   Substance and Sexual Activity    Alcohol use: Yes     Comment: occasional    Drug use: No    Sexual activity: Yes     Partners: Female     Birth control/protection: Condom        Review of Systems   Constitutional: Positive for activity change and appetite change. Negative for chills and fatigue.   HENT: Negative for congestion and facial swelling.    Eyes: Positive for visual disturbance. Negative for pain and discharge.   Respiratory: Negative for cough, chest tightness, shortness of breath and wheezing.    Cardiovascular: Negative for chest pain, palpitations and leg swelling.   Gastrointestinal: Negative for abdominal distention, abdominal pain, constipation, diarrhea, nausea and vomiting.   Endocrine: Negative.    Genitourinary: Negative for decreased urine volume, difficulty urinating and hematuria.   Musculoskeletal: Negative for back pain.   Skin: Positive for wound. Negative for pallor and rash.   Allergic/Immunologic: Positive for immunocompromised state.   Neurological: Negative for dizziness, tremors, weakness and light-headedness.   Hematological: Negative.    Psychiatric/Behavioral: Negative for agitation, confusion and dysphoric mood. The patient is not nervous/anxious.      Objective:     Vital Signs (Most Recent):  Temp: 98.3 °F (36.8 °C) (11/18/20 1146)  Pulse: 81 (11/18/20 1154)  Resp: 20 (11/18/20 1146)  BP: (!) 103/55 (11/18/20 1146)  SpO2: 97 % (11/18/20 1146)  Height: 5' 10" (177.8 cm)  Weight: 65.1 kg (143 lb 8.3 " oz)  Body mass index is 20.59 kg/m².     Physical Exam  Vitals signs and nursing note reviewed.   Constitutional:       Appearance: He is well-developed.   HENT:      Head: Normocephalic and atraumatic.   Eyes:      Pupils: Pupils are equal, round, and reactive to light.   Neck:      Musculoskeletal: Normal range of motion and neck supple.      Vascular: No JVD.   Cardiovascular:      Rate and Rhythm: Normal rate and regular rhythm.      Heart sounds: Normal heart sounds. No murmur. No friction rub.   Pulmonary:      Effort: Pulmonary effort is normal. No respiratory distress.      Breath sounds: Normal breath sounds. No wheezing or rales.   Abdominal:      General: Bowel sounds are normal.      Palpations: Abdomen is soft.      Tenderness: There is no abdominal tenderness.      Comments: Midline inc with staples intact no s/s/i   Musculoskeletal: Normal range of motion.      Comments: L BKA    Skin:     General: Skin is warm and dry.   Neurological:      Mental Status: He is alert and oriented to person, place, and time.   Psychiatric:         Behavior: Behavior normal.         Laboratory  CBC:   Recent Labs   Lab 11/16/20  0635 11/17/20  0635 11/18/20  0654   WBC 7.96 7.15 7.84   RBC 3.57* 3.51* 3.56*   HGB 9.5* 9.6* 9.6*   HCT 31.7* 30.8* 31.7*   * 385* 376*   MCV 89 88 89   MCH 26.6* 27.4 27.0   MCHC 30.0* 31.2* 30.3*     CMP:   Recent Labs   Lab 11/16/20  0635 11/17/20  0635 11/18/20  0654   GLU 85 107  106 86   CALCIUM 8.2* 8.2*  8.1* 8.2*   ALBUMIN 3.0*  3.0* 3.0*  3.0* 3.0*   PROT 5.7* 5.7*  --     138  137 142   K 4.9 4.5  4.4 4.9   CO2 20* 17*  17* 19*   * 112*  112* 115*   BUN 15 21*  20 23*   CREATININE 1.9* 2.0*  2.0* 2.1*   ALKPHOS 107 110  --    * 249*  --    * 129*  --        Diagnostic Results:  CT - Abd/Pelvic:   Impression:     Findings compatible with recent pancreatic and renal transplant.  No parenchymal abnormality.  Mild renal peritransplant fat  stranding.  Approximately 1.6 cm circumscribed low-density fluid collection just inferior to the pancreatic transplant, which may represent a seroma.  No evidence of transplant hydronephrosis, noting a ureteral stent in place.     There is mild circumferential bladder wall thickening.     Osseous sclerosis and endplate degenerative changes, which can be seen in the setting of renal osteodystrophy.     Punctate cholelithiasis.

## 2020-11-18 NOTE — ASSESSMENT & PLAN NOTE
- Antiemetics PRN.   - started reglan 11/12  - increase reglan dose to 10 mg  - GI consult - EGD 11/13 - gastric ulcer  - continue protonix  - repeat EGD in 8 wks  - monitor

## 2020-11-18 NOTE — ASSESSMENT & PLAN NOTE
- s/p SPK 11/3/20 2/2 DMI.   - Cr and pancreas enzymes slightly elevated  - kidney biopsy obtained 11/17 - results pending  - IVF discontinued 11/18  - Monitor labs daily.

## 2020-11-18 NOTE — ASSESSMENT & PLAN NOTE
- Continue Prograf. Check prograf level daily, monitor for toxic side effects, and adjust dose accordingly for a therapeutic level.   - Myfortic increased.

## 2020-11-18 NOTE — PLAN OF CARE
Pt remains AAO x 4 with VSS (standing BP only), afebrile, sats upper 90s on RA throughout shift  Pt denies any N/V, pain or SOB  NSR on Tele monitor  L UA fistula +/+  Midline incision MARV with staples - CDI, well approximated  Mag level 1.2 - PO Mag started BID  R forearm 20 g PIV - CDI with NS infusing at 50 cc/hr  Kidney biopsy done 11/17- results pending - gauze to RLQ site - CDI    Pt h/o of left BKA with prosthesis, and blind in both eyes- sister remains at bedside to assist   Pt up standby assist x1, ambulatory to toilet- voids in hat  Pt remains free from falls and injuries, call light in reach, bed in lowest position, nonskid socks on when OOB  Will continue to monitor

## 2020-11-19 VITALS
OXYGEN SATURATION: 98 % | TEMPERATURE: 98 F | HEIGHT: 70 IN | RESPIRATION RATE: 18 BRPM | DIASTOLIC BLOOD PRESSURE: 58 MMHG | HEART RATE: 71 BPM | BODY MASS INDEX: 13.06 KG/M2 | SYSTOLIC BLOOD PRESSURE: 114 MMHG | WEIGHT: 91.25 LBS

## 2020-11-19 LAB
ALBUMIN SERPL BCP-MCNC: 3.1 G/DL (ref 3.5–5.2)
AMYLASE SERPL-CCNC: 205 U/L (ref 20–110)
ANION GAP SERPL CALC-SCNC: 8 MMOL/L (ref 8–16)
ANION GAP SERPL CALC-SCNC: 8 MMOL/L (ref 8–16)
BASOPHILS # BLD AUTO: 0.04 K/UL (ref 0–0.2)
BASOPHILS NFR BLD: 0.4 % (ref 0–1.9)
BUN SERPL-MCNC: 22 MG/DL (ref 6–20)
BUN SERPL-MCNC: 25 MG/DL (ref 6–20)
CALCIUM SERPL-MCNC: 8.6 MG/DL (ref 8.7–10.5)
CALCIUM SERPL-MCNC: 8.9 MG/DL (ref 8.7–10.5)
CHLORIDE SERPL-SCNC: 110 MMOL/L (ref 95–110)
CHLORIDE SERPL-SCNC: 110 MMOL/L (ref 95–110)
CO2 SERPL-SCNC: 22 MMOL/L (ref 23–29)
CO2 SERPL-SCNC: 23 MMOL/L (ref 23–29)
CREAT SERPL-MCNC: 2 MG/DL (ref 0.5–1.4)
CREAT SERPL-MCNC: 2 MG/DL (ref 0.5–1.4)
DIFFERENTIAL METHOD: ABNORMAL
EOSINOPHIL # BLD AUTO: 0.1 K/UL (ref 0–0.5)
EOSINOPHIL NFR BLD: 1 % (ref 0–8)
ERYTHROCYTE [DISTWIDTH] IN BLOOD BY AUTOMATED COUNT: 18.4 % (ref 11.5–14.5)
EST. GFR  (AFRICAN AMERICAN): 47.5 ML/MIN/1.73 M^2
EST. GFR  (AFRICAN AMERICAN): 47.5 ML/MIN/1.73 M^2
EST. GFR  (NON AFRICAN AMERICAN): 41.1 ML/MIN/1.73 M^2
EST. GFR  (NON AFRICAN AMERICAN): 41.1 ML/MIN/1.73 M^2
GLUCOSE SERPL-MCNC: 122 MG/DL (ref 70–110)
GLUCOSE SERPL-MCNC: 83 MG/DL (ref 70–110)
HCT VFR BLD AUTO: 31.7 % (ref 40–54)
HGB BLD-MCNC: 9.5 G/DL (ref 14–18)
IMM GRANULOCYTES # BLD AUTO: 0.08 K/UL (ref 0–0.04)
IMM GRANULOCYTES NFR BLD AUTO: 0.9 % (ref 0–0.5)
LIPASE SERPL-CCNC: 119 U/L (ref 4–60)
LYMPHOCYTES # BLD AUTO: 0.3 K/UL (ref 1–4.8)
LYMPHOCYTES NFR BLD: 3 % (ref 18–48)
MAGNESIUM SERPL-MCNC: 1.5 MG/DL (ref 1.6–2.6)
MCH RBC QN AUTO: 26.3 PG (ref 27–31)
MCHC RBC AUTO-ENTMCNC: 30 G/DL (ref 32–36)
MCV RBC AUTO: 88 FL (ref 82–98)
MONOCYTES # BLD AUTO: 0.5 K/UL (ref 0.3–1)
MONOCYTES NFR BLD: 5.7 % (ref 4–15)
NEUTROPHILS # BLD AUTO: 7.9 K/UL (ref 1.8–7.7)
NEUTROPHILS NFR BLD: 89 % (ref 38–73)
NRBC BLD-RTO: 0 /100 WBC
PHOSPHATE SERPL-MCNC: 2.4 MG/DL (ref 2.7–4.5)
PLATELET # BLD AUTO: 368 K/UL (ref 150–350)
PMV BLD AUTO: 9.6 FL (ref 9.2–12.9)
POCT GLUCOSE: 104 MG/DL (ref 70–110)
POCT GLUCOSE: 107 MG/DL (ref 70–110)
POTASSIUM SERPL-SCNC: 5.2 MMOL/L (ref 3.5–5.1)
POTASSIUM SERPL-SCNC: 5.3 MMOL/L (ref 3.5–5.1)
RBC # BLD AUTO: 3.61 M/UL (ref 4.6–6.2)
SODIUM SERPL-SCNC: 140 MMOL/L (ref 136–145)
SODIUM SERPL-SCNC: 141 MMOL/L (ref 136–145)
TACROLIMUS BLD-MCNC: 7.7 NG/ML (ref 5–15)
WBC # BLD AUTO: 8.92 K/UL (ref 3.9–12.7)

## 2020-11-19 PROCEDURE — 93010 ELECTROCARDIOGRAM REPORT: CPT | Mod: ,,, | Performed by: INTERNAL MEDICINE

## 2020-11-19 PROCEDURE — 25000003 PHARM REV CODE 250: Performed by: PHYSICIAN ASSISTANT

## 2020-11-19 PROCEDURE — 63600175 PHARM REV CODE 636 W HCPCS: Performed by: NURSE PRACTITIONER

## 2020-11-19 PROCEDURE — 25000003 PHARM REV CODE 250: Performed by: NURSE PRACTITIONER

## 2020-11-19 PROCEDURE — 25000003 PHARM REV CODE 250: Performed by: INTERNAL MEDICINE

## 2020-11-19 PROCEDURE — 99239 PR HOSPITAL DISCHARGE DAY,>30 MIN: ICD-10-PCS | Mod: 24,,, | Performed by: PHYSICIAN ASSISTANT

## 2020-11-19 PROCEDURE — 83690 ASSAY OF LIPASE: CPT

## 2020-11-19 PROCEDURE — 93010 EKG 12-LEAD: ICD-10-PCS | Mod: ,,, | Performed by: INTERNAL MEDICINE

## 2020-11-19 PROCEDURE — 80197 ASSAY OF TACROLIMUS: CPT

## 2020-11-19 PROCEDURE — 80069 RENAL FUNCTION PANEL: CPT

## 2020-11-19 PROCEDURE — 36415 COLL VENOUS BLD VENIPUNCTURE: CPT

## 2020-11-19 PROCEDURE — 99239 HOSP IP/OBS DSCHRG MGMT >30: CPT | Mod: 24,,, | Performed by: PHYSICIAN ASSISTANT

## 2020-11-19 PROCEDURE — 80048 BASIC METABOLIC PNL TOTAL CA: CPT

## 2020-11-19 PROCEDURE — 97802 MEDICAL NUTRITION INDIV IN: CPT

## 2020-11-19 PROCEDURE — 82150 ASSAY OF AMYLASE: CPT

## 2020-11-19 PROCEDURE — 63600175 PHARM REV CODE 636 W HCPCS: Performed by: PHYSICIAN ASSISTANT

## 2020-11-19 PROCEDURE — 93005 ELECTROCARDIOGRAM TRACING: CPT

## 2020-11-19 PROCEDURE — 85025 COMPLETE CBC W/AUTO DIFF WBC: CPT

## 2020-11-19 PROCEDURE — 83735 ASSAY OF MAGNESIUM: CPT

## 2020-11-19 RX ORDER — FOLIC ACID 1 MG/1
1 TABLET ORAL DAILY
Qty: 30 TABLET | Refills: 5 | Status: SHIPPED | OUTPATIENT
Start: 2020-11-20 | End: 2021-05-07

## 2020-11-19 RX ORDER — FLUDROCORTISONE ACETATE 0.1 MG/1
100 TABLET ORAL DAILY
Status: DISCONTINUED | OUTPATIENT
Start: 2020-11-19 | End: 2020-11-19 | Stop reason: HOSPADM

## 2020-11-19 RX ORDER — SODIUM BICARBONATE 650 MG/1
1300 TABLET ORAL 2 TIMES DAILY
Qty: 120 TABLET | Refills: 11 | Status: SHIPPED | OUTPATIENT
Start: 2020-11-19 | End: 2020-11-20 | Stop reason: DRUGHIGH

## 2020-11-19 RX ORDER — MYCOPHENOLIC ACID 360 MG/1
720 TABLET, DELAYED RELEASE ORAL 2 TIMES DAILY
Qty: 120 TABLET | Refills: 11 | Status: SHIPPED | OUTPATIENT
Start: 2020-11-19 | End: 2020-11-19

## 2020-11-19 RX ORDER — TACROLIMUS 1 MG/1
8 CAPSULE ORAL 2 TIMES DAILY
Status: DISCONTINUED | OUTPATIENT
Start: 2020-11-19 | End: 2020-11-19 | Stop reason: HOSPADM

## 2020-11-19 RX ORDER — IBUPROFEN 200 MG
16 TABLET ORAL
Status: DISCONTINUED | OUTPATIENT
Start: 2020-11-19 | End: 2020-11-19 | Stop reason: HOSPADM

## 2020-11-19 RX ORDER — IBUPROFEN 200 MG
24 TABLET ORAL
Status: DISCONTINUED | OUTPATIENT
Start: 2020-11-19 | End: 2020-11-19 | Stop reason: HOSPADM

## 2020-11-19 RX ORDER — PANTOPRAZOLE SODIUM 40 MG/1
40 TABLET, DELAYED RELEASE ORAL DAILY
Qty: 30 TABLET | Refills: 2 | Status: SHIPPED | OUTPATIENT
Start: 2020-11-20 | End: 2021-02-08 | Stop reason: SDUPTHER

## 2020-11-19 RX ORDER — ATOVAQUONE 750 MG/5ML
1500 SUSPENSION ORAL DAILY
Status: DISCONTINUED | OUTPATIENT
Start: 2020-11-19 | End: 2020-11-19

## 2020-11-19 RX ORDER — SULFAMETHOXAZOLE AND TRIMETHOPRIM 400; 80 MG/1; MG/1
1 TABLET ORAL DAILY
Status: DISCONTINUED | OUTPATIENT
Start: 2020-11-20 | End: 2020-11-19 | Stop reason: HOSPADM

## 2020-11-19 RX ORDER — INSULIN ASPART 100 [IU]/ML
0-5 INJECTION, SOLUTION INTRAVENOUS; SUBCUTANEOUS
Status: DISCONTINUED | OUTPATIENT
Start: 2020-11-19 | End: 2020-11-19 | Stop reason: HOSPADM

## 2020-11-19 RX ORDER — MAGNESIUM SULFATE HEPTAHYDRATE 40 MG/ML
2 INJECTION, SOLUTION INTRAVENOUS ONCE
Status: COMPLETED | OUTPATIENT
Start: 2020-11-19 | End: 2020-11-19

## 2020-11-19 RX ORDER — KETOCONAZOLE 200 MG/1
100 TABLET ORAL DAILY
Qty: 15 TABLET | Refills: 11 | Status: SHIPPED | OUTPATIENT
Start: 2020-11-20 | End: 2021-11-01 | Stop reason: SDUPTHER

## 2020-11-19 RX ORDER — METOCLOPRAMIDE 10 MG/1
10 TABLET ORAL
Qty: 90 TABLET | Refills: 11 | Status: SHIPPED | OUTPATIENT
Start: 2020-11-19 | End: 2021-11-21

## 2020-11-19 RX ORDER — FLUDROCORTISONE ACETATE 0.1 MG/1
100 TABLET ORAL DAILY
Qty: 30 TABLET | Refills: 0 | Status: SHIPPED | OUTPATIENT
Start: 2020-11-20 | End: 2020-12-20

## 2020-11-19 RX ORDER — TACROLIMUS 1 MG/1
8 CAPSULE ORAL EVERY 12 HOURS
Qty: 480 CAPSULE | Refills: 11 | Status: SHIPPED | OUTPATIENT
Start: 2020-11-19 | End: 2020-11-23

## 2020-11-19 RX ORDER — MYCOPHENOLIC ACID 180 MG/1
720 TABLET, DELAYED RELEASE ORAL 2 TIMES DAILY
Qty: 240 TABLET | Refills: 11 | Status: SHIPPED | OUTPATIENT
Start: 2020-11-19 | End: 2020-12-04 | Stop reason: SDUPTHER

## 2020-11-19 RX ORDER — GLUCAGON 1 MG
1 KIT INJECTION
Status: DISCONTINUED | OUTPATIENT
Start: 2020-11-19 | End: 2020-11-19 | Stop reason: HOSPADM

## 2020-11-19 RX ORDER — VALGANCICLOVIR 450 MG/1
450 TABLET, FILM COATED ORAL
Qty: 12 TABLET | Refills: 2 | Status: SHIPPED | OUTPATIENT
Start: 2020-11-20 | End: 2020-12-04

## 2020-11-19 RX ORDER — LANOLIN ALCOHOL/MO/W.PET/CERES
800 CREAM (GRAM) TOPICAL 2 TIMES DAILY
Qty: 120 TABLET | Refills: 11 | Status: SHIPPED | OUTPATIENT
Start: 2020-11-19 | End: 2020-11-30

## 2020-11-19 RX ADMIN — CALCIUM GLUCONATE 1000 MG: 98 INJECTION, SOLUTION INTRAVENOUS at 10:11

## 2020-11-19 RX ADMIN — DIBASIC SODIUM PHOSPHATE, MONOBASIC POTASSIUM PHOSPHATE AND MONOBASIC SODIUM PHOSPHATE 2 TABLET: 852; 155; 130 TABLET ORAL at 08:11

## 2020-11-19 RX ADMIN — METOCLOPRAMIDE 10 MG: 5 TABLET ORAL at 06:11

## 2020-11-19 RX ADMIN — VALGANCICLOVIR 450 MG: 450 TABLET, FILM COATED ORAL at 08:11

## 2020-11-19 RX ADMIN — KETOCONAZOLE 100 MG: 200 TABLET ORAL at 08:11

## 2020-11-19 RX ADMIN — TACROLIMUS 8 MG: 1 CAPSULE ORAL at 05:11

## 2020-11-19 RX ADMIN — FOLIC ACID 1 MG: 1 TABLET ORAL at 08:11

## 2020-11-19 RX ADMIN — GABAPENTIN 300 MG: 300 CAPSULE ORAL at 08:11

## 2020-11-19 RX ADMIN — FLUDROCORTISONE ACETATE 100 MCG: 0.1 TABLET ORAL at 10:11

## 2020-11-19 RX ADMIN — CALCITRIOL 1 MCG: 0.5 CAPSULE, LIQUID FILLED ORAL at 08:11

## 2020-11-19 RX ADMIN — SODIUM CHLORIDE 500 ML: 0.9 INJECTION, SOLUTION INTRAVENOUS at 09:11

## 2020-11-19 RX ADMIN — NYSTATIN 500000 UNITS: 100000 SUSPENSION ORAL at 08:11

## 2020-11-19 RX ADMIN — PRAVASTATIN SODIUM 40 MG: 10 TABLET ORAL at 08:11

## 2020-11-19 RX ADMIN — SULFAMETHOXAZOLE AND TRIMETHOPRIM 1 TABLET: 400; 80 TABLET ORAL at 08:11

## 2020-11-19 RX ADMIN — MAGNESIUM SULFATE IN WATER 2 G: 40 INJECTION, SOLUTION INTRAVENOUS at 09:11

## 2020-11-19 RX ADMIN — PREDNISONE 20 MG: 20 TABLET ORAL at 08:11

## 2020-11-19 RX ADMIN — SODIUM BICARBONATE 650 MG TABLET 1300 MG: at 08:11

## 2020-11-19 RX ADMIN — METOCLOPRAMIDE 10 MG: 5 TABLET ORAL at 10:11

## 2020-11-19 RX ADMIN — PANTOPRAZOLE SODIUM 40 MG: 40 TABLET, DELAYED RELEASE ORAL at 08:11

## 2020-11-19 RX ADMIN — TACROLIMUS 7 MG: 1 CAPSULE ORAL at 08:11

## 2020-11-19 RX ADMIN — Medication 800 MG: at 08:11

## 2020-11-19 RX ADMIN — MYCOPHENILIC ACID 720 MG: 180 TABLET, DELAYED RELEASE ORAL at 08:11

## 2020-11-19 RX ADMIN — HEPARIN SODIUM 5000 UNITS: 5000 INJECTION INTRAVENOUS; SUBCUTANEOUS at 06:11

## 2020-11-19 RX ADMIN — METOCLOPRAMIDE 10 MG: 5 TABLET ORAL at 05:11

## 2020-11-19 NOTE — PLAN OF CARE
Patient AAOx4, VSS on RA. Afebrile- Tmax 98.5. Tele in place- SR. Kd bx obtained- results pending. Left BKA in place. ARLEY +/+. Midline abd inc estuardo w/ staples- free from signs of infection. Voids spontaneously- see I&O for details. No N/V reported o/n. Pt denies pain. Up w/ 1 person assist. Bed locked and lowered, call bell in reach, nonskid socks on when out of bed. Reminded pt to call for assistance, pt verbalized understanding. Hand hygiene performed before and after care. Sister at bedside. NAD noted, WCTM.

## 2020-11-19 NOTE — CONSULTS
Food & Nutrition  Education    Diet Education: Low Potassium Diet  Time Spent: 15 minutes  Learners: Pt and Caregiver      Nutrition Education provided with handouts: Potassium Content of Foods      Comments: Pt and Caregiver aware of following a Renal diet, pt is on HD. Caregiver verbalized understanding of which foods to avoid. Pt understands which foods have high potassium content. No other needs identified. Left education material with caregiver. Pt eager to make changes at home.      All questions and concerns answered. Dietitian's contact information provided.       Follow-Up: 11/27/2020    Please Re-consult as needed        Thanks!

## 2020-11-19 NOTE — PROGRESS NOTES
Discharge Note:    Pt will discharge to [facility] under the care of Dallas Oh, patient's sister, phone number [phone] with no DME/HH/infusion needs. Pt aware of, involved in, and coping well with this discharge plan. Pt did not have any concerns with the discharge plan at this time. SW remains available at [phone].

## 2020-11-19 NOTE — PROGRESS NOTES
Discharge Medication Note:    Hospital Course: 9 days  - Admitted for fever (Tmax 101), nausea, and vomiting. Received Zosyn for 5 days.  - Concerns for gastroparesis. Started Reglan, and consulted GI. They performed and endoscopy with no concerning findings aside from a gastric ulcer. Protonix started. GI would like a repeat EGD in 8 weeks.  - Kidney biopsy performed on 11/17 due to increasing pancreatic enzymes and increased Scr. Biopsy results still in process.   - Hep C (+) organs leading to patient with Hep C antibody positive result and elevated LFTs. Will follow up outpatient with hepatology for treatment.    Met with Hernandez Rosales at discharge to review discharge medications and to update the blue medication card.       Hernandez Rosales   Home Medication Instructions VIVIANA:[id number]    Printed on:11/19/20 1419   Medication Information                      aspirin (ECOTRIN) 81 MG EC tablet  Take 1 tablet (81 mg total) by mouth once daily.             calcitRIOL (ROCALTROL) 0.25 MCG Cap  Take 1 capsule (0.25 mcg total) by mouth once daily.             docusate sodium (COLACE) 100 MG capsule  Take 1 capsule (100 mg total) by mouth 3 (three) times daily as needed for Constipation.             ergocalciferol (ERGOCALCIFEROL) 50,000 unit Cap  Take 1 capsule (50,000 Units total) by mouth every 7 days.             ferrous sulfate 325 (65 FE) MG EC tablet  Take 325 mg by mouth once daily.             fludrocortisone (FLORINEF) 0.1 mg Tab  Take 1 tablet (100 mcg total) by mouth once daily.             folic acid (FOLVITE) 1 MG tablet  Take 1 tablet (1 mg total) by mouth once daily.             gabapentin (NEURONTIN) 300 MG capsule  Take 300 mg by mouth 2 (two) times daily.              k phos di & mono-sod phos mono (K-PHOS-NEUTRAL) 250 mg Tab  Take 2 tablets by mouth 2 (two) times a day.             ketoconazole (NIZORAL) 200 mg Tab  Take 0.5 tablets (100 mg total) by mouth once daily.             magnesium oxide  (MAG-OX) 400 mg (241.3 mg magnesium) tablet  Take 2 tablets (800 mg total) by mouth 2 (two) times daily.             metoclopramide HCl (REGLAN) 10 MG tablet  Take 1 tablet (10 mg total) by mouth 3 (three) times daily before meals.             mycophenolate (MYFORTIC) 180 MG TbEC  Take 4 tablets (720 mg total) by mouth 2 (two) times daily.             nystatin (MYCOSTATIN) 100,000 unit/mL suspension  Take 5 mLs (500,000 Units total) by mouth 3 (three) times daily after meals. STOP 12/7/20             oxyCODONE-acetaminophen (PERCOCET)  mg per tablet  Take 1 tablet by mouth every 4 (four) hours as needed for Pain.             pantoprazole (PROTONIX) 40 MG tablet  Take 1 tablet (40 mg total) by mouth once daily.             pravastatin (PRAVACHOL) 40 MG tablet  Take 40 mg by mouth once daily.             predniSONE (DELTASONE) 5 MG tablet  Take by mouth daily: 20mg 11/7-12/6, 15mg 12/7-1/6/21, 10mg 1/7-2/6, then 5mg daily beginning 2/7/21             sodium bicarbonate 650 MG tablet  Take 2 tablets (1,300 mg total) by mouth 2 (two) times daily.             sulfamethoxazole-trimethoprim 400-80mg (BACTRIM,SEPTRA) 400-80 mg per tablet  Take 1 tablet by mouth every morning. STOP 5/3/21             tacrolimus (PROGRAF) 1 MG Cap  Take 8 capsules (8 mg total) by mouth every 12 (twelve) hours.             valGANciclovir (VALCYTE) 450 mg Tab  Take 1 tablet (450 mg total) by mouth every Mon, Wed, Fri. STOP 2/1/21                 Pt was provided with prescriptions for the following meds:  E-rx: Fludricortisone, folic acid, ketoconazole, magnesium oxide, pantoprazole, metoclopramide, sodium bicarbonate, Myfortic  Printed rx: None  Phone-in or faxed rx: None    The following medications have been placed on HOLD and should be restarted in the outpatient setting (when appropriate): None    Hernandez Rosales verbalized understanding and had the opportunity to ask questions.

## 2020-11-20 ENCOUNTER — CLINICAL SUPPORT (OUTPATIENT)
Dept: TRANSPLANT | Facility: CLINIC | Age: 40
End: 2020-11-20
Payer: MEDICARE

## 2020-11-20 ENCOUNTER — LAB VISIT (OUTPATIENT)
Dept: LAB | Facility: HOSPITAL | Age: 40
End: 2020-11-20
Attending: INTERNAL MEDICINE
Payer: MEDICARE

## 2020-11-20 VITALS
HEIGHT: 70 IN | WEIGHT: 178.13 LBS | BODY MASS INDEX: 25.5 KG/M2 | RESPIRATION RATE: 18 BRPM | WEIGHT: 178.13 LBS | HEART RATE: 98 BPM | DIASTOLIC BLOOD PRESSURE: 64 MMHG | OXYGEN SATURATION: 100 % | BODY MASS INDEX: 25.5 KG/M2 | RESPIRATION RATE: 18 BRPM | HEIGHT: 70 IN | TEMPERATURE: 98 F | SYSTOLIC BLOOD PRESSURE: 124 MMHG | OXYGEN SATURATION: 100 % | TEMPERATURE: 98 F | DIASTOLIC BLOOD PRESSURE: 64 MMHG | SYSTOLIC BLOOD PRESSURE: 124 MMHG | HEART RATE: 98 BPM

## 2020-11-20 DIAGNOSIS — Z79.60 LONG-TERM USE OF IMMUNOSUPPRESSANT MEDICATION: ICD-10-CM

## 2020-11-20 DIAGNOSIS — B19.21 HEPATITIS C VIRUS INFECTION WITH HEPATIC COMA, UNSPECIFIED CHRONICITY: Primary | ICD-10-CM

## 2020-11-20 DIAGNOSIS — Z94.83 STATUS POST PANCREAS TRANSPLANTATION: ICD-10-CM

## 2020-11-20 DIAGNOSIS — T86.19 RECEIVED KIDNEY FROM DONOR WITH HEPATITIS C: Primary | ICD-10-CM

## 2020-11-20 DIAGNOSIS — Z94.0 KIDNEY REPLACED BY TRANSPLANT: ICD-10-CM

## 2020-11-20 DIAGNOSIS — Z57.8 EMPLOYEE EXPOSURE TO BLOOD: ICD-10-CM

## 2020-11-20 LAB
ALBUMIN SERPL BCP-MCNC: 3.5 G/DL (ref 3.5–5.2)
ALBUMIN SERPL BCP-MCNC: 3.5 G/DL (ref 3.5–5.2)
ALP SERPL-CCNC: 132 U/L (ref 55–135)
ALT SERPL W/O P-5'-P-CCNC: 199 U/L (ref 10–44)
AMYLASE SERPL-CCNC: 224 U/L (ref 20–110)
ANION GAP SERPL CALC-SCNC: 9 MMOL/L (ref 8–16)
AST SERPL-CCNC: 87 U/L (ref 10–40)
BASOPHILS # BLD AUTO: 0.09 K/UL (ref 0–0.2)
BASOPHILS NFR BLD: 1.2 % (ref 0–1.9)
BILIRUB DIRECT SERPL-MCNC: 0.2 MG/DL (ref 0.1–0.3)
BILIRUB SERPL-MCNC: 0.4 MG/DL (ref 0.1–1)
BUN SERPL-MCNC: 31 MG/DL (ref 6–20)
CALCIUM SERPL-MCNC: 9.1 MG/DL (ref 8.7–10.5)
CHLORIDE SERPL-SCNC: 111 MMOL/L (ref 95–110)
CO2 SERPL-SCNC: 26 MMOL/L (ref 23–29)
CREAT SERPL-MCNC: 2.3 MG/DL (ref 0.5–1.4)
DIFFERENTIAL METHOD: ABNORMAL
EOSINOPHIL # BLD AUTO: 0.1 K/UL (ref 0–0.5)
EOSINOPHIL NFR BLD: 1 % (ref 0–8)
ERYTHROCYTE [DISTWIDTH] IN BLOOD BY AUTOMATED COUNT: 18.6 % (ref 11.5–14.5)
EST. GFR  (AFRICAN AMERICAN): 40.1 ML/MIN/1.73 M^2
EST. GFR  (NON AFRICAN AMERICAN): 34.7 ML/MIN/1.73 M^2
FINAL PATHOLOGIC DIAGNOSIS: NORMAL
GLUCOSE SERPL-MCNC: 83 MG/DL (ref 70–110)
GROSS: NORMAL
HCT VFR BLD AUTO: 35.1 % (ref 40–54)
HCV AB SERPL QL IA: POSITIVE
HGB BLD-MCNC: 10.4 G/DL (ref 14–18)
IMM GRANULOCYTES # BLD AUTO: 0.06 K/UL (ref 0–0.04)
IMM GRANULOCYTES NFR BLD AUTO: 0.8 % (ref 0–0.5)
LIPASE SERPL-CCNC: 126 U/L (ref 4–60)
LYMPHOCYTES # BLD AUTO: 0.3 K/UL (ref 1–4.8)
LYMPHOCYTES NFR BLD: 3.4 % (ref 18–48)
MCH RBC QN AUTO: 26.6 PG (ref 27–31)
MCHC RBC AUTO-ENTMCNC: 29.6 G/DL (ref 32–36)
MCV RBC AUTO: 90 FL (ref 82–98)
MONOCYTES # BLD AUTO: 0.5 K/UL (ref 0.3–1)
MONOCYTES NFR BLD: 6 % (ref 4–15)
NEUTROPHILS # BLD AUTO: 6.7 K/UL (ref 1.8–7.7)
NEUTROPHILS NFR BLD: 87.6 % (ref 38–73)
NRBC BLD-RTO: 0 /100 WBC
PHOSPHATE SERPL-MCNC: 2.7 MG/DL (ref 2.7–4.5)
PLATELET # BLD AUTO: 402 K/UL (ref 150–350)
PMV BLD AUTO: 9.8 FL (ref 9.2–12.9)
POTASSIUM SERPL-SCNC: 4.8 MMOL/L (ref 3.5–5.1)
PROT SERPL-MCNC: 6.3 G/DL (ref 6–8.4)
RBC # BLD AUTO: 3.91 M/UL (ref 4.6–6.2)
SODIUM SERPL-SCNC: 146 MMOL/L (ref 136–145)
TACROLIMUS BLD-MCNC: 7.3 NG/ML (ref 5–15)
WBC # BLD AUTO: 7.66 K/UL (ref 3.9–12.7)

## 2020-11-20 PROCEDURE — 82150 ASSAY OF AMYLASE: CPT

## 2020-11-20 PROCEDURE — 99214 OFFICE O/P EST MOD 30 MIN: CPT | Mod: PBBFAC

## 2020-11-20 PROCEDURE — 80069 RENAL FUNCTION PANEL: CPT

## 2020-11-20 PROCEDURE — 99999 PR PBB SHADOW E&M-EST. PATIENT-LVL IV: CPT | Mod: PBBFAC,,,

## 2020-11-20 PROCEDURE — 87902 NFCT AGT GNTYP ALYS HEP C: CPT

## 2020-11-20 PROCEDURE — 84075 ASSAY ALKALINE PHOSPHATASE: CPT

## 2020-11-20 PROCEDURE — 99213 OFFICE O/P EST LOW 20 MIN: CPT | Mod: PBBFAC,27

## 2020-11-20 PROCEDURE — 86803 HEPATITIS C AB TEST: CPT

## 2020-11-20 PROCEDURE — 80197 ASSAY OF TACROLIMUS: CPT

## 2020-11-20 PROCEDURE — 99999 PR PBB SHADOW E&M-EST. PATIENT-LVL III: ICD-10-PCS | Mod: PBBFAC,,,

## 2020-11-20 PROCEDURE — 99999 PR PBB SHADOW E&M-EST. PATIENT-LVL III: CPT | Mod: PBBFAC,,,

## 2020-11-20 PROCEDURE — 85025 COMPLETE CBC W/AUTO DIFF WBC: CPT

## 2020-11-20 PROCEDURE — 83690 ASSAY OF LIPASE: CPT

## 2020-11-20 PROCEDURE — 36415 COLL VENOUS BLD VENIPUNCTURE: CPT

## 2020-11-20 PROCEDURE — 99999 PR PBB SHADOW E&M-EST. PATIENT-LVL IV: ICD-10-PCS | Mod: PBBFAC,,,

## 2020-11-20 RX ORDER — GABAPENTIN 300 MG/1
300 CAPSULE ORAL 2 TIMES DAILY
Qty: 60 CAPSULE | Refills: 0 | Status: SHIPPED | OUTPATIENT
Start: 2020-11-20 | End: 2021-01-05 | Stop reason: SDUPTHER

## 2020-11-20 RX ORDER — PRAVASTATIN SODIUM 40 MG/1
40 TABLET ORAL DAILY
Qty: 90 TABLET | Refills: 2 | Status: SHIPPED | OUTPATIENT
Start: 2020-11-20 | End: 2021-05-10 | Stop reason: SDUPTHER

## 2020-11-20 RX ORDER — SODIUM BICARBONATE 650 MG/1
650 TABLET ORAL 2 TIMES DAILY
Qty: 60 TABLET | Refills: 11 | Status: SHIPPED | OUTPATIENT
Start: 2020-11-20 | End: 2021-12-01 | Stop reason: ALTCHOICE

## 2020-11-20 SDOH — SOCIAL DETERMINANTS OF HEALTH (SDOH): OCCUPATIONAL EXPOSURE TO OTHER RISK FACTORS: Z57.8

## 2020-11-20 NOTE — TELEPHONE ENCOUNTER
Coordinator spoke with the patient and the patient's sister and informed her of the below information.[employer] message also sent to patient.      ----- Message from Damien Valladares MD sent at 11/20/2020  1:52 PM CST -----  Labs and diagnostic tests were reviewed. No action/changes indicated.    Damien Valladares MD   11/20/2020  1:26 PM  Encourage hydration> I understand his kidney biopsy is pending.  D/c KPN  Lower sodium bicarbonate to 650 bid

## 2020-11-20 NOTE — PROGRESS NOTES
"1ST NURSING VISIT POST DISCHARGE NOTE    1st RN appointment with Hernandez Rosales post discharge 11/19/2020 s/p kidney/pancreas transplant 11/3/20.  Patient's sister accompanied him.  Patient reports incisional pain.  Patient says that he that he is sleeping well.  Incision intact with staples.  Patient that he is able to explain daily incision care and showering instructions.  Reviewed I&O monitoring, measuring, and recording, and the need for hydration (i.e., at least 2 liters of water daily with minimal caffeine and no grapefruit products).  Medication list and rationale were reviewed.  Patient did bring blue medication card and medication bottles for review.  Patient reports that he has stopped Dulcolax and has continued Colace.  Patient has had a bowel movement.  Patient expressed understanding of daily care including BID VS, medications, and I&O documentation.  Patient made aware of today's creatinine level: 2.3.  Patient aware that coordinator will review today's labs with a transplant physician and call the patient with any dose changes indicated.  Next lab appointment scheduled for 11/23/2020.  First post-operative transplant team appointment with labs scheduled for 11/24/2020.    Using the Kidney Transplant Patient Reference Manual, the patient submitted his open book "Self-assessment of Kidney Transplant Patient Knowledge" test, which was completed in the transplant clinic this morning before 1st nursing visit.  This test includes questions regarding critical dose medications commonly used after kidney/pancreas transplant, medication dosing and side effects, importance of timed lab draws, important signs and symptoms to report 24/7 immediately post-transplant as well as how to contact the transplant team 24/7.    Patient instructed on correct procedure for collection of midstream clean catch urine specimen and vebalizes understanding.Patient instructed on correct procedure for collection of midstream clean " catch urine specimen and vebalizes understanding    Patient instructed on correct procedure for collection of midstream clean catch urine specimen and vebalizes understanding.      Test Score: 25/25    After completing the test, the patient was given a copy of the Self-assessment Answer Key to reinforce accurate learning of test content.  Patient expressed his understanding of the value of the information included in the self-assessment test.

## 2020-11-20 NOTE — PROGRESS NOTES
Encourage hydration> I understand his kidney biopsy is pending.  D/c KPN  Lower sodium bicarbonate to 650 bid

## 2020-11-20 NOTE — PROGRESS NOTES
Clinic Note: First Return to Clinic Post-  Kidney/Pancreas Transplant    Hernandez Rosales  is a 40 y.o. male  S/p LEFT KIDNEY   transplant on 11/3/2020 (Kidney / Pancreas) for Diabetes Mellitus - Type I.      Discharge Course (Issues/Concerns): No issues or concerns since discharge.     Objective:   Vitals:    11/20/20 1017   BP: 124/64   Pulse: 98   Resp: 18   Temp: 98 °F (36.7 °C)       Met with patient and his caregiver in the clinic to review current medication list.     Current Outpatient Medications   Medication Sig Dispense Refill    aspirin (ECOTRIN) 81 MG EC tablet Take 1 tablet (81 mg total) by mouth once daily. 30 tablet 11    calcitRIOL (ROCALTROL) 0.25 MCG Cap Take 1 capsule (0.25 mcg total) by mouth once daily. 30 capsule 11    docusate sodium (COLACE) 100 MG capsule Take 1 capsule (100 mg total) by mouth 3 (three) times daily as needed for Constipation.  0    ergocalciferol (ERGOCALCIFEROL) 50,000 unit Cap Take 1 capsule (50,000 Units total) by mouth every 7 days. 4 capsule 5    ferrous sulfate 325 (65 FE) MG EC tablet Take 325 mg by mouth once daily.      fludrocortisone (FLORINEF) 0.1 mg Tab Take 1 tablet (100 mcg total) by mouth once daily. 30 tablet 0    folic acid (FOLVITE) 1 MG tablet Take 1 tablet (1 mg total) by mouth once daily. 30 tablet 5    gabapentin (NEURONTIN) 300 MG capsule Take 300 mg by mouth 2 (two) times daily.       k phos di & mono-sod phos mono (K-PHOS-NEUTRAL) 250 mg Tab Take 2 tablets by mouth 2 (two) times a day. 120 tablet 11    ketoconazole (NIZORAL) 200 mg Tab Take 0.5 tablets (100 mg total) by mouth once daily. 15 tablet 11    magnesium oxide (MAG-OX) 400 mg (241.3 mg magnesium) tablet Take 2 tablets (800 mg total) by mouth 2 (two) times daily. 120 tablet 11    metoclopramide HCl (REGLAN) 10 MG tablet Take 1 tablet (10 mg total) by mouth 3 (three) times daily before meals. 90 tablet 11    mycophenolate (MYFORTIC) 180 MG TbEC Take 4 tablets (720 mg total) by  mouth 2 (two) times daily. 240 tablet 11    nystatin (MYCOSTATIN) 100,000 unit/mL suspension Take 5 mLs (500,000 Units total) by mouth 3 (three) times daily after meals. STOP 12/7/20 480 mL 0    oxyCODONE-acetaminophen (PERCOCET)  mg per tablet Take 1 tablet by mouth every 4 (four) hours as needed for Pain. 40 tablet 0    pantoprazole (PROTONIX) 40 MG tablet Take 1 tablet (40 mg total) by mouth once daily. 30 tablet 2    pravastatin (PRAVACHOL) 40 MG tablet Take 40 mg by mouth once daily.      predniSONE (DELTASONE) 5 MG tablet Take by mouth daily: 20mg 11/7-12/6, 15mg 12/7-1/6/21, 10mg 1/7-2/6, then 5mg daily beginning 2/7/21 120 tablet 11    sodium bicarbonate 650 MG tablet Take 2 tablets (1,300 mg total) by mouth 2 (two) times daily. 120 tablet 11    sulfamethoxazole-trimethoprim 400-80mg (BACTRIM,SEPTRA) 400-80 mg per tablet Take 1 tablet by mouth every morning. STOP 5/3/21 30 tablet 5    tacrolimus (PROGRAF) 1 MG Cap Take 8 capsules (8 mg total) by mouth every 12 (twelve) hours. 480 capsule 11    valGANciclovir (VALCYTE) 450 mg Tab Take 1 tablet (450 mg total) by mouth every Mon, Wed, Fri. STOP 2/1/21 12 tablet 2     No current facility-administered medications for this visit.        Pharmacy Interventions/Recommendations:     1) Graft Function & Immunosuppression Issues:   Patient received thymoglobulin induction.  Patient is on tacrolimus 8/8, Myfortic 720 mg BID, Prednisone taper.  Patient's SCr slightly elevated a 2.3 from 2.0.  Patient's pancreatic enzymes slightly elevated Amylase 224 (205) and Lipase 126 (119), blood glucose was 83.  Patient's FK level pending (goal: 12-15).     2) Opportunistic Infection prophylaxis:   PCP ppx: Bactrim until 5/3/2021  CMV ppx: Valcyte until 2/1/2021  Fungal ppx: Nystatin until 12/7/2020    3) Donor Serologies & Monitoring:     Donor CMV Status: Positive  Donor HCV Status: Positive  Donor HBcAb: Negative  Donor HBV YIN: Negative  Donor HCV YIN:  Positive    4) Pain Management & Bowel Regimen: Patient's pain is controlled not needing pain medications.  Patient is having bowel movements.     5) Blood Pressure Management: Patient's BP is WNL with standing pressures.  Patient started on florinef 100 mcg daily.      6) Blood Sugar Management & Follow-up: Patient's fasting blood glucose was 83.      7) Electrolyte Management: Patient's Phos is 2.7 on KPN.  Patient's Mag needs to be checked with next set of labs - on mag ox.  Patient's bicarb is 26 on sodium bicarbonate.      8) OTHER medication follow-up (patient assistance, held medications, etc): Calcitriol PA approved - patient and caregiver notified to  from [facility]. Patient's caregiver stated he did not have gabapentin or pravastatin therefore sent an Rx to  from [facility].     9) Reinforced medication education conducted in the hospital, including medication indications, dosing, administration, side effects, monitoring-- including timing of immunosuppressant levels.     Patient received their FIRST fill of medications from [facility].  Discussed the process for obtaining refills of medications, including verifying the dose and mailing address to have medications delivered.     Hernandez and his caregivers verbalized understanding and had the opportunity to ask questions.

## 2020-11-20 NOTE — PROGRESS NOTES
Transplant Coordinator  11/10-11/19/2020     Patient readmitted with fever and n/v   Infectious work up unremarkable--started on broad spectrum, but abx have since been d/c     Reglan started 11/11 for possible gastroparesis, improvement with increased dose.  GI consulted. EGD on 11/13 showed a gastric ulcer- BIOPSY PENDING (F/U). Patient is on daily Protonix. EGD needs to be repeated in 8 weeks.     STEVEN and elevated pancreatic enzymes.  Class I DSA A3 detected (2556) 11/16/20.   Kidney biopsy on 11/17/20- NO REJECTION, diffuse ATI 50%, c4d negative, IgA staining likely donor derived.  Cr remains stable at 2.0. Amylase and lipase are now improving daily.     Florinef started for hypotension (asymptomatic).   OK to continue Bactrim (with hyperkalemia) thought is that Florinef will bring potassium down.   Pt counseled on a high salt/low K diet by the dietician.     WEEK #1 HCV labs show that pt now Ab+. WEEK #2 labs due 11/20/20.     Labs scheduled for 11/20 and all f/u appts booked/rescheudled including coordinator/pharmD, surgeon, KTM and STENT removal.

## 2020-11-23 ENCOUNTER — LAB VISIT (OUTPATIENT)
Dept: LAB | Facility: HOSPITAL | Age: 40
End: 2020-11-23
Attending: INTERNAL MEDICINE
Payer: MEDICARE

## 2020-11-23 ENCOUNTER — TELEPHONE (OUTPATIENT)
Dept: TRANSPLANT | Facility: CLINIC | Age: 40
End: 2020-11-23

## 2020-11-23 DIAGNOSIS — B19.21 HEPATITIS C VIRUS INFECTION WITH HEPATIC COMA, UNSPECIFIED CHRONICITY: ICD-10-CM

## 2020-11-23 DIAGNOSIS — Z94.83 STATUS POST SIMULTANEOUS KIDNEY AND PANCREAS TRANSPLANT: Primary | Chronic | ICD-10-CM

## 2020-11-23 DIAGNOSIS — Z94.83 STATUS POST PANCREAS TRANSPLANTATION: ICD-10-CM

## 2020-11-23 DIAGNOSIS — Z79.60 LONG-TERM USE OF IMMUNOSUPPRESSANT MEDICATION: ICD-10-CM

## 2020-11-23 DIAGNOSIS — Z94.0 KIDNEY REPLACED BY TRANSPLANT: ICD-10-CM

## 2020-11-23 DIAGNOSIS — Z94.0 STATUS POST SIMULTANEOUS KIDNEY AND PANCREAS TRANSPLANT: Primary | Chronic | ICD-10-CM

## 2020-11-23 DIAGNOSIS — D84.9 IMMUNOCOMPROMISED STATE: ICD-10-CM

## 2020-11-23 DIAGNOSIS — Z57.8 EMPLOYEE EXPOSURE TO BLOOD: ICD-10-CM

## 2020-11-23 LAB
ALBUMIN SERPL BCP-MCNC: 3.4 G/DL (ref 3.5–5.2)
AMYLASE SERPL-CCNC: 146 U/L (ref 20–110)
ANION GAP SERPL CALC-SCNC: 9 MMOL/L (ref 8–16)
BASOPHILS # BLD AUTO: 0.05 K/UL (ref 0–0.2)
BASOPHILS NFR BLD: 0.6 % (ref 0–1.9)
BUN SERPL-MCNC: 34 MG/DL (ref 6–20)
CALCIUM SERPL-MCNC: 8.7 MG/DL (ref 8.7–10.5)
CHLORIDE SERPL-SCNC: 110 MMOL/L (ref 95–110)
CO2 SERPL-SCNC: 24 MMOL/L (ref 23–29)
CREAT SERPL-MCNC: 2.4 MG/DL (ref 0.5–1.4)
DIFFERENTIAL METHOD: ABNORMAL
EOSINOPHIL # BLD AUTO: 0.1 K/UL (ref 0–0.5)
EOSINOPHIL NFR BLD: 1.1 % (ref 0–8)
ERYTHROCYTE [DISTWIDTH] IN BLOOD BY AUTOMATED COUNT: 18.6 % (ref 11.5–14.5)
EST. GFR  (AFRICAN AMERICAN): 38.1 ML/MIN/1.73 M^2
EST. GFR  (NON AFRICAN AMERICAN): 33 ML/MIN/1.73 M^2
GLUCOSE SERPL-MCNC: 80 MG/DL (ref 70–110)
HCT VFR BLD AUTO: 32.9 % (ref 40–54)
HCV AB SERPL QL IA: POSITIVE
HGB BLD-MCNC: 9.9 G/DL (ref 14–18)
IMM GRANULOCYTES # BLD AUTO: 0.11 K/UL (ref 0–0.04)
IMM GRANULOCYTES NFR BLD AUTO: 1.3 % (ref 0–0.5)
LIPASE SERPL-CCNC: 56 U/L (ref 4–60)
LYMPHOCYTES # BLD AUTO: 0.3 K/UL (ref 1–4.8)
LYMPHOCYTES NFR BLD: 3.1 % (ref 18–48)
MAGNESIUM SERPL-MCNC: 1.4 MG/DL (ref 1.6–2.6)
MCH RBC QN AUTO: 26.8 PG (ref 27–31)
MCHC RBC AUTO-ENTMCNC: 30.1 G/DL (ref 32–36)
MCV RBC AUTO: 89 FL (ref 82–98)
MONOCYTES # BLD AUTO: 0.7 K/UL (ref 0.3–1)
MONOCYTES NFR BLD: 8.1 % (ref 4–15)
NEUTROPHILS # BLD AUTO: 7.3 K/UL (ref 1.8–7.7)
NEUTROPHILS NFR BLD: 85.8 % (ref 38–73)
NRBC BLD-RTO: 0 /100 WBC
PHOSPHATE SERPL-MCNC: 2.2 MG/DL (ref 2.7–4.5)
PLATELET # BLD AUTO: 362 K/UL (ref 150–350)
PMV BLD AUTO: 9.8 FL (ref 9.2–12.9)
POTASSIUM SERPL-SCNC: 4.7 MMOL/L (ref 3.5–5.1)
RBC # BLD AUTO: 3.69 M/UL (ref 4.6–6.2)
SODIUM SERPL-SCNC: 143 MMOL/L (ref 136–145)
TACROLIMUS BLD-MCNC: 9.8 NG/ML (ref 5–15)
WBC # BLD AUTO: 8.52 K/UL (ref 3.9–12.7)

## 2020-11-23 PROCEDURE — 86803 HEPATITIS C AB TEST: CPT

## 2020-11-23 PROCEDURE — 36415 COLL VENOUS BLD VENIPUNCTURE: CPT

## 2020-11-23 PROCEDURE — 87902 NFCT AGT GNTYP ALYS HEP C: CPT

## 2020-11-23 PROCEDURE — 83735 ASSAY OF MAGNESIUM: CPT

## 2020-11-23 PROCEDURE — 85025 COMPLETE CBC W/AUTO DIFF WBC: CPT

## 2020-11-23 PROCEDURE — 80197 ASSAY OF TACROLIMUS: CPT

## 2020-11-23 PROCEDURE — 83690 ASSAY OF LIPASE: CPT

## 2020-11-23 PROCEDURE — 82150 ASSAY OF AMYLASE: CPT

## 2020-11-23 PROCEDURE — 80069 RENAL FUNCTION PANEL: CPT

## 2020-11-23 RX ORDER — TACROLIMUS 1 MG/1
9 CAPSULE ORAL EVERY 12 HOURS
Qty: 540 CAPSULE | Refills: 11 | Status: SHIPPED | OUTPATIENT
Start: 2020-11-23 | End: 2020-11-27

## 2020-11-23 SDOH — SOCIAL DETERMINANTS OF HEALTH (SDOH): OCCUPATIONAL EXPOSURE TO OTHER RISK FACTORS: Z57.8

## 2020-11-23 NOTE — TELEPHONE ENCOUNTER
Faveeo message sent to patient.     ----- Message from Damien Valladares MD sent at 11/23/2020  9:20 AM CST -----  Labs and diagnostic tests were reviewed. No action/changes indicated.

## 2020-11-23 NOTE — TELEPHONE ENCOUNTER
----- Message from Damien Valladares MD sent at 11/23/2020 11:24 AM CST -----  Please let's Increase prograf to 9 mg po bid

## 2020-11-23 NOTE — TELEPHONE ENCOUNTER
Shahzad spoke with the patient and his sister and reviewed labs and advised of the below information. All questions nswered and patient and sister verbalized understanding of the above information.     ----- Message from Damien Valladares MD sent at 11/23/2020 11:24 AM CST -----  Please let's Increase prograf to 9 mg po bid

## 2020-11-24 ENCOUNTER — OFFICE VISIT (OUTPATIENT)
Dept: TRANSPLANT | Facility: CLINIC | Age: 40
End: 2020-11-24
Payer: MEDICARE

## 2020-11-24 ENCOUNTER — TELEPHONE (OUTPATIENT)
Dept: TRANSPLANT | Facility: CLINIC | Age: 40
End: 2020-11-24

## 2020-11-24 VITALS
HEIGHT: 70 IN | OXYGEN SATURATION: 99 % | BODY MASS INDEX: 26.55 KG/M2 | SYSTOLIC BLOOD PRESSURE: 112 MMHG | RESPIRATION RATE: 18 BRPM | HEART RATE: 99 BPM | WEIGHT: 185.44 LBS | TEMPERATURE: 99 F | DIASTOLIC BLOOD PRESSURE: 57 MMHG

## 2020-11-24 DIAGNOSIS — Z94.0 KIDNEY TRANSPLANTED: Primary | ICD-10-CM

## 2020-11-24 DIAGNOSIS — Z94.83 STATUS POST PANCREAS TRANSPLANTATION: ICD-10-CM

## 2020-11-24 LAB
FINAL PATHOLOGIC DIAGNOSIS: NORMAL
GROSS: NORMAL
Lab: NORMAL
MICROSCOPIC EXAM: NORMAL

## 2020-11-24 PROCEDURE — 99999 PR PBB SHADOW E&M-EST. PATIENT-LVL IV: ICD-10-PCS | Mod: PBBFAC,,, | Performed by: INTERNAL MEDICINE

## 2020-11-24 PROCEDURE — 99214 OFFICE O/P EST MOD 30 MIN: CPT | Mod: PBBFAC | Performed by: INTERNAL MEDICINE

## 2020-11-24 PROCEDURE — 99215 OFFICE O/P EST HI 40 MIN: CPT | Mod: S$PBB,,, | Performed by: INTERNAL MEDICINE

## 2020-11-24 PROCEDURE — 99999 PR PBB SHADOW E&M-EST. PATIENT-LVL IV: CPT | Mod: PBBFAC,,, | Performed by: INTERNAL MEDICINE

## 2020-11-24 PROCEDURE — 99215 PR OFFICE/OUTPT VISIT, EST, LEVL V, 40-54 MIN: ICD-10-PCS | Mod: S$PBB,,, | Performed by: INTERNAL MEDICINE

## 2020-11-24 NOTE — TELEPHONE ENCOUNTER
----- Message from Damien Valladares MD sent at 11/24/2020  7:40 AM CST -----  Labs and diagnostic tests were reviewed. No action/changes indicated.

## 2020-11-24 NOTE — PROGRESS NOTES
STAPLE REMOVAL NOTE    Staples removed from kidney and pancreas transplant incision, steri-strips applied with Benzoin per Dr. Johnson's order.  Patient tolerated procedure well.  Skin dry and intact.  Patient instructed to shower with back to water spray and to pat dry incision and to let the steri-strips wear off on their own.  Patient instructed to report any redness, warmth, or drainage from incision to transplant coordinators.  All questions answered.

## 2020-11-24 NOTE — LETTER
November 24, 2020        ARUNA Lockett From .  [address]  [place] [address]  Phone: [phone]  Fax: [phone]             [facility] Transplant 1st Fl  [address]  [facility] [address]  Phone: [phone]   Patient: Hernandez Rosales   MR Number: [id number]   YOB: 1980   Date of Visit: 11/24/2020       Dear Dr. ARUNA Lockett From .    Thank you for referring Hernandez Rosales to me for evaluation. Attached you will find relevant portions of my assessment and plan of care.    If you have questions, please do not hesitate to call me. I look forward to following Hernandez Rosales along with you.    Sincerely,    Devora Hernandez MD    Enclosure    If you would like to receive this communication electronically, please contact [email or web address] or [phone] to request [employer] access.    [employer] is a tool which provides read-only access to select patient information with whom you have a relationship. Its easy to use and provides real time access to review your patients record including encounter summaries, notes, results, and demographic information.    If you feel you have received this communication in error or would no longer like to receive these types of communications, please e-mail [email or web address]

## 2020-11-24 NOTE — PROGRESS NOTES
Kidney Post-Transplant Assessment    Referring Physician: ARUNA Krishnan Jr.  Current Nephrologist: ARUNA Krishnan .    ORGAN: PANCREAS  Donor Type: donation after brain death  PHS Increased Risk: yes  Cold Ischemia: 432 mins  Induction Medications: thymo    Subjective:     CC:  Reassessment of renal allograft function and management of chronic immunosuppression.    Kidney History:  Mr. Rosales is a 40 y.o. year old Black or  male with ESRD 2/2 diabetic nephropathy s/p kidney/pancreas transplant on 11/3/20 (Thymo induction, CMV D+/R+, HCV Ab+/YIN+).  who received a donation after brain death kidney transplant on 11/3/20. His most recent creatinine is 2.4. He takes mycophenolate mofetil, prednisone and tacrolimus for maintenance immunosuppression.    Post Transplant Course:   - Admitted on POD#7 due to nausea/vomiting and fever of 101.  Infectious work up unremarkable.EGD performed 11/13 with gastric ulcer. Patient is on daily Protonix. EGD needs to be repeated in 8 weeks.  - Pancreatic enzymes also elevated this admission.  Class I DSA A3 detected (2556) 11/16/20. Kidney biopsy obtained 11/17/20. Results with 20 glomeruli , no rejection; diffuse acute tubular injury ATI 50%, c4d negative, IgA staining likely donor derived.   - Patient received HCV positive organs.  He is now HCV antibody positive with elevated LFTs - f/u with hepatology for hep c treatment.       Standing Blood pressures 110-120/60-70.    Interval History: This is the first time seen in clinic after KP transplant. He is doing okay. No heart burn, no nausea, no vomiting, no diarrhea. He was asking about incision when can be removed. Discussed with Transplant surgeon, Dr. Johnson and he personally seen the patient and cleared that staples can be removed. Appreciate Dr. Johnson recs. No pain over the allograft and voids with out any problems. Urinary stent was scheduled to be removed tomorrow at Clinic. He is taking all  Immunosuppressive medications. Blood sugars are well controlled. No other system symptoms.       Current Medication  Current Outpatient Medications   Medication Sig Dispense Refill    aspirin (ECOTRIN) 81 MG EC tablet Take 1 tablet (81 mg total) by mouth once daily. 30 tablet 11    calcitRIOL (ROCALTROL) 0.25 MCG Cap Take 1 capsule (0.25 mcg total) by mouth once daily. 30 capsule 11    docusate sodium (COLACE) 100 MG capsule Take 1 capsule (100 mg total) by mouth 3 (three) times daily as needed for Constipation.  0    ergocalciferol (ERGOCALCIFEROL) 50,000 unit Cap Take 1 capsule (50,000 Units total) by mouth every 7 days. 4 capsule 5    ferrous sulfate 325 (65 FE) MG EC tablet Take 325 mg by mouth once daily.      fludrocortisone (FLORINEF) 0.1 mg Tab Take 1 tablet (100 mcg total) by mouth once daily. 30 tablet 0    folic acid (FOLVITE) 1 MG tablet Take 1 tablet (1 mg total) by mouth once daily. 30 tablet 5    gabapentin (NEURONTIN) 300 MG capsule Take 1 capsule (300 mg total) by mouth 2 (two) times daily. 60 capsule 0    ketoconazole (NIZORAL) 200 mg Tab Take 0.5 tablets (100 mg total) by mouth once daily. 15 tablet 11    magnesium oxide (MAG-OX) 400 mg (241.3 mg magnesium) tablet Take 2 tablets (800 mg total) by mouth 2 (two) times daily. 120 tablet 11    metoclopramide HCl (REGLAN) 10 MG tablet Take 1 tablet (10 mg total) by mouth 3 (three) times daily before meals. 90 tablet 11    mycophenolate (MYFORTIC) 180 MG TbEC Take 4 tablets (720 mg total) by mouth 2 (two) times daily. 240 tablet 11    nystatin (MYCOSTATIN) 100,000 unit/mL suspension Take 5 mLs (500,000 Units total) by mouth 3 (three) times daily after meals. STOP 12/7/20 480 mL 0    oxyCODONE-acetaminophen (PERCOCET)  mg per tablet Take 1 tablet by mouth every 4 (four) hours as needed for Pain. 40 tablet 0    pantoprazole (PROTONIX) 40 MG tablet Take 1 tablet (40 mg total) by mouth once daily. 30 tablet 2    pravastatin (PRAVACHOL)  "40 MG tablet Take 1 tablet (40 mg total) by mouth once daily. 90 tablet 2    predniSONE (DELTASONE) 5 MG tablet Take by mouth daily: 20mg 11/7-12/6, 15mg 12/7-1/6/21, 10mg 1/7-2/6, then 5mg daily beginning 2/7/21 120 tablet 11    sodium bicarbonate 650 MG tablet Take 1 tablet (650 mg total) by mouth 2 (two) times daily. 60 tablet 11    sulfamethoxazole-trimethoprim 400-80mg (BACTRIM,SEPTRA) 400-80 mg per tablet Take 1 tablet by mouth every morning. STOP 5/3/21 30 tablet 5    tacrolimus (PROGRAF) 1 MG Cap Take 9 capsules (9 mg total) by mouth every 12 (twelve) hours. 540 capsule 11    valGANciclovir (VALCYTE) 450 mg Tab Take 1 tablet (450 mg total) by mouth every Mon, Wed, Fri. STOP 2/1/21 12 tablet 2     No current facility-administered medications for this visit.          Review of Systems   Constitutional: Negative for activity change, appetite change, chills, diaphoresis and fever.   HENT: Negative for congestion, dental problem, ear discharge and hearing loss.    Eyes: Positive for visual disturbance. Negative for discharge and redness.   Respiratory: Negative for apnea, cough, choking, chest tightness and shortness of breath.    Cardiovascular: Negative.    Gastrointestinal: Negative.    Endocrine: Negative.    Genitourinary: Negative.    Musculoskeletal: Negative.    Skin: Negative.    Allergic/Immunologic: Negative.          Objective:     Blood pressure (!) 112/57, pulse 99, temperature 98.9 °F (37.2 °C), temperature source Oral, resp. rate 18, height 5' 10" (1.778 m), weight 84.1 kg (185 lb 6.5 oz), SpO2 99 %.  body mass index is 26.6 kg/m².    Physical Exam  Constitutional:       Appearance: Normal appearance. He is normal weight.   HENT:      Head: Normocephalic and atraumatic.      Nose: Nose normal.      Mouth/Throat:      Mouth: Mucous membranes are moist.   Eyes:      Extraocular Movements: Extraocular movements intact.   Neck:      Musculoskeletal: Normal range of motion and neck supple. "   Cardiovascular:      Rate and Rhythm: Normal rate and regular rhythm.      Pulses: Normal pulses.      Heart sounds: Normal heart sounds.   Pulmonary:      Effort: Pulmonary effort is normal.      Breath sounds: Normal breath sounds.   Abdominal:      General: Bowel sounds are normal.      Palpations: Abdomen is soft.   Musculoskeletal:         General: No swelling.   Skin:     General: Skin is warm.      Capillary Refill: Capillary refill takes less than 2 seconds.   Neurological:      General: No focal deficit present.      Mental Status: He is alert and oriented to person, place, and time.   Psychiatric:         Mood and Affect: Mood normal.         Labs:  Lab Results   Component Value Date    WBC 8.52 11/23/2020    HGB 9.9 (L) 11/23/2020    HCT 32.9 (L) 11/23/2020     11/23/2020    K 4.7 11/23/2020     11/23/2020    CO2 24 11/23/2020    BUN 34 (H) 11/23/2020    CREATININE 2.4 (H) 11/23/2020    EGFRNONAA 33.0 (A) 11/23/2020    CALCIUM 8.7 11/23/2020    PHOS 2.2 (L) 11/23/2020    MG 1.4 (L) 11/23/2020    ALBUMIN 3.4 (L) 11/23/2020    AST 87 (H) 11/20/2020     (H) 11/20/2020    .0 (H) 11/03/2020    TACROLIMUS 9.8 11/23/2020       No results found for: EXTANC, EXTWBC, EXTSEGS, EXTPLATELETS, EXTHEMOGLOBI, EXTHEMATOCRI, EXTCREATININ, EXTSODIUM, EXTPOTASSIUM, EXTBUN, EXTCO2, EXTCALCIUM, EXTPHOSPHORU, EXTGLUCOSE, EXTALBUMIN, EXTAST, EXTALT, EXTBILITOTAL, EXTLIPASE, EXTAMYLASE    No results found for: EXTCYCLOSLVL, EXTSIROLIMUS, EXTTACROLVL, EXTPROTCRE, EXTPTHINTACT, EXTPROTEINUA, EXTWBCUA, EXTRBCUA    Labs were reviewed with the patient    Assessment/Plan:     1. Kidney transplanted    2. Status post pancreas transplantation        Mr. Rosales is a 40 y.o. male with:       # History of ESRD presumed secondary to DM  - s/p kidney/pancreas transplant on 11/3/20 (Thymo induction, CMV D+/R+, HCV Ab+/YIN+).   - creat is around 2.0-.5 post transplant  - kidney transplant biopsy done 11/17/20 -  diffuse ATN  - will do weekly labs (renal panel, cbc, prograf levels) and monthly dsa levels given positive from 11/16/20  - last Cr 2.4  - Amylase and lipase trending down    # Received Donor + hep C kidney allograft   - needs hepatology follow up      # Transaminatitis  - due to medications  - those trending down       # Peptic Ulcer   - on PPI  - Repeat EGD in 7 weeks      # Immunosuppression:   - continue Prograf 9 mg BID  - continue Myfortic 720 mg BID   - continue prednisone  - will monitor closely for toxicities      # Antimicrobial Prophylaxis:   - continue bactrim  for PJP prophylaxis  - continue Valcyte for CMV prophylaxis   - continue nystatin for thrush prophylaxis for 1 month       # HTN:   - BPs well controlled   - continue with home blood pressure monitoring  - low salt and healthy life discussed with the patient      # Metabolic Bone Disease/Secondary Hyperparathyroidism:  - calcitriol and nergo   - will monitor PTH, calcium, and phosphorus as per our center protocol      # Anemia of chronic disease:   - Hb 9.9  - will continue monitoring as per our center guidelines.   - No indication for acute intervention today     Case was discussed and formulated plan with Dr. Hernandez

## 2020-11-25 ENCOUNTER — PROCEDURE VISIT (OUTPATIENT)
Dept: UROLOGY | Facility: CLINIC | Age: 40
End: 2020-11-25
Payer: MEDICARE

## 2020-11-25 VITALS
SYSTOLIC BLOOD PRESSURE: 117 MMHG | RESPIRATION RATE: 16 BRPM | BODY MASS INDEX: 27.18 KG/M2 | HEART RATE: 104 BPM | TEMPERATURE: 99 F | HEIGHT: 70 IN | WEIGHT: 189.88 LBS | DIASTOLIC BLOOD PRESSURE: 60 MMHG

## 2020-11-25 DIAGNOSIS — Z94.0 KIDNEY REPLACED BY TRANSPLANT: ICD-10-CM

## 2020-11-25 LAB
HCV GENTYP SERPL NAA+PROBE: ABNORMAL
HCV RNA SERPL NAA+PROBE-LOG IU: 6.42 LOG (10) IU/ML
HCV RNA SERPL QL NAA+PROBE: DETECTED
HCV RNA SPEC NAA+PROBE-ACNC: ABNORMAL IU/ML

## 2020-11-25 PROCEDURE — 52310 PR CYSTOSCOPY,REMV CALCULUS,SIMPLE: ICD-10-PCS | Mod: S$PBB,,, | Performed by: UROLOGY

## 2020-11-25 PROCEDURE — 52310 CYSTOSCOPY AND TREATMENT: CPT | Mod: PBBFAC | Performed by: UROLOGY

## 2020-11-25 PROCEDURE — 52310 CYSTOSCOPY AND TREATMENT: CPT | Mod: S$PBB,,, | Performed by: UROLOGY

## 2020-11-25 RX ORDER — LIDOCAINE HYDROCHLORIDE 20 MG/ML
JELLY TOPICAL
Status: COMPLETED | OUTPATIENT
Start: 2020-11-25 | End: 2020-11-25

## 2020-11-25 RX ADMIN — LIDOCAINE HYDROCHLORIDE: 20 JELLY TOPICAL at 12:11

## 2020-11-25 NOTE — PROCEDURES
Procedures   Procedure: Flexible cysto-uretheroscopy and stent removal   Pre Procedure Diagnosis:s/p kidney transplant   Post Procedure Diagnosis:same   Surgeon: Berny Vaughan MD   Anesthesia: 2% uro-jet lidocaine jelly for local analgesia   Flexible cysto-urethroscopy was performed after consent was obtained. The risks and benefits were explained.   2% lidocaine urojet was used for local analgesia.   The genitalia was prepped and draped in the sterile fashion with betadine.   The flexible scope was advanced into the urethra and into the bladder. The stent was removed without difficulty.   The patient tolerated the procedure well without complication.   They will follow up with transplant.

## 2020-11-27 ENCOUNTER — LAB VISIT (OUTPATIENT)
Dept: LAB | Facility: HOSPITAL | Age: 40
End: 2020-11-27
Attending: INTERNAL MEDICINE
Payer: MEDICARE

## 2020-11-27 DIAGNOSIS — Z94.83 STATUS POST PANCREAS TRANSPLANTATION: ICD-10-CM

## 2020-11-27 DIAGNOSIS — Z94.0 KIDNEY REPLACED BY TRANSPLANT: ICD-10-CM

## 2020-11-27 DIAGNOSIS — D84.9 IMMUNOCOMPROMISED STATE: ICD-10-CM

## 2020-11-27 DIAGNOSIS — Z94.0 STATUS POST SIMULTANEOUS KIDNEY AND PANCREAS TRANSPLANT: Chronic | ICD-10-CM

## 2020-11-27 DIAGNOSIS — Z79.60 LONG-TERM USE OF IMMUNOSUPPRESSANT MEDICATION: ICD-10-CM

## 2020-11-27 DIAGNOSIS — Z94.83 STATUS POST SIMULTANEOUS KIDNEY AND PANCREAS TRANSPLANT: Chronic | ICD-10-CM

## 2020-11-27 LAB
ALBUMIN SERPL BCP-MCNC: 3.2 G/DL (ref 3.5–5.2)
ALBUMIN SERPL BCP-MCNC: 3.2 G/DL (ref 3.5–5.2)
ALP SERPL-CCNC: 155 U/L (ref 55–135)
ALT SERPL W/O P-5'-P-CCNC: 77 U/L (ref 10–44)
AMYLASE SERPL-CCNC: 121 U/L (ref 20–110)
ANION GAP SERPL CALC-SCNC: 7 MMOL/L (ref 8–16)
ANISOCYTOSIS BLD QL SMEAR: SLIGHT
AST SERPL-CCNC: 36 U/L (ref 10–40)
BASOPHILS NFR BLD: 2 % (ref 0–1.9)
BILIRUB DIRECT SERPL-MCNC: <0.1 MG/DL (ref 0.1–0.3)
BILIRUB SERPL-MCNC: 0.2 MG/DL (ref 0.1–1)
BUN SERPL-MCNC: 30 MG/DL (ref 6–20)
CALCIUM SERPL-MCNC: 8.5 MG/DL (ref 8.7–10.5)
CHLORIDE SERPL-SCNC: 111 MMOL/L (ref 95–110)
CO2 SERPL-SCNC: 24 MMOL/L (ref 23–29)
CREAT SERPL-MCNC: 2.2 MG/DL (ref 0.5–1.4)
DACRYOCYTES BLD QL SMEAR: ABNORMAL
DIFFERENTIAL METHOD: ABNORMAL
EOSINOPHIL NFR BLD: 3 % (ref 0–8)
ERYTHROCYTE [DISTWIDTH] IN BLOOD BY AUTOMATED COUNT: 18.7 % (ref 11.5–14.5)
EST. GFR  (AFRICAN AMERICAN): 42.4 ML/MIN/1.73 M^2
EST. GFR  (NON AFRICAN AMERICAN): 36.6 ML/MIN/1.73 M^2
GLUCOSE SERPL-MCNC: 84 MG/DL (ref 70–110)
HCT VFR BLD AUTO: 33.6 % (ref 40–54)
HGB BLD-MCNC: 10.2 G/DL (ref 14–18)
HYPOCHROMIA BLD QL SMEAR: ABNORMAL
IMM GRANULOCYTES # BLD AUTO: ABNORMAL K/UL (ref 0–0.04)
IMM GRANULOCYTES NFR BLD AUTO: ABNORMAL % (ref 0–0.5)
LIPASE SERPL-CCNC: 59 U/L (ref 4–60)
LYMPHOCYTES NFR BLD: 5 % (ref 18–48)
MAGNESIUM SERPL-MCNC: 1.4 MG/DL (ref 1.6–2.6)
MCH RBC QN AUTO: 27.6 PG (ref 27–31)
MCHC RBC AUTO-ENTMCNC: 30.4 G/DL (ref 32–36)
MCV RBC AUTO: 91 FL (ref 82–98)
MONOCYTES NFR BLD: 6 % (ref 4–15)
NEUTROPHILS NFR BLD: 84 % (ref 38–73)
NRBC BLD-RTO: 0 /100 WBC
OVALOCYTES BLD QL SMEAR: ABNORMAL
PHOSPHATE SERPL-MCNC: 2.3 MG/DL (ref 2.7–4.5)
PLATELET # BLD AUTO: 308 K/UL (ref 150–350)
PLATELET BLD QL SMEAR: ABNORMAL
PMV BLD AUTO: 10.3 FL (ref 9.2–12.9)
POIKILOCYTOSIS BLD QL SMEAR: SLIGHT
POLYCHROMASIA BLD QL SMEAR: ABNORMAL
POTASSIUM SERPL-SCNC: 5.1 MMOL/L (ref 3.5–5.1)
PROT SERPL-MCNC: 6.2 G/DL (ref 6–8.4)
RBC # BLD AUTO: 3.69 M/UL (ref 4.6–6.2)
SODIUM SERPL-SCNC: 142 MMOL/L (ref 136–145)
TACROLIMUS BLD-MCNC: 8.8 NG/ML (ref 5–15)
WBC # BLD AUTO: 8.75 K/UL (ref 3.9–12.7)

## 2020-11-27 PROCEDURE — 85027 COMPLETE CBC AUTOMATED: CPT

## 2020-11-27 PROCEDURE — 83735 ASSAY OF MAGNESIUM: CPT

## 2020-11-27 PROCEDURE — 84075 ASSAY ALKALINE PHOSPHATASE: CPT

## 2020-11-27 PROCEDURE — 83690 ASSAY OF LIPASE: CPT

## 2020-11-27 PROCEDURE — 36415 COLL VENOUS BLD VENIPUNCTURE: CPT

## 2020-11-27 PROCEDURE — 80069 RENAL FUNCTION PANEL: CPT

## 2020-11-27 PROCEDURE — 82150 ASSAY OF AMYLASE: CPT

## 2020-11-27 PROCEDURE — 85007 BL SMEAR W/DIFF WBC COUNT: CPT

## 2020-11-27 PROCEDURE — 80197 ASSAY OF TACROLIMUS: CPT

## 2020-11-27 RX ORDER — TACROLIMUS 1 MG/1
10 CAPSULE ORAL EVERY 12 HOURS
Qty: 600 CAPSULE | Refills: 11 | Status: SHIPPED | OUTPATIENT
Start: 2020-11-27 | End: 2020-12-04 | Stop reason: SDUPTHER

## 2020-11-27 NOTE — TELEPHONE ENCOUNTER
Coordinator spoke with patient and reviewed labs. tacrolimus level was low. 8.8. Please increase prograf to 10 mg bid. Patient verbalized understanding and all questions answered.      ----- Message from Damien Valladares MD sent at 11/27/2020 11:39 AM CST -----  Let's increase prograf to 10 mg po bid. Labs Monday

## 2020-11-30 ENCOUNTER — LAB VISIT (OUTPATIENT)
Dept: LAB | Facility: HOSPITAL | Age: 40
End: 2020-11-30
Attending: INTERNAL MEDICINE
Payer: MEDICARE

## 2020-11-30 ENCOUNTER — TELEPHONE (OUTPATIENT)
Dept: HEPATOLOGY | Facility: CLINIC | Age: 40
End: 2020-11-30

## 2020-11-30 DIAGNOSIS — Z94.83 STATUS POST PANCREAS TRANSPLANTATION: Primary | ICD-10-CM

## 2020-11-30 DIAGNOSIS — R74.8 ELEVATED LIVER ENZYMES: ICD-10-CM

## 2020-11-30 DIAGNOSIS — Z94.83 STATUS POST PANCREAS TRANSPLANTATION: ICD-10-CM

## 2020-11-30 DIAGNOSIS — E83.40 DISORDER OF MAGNESIUM METABOLISM: ICD-10-CM

## 2020-11-30 DIAGNOSIS — Z79.60 LONG-TERM USE OF IMMUNOSUPPRESSANT MEDICATION: ICD-10-CM

## 2020-11-30 DIAGNOSIS — Z94.0 KIDNEY REPLACED BY TRANSPLANT: ICD-10-CM

## 2020-11-30 LAB
ALBUMIN SERPL BCP-MCNC: 3.3 G/DL (ref 3.5–5.2)
AMYLASE SERPL-CCNC: 108 U/L (ref 20–110)
ANION GAP SERPL CALC-SCNC: 7 MMOL/L (ref 8–16)
ANISOCYTOSIS BLD QL SMEAR: SLIGHT
BASOPHILS # BLD AUTO: ABNORMAL K/UL (ref 0–0.2)
BASOPHILS NFR BLD: 0 % (ref 0–1.9)
BUN SERPL-MCNC: 29 MG/DL (ref 6–20)
CALCIUM SERPL-MCNC: 8.7 MG/DL (ref 8.7–10.5)
CHLORIDE SERPL-SCNC: 108 MMOL/L (ref 95–110)
CO2 SERPL-SCNC: 26 MMOL/L (ref 23–29)
CREAT SERPL-MCNC: 2.1 MG/DL (ref 0.5–1.4)
DACRYOCYTES BLD QL SMEAR: ABNORMAL
DIFFERENTIAL METHOD: ABNORMAL
EOSINOPHIL # BLD AUTO: ABNORMAL K/UL (ref 0–0.5)
EOSINOPHIL NFR BLD: 2 % (ref 0–8)
ERYTHROCYTE [DISTWIDTH] IN BLOOD BY AUTOMATED COUNT: 18.5 % (ref 11.5–14.5)
EST. GFR  (AFRICAN AMERICAN): 44.8 ML/MIN/1.73 M^2
EST. GFR  (NON AFRICAN AMERICAN): 38.8 ML/MIN/1.73 M^2
GLUCOSE SERPL-MCNC: 74 MG/DL (ref 70–110)
HCT VFR BLD AUTO: 35.7 % (ref 40–54)
HCV GENTYP SERPL NAA+PROBE: ABNORMAL
HCV RNA SERPL NAA+PROBE-LOG IU: 6.41 LOG (10) IU/ML
HCV RNA SERPL QL NAA+PROBE: DETECTED
HCV RNA SPEC NAA+PROBE-ACNC: ABNORMAL IU/ML
HGB BLD-MCNC: 10.6 G/DL (ref 14–18)
HYPOCHROMIA BLD QL SMEAR: ABNORMAL
IMM GRANULOCYTES # BLD AUTO: ABNORMAL K/UL (ref 0–0.04)
IMM GRANULOCYTES NFR BLD AUTO: ABNORMAL % (ref 0–0.5)
LIPASE SERPL-CCNC: 309 U/L (ref 4–60)
LYMPHOCYTES # BLD AUTO: ABNORMAL K/UL (ref 1–4.8)
LYMPHOCYTES NFR BLD: 8 % (ref 18–48)
MAGNESIUM SERPL-MCNC: 1.2 MG/DL (ref 1.6–2.6)
MCH RBC QN AUTO: 26.8 PG (ref 27–31)
MCHC RBC AUTO-ENTMCNC: 29.7 G/DL (ref 32–36)
MCV RBC AUTO: 90 FL (ref 82–98)
MONOCYTES # BLD AUTO: ABNORMAL K/UL (ref 0.3–1)
MONOCYTES NFR BLD: 2 % (ref 4–15)
NEUTROPHILS NFR BLD: 86 % (ref 38–73)
NEUTS BAND NFR BLD MANUAL: 2 %
NRBC BLD-RTO: 0 /100 WBC
OVALOCYTES BLD QL SMEAR: ABNORMAL
PHOSPHATE SERPL-MCNC: 1.8 MG/DL (ref 2.7–4.5)
PLATELET # BLD AUTO: 264 K/UL (ref 150–350)
PLATELET BLD QL SMEAR: ABNORMAL
PMV BLD AUTO: 9.3 FL (ref 9.2–12.9)
POIKILOCYTOSIS BLD QL SMEAR: SLIGHT
POLYCHROMASIA BLD QL SMEAR: ABNORMAL
POTASSIUM SERPL-SCNC: 5.2 MMOL/L (ref 3.5–5.1)
RBC # BLD AUTO: 3.96 M/UL (ref 4.6–6.2)
SODIUM SERPL-SCNC: 141 MMOL/L (ref 136–145)
TACROLIMUS BLD-MCNC: 9.6 NG/ML (ref 5–15)
WBC # BLD AUTO: 8.1 K/UL (ref 3.9–12.7)

## 2020-11-30 PROCEDURE — 85007 BL SMEAR W/DIFF WBC COUNT: CPT

## 2020-11-30 PROCEDURE — 36415 COLL VENOUS BLD VENIPUNCTURE: CPT

## 2020-11-30 PROCEDURE — 80197 ASSAY OF TACROLIMUS: CPT

## 2020-11-30 PROCEDURE — 83690 ASSAY OF LIPASE: CPT

## 2020-11-30 PROCEDURE — 82150 ASSAY OF AMYLASE: CPT

## 2020-11-30 PROCEDURE — 83735 ASSAY OF MAGNESIUM: CPT

## 2020-11-30 PROCEDURE — 85027 COMPLETE CBC AUTOMATED: CPT

## 2020-11-30 PROCEDURE — 80069 RENAL FUNCTION PANEL: CPT

## 2020-11-30 RX ORDER — LANOLIN ALCOHOL/MO/W.PET/CERES
800 CREAM (GRAM) TOPICAL 3 TIMES DAILY
Qty: 180 TABLET | Refills: 11 | Status: SHIPPED | OUTPATIENT
Start: 2020-11-30 | End: 2022-06-03 | Stop reason: SDUPTHER

## 2020-11-30 NOTE — TELEPHONE ENCOUNTER
----- Message from Jennifer B. Scheuermann, PA sent at 11/27/2020  4:38 PM CST -----  Regarding: need appt  Pls schedule appt w/ me in HCV clinic - video or in person  Early to mid Dec  thanks  ----- Message -----  From: Fatimah Wilson RN  Sent: 11/25/2020   1:26 PM CST  To: Jennifer B. Scheuermann, PA      ----- Message -----  From: Damien Valladares MD  Sent: 11/25/2020   1:15 PM CST  To: [facility] Post-Kidney Transplant Clinical    Hepatology referral

## 2020-11-30 NOTE — PROGRESS NOTES
Please send the patient for a pancreas US due to increase lipase  Encourage hydration  Increase magnesium oxide to 800 tid, if he is taking mag oxide correctly  Encourage magnesium and phosphorus reach meals   Low k diet  Please ask the patient about GI symptoms mostly diarrhea and let me know

## 2020-11-30 NOTE — TELEPHONE ENCOUNTER
Coordinator spoke with patient and reviewed labs. Patient also informed of the below information. Patient reports no GI symptoms. Patient and caregiver verbalized understanding of the below information and all questions answered.      ----- Message from Damien Valladares MD sent at 11/30/2020  2:32 PM CST -----  Please send the patient for a pancreas US due to increase lipase  Encourage hydration  Increase magnesium oxide to 800 tid, if he is taking mag oxide correctly  Encourage magnesium and phosphorus reach meals   Low k diet  Please ask the patient about GI symptoms mostly diarrhea and let me know

## 2020-12-01 ENCOUNTER — HOSPITAL ENCOUNTER (OUTPATIENT)
Dept: RADIOLOGY | Facility: HOSPITAL | Age: 40
Discharge: HOME | End: 2020-12-01
Attending: INTERNAL MEDICINE
Payer: MEDICARE

## 2020-12-01 ENCOUNTER — TELEPHONE (OUTPATIENT)
Dept: GASTROENTEROLOGY | Facility: CLINIC | Age: 40
End: 2020-12-01

## 2020-12-01 ENCOUNTER — SOCIAL WORK (OUTPATIENT)
Dept: TRANSPLANT | Facility: CLINIC | Age: 40
End: 2020-12-01

## 2020-12-01 DIAGNOSIS — Z94.83 STATUS POST PANCREAS TRANSPLANTATION: ICD-10-CM

## 2020-12-01 DIAGNOSIS — R74.8 ELEVATED LIVER ENZYMES: ICD-10-CM

## 2020-12-01 PROCEDURE — 93975 US PANCREAS TRANSPLANT POST: ICD-10-PCS | Mod: 26,,, | Performed by: RADIOLOGY

## 2020-12-01 PROCEDURE — 76775 US PANCREAS TRANSPLANT POST: ICD-10-PCS | Mod: 26,XS,, | Performed by: RADIOLOGY

## 2020-12-01 PROCEDURE — 93975 VASCULAR STUDY: CPT | Mod: TC

## 2020-12-01 PROCEDURE — 93975 VASCULAR STUDY: CPT | Mod: 26,,, | Performed by: RADIOLOGY

## 2020-12-01 PROCEDURE — 76775 US EXAM ABDO BACK WALL LIM: CPT | Mod: 26,XS,, | Performed by: RADIOLOGY

## 2020-12-01 NOTE — TELEPHONE ENCOUNTER
PATH:    Please let the pt know that pathology from recent procedure shows:    No significant abnormality    Will need repeat EGD in 8-12 weeks from last - pls arrange.  General Gi pt.     Endoscopy and colonoscopy is not a perfect exam of the colon. Rarely a significant polyp or colon cancer can be missed. He/she should not ignore symptoms such as blood with bowel movements or abdominal pain and report these symptoms to the doctor if they occur.     General Gi   Ok to schedule w any MD    CLEARANCE FOR PROCEDURES:  No needed     PROCEDURES  EGD     FLOOR:  4th Floor     PREP  Full liquid diet 3 days

## 2020-12-01 NOTE — PROGRESS NOTES
Epic message left for transplant SW. Transplant SW returned call.    Miky RAMIREZ [facility] Kidney Transplant Social Workers   Caller: [place] (Today, 11:01 AM)             Pt's sister states pt has scheduled appt today, and she will not be able to take pt as she is experiencing vertigo.  Pt's sister would like to know if there financial help for transportation     [place] Call back # [phone]         Outcome:  Pt's sister is actually the person who has vertigo (ear crystals are not in correct place) and she reports usually takes patient for all medical appointments but can not at this time because of vertigo. Patient will go to his pancreas ultrasound appointment today with another family member Tiffany Rasheed [phone].    Fatimah Wilson transplant nurse notified of above via Epic Message.    No other needs stated.    Bev Cameron MSW [facility]

## 2020-12-03 ENCOUNTER — LAB VISIT (OUTPATIENT)
Dept: LAB | Facility: HOSPITAL | Age: 40
End: 2020-12-03
Attending: INTERNAL MEDICINE
Payer: MEDICARE

## 2020-12-03 ENCOUNTER — TELEPHONE (OUTPATIENT)
Dept: TRANSPLANT | Facility: CLINIC | Age: 40
End: 2020-12-03

## 2020-12-03 DIAGNOSIS — B19.21 HEPATITIS C VIRUS INFECTION WITH HEPATIC COMA, UNSPECIFIED CHRONICITY: ICD-10-CM

## 2020-12-03 DIAGNOSIS — Z79.60 LONG-TERM USE OF IMMUNOSUPPRESSANT MEDICATION: ICD-10-CM

## 2020-12-03 DIAGNOSIS — Z94.83 STATUS POST PANCREAS TRANSPLANTATION: ICD-10-CM

## 2020-12-03 DIAGNOSIS — Z57.8 EMPLOYEE EXPOSURE TO BLOOD: ICD-10-CM

## 2020-12-03 DIAGNOSIS — Z94.0 KIDNEY REPLACED BY TRANSPLANT: ICD-10-CM

## 2020-12-03 DIAGNOSIS — T86.10 COMPLICATION OF TRANSPLANTED KIDNEY, UNSPECIFIED COMPLICATION: ICD-10-CM

## 2020-12-03 LAB
ALBUMIN SERPL BCP-MCNC: 3.6 G/DL (ref 3.5–5.2)
ALBUMIN SERPL BCP-MCNC: 3.6 G/DL (ref 3.5–5.2)
ALP SERPL-CCNC: 169 U/L (ref 55–135)
ALT SERPL W/O P-5'-P-CCNC: 58 U/L (ref 10–44)
AMYLASE SERPL-CCNC: 96 U/L (ref 20–110)
ANION GAP SERPL CALC-SCNC: 7 MMOL/L (ref 8–16)
AST SERPL-CCNC: 24 U/L (ref 10–40)
BASOPHILS # BLD AUTO: 0.03 K/UL (ref 0–0.2)
BASOPHILS NFR BLD: 0.5 % (ref 0–1.9)
BILIRUB DIRECT SERPL-MCNC: 0.2 MG/DL (ref 0.1–0.3)
BILIRUB SERPL-MCNC: 0.4 MG/DL (ref 0.1–1)
BUN SERPL-MCNC: 30 MG/DL (ref 6–20)
CALCIUM SERPL-MCNC: 8.9 MG/DL (ref 8.7–10.5)
CHLORIDE SERPL-SCNC: 108 MMOL/L (ref 95–110)
CO2 SERPL-SCNC: 26 MMOL/L (ref 23–29)
CREAT SERPL-MCNC: 2.3 MG/DL (ref 0.5–1.4)
DIFFERENTIAL METHOD: ABNORMAL
EOSINOPHIL # BLD AUTO: 0.2 K/UL (ref 0–0.5)
EOSINOPHIL NFR BLD: 2.6 % (ref 0–8)
ERYTHROCYTE [DISTWIDTH] IN BLOOD BY AUTOMATED COUNT: 18.1 % (ref 11.5–14.5)
EST. GFR  (AFRICAN AMERICAN): 40.1 ML/MIN/1.73 M^2
EST. GFR  (NON AFRICAN AMERICAN): 34.7 ML/MIN/1.73 M^2
GLUCOSE SERPL-MCNC: 86 MG/DL (ref 70–110)
HBV SURFACE AG SERPL QL IA: NEGATIVE
HCT VFR BLD AUTO: 37.3 % (ref 40–54)
HCV AB SERPL QL IA: POSITIVE
HGB BLD-MCNC: 11.2 G/DL (ref 14–18)
HIV 1+2 AB+HIV1 P24 AG SERPL QL IA: NEGATIVE
IMM GRANULOCYTES # BLD AUTO: 0.17 K/UL (ref 0–0.04)
IMM GRANULOCYTES NFR BLD AUTO: 2.6 % (ref 0–0.5)
LIPASE SERPL-CCNC: 26 U/L (ref 4–60)
LYMPHOCYTES # BLD AUTO: 0.4 K/UL (ref 1–4.8)
LYMPHOCYTES NFR BLD: 5.7 % (ref 18–48)
MAGNESIUM SERPL-MCNC: 1.4 MG/DL (ref 1.6–2.6)
MCH RBC QN AUTO: 27.1 PG (ref 27–31)
MCHC RBC AUTO-ENTMCNC: 30 G/DL (ref 32–36)
MCV RBC AUTO: 90 FL (ref 82–98)
MONOCYTES # BLD AUTO: 0.4 K/UL (ref 0.3–1)
MONOCYTES NFR BLD: 5.9 % (ref 4–15)
NEUTROPHILS # BLD AUTO: 5.5 K/UL (ref 1.8–7.7)
NEUTROPHILS NFR BLD: 82.7 % (ref 38–73)
NRBC BLD-RTO: 0 /100 WBC
PHOSPHATE SERPL-MCNC: 2.3 MG/DL (ref 2.7–4.5)
PLATELET # BLD AUTO: 283 K/UL (ref 150–350)
PMV BLD AUTO: 9.7 FL (ref 9.2–12.9)
POTASSIUM SERPL-SCNC: 4.4 MMOL/L (ref 3.5–5.1)
PROT SERPL-MCNC: 6.4 G/DL (ref 6–8.4)
PTH-INTACT SERPL-MCNC: 283 PG/ML (ref 9–77)
RBC # BLD AUTO: 4.13 M/UL (ref 4.6–6.2)
SODIUM SERPL-SCNC: 141 MMOL/L (ref 136–145)
TACROLIMUS BLD-MCNC: 11.7 NG/ML (ref 5–15)
URATE SERPL-MCNC: 7.3 MG/DL (ref 3.4–7)
WBC # BLD AUTO: 6.62 K/UL (ref 3.9–12.7)

## 2020-12-03 PROCEDURE — 83970 ASSAY OF PARATHORMONE: CPT

## 2020-12-03 PROCEDURE — 83690 ASSAY OF LIPASE: CPT

## 2020-12-03 PROCEDURE — 87902 NFCT AGT GNTYP ALYS HEP C: CPT

## 2020-12-03 PROCEDURE — 86803 HEPATITIS C AB TEST: CPT

## 2020-12-03 PROCEDURE — 87340 HEPATITIS B SURFACE AG IA: CPT

## 2020-12-03 PROCEDURE — 86703 HIV-1/HIV-2 1 RESULT ANTBDY: CPT

## 2020-12-03 PROCEDURE — 80069 RENAL FUNCTION PANEL: CPT

## 2020-12-03 PROCEDURE — 87517 HEPATITIS B DNA QUANT: CPT

## 2020-12-03 PROCEDURE — 83735 ASSAY OF MAGNESIUM: CPT

## 2020-12-03 PROCEDURE — 87799 DETECT AGENT NOS DNA QUANT: CPT

## 2020-12-03 PROCEDURE — 80197 ASSAY OF TACROLIMUS: CPT

## 2020-12-03 PROCEDURE — 85025 COMPLETE CBC W/AUTO DIFF WBC: CPT

## 2020-12-03 PROCEDURE — 84075 ASSAY ALKALINE PHOSPHATASE: CPT

## 2020-12-03 PROCEDURE — 84550 ASSAY OF BLOOD/URIC ACID: CPT

## 2020-12-03 PROCEDURE — 82150 ASSAY OF AMYLASE: CPT

## 2020-12-03 SDOH — SOCIAL DETERMINANTS OF HEALTH (SDOH): OCCUPATIONAL EXPOSURE TO OTHER RISK FACTORS: Z57.8

## 2020-12-03 NOTE — TELEPHONE ENCOUNTER
Labs reviewed with patient. No changes at this time. Patient verbalized understanding and all questions answered.     ----- Message from Devora Hernandez MD sent at 12/3/2020 12:22 PM CST -----  The results reviewed, no change required.

## 2020-12-04 ENCOUNTER — OFFICE VISIT (OUTPATIENT)
Dept: TRANSPLANT | Facility: CLINIC | Age: 40
End: 2020-12-04
Payer: MEDICARE

## 2020-12-04 ENCOUNTER — TELEPHONE (OUTPATIENT)
Dept: TRANSPLANT | Facility: CLINIC | Age: 40
End: 2020-12-04

## 2020-12-04 VITALS
WEIGHT: 189.63 LBS | BODY MASS INDEX: 27.15 KG/M2 | HEART RATE: 108 BPM | HEIGHT: 70 IN | DIASTOLIC BLOOD PRESSURE: 57 MMHG | OXYGEN SATURATION: 100 % | RESPIRATION RATE: 18 BRPM | TEMPERATURE: 99 F | SYSTOLIC BLOOD PRESSURE: 124 MMHG

## 2020-12-04 DIAGNOSIS — E87.20 METABOLIC ACIDOSIS: ICD-10-CM

## 2020-12-04 DIAGNOSIS — Z94.83 STATUS POST SIMULTANEOUS KIDNEY AND PANCREAS TRANSPLANT: Chronic | ICD-10-CM

## 2020-12-04 DIAGNOSIS — Z94.0 STATUS POST SIMULTANEOUS KIDNEY AND PANCREAS TRANSPLANT: Chronic | ICD-10-CM

## 2020-12-04 DIAGNOSIS — Z94.0 KIDNEY TRANSPLANTED: Primary | ICD-10-CM

## 2020-12-04 DIAGNOSIS — N25.81 SECONDARY HYPERPARATHYROIDISM: ICD-10-CM

## 2020-12-04 DIAGNOSIS — Z94.83 STATUS POST PANCREAS TRANSPLANTATION: ICD-10-CM

## 2020-12-04 DIAGNOSIS — D84.9 IMMUNOCOMPROMISED STATE: ICD-10-CM

## 2020-12-04 PROCEDURE — 99999 PR PBB SHADOW E&M-EST. PATIENT-LVL IV: CPT | Mod: PBBFAC,,, | Performed by: NURSE PRACTITIONER

## 2020-12-04 PROCEDURE — 99215 OFFICE O/P EST HI 40 MIN: CPT | Mod: S$PBB,,, | Performed by: NURSE PRACTITIONER

## 2020-12-04 PROCEDURE — 99215 PR OFFICE/OUTPT VISIT, EST, LEVL V, 40-54 MIN: ICD-10-PCS | Mod: S$PBB,,, | Performed by: NURSE PRACTITIONER

## 2020-12-04 PROCEDURE — 99999 PR PBB SHADOW E&M-EST. PATIENT-LVL IV: ICD-10-PCS | Mod: PBBFAC,,, | Performed by: NURSE PRACTITIONER

## 2020-12-04 PROCEDURE — 99214 OFFICE O/P EST MOD 30 MIN: CPT | Mod: PBBFAC | Performed by: NURSE PRACTITIONER

## 2020-12-04 RX ORDER — TACROLIMUS 1 MG/1
10 CAPSULE ORAL EVERY 12 HOURS
Qty: 600 CAPSULE | Refills: 11 | Status: SHIPPED | OUTPATIENT
Start: 2020-12-04 | End: 2020-12-09

## 2020-12-04 RX ORDER — ERGOCALCIFEROL 1.25 MG/1
50000 CAPSULE ORAL
Qty: 4 CAPSULE | Refills: 5 | Status: ON HOLD | OUTPATIENT
Start: 2020-12-04 | End: 2021-11-15 | Stop reason: SDUPTHER

## 2020-12-04 RX ORDER — MYCOPHENOLIC ACID 180 MG/1
720 TABLET, DELAYED RELEASE ORAL 2 TIMES DAILY
Qty: 240 TABLET | Refills: 11 | Status: SHIPPED | OUTPATIENT
Start: 2020-12-04 | End: 2020-12-11

## 2020-12-04 RX ORDER — VALGANCICLOVIR 450 MG/1
450 TABLET, FILM COATED ORAL DAILY
Qty: 30 TABLET | Refills: 2 | Status: SHIPPED | OUTPATIENT
Start: 2020-12-04 | End: 2021-05-10

## 2020-12-04 NOTE — Clinical Note
Mr. Rasheed was seen in clinic today. K/P for 11/3, staples removed 11/24. Today with incisional skin separation. Was seen by surgeon and and instructed to W-D dressing changes. No need for ABX. This patient is blind and during the day is by himself. Would we be able to set up home health to see this patient 2x a week? He will be seeing Dr. Spencer on 12/17. If he doesn't have a surgery appointment please arrange.  Thanks, Radha

## 2020-12-04 NOTE — LETTER
December 4, 2020        ARUNA Lockett From .  [address] [facility]  [place] [address]  Phone: [phone]  Fax: [phone]             [facility]- Transplant 1st Fl  [address]  [facility] [address]  Phone: [phone]   Patient: Hernandez Rosales   MR Number: [id number]   YOB: 1980   Date of Visit: 12/4/2020       Dear Dr. ARUNA Lockett From .    Thank you for referring Hernandez Rosales to me for evaluation. Attached you will find relevant portions of my assessment and plan of care.    If you have questions, please do not hesitate to call me. I look forward to following Hernandez Rosales along with you.    Sincerely,    Radha Mancia NP    Enclosure    If you would like to receive this communication electronically, please contact [email or web address] or [phone] to request [email or web address] Link access.    [email or web address] Link is a tool which provides read-only access to select patient information with whom you have a relationship. Its easy to use and provides real time access to review your patients record including encounter summaries, notes, results, and demographic information.    If you feel you have received this communication in error or would no longer like to receive these types of communications, please e-mail [email or web address]

## 2020-12-04 NOTE — PROGRESS NOTES
Kidney Post-Transplant Assessment    Referring Physician: ARUNA Krishnan Jr.  Current Nephrologist: ARUNA Krishnan Jr.    ORGAN: PANCREAS  Donor Type: donation after brain death  PHS Increased Risk: yes  Cold Ischemia: 432 mins  Induction Medications:     Subjective:     CC:  Reassessment of renal allograft function and management of chronic immunosuppression.    HPI:  Mr. Rosales is a 40 y.o. 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 His most recent creatinine is 2.3. He takes prednisone, tacrolimus and MYF,Keto for maintenance immunosuppression.    Post Transplant Course:   - Admitted on POD#7 due to nausea/vomiting and fever of 101.  Infectious work up unremarkable.EGD performed 11/13 with gastric ulcer. Patient is on daily Protonix. EGD needs to be repeated in 8 weeks.  - Pancreatic enzymes also elevated this admission.  Class I DSA A3 detected (2556) 11/16/20. Kidney biopsy obtained 11/17/20. Results with 20 glomeruli , no rejection; diffuse acute tubular injury ATI 50%, c4d negative, IgA staining likely donor derived.   - Patient received HCV positive organs.  He is now HCV antibody positive with elevated LFTs - f/u with hepatology for hep c treatment.           Hospitalization/ ED visits  None    Interval HX:  Reports doing well but with incisional openings. Patient's staples were removed on 11/24. Denies Fever. Log book present but not updates. Patient is blind and mostly been on his own during the daytime. Family at bedside reports that they just received a talking scale and pending talking blood pressure cuff.     Dr. Abreu did see patient during this clinic (as per my request) stating wound is skin superficial skin sepration and to do W-D dressing      Intake 4 bottles a day  UOP adequate  BP  BP Readings from Last 3 Encounters:   12/04/20 (!) 124/57   11/25/20 117/60   11/24/20 (!) 112/57       Peripheral edema  Weight  stable  Appetite good  Wound--open  fx assessment: walks with walking stick  Lab /diagnostic results reviewed with patient today.   All questions answered          Current Outpatient Medications:     aspirin (ECOTRIN) 81 MG EC tablet, Take 1 tablet (81 mg total) by mouth once daily., Disp: 30 tablet, Rfl: 11    calcitRIOL (ROCALTROL) 0.25 MCG Cap, Take 1 capsule (0.25 mcg total) by mouth once daily., Disp: 30 capsule, Rfl: 11    ergocalciferol (ERGOCALCIFEROL) 50,000 unit Cap, Take 1 capsule (50,000 Units total) by mouth every 7 days., Disp: 4 capsule, Rfl: 5    ferrous sulfate 325 (65 FE) MG EC tablet, Take 325 mg by mouth once daily., Disp: , Rfl:     fludrocortisone (FLORINEF) 0.1 mg Tab, Take 1 tablet (100 mcg total) by mouth once daily., Disp: 30 tablet, Rfl: 0    folic acid (FOLVITE) 1 MG tablet, Take 1 tablet (1 mg total) by mouth once daily., Disp: 30 tablet, Rfl: 5    gabapentin (NEURONTIN) 300 MG capsule, Take 1 capsule (300 mg total) by mouth 2 (two) times daily., Disp: 60 capsule, Rfl: 0    ketoconazole (NIZORAL) 200 mg Tab, Take 0.5 tablets (100 mg total) by mouth once daily., Disp: 15 tablet, Rfl: 11    magnesium oxide (MAG-OX) 400 mg (241.3 mg magnesium) tablet, Take 2 tablets (800 mg total) by mouth 3 (three) times daily., Disp: 180 tablet, Rfl: 11    metoclopramide HCl (REGLAN) 10 MG tablet, Take 1 tablet (10 mg total) by mouth 3 (three) times daily before meals., Disp: 90 tablet, Rfl: 11    mycophenolate (MYFORTIC) 180 MG TbEC, Take 4 tablets (720 mg total) by mouth 2 (two) times daily., Disp: 240 tablet, Rfl: 11    nystatin (MYCOSTATIN) 100,000 unit/mL suspension, Take 5 mLs (500,000 Units total) by mouth 3 (three) times daily after meals. STOP 12/7/20, Disp: 480 mL, Rfl: 0    oxyCODONE-acetaminophen (PERCOCET)  mg per tablet, Take 1 tablet by mouth every 4 (four) hours as needed for Pain., Disp: 40 tablet, Rfl: 0    pantoprazole (PROTONIX) 40 MG tablet, Take 1 tablet (40 mg  total) by mouth once daily., Disp: 30 tablet, Rfl: 2    pravastatin (PRAVACHOL) 40 MG tablet, Take 1 tablet (40 mg total) by mouth once daily., Disp: 90 tablet, Rfl: 2    predniSONE (DELTASONE) 5 MG tablet, Take by mouth daily: 20mg 11/7-12/6, 15mg 12/7-1/6/21, 10mg 1/7-2/6, then 5mg daily beginning 2/7/21, Disp: 120 tablet, Rfl: 11    sodium bicarbonate 650 MG tablet, Take 1 tablet (650 mg total) by mouth 2 (two) times daily., Disp: 60 tablet, Rfl: 11    sulfamethoxazole-trimethoprim 400-80mg (BACTRIM,SEPTRA) 400-80 mg per tablet, Take 1 tablet by mouth every morning. STOP 5/3/21, Disp: 30 tablet, Rfl: 5    tacrolimus (PROGRAF) 1 MG Cap, Take 10 capsules (10 mg total) by mouth every 12 (twelve) hours., Disp: 600 capsule, Rfl: 11    valGANciclovir (VALCYTE) 450 mg Tab, Take 1 tablet (450 mg total) by mouth once daily. STOP 2/1/21, Disp: 30 tablet, Rfl: 2    docusate sodium (COLACE) 100 MG capsule, Take 1 capsule (100 mg total) by mouth 3 (three) times daily as needed for Constipation. (Patient not taking: Reported on 11/24/2020), Disp: , Rfl: 0    Past Medical History:   Diagnosis Date    Anxiety     Cataract     Diabetes mellitus     Diabetic retinopathy     Disorder of kidney and ureter     Encounter for blood transfusion     Glaucoma     High cholesterol     Hypertension     Retinal detachment        Review of Systems   Constitutional: Negative for appetite change, chills, fatigue and fever.   HENT: Negative for trouble swallowing.    Respiratory: Negative for cough, chest tightness, shortness of breath and wheezing.    Cardiovascular: Negative for chest pain, palpitations and leg swelling.   Gastrointestinal: Negative for abdominal pain, constipation, diarrhea and nausea.   Genitourinary: Negative for difficulty urinating, frequency and urgency.   Musculoskeletal: Negative for arthralgias and myalgias.   Skin: Positive for wound. Negative for rash.   Allergic/Immunologic: Positive for  "immunocompromised state.   Neurological: Negative for dizziness, weakness, light-headedness and headaches.   Psychiatric/Behavioral: Negative for sleep disturbance.       Objective:   Blood pressure (!) 124/57, pulse 108, temperature 99.1 °F (37.3 °C), temperature source Oral, resp. rate 18, height 5' 10" (1.778 m), weight 86 kg (189 lb 9.5 oz), SpO2 100 %.body mass index is 27.2 kg/m².    Physical Exam  Constitutional:       General: He is not in acute distress.     Appearance: He is well-developed. He is not diaphoretic.   Cardiovascular:      Rate and Rhythm: Normal rate and regular rhythm.      Heart sounds: Normal heart sounds. No murmur. No friction rub. No gallop.    Pulmonary:      Effort: Pulmonary effort is normal. No respiratory distress.      Breath sounds: Normal breath sounds. No wheezing or rales.   Abdominal:      General: Bowel sounds are normal. There is no distension.      Palpations: Abdomen is soft.      Tenderness: There is no abdominal tenderness.   Musculoskeletal: Normal range of motion.         General: No tenderness.   Skin:     General: Skin is warm and dry.      Findings: No rash.      Nails: There is no clubbing.            Neurological:      Mental Status: He is alert and oriented to person, place, and time.   Psychiatric:         Behavior: Behavior normal.         Above photo(s) was/were taken with verbal permission of patient and/or their representative.  The purpose of photo(s) is to aid in medical treatment plan.      Labs:  Lab Results   Component Value Date    WBC 6.62 12/03/2020    HGB 11.2 (L) 12/03/2020    HCT 37.3 (L) 12/03/2020     12/03/2020    K 4.4 12/03/2020     12/03/2020    CO2 26 12/03/2020    BUN 30 (H) 12/03/2020    CREATININE 2.3 (H) 12/03/2020    EGFRNONAA 34.7 (A) 12/03/2020    CALCIUM 8.9 12/03/2020    PHOS 2.3 (L) 12/03/2020    MG 1.4 (L) 12/03/2020    ALBUMIN 3.6 12/03/2020    ALBUMIN 3.6 12/03/2020    AST 24 12/03/2020    ALT 58 (H) 12/03/2020    " UTPCR 0.14 12/03/2020    .0 (H) 12/03/2020    TACROLIMUS 11.7 12/03/2020       No results found for: EXTANC, EXTWBC, EXTSEGS, EXTPLATELETS, EXTHEMOGLOBI, EXTHEMATOCRI, EXTCREATININ, EXTSODIUM, EXTPOTASSIUM, EXTBUN, EXTCO2, EXTCALCIUM, EXTPHOSPHORU, EXTGLUCOSE, EXTALBUMIN, EXTAST, EXTALT, EXTBILITOTAL, EXTLIPASE, EXTAMYLASE    No results found for: EXTCYCLOSLVL, EXTSIROLIMUS, EXTTACROLVL, EXTPROTCRE, EXTPTHINTACT, EXTPROTEINUA, EXTWBCUA, EXTRBCUA    Labs were reviewed with the patient    Assessment:     1. Kidney transplanted    2. Status post pancreas transplantation    3. Immunocompromised state    4. Metabolic acidosis    5. Secondary hyperparathyroidism    6. 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   Scheduled with Hepatology for Hep C management on 12/11/20  Increased Valcyte 450mg daily  Instructed patient and family on doing W-D dressing daily.       1. CKD stage: will continue follow up as per our center guidelines. patient to continue close follow up with the local General nephrologist. Education provided in appropriate fluid intake, potassium intake. Continue with oral hydration.      2. Immunosuppression: Prograf trough 11.7, which is therapeutic target 12-15. Continue Prograf 10/10,  Mg BID, and Prednisone taper, Keto.   Lab Results   Component Value Date    TACROLIMUS 11.7 12/03/2020    TACROLIMUS 9.6 11/30/2020    TACROLIMUS 8.8 11/27/2020   Will closely monitor for toxicities, education provided about adherence to medicines and need to communicate any side effect to the transplant nurse or physician.      3. Allograft Function:stable at baseline for the patient. Continue follow up as per our guidelines and with the local General nephrologist. Communication will be sent today.    12/3/2020  POD 30   BUN 6 - 20 mg/dL 30 (A)   Creatinine 0.5 - 1.4 mg/dL 2.3 (A)   eGFR if African  American >60 mL/min/1.73 m^2 40.1 (A)   Glucose 70 - 110 mg/dL 86     Lab Results   Component Value Date    HGBA1C 8.2 (H) 11/03/2020     On ASA      4. Hypertension management:  Continue with home blood pressure monitoring, low salt and healthy life discussed with the patient.  BP Readings from Last 3 Encounters:   12/04/20 (!) 124/57   11/25/20 117/60   11/24/20 (!) 112/57         5. Metabolic Bone Disease/Secondary Hyperparathyroidism:calcium and phosphorus level discussed with the patient, patient will continue follow up with the general nephrologist for management of metabolic bone disease  calcium and phosphorus as per our center protocol. Will monitor PTH, Vit D level, calcium.     11/3/2020  POD 0   Vit D, 25-Hydroxy 30 - 96 ng/mL 19 (A)         Lab Results   Component Value Date    .0 (H) 12/03/2020    CALCIUM 8.9 12/03/2020    CAION 1.14 11/05/2020    PHOS 2.3 (L) 12/03/2020    PHOS 1.8 (L) 11/30/2020    PHOS 2.3 (L) 11/27/2020   Ergo, calcitriol    6. Electrolytes: reviewed with the patient, essentially within the normal range no need for acute changes today, will monitor as per our center guidelines.    12/3/2020  POD 30   Magnesium 1.6 - 2.6 mg/dL 1.4 (A)     Lab Results   Component Value Date     12/03/2020    K 4.4 12/03/2020     12/03/2020    CO2 26 12/03/2020    CO2 26 11/30/2020    CO2 24 11/27/2020   Nabicarb and MagOX      7. Anemia: will continue monitoring as per our center guidelines. No indication for acute intervention today.  Lab Results   Component Value Date    WBC 6.62 12/03/2020    HGB 11.2 (L) 12/03/2020    HCT 37.3 (L) 12/03/2020    MCV 90 12/03/2020     12/03/2020   takes ferrous sulfate    8.Proteinuria: will continue with pr/cr ratio as per our center guidelines  Lab Results   Component Value Date    PROTEINURINE 18 (H) 12/03/2020    CREATRANDUR 130.0 12/03/2020    UTPCR 0.14 12/03/2020        9. BK virus infection screening: will continue with urine or  blood PCR as per our guidelines to prevent BK virus viremia and allograft dysfunction  No results found for: BKVIRUSDNAUR, BKQUANTURINE, BKVIRUSLOG, BKVIRUSURINE, BKVIRUSPCRQB      10. Weight education: provided during the clinic visit.   Body mass index is 27.2 kg/m².       11.Patient safety education regarding immunosuppression including prophylaxis posttransplant for CMV, PCP : Education provided about vaccination and prevention of infections.    12.  Cytopenias: no significant cytopenias will monitor as per our guidelines. Medicine list reviewed including potential causes of drug-induced cytopenias  Lab Results   Component Value Date    WBC 6.62 12/03/2020    HGB 11.2 (L) 12/03/2020    HCT 37.3 (L) 12/03/2020    MCV 90 12/03/2020     12/03/2020       13. Post-transplant Prophylaxis; CMV Infection, PJP and Candida mucosistis and other indicated for this particular patient.   Valcyte and Bactrim      Follow-up:   Clinic: return to transplant clinic weekly for the first month after transplant; every 2 weeks during months 2-3; then at 6-, 9-, 12-, 18-, 24-, and 36- months post-transplant to reassess for complications from immunosuppression toxicity and monitor for rejection.  Annually thereafter.    Labs: since patient remains at high risk for rejection and drug-related complications that warrant close monitoring, labs will be ordered as follows: continue twice weekly CBC, renal panel, and drug level for first month; then same labs once weekly through 3rd month post-transplant.  Urine for UA and protein/creatinine ratio monthly.  Serum BK - PCR at 1-, 3-, 6-, 9-, 12-, 18-, 24-, 36-, 48-, and 60 months post-transplant.  Hepatic panel at 1-, 2-, 3-, 6-, 9-, 12-, 18-, 24-, and 36- months post-transplant.    Education:   Material provided to the patient.  Patient reminded to call with any health changes since these can be early signs of significant complications.  Also, I advised the patient to be sure any new  medications or changes of old medications are discussed with either a pharmacist or physician knowledgeable with transplant to avoid rejection/drug toxicity related to significant drug interactions.    Exercise: reminded Hernandez of the importance of regular exercise for weight management, blood sugar and blood pressure management.  I also explained exercise has been shown to improve cardiovascular health, energy level, and sleep hygiene.  Lastly, I advised him that cardiovascular complications are leading cause of death for renal transplant recipients, and regular exercise can help lower this risk.    I spoke with the patient for 30 minutes. More than half dedicated to counseling and education. All questions answered    Radha Mancia, NP-C  Transplant Nephrology

## 2020-12-04 NOTE — TELEPHONE ENCOUNTER
TERESITA received a message that pt will need home health set up for wound care. TERESITA followed up with FLYNN Garcia and post coordinator: KELSEA Kelly to discuss options. Radha reports that pt can return home to [place] and that home health can be set up on Monday. TERESITA will follow up with pt on Monday regarding home health preference. TERESITA is aware that there is an [facility] home health agency in the area ([facility]; [phone], ; [phone], fax). TERESITA remains available to pt, pt's family, and transplant team at [phone].         ----- Message -----  From: Radha Mancia NP  Sent: 12/4/2020   3:42 PM CST  To: Morales Schofield RN, Leticia Grant RN, *    Mr. Rasheed was seen in clinic today. K/P for 11/3, staples removed 11/24. Today with incisional skin separation. Was seen by surgeon and and instructed to W-D dressing changes. No need for ABX. This patient is blind and during the day is by himself. Would we be able to set up home health to see this patient 2x a week? He will be seeing Dr. Spencer on 12/17. If he doesn't have a surgery appointment please arrange.   Thanks, Radha

## 2020-12-07 ENCOUNTER — TELEPHONE (OUTPATIENT)
Dept: TRANSPLANT | Facility: CLINIC | Age: 40
End: 2020-12-07

## 2020-12-07 ENCOUNTER — PATIENT MESSAGE (OUTPATIENT)
Dept: HEPATOLOGY | Facility: CLINIC | Age: 40
End: 2020-12-07

## 2020-12-07 ENCOUNTER — PATIENT MESSAGE (OUTPATIENT)
Dept: TRANSPLANT | Facility: CLINIC | Age: 40
End: 2020-12-07

## 2020-12-07 LAB
BKV DNA SERPL NAA+PROBE-ACNC: <125 COPIES/ML
BKV DNA SERPL NAA+PROBE-LOG#: <2.1 LOG (10) COPIES/ML
BKV DNA SERPL QL NAA+PROBE: NOT DETECTED

## 2020-12-07 NOTE — TELEPHONE ENCOUNTER
Home Health    TERESITA faxed home health information to [facility] ([phone], ph; [phone], fax). SW confirmed receipt and they report they can either see pt today or tomorrow. TERESITA explained that because of the wound care, the team is requesting that, if possible, pt can be seen today. Alisa reports that she will relay this information to the nurse. TERESITA informed pt's coordinator via phone and Epic message.    Information will be listed on pt's Snapshot as well. TERESITA remains available to pt, pt's family, and transplant team at [phone].

## 2020-12-09 DIAGNOSIS — E87.5 HYPERKALEMIA: Primary | ICD-10-CM

## 2020-12-09 DIAGNOSIS — Z94.0 KIDNEY TRANSPLANTED: ICD-10-CM

## 2020-12-09 LAB
HBV DNA SERPL NAA+PROBE-ACNC: <10 IU/ML
HBV DNA SERPL NAA+PROBE-LOG IU: <1 LOG (10) IU/ML
HBV DNA SERPL QL NAA+PROBE: NOT DETECTED

## 2020-12-09 NOTE — TELEPHONE ENCOUNTER
Coordinator spoke with patients Aunt Malena Rosales and reviewed labs. Potassium elevated (5.9). Patient reports no signs/symptoms at this time. Tacrolimus elevated 16.4. informed of the below information all questions answered. Coordinator spoke with Trang @ [facility]. The will draw labs on Thurs.and Sat. STAT and have labs resultes at [facility]. Patient informed.        ----- Message from Damien Valladares MD sent at 12/9/2020  1:24 PM CST -----  Please start kayexalate 30 gm every 6 hrs x 2 doses today  Repeat K in am  Hydration  Ask the patient how he feels and let me know. If he feels weak send him to a local ER to treat the elevated potassium   Hold prograf for 24 hrs  Repeat level Friday in a lab we can get results the same day

## 2020-12-10 LAB
HCV GENTYP SERPL NAA+PROBE: ABNORMAL
HCV RNA SERPL NAA+PROBE-LOG IU: 7.57 LOG (10) IU/ML
HCV RNA SERPL QL NAA+PROBE: DETECTED
HCV RNA SPEC NAA+PROBE-ACNC: ABNORMAL IU/ML

## 2020-12-10 NOTE — TELEPHONE ENCOUNTER
Coordinator returned patient's Aunt phone call. Aunt reports patient's B/Ps have been elevated since yesterday. B/P readings: Wed /95 /97 Thurs /108 Home health manual reading was 142/82.     Aunt believes B/P cuff is not the correct size because she has to hold it from over inflating at time. Coordinator advised Aunt to purchase a machine with a larger cuff. Aunt states she will have a family member to get one from a local [employer] or [employer]. Instructed Aunt to continue monitoring & recording patient's B/Ps and report elevated B/P readings.  Aunt verbalized understanding       ----- Message from Trinidad Black sent at 12/10/2020  9:42 AM CST -----  Regarding: Conversation  Aunt calling because the pt's blood pressure has elevated.   attempted contact with [facility], but no one picked up the call, so she is requesting a returned call to [phone].    Thank you.

## 2020-12-11 ENCOUNTER — DOCUMENTATION ONLY (OUTPATIENT)
Dept: TRANSPLANT | Facility: CLINIC | Age: 40
End: 2020-12-11

## 2020-12-11 ENCOUNTER — PATIENT MESSAGE (OUTPATIENT)
Dept: HEPATOLOGY | Facility: CLINIC | Age: 40
End: 2020-12-11

## 2020-12-11 ENCOUNTER — OFFICE VISIT (OUTPATIENT)
Dept: HEPATOLOGY | Facility: CLINIC | Age: 40
End: 2020-12-11
Payer: MEDICARE

## 2020-12-11 DIAGNOSIS — Z94.83 STATUS POST SIMULTANEOUS KIDNEY AND PANCREAS TRANSPLANT: ICD-10-CM

## 2020-12-11 DIAGNOSIS — B19.20 HEPATITIS C VIRUS INFECTION WITHOUT HEPATIC COMA, UNSPECIFIED CHRONICITY: Primary | ICD-10-CM

## 2020-12-11 DIAGNOSIS — Z94.0 STATUS POST SIMULTANEOUS KIDNEY AND PANCREAS TRANSPLANT: ICD-10-CM

## 2020-12-11 DIAGNOSIS — Z94.83 STATUS POST PANCREAS TRANSPLANTATION: Primary | ICD-10-CM

## 2020-12-11 LAB
EXT ANC: NORMAL
EXT POTASSIUM: 3.9 MMOL/L (ref 3.6–5.1)

## 2020-12-11 PROCEDURE — 99213 PR OFFICE/OUTPT VISIT, EST, LEVL III, 20-29 MIN: ICD-10-PCS | Mod: 95,,, | Performed by: PHYSICIAN ASSISTANT

## 2020-12-11 PROCEDURE — 99213 OFFICE O/P EST LOW 20 MIN: CPT | Mod: 95,,, | Performed by: PHYSICIAN ASSISTANT

## 2020-12-11 RX ORDER — MYCOPHENOLIC ACID 180 MG/1
720 TABLET, DELAYED RELEASE ORAL 2 TIMES DAILY
Qty: 240 TABLET | Refills: 11 | Status: CANCELLED | OUTPATIENT
Start: 2020-12-11 | End: 2021-12-11

## 2020-12-11 RX ORDER — GLECAPREVIR AND PIBRENTASVIR 40; 100 MG/1; MG/1
3 TABLET, FILM COATED ORAL DAILY
Qty: 84 TABLET | Refills: 2 | Status: SHIPPED | OUTPATIENT
Start: 2020-12-11 | End: 2021-07-20

## 2020-12-11 RX ORDER — MYCOPHENOLATE MOFETIL 250 MG/1
1000 CAPSULE ORAL 2 TIMES DAILY
Qty: 240 CAPSULE | Refills: 11
Start: 2020-12-11 | End: 2020-12-18

## 2020-12-11 RX ORDER — PRAVASTATIN SODIUM 20 MG/1
20 TABLET ORAL DAILY
Qty: 30 TABLET | Refills: 2 | Status: SHIPPED | OUTPATIENT
Start: 2020-12-11 | End: 2021-01-07 | Stop reason: SDUPTHER

## 2020-12-11 NOTE — LETTER
December 11, 2020      Zulay Cordon, FLYNN  [address]  [facility]  1st Floor Clinic  [facility] [address]           [facility]  [address]  [facility] [address]  Phone: [phone]  Fax: [phone]          Patient: Hernandez Rosales   MR Number: [id number]   YOB: 1980   Date of Visit: 12/11/2020       Dear Zulay Cordon:    Thank you for referring Hernandez Rosales to me for evaluation. Attached you will find relevant portions of my assessment and plan of care.    If you have questions, please do not hesitate to call me. I look forward to following Hernandez Rosales along with you.    Sincerely,    Jennifer B. Scheuermann, PA    Enclosure  CC:  No Recipients    If you would like to receive this communication electronically, please contact [email or web address] or [phone] to request more information on [employer] access.    For providers and/or their staff who would like to refer a patient to [facility], please contact us through our one-stop-shop provider referral line, [facility] [place], at [phone].    If you feel you have received this communication in error or would no longer like to receive these types of communications, please e-mail [email or web address]

## 2020-12-11 NOTE — TELEPHONE ENCOUNTER
Coordinator spoke with the patient's aunt and he will take Cellept 250mg (1000 mg) 4 tablets twice daily until the Myfortic arrives (Approximately 5 days). Once she receives the Myfotic she will call so we can update the medication list in the sytem. Patient's caregiver verbalized understanding and all questions answered.         Blaine Quezada MD   that sounds reasonable. thanks    Fatimah Wilson RN   Dr Valladares, I received a call from the patient's caregiver (his elderly aunt) that he took his last dose of Myfortic this morning. She called for a refill to be shipped but it will not arrive until Thursday.  He lives in the [facility]  and none of the local pharmacies has Myfortic on hand. I spoke with Karin Rubio and she states that patient has an old rx for Cellcept 250mg. I confirmed with the aunt and she still has the Cellcept 250mg. Please let me know if he can take the Cellcept 250mg 4 tablet BID until his prescription if Myfortic arrives. Please advise. Thanks!        ----- Message from Karin Rubio PharmD sent at 12/11/2020 11:24 AM CST -----  Regarding: myfortic ER supply to local pharmacy please  Tj Sheridan!  Pt has run out of Myfortic a week early.  I do not suspect it was a pharmacy miscount because of how the medication is supplied/packaged.   (all of his meds were last filled on 11/19)  He will need an short supply called in to his local  pharmacy.  We're shipping all of his prescriptions on Monday, so I would recommend he get a 5 day supply to hold him until I can get his package to him next week.    Thanks    Karin Rubio, Pharm.D., B.C.P.S.  Clinical Pharmacist for Transplant Services  [facility] at [facility].  Phone [phone] (or [phone])  Fax [phone]

## 2020-12-11 NOTE — PROGRESS NOTES
HEPATOLOGY VIDEO VISIT NOTE - HCV clinic  The patient location is: [place], [place]  Visit type: audiovisual    Face to Face time with patient: 17 minutes  26 minutes of total time spent on the encounter, which includes face to face time and non-face to face time preparing to see the patient (eg, review of tests), Obtaining and/or reviewing separately obtained history, Documenting clinical information in the electronic or other health record, Independently interpreting results (not separately reported) and communicating results to the patient/family/caregiver, or Care coordination (not separately reported).     Each patient to whom he or she provides medical services by telemedicine is:  (1) informed of the relationship between the physician and patient and the respective role of any other health care provider with respect to management of the patient; and (2) notified that he or she may decline to receive medical services by telemedicine and may withdraw from such care at any time.    REFERRING PROVIDER: Damien Valladares MD     CHIEF COMPLAINT: Hepatitis C      HISTORY: This is a 40 y.o.  Black or  male who underwent KIDNEY / PANCREAS transplant 11/3/20    Donor was HCV YIN (+); pt became viremic at 1 week post transplant, now here to initiate antiviral therapy.  - Treatment naive  - Genotype 1A  Donor and Recipient HBV studies negative    Doing well  Appetite good  Denies jaundice, dark urine, abdominal distention, hematemesis, melena, slowed mentation.                     Past Medical History:   Diagnosis Date    Anxiety     Cataract     Diabetes mellitus     Diabetic retinopathy     Disorder of kidney and ureter     Encounter for blood transfusion     Glaucoma     High cholesterol     Hypertension     Retinal detachment      Patient Active Problem List   Diagnosis    Blindness of both eyes due to diabetes mellitus    Hyperkalemia    Hypertension    Status post below-knee amputation     Prophylactic immunotherapy    Hypoglycemia    Adrenal cortical steroids causing adverse effect in therapeutic use    Kidney transplanted    Status post pancreas transplantation    Immunocompromised state    Long-term use of immunosuppressant medication    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    Secondary hyperparathyroidism    Renovascular hypertension    At risk for opportunistic infections    Type 1 diabetes mellitus with severe nonproliferative retinopathy    Gastroparesis due to DM    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    Anemia of chronic disease    Hypomagnesemia    Metabolic acidosis    Hypophosphatemia     Past Surgical History:   Procedure Laterality Date    ESOPHAGOGASTRODUODENOSCOPY N/A 11/13/2020    Procedure: EGD (ESOPHAGOGASTRODUODENOSCOPY);  Surgeon: Candis Clement MD;  Location: [address]);  Service: Endoscopy;  Laterality: N/A;    EYE SURGERY      KIDNEY TRANSPLANT N/A 11/3/2020    Procedure: TRANSPLANT, KIDNEY;  Surgeon: Adin Romero Jr., MD;  Location: [address];  Service: Transplant;  Laterality: N/A;    LEG AMPUTATION Left     RETINAL DETACHMENT SURGERY      TOE AMPUTATION Right     TRANSPLANTATION OF PANCREAS N/A 11/3/2020    Procedure: TRANSPLANT, PANCREAS;  Surgeon: Adin Romero Jr., MD;  Location: [address];  Service: Transplant;  Laterality: N/A;       SOCIAL HISTORY:   Social History     Tobacco Use   Smoking Status Former Smoker    Packs/day: 0.25    Years: 10.00    Pack years: 2.50   Smokeless Tobacco Never Used     Alcohol - none  Drugs - none      ROS:   Review of Systems   Constitutional: Negative for chills and fever.   Respiratory: Negative for cough and shortness of breath.    Cardiovascular: Negative for chest pain.   Gastrointestinal: Negative for abdominal pain.   Skin: Negative for rash.     A review of 10+ systems was conducted with pertinent positive and negative findings  documented in HPI with all other systems reviewed and negative.      PHYSICAL EXAM:  Friendly Black or  male, in no acute distress; alert and oriented to person, place and time  LUNGS: Normal respiratory effort.  SKIN: No jaundice, No obvious rashes.   NEURO/PSYCH: Memory intact. Thought and speech pattern appropriate. Behavior normal. No depression or anxiety noted.    RECENT LABS:  Lab Results   Component Value Date    WBC 6.62 12/03/2020    HGB 11.2 (L) 12/03/2020     12/03/2020     Lab Results   Component Value Date    INR 1.3 (H) 11/05/2020     Lab Results   Component Value Date    AST 24 12/03/2020    ALT 58 (H) 12/03/2020    BILITOT 0.4 12/03/2020    ALBUMIN 3.6 12/03/2020    ALBUMIN 3.6 12/03/2020    ALKPHOS 169 (H) 12/03/2020    CREATININE 2.3 (H) 12/03/2020    BUN 30 (H) 12/03/2020     12/03/2020    K 4.4 12/03/2020       ASSESSMENT  40 y.o. White male with:  1. HEPATITIS C, GENOTYPE 1a - treatment naive  -- Elevated transaminases    2. HISTORY OF KIDNEY / PANCREAS TRANSPLANT - 11/3/20  -- HCV YIN POS donor  -- HBV neg donor / recipient      EDUCATION:  Transmission of Hepatitis C was reviewed, including possible sexual transmission. Patient should avoid sharing personal products such as razors, toothbrushes, etc.     HCV RX  Discussed goal of HCV eradication to prevent progression of liver disease.  Discussed use of Mavyret (3 pills daily with food) x 12 weeks with potential side effects of fatigue, headache.    Discussed potential for drug interactions with Mavyret.  Current med list reviewed - PRAVASTATIN WILL BE REDUCED FROM 40MG TO 20MG WHILE ON MAVYRET  Pt to contact me if new medicines are prescribed.  Herbal / alternative therapies must be avoided.    Discussed importance of medication adherence and risk of treatment failure / viral resistance if not adherent. Pt has verbalized understanding.      Med list includes Protonix 40mg, Sodium bicarb BID, Mg oxide TID - all of  which will make epclusa dosing very difficult      PLAN:  1. Obtain authorization to treat HCV with Mavyret x 12 weeks  -- Rx will be routed to [facility]  -- Patient will notify me of exact treatment start date so appropriate lab f/u can be scheduled.  2. Reduce pravastatin to 20mg while on Mavyret

## 2020-12-12 ENCOUNTER — SPECIALTY PHARMACY (OUTPATIENT)
Dept: PHARMACY | Facility: CLINIC | Age: 40
End: 2020-12-12

## 2020-12-12 NOTE — TELEPHONE ENCOUNTER
Mavyret x 12 weeks requires PA. PA submitted via [facility] on 12/12. PA Key: [id number] - PA Case ID: [id number].

## 2020-12-13 ENCOUNTER — PATIENT MESSAGE (OUTPATIENT)
Dept: TRANSPLANT | Facility: CLINIC | Age: 40
End: 2020-12-13

## 2020-12-14 ENCOUNTER — DOCUMENT SCAN (OUTPATIENT)
Dept: HOME HEALTH SERVICES | Facility: HOSPITAL | Age: 40
End: 2020-12-14
Payer: MEDICARE

## 2020-12-14 ENCOUNTER — DOCUMENTATION ONLY (OUTPATIENT)
Dept: TRANSPLANT | Facility: CLINIC | Age: 40
End: 2020-12-14

## 2020-12-14 LAB
EXT ANC: NORMAL
EXT TACROLIMUS LVL: 6.8

## 2020-12-14 RX ORDER — MYCOPHENOLIC ACID 180 MG/1
720 TABLET, DELAYED RELEASE ORAL 2 TIMES DAILY
Qty: 240 TABLET | Refills: 11 | Status: SHIPPED | OUTPATIENT
Start: 2020-12-14 | End: 2021-06-18

## 2020-12-15 NOTE — TELEPHONE ENCOUNTER
Mavyret x 12 weeks approved. Case ID [id number]  Approval dates: 11/12/2020 to 03/06/2021    Will complete BI.

## 2020-12-16 ENCOUNTER — TELEPHONE (OUTPATIENT)
Dept: TRANSPLANT | Facility: CLINIC | Age: 40
End: 2020-12-16

## 2020-12-16 DIAGNOSIS — D84.9 IMMUNOCOMPROMISED STATE: ICD-10-CM

## 2020-12-16 DIAGNOSIS — Z94.83 STATUS POST SIMULTANEOUS KIDNEY AND PANCREAS TRANSPLANT: Primary | ICD-10-CM

## 2020-12-16 DIAGNOSIS — Z94.0 STATUS POST SIMULTANEOUS KIDNEY AND PANCREAS TRANSPLANT: Primary | ICD-10-CM

## 2020-12-16 DIAGNOSIS — Z79.60 LONG-TERM USE OF IMMUNOSUPPRESSANT MEDICATION: ICD-10-CM

## 2020-12-16 DIAGNOSIS — T86.10 COMPLICATION OF TRANSPLANTED KIDNEY, UNSPECIFIED COMPLICATION: ICD-10-CM

## 2020-12-16 PROBLEM — N18.30 CKD STAGE 3 DUE TO TYPE 1 DIABETES MELLITUS: Status: ACTIVE | Noted: 2020-12-16

## 2020-12-16 PROBLEM — E10.22 CKD STAGE 3 DUE TO TYPE 1 DIABETES MELLITUS: Status: ACTIVE | Noted: 2020-12-16

## 2020-12-16 PROBLEM — N17.9 AKI (ACUTE KIDNEY INJURY): Status: ACTIVE | Noted: 2020-12-16

## 2020-12-16 RX ORDER — TACROLIMUS 1 MG/1
9 CAPSULE ORAL EVERY 12 HOURS
Qty: 540 CAPSULE | Refills: 11 | Status: SHIPPED | OUTPATIENT
Start: 2020-12-16 | End: 2021-01-05 | Stop reason: SDUPTHER

## 2020-12-16 RX ORDER — HEPARIN 100 UNIT/ML
500 SYRINGE INTRAVENOUS
Status: CANCELLED | OUTPATIENT
Start: 2020-12-16

## 2020-12-16 RX ORDER — SODIUM CHLORIDE 0.9 % (FLUSH) 0.9 %
10 SYRINGE (ML) INJECTION
Status: CANCELLED | OUTPATIENT
Start: 2020-12-16

## 2020-12-16 NOTE — PROGRESS NOTES
Kidney/Pancreas Post-Transplant Assessment    Referring Physician: ARUNA Lockett From .  Current Nephrologist: ARUNA Krishnan Jr.    ORGAN: PANCREAS  Donor Type: donation after brain death  PHS Increased Risk: yes  Cold Ischemia: 432 mins  Induction Medications: thymo    Subjective:     CC:  Reassessment of renal allograft function and management of chronic immunosuppression.    HPI:  Mr. Rosales is a 40 y.o. 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 His most recent creatinine is 2.3. He takes mycophenolate mofetil, prednisone and tacrolimus for maintenance immunosuppression. His post transplant course has been complicated by wound infection.    Post Transplant Course:   - Admitted on POD#7 due to nausea/vomiting and fever of 101.  Infectious work up unremarkable.EGD performed 11/13 with gastric ulcer. Patient is on daily Protonix. EGD needs to be repeated in 8 weeks.  - Pancreatic enzymes also elevated this admission.  Class I DSA A3 detected (2556) 11/16/20. Kidney biopsy obtained 11/17/20. Results with 20 glomeruli , no rejection; diffuse acute tubular injury ATI 50%, c4d negative, IgA staining likely donor derived.   - Patient received HCV positive organs.  He is now HCV antibody positive with elevated LFTs - f/u with hepatology for hep c treatment.      Hospitalization/ ED visits  None     Interval HX:  Reports doing well, denies complaints. Patient is blind and mostly been on his own during the daytime. Family at bedside. Report they are using pill boxes and patient is compliant with medications.    Cr is elevated to 2.3. Plan for kidney ultrasound today.      Intake 4 bottles a day  UOP adequate    Current Outpatient Medications on File Prior to Visit   Medication Sig Dispense Refill    aspirin (ECOTRIN) 81 MG EC tablet Take 1 tablet (81 mg total) by mouth once daily. 30 tablet 11    calcitRIOL (ROCALTROL) 0.25 MCG Cap Take 1  capsule (0.25 mcg total) by mouth once daily. 30 capsule 11    docusate sodium (COLACE) 100 MG capsule Take 1 capsule (100 mg total) by mouth 3 (three) times daily as needed for Constipation.  0    ergocalciferol (ERGOCALCIFEROL) 50,000 unit Cap Take 1 capsule (50,000 Units total) by mouth every 7 days. 4 capsule 5    ferrous sulfate 325 (65 FE) MG EC tablet Take 325 mg by mouth once daily.      fludrocortisone (FLORINEF) 0.1 mg Tab Take 1 tablet (100 mcg total) by mouth once daily. 30 tablet 0    folic acid (FOLVITE) 1 MG tablet Take 1 tablet (1 mg total) by mouth once daily. 30 tablet 5    gabapentin (NEURONTIN) 300 MG capsule Take 1 capsule (300 mg total) by mouth 2 (two) times daily. 60 capsule 0    glecaprevir-pibrentasvir (MAVYRET) 100-40 mg Tab Take 3 tablets by mouth once daily. Take with food 84 tablet 2    ketoconazole (NIZORAL) 200 mg Tab Take 0.5 tablets (100 mg total) by mouth once daily. 15 tablet 11    magnesium oxide (MAG-OX) 400 mg (241.3 mg magnesium) tablet Take 2 tablets (800 mg total) by mouth 3 (three) times daily. 180 tablet 11    metoclopramide HCl (REGLAN) 10 MG tablet Take 1 tablet (10 mg total) by mouth 3 (three) times daily before meals. 90 tablet 11    mycophenolate (CELLCEPT) 250 mg Cap Take 4 capsules (1,000 mg total) by mouth 2 (two) times daily. for 5 days 240 capsule 11    mycophenolate (MYFORTIC) 180 MG TbEC Take 4 tablets (720 mg total) by mouth 2 (two) times daily. 240 tablet 11    nystatin (MYCOSTATIN) 100,000 unit/mL suspension Take 5 mLs (500,000 Units total) by mouth 3 (three) times daily after meals. STOP 12/7/20 480 mL 0    oxyCODONE-acetaminophen (PERCOCET)  mg per tablet Take 1 tablet by mouth every 4 (four) hours as needed for Pain. 40 tablet 0    pantoprazole (PROTONIX) 40 MG tablet Take 1 tablet (40 mg total) by mouth once daily. 30 tablet 2    pravastatin (PRAVACHOL) 20 MG tablet Take 1 tablet (20 mg total) by mouth once daily. Take instead of  40mg dose while on Mavyret 30 tablet 2    pravastatin (PRAVACHOL) 40 MG tablet Take 1 tablet (40 mg total) by mouth once daily. 90 tablet 2    predniSONE (DELTASONE) 5 MG tablet Take by mouth daily: 20mg 11/7-12/6, 15mg 12/7-1/6/21, 10mg 1/7-2/6, then 5mg daily beginning 2/7/21 120 tablet 11    sodium bicarbonate 650 MG tablet Take 1 tablet (650 mg total) by mouth 2 (two) times daily. 60 tablet 11    sodium polystyrene (KAYEXALATE) 15 gram/60 mL Susp Take 120 mLs (30 g total) by mouth every 6 (six) hours. X 2 doses only. Then hold until further instructions from the Transplant clinic. (Patient not taking: Reported on 12/11/2020) 240 mL 0    sulfamethoxazole-trimethoprim 400-80mg (BACTRIM,SEPTRA) 400-80 mg per tablet Take 1 tablet by mouth every morning. STOP 5/3/21 30 tablet 5    tacrolimus (PROGRAF) 1 MG Cap Take 9 capsules (9 mg total) by mouth every 12 (twelve) hours. 540 capsule 11    valGANciclovir (VALCYTE) 450 mg Tab Take 1 tablet (450 mg total) by mouth once daily. STOP 2/1/21 30 tablet 2     No current facility-administered medications on file prior to visit.         Review of Systems     Skin: no skin rash  CNS; no headaches, blurred vision, seizure, or syncope  ENT: No JVD,  Adenopathies,  nasal congestion. No oral lesions  Cardiac: No chest pain, dyspnea, claudication, edema or palpitations  Respiratory: No SOB, cough, hemoptysis   Gastro-intestinal: No diarrhea, constipation, abdominal pain, nausea, vomit. No ascitis  Genitourinary: no hematuria, dysuria, frequency, frequency  Musculoskeletal: joint pain, arthritis or vasculitic changes  Psych: alert awake, oriented, No cranial nerves deficit.      Objective:       Physical Exam     BP (!) 156/87 (BP Location: Right arm, Patient Position: Sitting, BP Method: Medium (Automatic))   Pulse 65   Temp 97.2 °F (36.2 °C) (Oral)   Resp 16   Wt 91.9 kg (202 lb 9.6 oz)   SpO2 99%   BMI 29.07 kg/m²       Head: normocephalic  Neck: No JVD, cervical  axillary, or femoral adenopathies  Heart: no murmurs, Normal s1 and s2, No gallops, no rubs, No murmurs  Lungs; CTA, good respiratory effort, no crackles  Abdomen: soft, non tender, no splenomegaly or hepatomegaly, no massess, no bruits. +incision midline, healing well but small amount of drainage noted on bandage  Extremities: No edema, skin rash, joint pain  SNC: awake, alert oriented. Cranial nerves are intact, no focalized, sensitivity and strength preserved      Labs:  Lab Results   Component Value Date    WBC 6.62 12/03/2020    HGB 11.2 (L) 12/03/2020    HCT 37.3 (L) 12/03/2020     12/03/2020    K 4.4 12/03/2020     12/03/2020    CO2 26 12/03/2020    BUN 30 (H) 12/03/2020    CREATININE 2.3 (H) 12/03/2020    EGFRNONAA 34.7 (A) 12/03/2020    CALCIUM 8.9 12/03/2020    PHOS 2.3 (L) 12/03/2020    MG 1.4 (L) 12/03/2020    ALBUMIN 3.6 12/03/2020    ALBUMIN 3.6 12/03/2020    AST 24 12/03/2020    ALT 58 (H) 12/03/2020    UTPCR 0.14 12/03/2020    .0 (H) 12/03/2020    TACROLIMUS 11.7 12/03/2020       Lab Results   Component Value Date    EXTANC  12/11/2020      Comment:      Repeat tacrolimus level only/ Next Tacrolimus level due 12/14/2020    EXTWBC 3.6 (A) 12/14/2020    EXTSEGS 78 (A) 12/14/2020    EXTPLATELETS 313 12/14/2020    EXTHEMOGLOBI 11.4 (A) 12/14/2020    EXTHEMATOCRI 37.4 (A) 12/14/2020    EXTCREATININ 2.06 (A) 12/14/2020    EXTSODIUM 139 12/14/2020    EXTPOTASSIUM 4.3 12/14/2020    EXTBUN 25.0 (A) 12/14/2020    EXTCO2 28 12/14/2020    EXTCALCIUM 9.7 12/14/2020    EXTPHOSPHORU 2.2 (A) 12/14/2020    EXTGLUCOSE 85 12/14/2020    EXTALBUMIN 4.6 12/14/2020    EXTLIPASE 32 12/14/2020    EXTAMYLASE 84 12/14/2020       Lab Results   Component Value Date    EXTTACROLVL 8.9 12/14/2020       Labs were reviewed with the patient    Assessment:     1. Status post pancreas transplantation    2. Secondary hyperparathyroidism    3. 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    4. Long-term use of  immunosuppressant medication    5. Immunocompromised state    6. Gastroparesis due to DM    7. CKD stage 3 due to type 1 diabetes mellitus    8. STEVEN (acute kidney injury)        Plan:      Will proceed with a kidney US  IV fluids  Follow up on labs  surgery evaluation in clinic today  Patient to speak with SW today to assess care giver and adherence. Care giver present during the interview.   We discussed at length importance of tasking medications correctly, blood work. Patient is blind and we want to make sure there is no errors or gaps with medications.   Wound check in am     1. CKD stage 3 with STEVEN, biopsy last month negative for rejection.  will continue follow up as per our center guidelines. patient to continue close follow up with the local General nephrologist. Education provided in appropriate fluid intake, potassium intake.   -Cr elevated from baseline, will plan for kidney US and IV fluids. Follow up on ultrasound results.        2. Immunosuppression: continue Prograf 9 mg BID, increase ketoconazole dose. MMF 1000 bid   Lab Results   Component Value Date    TACROLIMUS 11.7 12/03/2020    TACROLIMUS 9.6 11/30/2020    TACROLIMUS 8.8 11/27/2020     No results found for: CYCLOSPORINE  @   Will closely monitor for toxicities, education provided about adherence to medicines and need to communicate any side effect to the transplant nurse or physician.    3. Allograft Function:stable at baseline for the patient. Continue follow up as per our guidelines and with the local General nephrologist. Communication will be sent today.  Lab Results   Component Value Date    CREATININE 2.3 (H) 12/03/2020    CREATININE 2.1 (H) 11/30/2020    CREATININE 2.2 (H) 11/27/2020     Lab Results   Component Value Date    AMYLASE 96 12/03/2020    LIPASE 26 12/03/2020    LIPASE 309 (H) 11/30/2020    LIPASE 59 11/27/2020       4. Hypertension management:  Continue with home blood pressure monitoring, low salt and healthy life discussed  with the patient..    5. Metabolic Bone Disease/Secondary Hyperparathyroidism:calcium and phosphorus level discussed with the patient, patient will continue follow up with the general nephrologist for management of metabolic bone disease   calcium and phosphorus as per our center protocol. Will monitor PTH, Vit D level, calcium.      Lab Results   Component Value Date    .0 (H) 12/03/2020    CALCIUM 8.9 12/03/2020    CAION 1.14 11/05/2020    PHOS 2.3 (L) 12/03/2020    PHOS 1.8 (L) 11/30/2020    PHOS 2.3 (L) 11/27/2020       6. Electrolytes: reviewed with the patient, essentially within the normal range no need for acute changes today, will monitor as per our center guidelines.     Lab Results   Component Value Date     12/03/2020    K 4.4 12/03/2020     12/03/2020    CO2 26 12/03/2020    CO2 26 11/30/2020    CO2 24 11/27/2020       7. Anemia: will continue monitoring as per our center guidelines. No indication for acute intervention today.     Lab Results   Component Value Date    WBC 6.62 12/03/2020    HGB 11.2 (L) 12/03/2020    HCT 37.3 (L) 12/03/2020    MCV 90 12/03/2020     12/03/2020       8.Proteinuria: will continue with pr/cr ratio as per our center guidelines  Lab Results   Component Value Date    PROTEINURINE 18 (H) 12/03/2020    CREATRANDUR 130.0 12/03/2020    UTPCR 0.14 12/03/2020        9. BK virus infection screening: will continue with urine or blood PCR as per our guidelines to prevent BK virus viremia and allograft dysfunction  Lab Results   Component Value Date    BKVIRUSPCRQB <125 12/03/2020         10. Weight education: provided during the clinic visit.   There is no height or weight on file to calculate BMI.       11.Patient safety education regarding immunosuppression including prophylaxis posttransplant for CMV, PCP : Education provided about vaccination and prevention of infections.    12.  Cytopenias: no significant cytopenias will monitor as per our guidelines.  Medicine list reviewed including potential causes of drug-induced cytopenias     Lab Results   Component Value Date    WBC 6.62 12/03/2020    HGB 11.2 (L) 12/03/2020    HCT 37.3 (L) 12/03/2020    MCV 90 12/03/2020     12/03/2020       13. Post-transplant Prophylaxis; CMV Infection, PJP and Candida mucosistis and other indicated for this particular patient. Valcyte bactrim per protocol.     I spoke with the patient for 30 minutes. More than half dedicated to counseling and education. All questions answered    Damien Valladares MD  Transplant Nephrology            Follow-up:   Clinic: return to transplant clinic weekly for the first month after transplant; every 2 weeks during months 2-3; then at 6-, 9-, 12-, 18-, 24-, and 36- months post-transplant to reassess for complications from immunosuppression toxicity and monitor for rejection.  Annually thereafter.    Labs: since patient remains at high risk for rejection and drug-related complications that warrant close monitoring, labs will be ordered as follows: continue twice weekly CBC, renal panel, and drug level for first month; then same labs once weekly through 3rd month post-transplant.  Urine for UA and protein/creatinine ratio monthly.  Serum BK - PCR at 1-, 3-, 6-, 9-, 12-, 18-, 24-, 36-, 48-, and 60 months post-transplant.  Hepatic panel at 1-, 2-, 3-, 6-, 9-, 12-, 18-, 24-, and 36- months post-transplant.    Damien Valladares MD       Education:   Material provided to the patient.  Patient reminded to call with any health changes since these can be early signs of significant complications.  Also, I advised the patient to be sure any new medications or changes of old medications are discussed with either a pharmacist or physician knowledgeable with transplant to avoid rejection/drug toxicity related to significant drug interactions.

## 2020-12-16 NOTE — TELEPHONE ENCOUNTER
Coordinator spoke with patient's aunt and she states that the patient held the prograf for 24 hours and restarted on 9/9.  Patient's aaunt was advised to continue with the 8/8 until wev have the new levels. All questions answered and  Patient's aunt verbalized understanding.       ----- Message from Camila Larson, Gerard sent at 12/16/2020  8:47 AM CST -----  Good morning!    I was reviewing patients for this afternoon's clinic and I didn't see any prograf listed for Mr Rosales (KP from 11/3)??    It looks like he was on prograf 10/10 + keto and had a level of 16 on 12/9 and was instructed to hold for 24 hours.  It looks like he had an external level on 12/11 that was 6.8 but I didn't see that a new order was put in.  So I just wanted to confirm that he was instructed to restart his prograf at a lower dose.  If for some reason he was not told to restart, please have him start tacrolimus 8/8 this morning.     This probably was already taken care of or the Rx is pending but just wanted to make sure!!    Camila

## 2020-12-16 NOTE — TELEPHONE ENCOUNTER
"Coordinator spoke with patient sister and reviewed labs. Patient will need a kidney US and allosure and HLA DSA test. She will bring patient s to his appointments tomorrow and can arrive around 10am.   Coordinator also spoke with patient's aunt and she states that the patient left her home Monday evening and kolby back to his apartment. She arrange his medication for him but can not confirm that he is taking his medications as prescribed. Patient's aunt states that the patient is stubborn and she is unsure if he is taking his BP, Input, Output are weighing himself as requested. She also states that he has not had his wound checked since Monday and "his belly will fall off".     Coordinator also spoke with the patient and he states that he is not having any GI issues or any acute illness. He states that he is drinking 4-6 bottles of water but has not measured his urine output. All questions answered and patient verbalized understanding of the above information.            ----- Message from Damien Valladares MD sent at 12/16/2020  2:34 PM CST -----  His prograf level remains below target despite ketoconazole and increased prograf dose. Will verify with patient and care giver that he is actually taking ketoconazole. He is blind. And his care giver is not at home during the day time as per Radha's note  Please verify he is taking prograf correctly and not missing doses  Any GI issues or acute illness?  Let's do a kidney US  Order a DSA and allosure baseline  Need to decide if need to re biopsy this kidney. He had a kidney biopsy 1 month ago which showed ATI  Plan also for some IV fluids 2 lt NSS    Let me know after you speak with patient/care giver          "

## 2020-12-17 ENCOUNTER — INFUSION (OUTPATIENT)
Dept: INFECTIOUS DISEASES | Facility: HOSPITAL | Age: 40
End: 2020-12-17
Attending: INTERNAL MEDICINE
Payer: MEDICARE

## 2020-12-17 ENCOUNTER — HOSPITAL ENCOUNTER (OUTPATIENT)
Dept: RADIOLOGY | Facility: HOSPITAL | Age: 40
Discharge: HOME | End: 2020-12-17
Attending: INTERNAL MEDICINE
Payer: MEDICARE

## 2020-12-17 ENCOUNTER — OFFICE VISIT (OUTPATIENT)
Dept: TRANSPLANT | Facility: CLINIC | Age: 40
End: 2020-12-17
Payer: MEDICARE

## 2020-12-17 VITALS
BODY MASS INDEX: 29.07 KG/M2 | RESPIRATION RATE: 16 BRPM | HEART RATE: 165 BPM | TEMPERATURE: 97 F | WEIGHT: 202.63 LBS | SYSTOLIC BLOOD PRESSURE: 156 MMHG | BODY MASS INDEX: 29.07 KG/M2 | DIASTOLIC BLOOD PRESSURE: 87 MMHG | HEART RATE: 65 BPM | SYSTOLIC BLOOD PRESSURE: 156 MMHG | OXYGEN SATURATION: 99 % | WEIGHT: 202.63 LBS | OXYGEN SATURATION: 99 % | RESPIRATION RATE: 16 BRPM | TEMPERATURE: 99 F | DIASTOLIC BLOOD PRESSURE: 87 MMHG

## 2020-12-17 VITALS
RESPIRATION RATE: 18 BRPM | OXYGEN SATURATION: 98 % | DIASTOLIC BLOOD PRESSURE: 65 MMHG | WEIGHT: 196.56 LBS | TEMPERATURE: 100 F | SYSTOLIC BLOOD PRESSURE: 127 MMHG | HEART RATE: 109 BPM | HEIGHT: 70 IN | BODY MASS INDEX: 28.14 KG/M2

## 2020-12-17 DIAGNOSIS — D84.9 IMMUNOCOMPROMISED STATE: ICD-10-CM

## 2020-12-17 DIAGNOSIS — Z94.0 STATUS POST SIMULTANEOUS KIDNEY AND PANCREAS TRANSPLANT: Primary | ICD-10-CM

## 2020-12-17 DIAGNOSIS — N18.30 CKD STAGE 3 DUE TO TYPE 1 DIABETES MELLITUS: ICD-10-CM

## 2020-12-17 DIAGNOSIS — Z94.0 STATUS POST SIMULTANEOUS KIDNEY AND PANCREAS TRANSPLANT: ICD-10-CM

## 2020-12-17 DIAGNOSIS — Z94.83 STATUS POST SIMULTANEOUS KIDNEY AND PANCREAS TRANSPLANT: ICD-10-CM

## 2020-12-17 DIAGNOSIS — Z94.83 STATUS POST SIMULTANEOUS KIDNEY AND PANCREAS TRANSPLANT: Primary | ICD-10-CM

## 2020-12-17 DIAGNOSIS — Z94.83 STATUS POST PANCREAS TRANSPLANTATION: Primary | ICD-10-CM

## 2020-12-17 DIAGNOSIS — K31.84 GASTROPARESIS DUE TO DM: ICD-10-CM

## 2020-12-17 DIAGNOSIS — T86.19 RECEIVED KIDNEY FROM DONOR WITH HEPATITIS C: ICD-10-CM

## 2020-12-17 DIAGNOSIS — E11.43 GASTROPARESIS DUE TO DM: ICD-10-CM

## 2020-12-17 DIAGNOSIS — N17.9 AKI (ACUTE KIDNEY INJURY): ICD-10-CM

## 2020-12-17 DIAGNOSIS — N25.81 SECONDARY HYPERPARATHYROIDISM: ICD-10-CM

## 2020-12-17 DIAGNOSIS — Z94.0 KIDNEY REPLACED BY TRANSPLANT: Primary | ICD-10-CM

## 2020-12-17 DIAGNOSIS — T81.49XA WOUND INFECTION AFTER SURGERY: ICD-10-CM

## 2020-12-17 DIAGNOSIS — Z79.60 LONG-TERM USE OF IMMUNOSUPPRESSANT MEDICATION: ICD-10-CM

## 2020-12-17 DIAGNOSIS — E10.22 CKD STAGE 3 DUE TO TYPE 1 DIABETES MELLITUS: ICD-10-CM

## 2020-12-17 PROCEDURE — 99999 PR PBB SHADOW E&M-EST. PATIENT-LVL III: CPT | Mod: PBBFAC,,, | Performed by: INTERNAL MEDICINE

## 2020-12-17 PROCEDURE — 99213 OFFICE O/P EST LOW 20 MIN: CPT | Mod: PBBFAC,25 | Performed by: INTERNAL MEDICINE

## 2020-12-17 PROCEDURE — 99215 OFFICE O/P EST HI 40 MIN: CPT | Mod: S$PBB,,, | Performed by: INTERNAL MEDICINE

## 2020-12-17 PROCEDURE — 99214 OFFICE O/P EST MOD 30 MIN: CPT | Mod: PBBFAC,25,27

## 2020-12-17 PROCEDURE — 76776 US EXAM K TRANSPL W/DOPPLER: CPT | Mod: 26,,, | Performed by: RADIOLOGY

## 2020-12-17 PROCEDURE — 76776 US EXAM K TRANSPL W/DOPPLER: CPT | Mod: TC

## 2020-12-17 PROCEDURE — 99215 PR OFFICE/OUTPT VISIT, EST, LEVL V, 40-54 MIN: ICD-10-PCS | Mod: S$PBB,,, | Performed by: INTERNAL MEDICINE

## 2020-12-17 PROCEDURE — 99212 OFFICE O/P EST SF 10 MIN: CPT | Mod: 24,S$PBB,, | Performed by: TRANSPLANT SURGERY

## 2020-12-17 PROCEDURE — 76776 US TRANSPLANT KIDNEY WITH DOPPLER: ICD-10-PCS | Mod: 26,,, | Performed by: RADIOLOGY

## 2020-12-17 PROCEDURE — 96360 HYDRATION IV INFUSION INIT: CPT

## 2020-12-17 PROCEDURE — 99212 PR OFFICE/OUTPT VISIT, EST, LEVL II, 10-19 MIN: ICD-10-PCS | Mod: 24,S$PBB,, | Performed by: TRANSPLANT SURGERY

## 2020-12-17 PROCEDURE — 96361 HYDRATE IV INFUSION ADD-ON: CPT

## 2020-12-17 PROCEDURE — 99999 PR PBB SHADOW E&M-EST. PATIENT-LVL IV: ICD-10-PCS | Mod: PBBFAC,,,

## 2020-12-17 PROCEDURE — 99999 PR PBB SHADOW E&M-EST. PATIENT-LVL IV: CPT | Mod: PBBFAC,,,

## 2020-12-17 PROCEDURE — 25000003 PHARM REV CODE 250: Performed by: INTERNAL MEDICINE

## 2020-12-17 PROCEDURE — 99999 PR PBB SHADOW E&M-EST. PATIENT-LVL III: ICD-10-PCS | Mod: PBBFAC,,, | Performed by: INTERNAL MEDICINE

## 2020-12-17 RX ORDER — SODIUM CHLORIDE 0.9 % (FLUSH) 0.9 %
10 SYRINGE (ML) INJECTION
Status: DISCONTINUED | OUTPATIENT
Start: 2020-12-17 | End: 2020-12-17 | Stop reason: HOSPADM

## 2020-12-17 RX ORDER — HEPARIN 100 UNIT/ML
500 SYRINGE INTRAVENOUS
Status: DISCONTINUED | OUTPATIENT
Start: 2020-12-17 | End: 2020-12-17 | Stop reason: HOSPADM

## 2020-12-17 RX ORDER — HEPARIN 100 UNIT/ML
500 SYRINGE INTRAVENOUS
Status: CANCELLED | OUTPATIENT
Start: 2020-12-17

## 2020-12-17 RX ORDER — SODIUM CHLORIDE 0.9 % (FLUSH) 0.9 %
10 SYRINGE (ML) INJECTION
Status: CANCELLED | OUTPATIENT
Start: 2020-12-17

## 2020-12-17 RX ADMIN — SODIUM CHLORIDE 2000 ML: 0.9 INJECTION, SOLUTION INTRAVENOUS at 10:12

## 2020-12-17 NOTE — PROGRESS NOTES
SW met with pt and pt's sister: [place] to discuss pt's current living situation and caregiver support. Pt reports that he was staying with his aunt, but reports that he wanted to go to his own home where he felt more comfortable. SW explained some of the concerns that the transplant team had regarding pt's staying at home alone so close after his transplant. Pt discussed not wanting to feel like a burden to his aunt and wanted to be able to exercise more and felt like he could do that better at home. SW and pt's sister discussed the importance of still having a caregiver available to him to continue to assist with medication management and medication changes; wound care as needed; and watching for any changes that may happen post transplant. Pt's sister also explained that pt is not a burden to the aunt because she cares about pt and wants to do these things for pt.      Pt relented and compromised with pt's sister and reports that he would be willing to stay at his aunt's house during the week and go to his own home on the weekends. Pt's sister reports that she will help move pt's weights to the aunts house so he can still exercise and will get with family to make sure that when they go take a walk, they will take pt with them so pt has the opportunity to get out of the house.    TERESITA remains available to pt, pt's family, and transplant team at [phone].

## 2020-12-17 NOTE — PROGRESS NOTES
Patient arrived for 2L IV NS over two hours.  Tolerated infusion well and left in NAD with family.

## 2020-12-17 NOTE — LETTER
December 18, 2020        ARUNA Lockett From .  [address]  [place] [address]  Phone: [phone]  Fax: [phone]             [facility] 1st Fl  [address]  [facility] [address]  Phone: [phone]   Patient: Hernandez Rosales   MR Number: [id number]   YOB: 1980   Date of Visit: 12/17/2020       Dear Dr. ARUNA Lockett From .    Thank you for referring Hernandez Rosales to me for evaluation. Attached you will find relevant portions of my assessment and plan of care.    If you have questions, please do not hesitate to call me. I look forward to following Hernandez Rosales along with you.    Sincerely,    Damien Valladares MD    Enclosure    If you would like to receive this communication electronically, please contact [email or web address] or [phone] to request [employer] access.    [employer] is a tool which provides read-only access to select patient information with whom you have a relationship. Its easy to use and provides real time access to review your patients record including encounter summaries, notes, results, and demographic information.    If you feel you have received this communication in error or would no longer like to receive these types of communications, please e-mail [email or web address]

## 2020-12-18 ENCOUNTER — TELEPHONE (OUTPATIENT)
Dept: TRANSPLANT | Facility: CLINIC | Age: 40
End: 2020-12-18

## 2020-12-18 DIAGNOSIS — Z94.83 STATUS POST SIMULTANEOUS KIDNEY AND PANCREAS TRANSPLANT: ICD-10-CM

## 2020-12-18 DIAGNOSIS — Z94.0 STATUS POST SIMULTANEOUS KIDNEY AND PANCREAS TRANSPLANT: ICD-10-CM

## 2020-12-18 NOTE — PROGRESS NOTES
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   He had a wound infection that required daily dressings.  He was seen today by transplant nephrology that addressed organ function and IS.   At physical examination, he has a midline incision that is completely healed except in a 2 cms area at the supraumbilical level.  This area is 2x1x0.5 cms abd have excellent granulation tissue.   Plan: Continue daily dressings. It doesn't require wound vac.

## 2020-12-24 ENCOUNTER — TELEPHONE (OUTPATIENT)
Dept: TRANSPLANT | Facility: CLINIC | Age: 40
End: 2020-12-24

## 2020-12-24 NOTE — TELEPHONE ENCOUNTER
Coordinator spoke with patients aunt and reviewed labs. Patients tacrolimus level is low (8.2). Aunts states that she found some of his prograf pills  on the floor or left in the medicine cup.  When he takes his medicine he is unaware that the is missing his medication because he is blind. She will from now on stay with him when he is taking is meds to ensure he is not missing any of his medication. Aunt reports this is not a true trough. Patient will continue with Prograf 9mg and repeat labs on Monday. She also informed that we may need to repeat a biopsy. She states she will inform the patient. All questions answered and Aunt verbalized understanding.     ----- Message from Fatimah Wilson RN sent at 12/23/2020  4:54 PM CST -----    ----- Message -----  From: Damien Valladares MD  Sent: 12/23/2020   4:22 PM CST  To: [facility] Post-Kidney Transplant Clinical    Will increase to 10 mg po bid  Repeat labs next week  Encourage hydration  I suspect may need to do a follow up biopsy due to persistent elevation of his creatinine. Biopsy mid November showed diffuse ATI   Seen in clinic recently. No acute illness

## 2020-12-28 ENCOUNTER — TELEPHONE (OUTPATIENT)
Dept: TRANSPLANT | Facility: CLINIC | Age: 40
End: 2020-12-28
Payer: MEDICARE

## 2020-12-28 NOTE — TELEPHONE ENCOUNTER
----- Message from Miky Jeong sent at 12/28/2020  9:36 AM CST -----  Regarding: Lab order  Contact: Aunt  Pt's aunt would like to know if pt needs lab order.  Pt's aunt states nurse is currently at home and does not have any order for lab work, but aunt presumed labs were necessary every visit.    Aunt  or

## 2021-01-04 ENCOUNTER — TELEPHONE (OUTPATIENT)
Dept: TRANSPLANT | Facility: CLINIC | Age: 41
End: 2021-01-04

## 2021-01-05 ENCOUNTER — SPECIALTY PHARMACY (OUTPATIENT)
Dept: PHARMACY | Facility: CLINIC | Age: 41
End: 2021-01-05

## 2021-01-05 DIAGNOSIS — Z79.60 LONG-TERM USE OF IMMUNOSUPPRESSANT MEDICATION: ICD-10-CM

## 2021-01-05 DIAGNOSIS — Z94.83 STATUS POST SIMULTANEOUS KIDNEY AND PANCREAS TRANSPLANT: ICD-10-CM

## 2021-01-05 DIAGNOSIS — Z94.0 STATUS POST SIMULTANEOUS KIDNEY AND PANCREAS TRANSPLANT: ICD-10-CM

## 2021-01-05 RX ORDER — TACROLIMUS 1 MG/1
6 CAPSULE ORAL EVERY 12 HOURS
Qty: 360 CAPSULE | Refills: 11 | Status: SHIPPED | OUTPATIENT
Start: 2021-01-05 | End: 2021-04-09 | Stop reason: DRUGHIGH

## 2021-01-05 NOTE — TELEPHONE ENCOUNTER
Notified patient's Aunt of Dr. Valladares review of 12/30/20 lab results. Aunt reports Prograf level was a true 12 hour level. Instructions given to HOLD tonight's dose of Prograf & tomorrow AM & PM doses then lower dose to 6mg twice daily; repeat labs on Thurs 1/7/21 as scheduled.     ----- Message from Damien Valladares MD sent at 1/4/2021  4:40 PM CST -----  Please check if this is a true trough level  Hold prograf for 3 doses and lower to 6 mg po bid   Repeat labs Thursday place we can have same day results

## 2021-01-07 ENCOUNTER — PATIENT MESSAGE (OUTPATIENT)
Dept: TRANSPLANT | Facility: CLINIC | Age: 41
End: 2021-01-07

## 2021-01-07 ENCOUNTER — TELEPHONE (OUTPATIENT)
Dept: HEPATOLOGY | Facility: CLINIC | Age: 41
End: 2021-01-07

## 2021-01-07 DIAGNOSIS — B19.20 HEPATITIS C VIRUS INFECTION WITHOUT HEPATIC COMA, UNSPECIFIED CHRONICITY: Primary | ICD-10-CM

## 2021-01-08 ENCOUNTER — PATIENT MESSAGE (OUTPATIENT)
Dept: TRANSPLANT | Facility: CLINIC | Age: 41
End: 2021-01-08

## 2021-01-08 ENCOUNTER — TELEPHONE (OUTPATIENT)
Dept: TRANSPLANT | Facility: CLINIC | Age: 41
End: 2021-01-08

## 2021-01-08 DIAGNOSIS — Z94.83 STATUS POST SIMULTANEOUS KIDNEY AND PANCREAS TRANSPLANT: Primary | ICD-10-CM

## 2021-01-08 DIAGNOSIS — Z94.0 STATUS POST SIMULTANEOUS KIDNEY AND PANCREAS TRANSPLANT: Primary | ICD-10-CM

## 2021-01-08 DIAGNOSIS — T86.10 COMPLICATION OF TRANSPLANTED KIDNEY, UNSPECIFIED COMPLICATION: ICD-10-CM

## 2021-01-11 ENCOUNTER — DOCUMENTATION ONLY (OUTPATIENT)
Dept: TRANSPLANT | Facility: CLINIC | Age: 41
End: 2021-01-11

## 2021-01-11 LAB — EXT TACROLIMUS LVL: 4.6

## 2021-01-15 ENCOUNTER — SPECIALTY PHARMACY (OUTPATIENT)
Dept: PHARMACY | Facility: CLINIC | Age: 41
End: 2021-01-15

## 2021-01-21 ENCOUNTER — TELEPHONE (OUTPATIENT)
Dept: TRANSPLANT | Facility: CLINIC | Age: 41
End: 2021-01-21

## 2021-01-21 DIAGNOSIS — Z94.83 STATUS POST SIMULTANEOUS KIDNEY AND PANCREAS TRANSPLANT: Primary | Chronic | ICD-10-CM

## 2021-01-21 DIAGNOSIS — Z94.0 STATUS POST SIMULTANEOUS KIDNEY AND PANCREAS TRANSPLANT: Primary | Chronic | ICD-10-CM

## 2021-01-25 ENCOUNTER — HOSPITAL ENCOUNTER (OUTPATIENT)
Dept: INTERVENTIONAL RADIOLOGY/VASCULAR | Facility: HOSPITAL | Age: 41
Discharge: HOME | End: 2021-01-25
Attending: INTERNAL MEDICINE
Payer: MEDICARE

## 2021-01-25 ENCOUNTER — TELEPHONE (OUTPATIENT)
Dept: TRANSPLANT | Facility: CLINIC | Age: 41
End: 2021-01-25

## 2021-01-25 VITALS
RESPIRATION RATE: 18 BRPM | BODY MASS INDEX: 26.7 KG/M2 | SYSTOLIC BLOOD PRESSURE: 159 MMHG | TEMPERATURE: 98 F | WEIGHT: 186.06 LBS | OXYGEN SATURATION: 100 % | DIASTOLIC BLOOD PRESSURE: 90 MMHG | HEART RATE: 98 BPM

## 2021-01-25 DIAGNOSIS — Z94.0 STATUS POST SIMULTANEOUS KIDNEY AND PANCREAS TRANSPLANT: ICD-10-CM

## 2021-01-25 DIAGNOSIS — Z94.0 HISTORY OF SIMULTANEOUS KIDNEY AND PANCREAS TRANSPLANT: ICD-10-CM

## 2021-01-25 DIAGNOSIS — Z94.83 STATUS POST SIMULTANEOUS KIDNEY AND PANCREAS TRANSPLANT: ICD-10-CM

## 2021-01-25 DIAGNOSIS — T86.10 COMPLICATION OF TRANSPLANTED KIDNEY, UNSPECIFIED COMPLICATION: ICD-10-CM

## 2021-01-25 DIAGNOSIS — Z94.83 HISTORY OF SIMULTANEOUS KIDNEY AND PANCREAS TRANSPLANT: ICD-10-CM

## 2021-01-25 PROCEDURE — 76942 ECHO GUIDE FOR BIOPSY: CPT | Mod: 26,,, | Performed by: RADIOLOGY

## 2021-01-25 PROCEDURE — 25000003 PHARM REV CODE 250: Performed by: PHYSICIAN ASSISTANT

## 2021-01-25 PROCEDURE — 99153 MOD SED SAME PHYS/QHP EA: CPT | Performed by: RADIOLOGY

## 2021-01-25 PROCEDURE — 99152 PR MOD CONSCIOUS SEDATION, SAME PHYS, 5+ YRS, FIRST 15 MIN: ICD-10-PCS | Mod: ,,, | Performed by: RADIOLOGY

## 2021-01-25 PROCEDURE — 25000003 PHARM REV CODE 250: Performed by: RADIOLOGY

## 2021-01-25 PROCEDURE — 88350 IMFLUOR EA ADDL 1ANTB STN PX: CPT | Mod: 59 | Performed by: PATHOLOGY

## 2021-01-25 PROCEDURE — 50200 RENAL BIOPSY PERQ: CPT | Performed by: RADIOLOGY

## 2021-01-25 PROCEDURE — 88305 TISSUE EXAM BY PATHOLOGIST: CPT | Performed by: PATHOLOGY

## 2021-01-25 PROCEDURE — 76942 PR U/S GUIDANCE FOR NEEDLE GUIDANCE: ICD-10-PCS | Mod: 26,,, | Performed by: RADIOLOGY

## 2021-01-25 PROCEDURE — 99152 MOD SED SAME PHYS/QHP 5/>YRS: CPT | Performed by: RADIOLOGY

## 2021-01-25 PROCEDURE — 99152 MOD SED SAME PHYS/QHP 5/>YRS: CPT | Mod: ,,, | Performed by: RADIOLOGY

## 2021-01-25 PROCEDURE — 50200 IR BIOPSY KIDNEY: ICD-10-PCS | Mod: RT,,, | Performed by: RADIOLOGY

## 2021-01-25 PROCEDURE — 63600175 PHARM REV CODE 636 W HCPCS: Performed by: RADIOLOGY

## 2021-01-25 PROCEDURE — 27201068 IR BIOPSY KIDNEY

## 2021-01-25 PROCEDURE — 88313 SPECIAL STAINS GROUP 2: CPT | Mod: 59 | Performed by: PATHOLOGY

## 2021-01-25 PROCEDURE — 63600175 PHARM REV CODE 636 W HCPCS: Mod: JG | Performed by: STUDENT IN AN ORGANIZED HEALTH CARE EDUCATION/TRAINING PROGRAM

## 2021-01-25 PROCEDURE — 88346 IMFLUOR 1ST 1ANTB STAIN PX: CPT | Performed by: PATHOLOGY

## 2021-01-25 PROCEDURE — 25000003 PHARM REV CODE 250: Performed by: STUDENT IN AN ORGANIZED HEALTH CARE EDUCATION/TRAINING PROGRAM

## 2021-01-25 PROCEDURE — 76942 ECHO GUIDE FOR BIOPSY: CPT | Mod: TC | Performed by: RADIOLOGY

## 2021-01-25 RX ORDER — FENTANYL CITRATE 50 UG/ML
50 INJECTION, SOLUTION INTRAMUSCULAR; INTRAVENOUS
Status: DISCONTINUED | OUTPATIENT
Start: 2021-01-25 | End: 2021-01-26 | Stop reason: HOSPADM

## 2021-01-25 RX ORDER — SODIUM CHLORIDE 9 MG/ML
INJECTION, SOLUTION INTRAVENOUS CONTINUOUS
Status: DISCONTINUED | OUTPATIENT
Start: 2021-01-25 | End: 2021-01-26 | Stop reason: HOSPADM

## 2021-01-25 RX ORDER — MIDAZOLAM HYDROCHLORIDE 1 MG/ML
1 INJECTION INTRAMUSCULAR; INTRAVENOUS
Status: DISCONTINUED | OUTPATIENT
Start: 2021-01-25 | End: 2021-01-26 | Stop reason: HOSPADM

## 2021-01-25 RX ORDER — LIDOCAINE HYDROCHLORIDE 10 MG/ML
INJECTION INFILTRATION; PERINEURAL CODE/TRAUMA/SEDATION MEDICATION
Status: COMPLETED | OUTPATIENT
Start: 2021-01-25 | End: 2021-01-25

## 2021-01-25 RX ORDER — FENTANYL CITRATE 50 UG/ML
INJECTION, SOLUTION INTRAMUSCULAR; INTRAVENOUS CODE/TRAUMA/SEDATION MEDICATION
Status: COMPLETED | OUTPATIENT
Start: 2021-01-25 | End: 2021-01-25

## 2021-01-25 RX ORDER — MIDAZOLAM HYDROCHLORIDE 1 MG/ML
INJECTION INTRAMUSCULAR; INTRAVENOUS CODE/TRAUMA/SEDATION MEDICATION
Status: COMPLETED | OUTPATIENT
Start: 2021-01-25 | End: 2021-01-25

## 2021-01-25 RX ADMIN — FENTANYL CITRATE 50 MCG: 50 INJECTION, SOLUTION INTRAMUSCULAR; INTRAVENOUS at 01:01

## 2021-01-25 RX ADMIN — DESMOPRESSIN ACETATE 12.66 MCG: 4 SOLUTION INTRAVENOUS at 12:01

## 2021-01-25 RX ADMIN — SODIUM CHLORIDE: 0.9 INJECTION, SOLUTION INTRAVENOUS at 11:01

## 2021-01-25 RX ADMIN — LIDOCAINE HYDROCHLORIDE 5 ML: 10 INJECTION, SOLUTION INFILTRATION; PERINEURAL at 01:01

## 2021-01-25 RX ADMIN — MIDAZOLAM HYDROCHLORIDE 1 MG: 1 INJECTION, SOLUTION INTRAMUSCULAR; INTRAVENOUS at 01:01

## 2021-01-28 ENCOUNTER — SPECIALTY PHARMACY (OUTPATIENT)
Dept: PHARMACY | Facility: CLINIC | Age: 41
End: 2021-01-28

## 2021-02-01 ENCOUNTER — DOCUMENT SCAN (OUTPATIENT)
Dept: HOME HEALTH SERVICES | Facility: HOSPITAL | Age: 41
End: 2021-02-01
Payer: MEDICARE

## 2021-02-03 ENCOUNTER — DOCUMENT SCAN (OUTPATIENT)
Dept: HOME HEALTH SERVICES | Facility: HOSPITAL | Age: 41
End: 2021-02-03
Payer: MEDICARE

## 2021-02-04 ENCOUNTER — TELEPHONE (OUTPATIENT)
Dept: TRANSPLANT | Facility: CLINIC | Age: 41
End: 2021-02-04

## 2021-02-05 LAB
FINAL PATHOLOGIC DIAGNOSIS: NORMAL
GROSS: NORMAL

## 2021-02-08 ENCOUNTER — DOCUMENT SCAN (OUTPATIENT)
Dept: HOME HEALTH SERVICES | Facility: HOSPITAL | Age: 41
End: 2021-02-08
Payer: MEDICARE

## 2021-02-11 ENCOUNTER — OFFICE VISIT (OUTPATIENT)
Dept: TRANSPLANT | Facility: CLINIC | Age: 41
End: 2021-02-11
Payer: MEDICARE

## 2021-02-11 VITALS — DIASTOLIC BLOOD PRESSURE: 80 MMHG | SYSTOLIC BLOOD PRESSURE: 140 MMHG

## 2021-02-11 DIAGNOSIS — Z94.83 HISTORY OF SIMULTANEOUS KIDNEY AND PANCREAS TRANSPLANT: ICD-10-CM

## 2021-02-11 DIAGNOSIS — Z91.89 AT RISK FOR OPPORTUNISTIC INFECTIONS: ICD-10-CM

## 2021-02-11 DIAGNOSIS — Z94.0 HISTORY OF SIMULTANEOUS KIDNEY AND PANCREAS TRANSPLANT: ICD-10-CM

## 2021-02-11 DIAGNOSIS — E10.22 CKD STAGE 3 DUE TO TYPE 1 DIABETES MELLITUS: Primary | ICD-10-CM

## 2021-02-11 DIAGNOSIS — N18.30 CKD STAGE 3 DUE TO TYPE 1 DIABETES MELLITUS: Primary | ICD-10-CM

## 2021-02-11 DIAGNOSIS — Z94.0 STATUS POST SIMULTANEOUS KIDNEY AND PANCREAS TRANSPLANT: Chronic | ICD-10-CM

## 2021-02-11 DIAGNOSIS — K31.84 GASTROPARESIS DUE TO DM: ICD-10-CM

## 2021-02-11 DIAGNOSIS — T86.19 RECEIVED KIDNEY FROM DONOR WITH HEPATITIS C: ICD-10-CM

## 2021-02-11 DIAGNOSIS — I10 ESSENTIAL HYPERTENSION: ICD-10-CM

## 2021-02-11 DIAGNOSIS — D84.9 IMMUNOCOMPROMISED STATE: ICD-10-CM

## 2021-02-11 DIAGNOSIS — D63.8 ANEMIA OF CHRONIC DISEASE: ICD-10-CM

## 2021-02-11 DIAGNOSIS — E11.43 GASTROPARESIS DUE TO DM: ICD-10-CM

## 2021-02-11 DIAGNOSIS — Z94.83 STATUS POST SIMULTANEOUS KIDNEY AND PANCREAS TRANSPLANT: Chronic | ICD-10-CM

## 2021-02-11 PROCEDURE — 99214 OFFICE O/P EST MOD 30 MIN: CPT | Mod: 95,,, | Performed by: INTERNAL MEDICINE

## 2021-02-11 PROCEDURE — 99214 PR OFFICE/OUTPT VISIT, EST, LEVL IV, 30-39 MIN: ICD-10-PCS | Mod: 95,,, | Performed by: INTERNAL MEDICINE

## 2021-02-25 ENCOUNTER — SPECIALTY PHARMACY (OUTPATIENT)
Dept: PHARMACY | Facility: CLINIC | Age: 41
End: 2021-02-25

## 2021-03-11 ENCOUNTER — TELEPHONE (OUTPATIENT)
Dept: HEPATOLOGY | Facility: CLINIC | Age: 41
End: 2021-03-11

## 2021-03-12 DIAGNOSIS — E87.5 HYPERKALEMIA: ICD-10-CM

## 2021-03-22 ENCOUNTER — TELEPHONE (OUTPATIENT)
Dept: TRANSPLANT | Facility: CLINIC | Age: 41
End: 2021-03-22

## 2021-03-23 ENCOUNTER — DOCUMENTATION ONLY (OUTPATIENT)
Dept: TRANSPLANT | Facility: CLINIC | Age: 41
End: 2021-03-23

## 2021-03-23 ENCOUNTER — PATIENT MESSAGE (OUTPATIENT)
Dept: TRANSPLANT | Facility: CLINIC | Age: 41
End: 2021-03-23

## 2021-03-23 ENCOUNTER — NURSE TRIAGE (OUTPATIENT)
Dept: ADMINISTRATIVE | Facility: CLINIC | Age: 41
End: 2021-03-23

## 2021-03-25 ENCOUNTER — DOCUMENTATION ONLY (OUTPATIENT)
Dept: TRANSPLANT | Facility: CLINIC | Age: 41
End: 2021-03-25

## 2021-03-25 ENCOUNTER — TELEPHONE (OUTPATIENT)
Dept: TRANSPLANT | Facility: CLINIC | Age: 41
End: 2021-03-25

## 2021-03-25 DIAGNOSIS — E87.5 HYPERKALEMIA: Primary | ICD-10-CM

## 2021-03-25 LAB
EXT BUN: 25 MG/DL (ref 6–22)
EXT CALCIUM: 10 MG/DL (ref 9–10.2)
EXT CHLORIDE: 111 MMOL/L (ref 97–108)
EXT CO2: 27 MMOL/L (ref 26–34)
EXT CREATININE: 2.24 MG/DL (ref 0.92–1.35)
EXT GFR MDRD AF AMER: 42.18
EXT GLUCOSE: 84 MG/DL (ref 74–112)
EXT POTASSIUM: 5.9 MMOL/L (ref 3.6–5.1)
EXT SODIUM: 141 MMOL/L (ref 135–142)

## 2021-04-08 ENCOUNTER — DOCUMENTATION ONLY (OUTPATIENT)
Dept: TRANSPLANT | Facility: CLINIC | Age: 41
End: 2021-04-08

## 2021-04-08 LAB
EXT ALBUMIN: 4.9 G/DL (ref 4.2–5.4)
EXT AMYLASE: 86 U/L (ref 23–130)
EXT BUN: 25 (ref 6–22)
EXT CALCIUM: 9.6 MG/DL (ref 9–10.2)
EXT CHLORIDE: 108 MMOL/L (ref 97–108)
EXT CO2: 31 MMOL/L (ref 26–34)
EXT CREATININE: 2.3 MG/DL (ref 0.92–1.35)
EXT EOSINOPHIL%: 4 (ref 0–4)
EXT G6PD QUAL: 15.3 (ref 9.9–16.6)
EXT GFR MDRD AF AMER: 40.91 ML/MIN/1.73M2
EXT GLUCOSE: 78 MG/DL (ref 74–112)
EXT HEMATOCRIT: 40.8 (ref 40.2–51.4)
EXT HEMOGLOBIN: 12.1 G/DL (ref 13.6–17.2)
EXT LIPASE: 22 U/L (ref 31–96)
EXT LYMPH%: 25 (ref 20–48)
EXT MAGNESIUM: 1.34 (ref 1.69–2.15)
EXT MONOCYTES%: 7 (ref 1–9)
EXT PHOSPHORUS: 3 MG/DL (ref 2.8–4.8)
EXT PLATELETS: 250 (ref 160–400)
EXT POTASSIUM: 4.4 MMOL/L (ref 3.6–5.1)
EXT SEGS%: 65
EXT SODIUM: 141 MMOL/L (ref 135–142)
EXT TACROLIMUS LVL: 13
EXT WBC: 3.3 (ref 4.8–10.8)

## 2021-04-09 DIAGNOSIS — Z91.89 AT RISK FOR OPPORTUNISTIC INFECTIONS: Primary | ICD-10-CM

## 2021-04-09 DIAGNOSIS — Z94.0 STATUS POST SIMULTANEOUS KIDNEY AND PANCREAS TRANSPLANT: ICD-10-CM

## 2021-04-09 DIAGNOSIS — Z79.60 LONG-TERM USE OF IMMUNOSUPPRESSANT MEDICATION: ICD-10-CM

## 2021-04-09 DIAGNOSIS — Z94.83 STATUS POST SIMULTANEOUS KIDNEY AND PANCREAS TRANSPLANT: ICD-10-CM

## 2021-04-09 RX ORDER — TACROLIMUS 1 MG/1
5 CAPSULE ORAL EVERY 12 HOURS
Qty: 360 CAPSULE | Refills: 11 | Status: SHIPPED | OUTPATIENT
Start: 2021-04-09 | End: 2021-05-06

## 2021-04-09 RX ORDER — DAPSONE 100 MG/1
100 TABLET ORAL DAILY
Qty: 30 TABLET | Refills: 0 | Status: SHIPPED | OUTPATIENT
Start: 2021-04-09 | End: 2021-05-10

## 2021-05-06 ENCOUNTER — DOCUMENTATION ONLY (OUTPATIENT)
Dept: TRANSPLANT | Facility: CLINIC | Age: 41
End: 2021-05-06

## 2021-05-06 DIAGNOSIS — Z94.83 STATUS POST SIMULTANEOUS KIDNEY AND PANCREAS TRANSPLANT: ICD-10-CM

## 2021-05-06 DIAGNOSIS — Z94.0 STATUS POST SIMULTANEOUS KIDNEY AND PANCREAS TRANSPLANT: ICD-10-CM

## 2021-05-06 DIAGNOSIS — Z79.60 LONG-TERM USE OF IMMUNOSUPPRESSANT MEDICATION: ICD-10-CM

## 2021-05-06 LAB
EXT ALBUMIN: 4.8 G/DL (ref 4.2–5.4)
EXT ALKALINE PHOSPHATASE: 138 U/L (ref 57–111)
EXT ALLOSURE: ABNORMAL
EXT ALT: 16 U/L (ref 10–49)
EXT AMYLASE: 89 U/L (ref 23–130)
EXT ANC: ABNORMAL
EXT AST: 21 U/L (ref 18–30)
EXT BACTERIA UA: ABNORMAL
EXT BANDS%: ABNORMAL
EXT BILIRUBIN DIRECT: 0.3 MG/DL (ref 0–0.6)
EXT BILIRUBIN TOTAL: 0.8 MG/DL (ref 0.3–2)
EXT BK VIRUS DNA QN PCR: <2.6
EXT BK VIRUS DNA QUANT, PCR, URINE: ABNORMAL
EXT BUN: 33 MG/DL (ref 6–22)
EXT C PEPTIDE: ABNORMAL
EXT CALCIUM: 10 MG/DL (ref 9–10.2)
EXT CHLORIDE: 109 MMOL/L (ref 97–108)
EXT CHOLESTEROL (LIPID PANEL): ABNORMAL
EXT CHOLESTEROL: ABNORMAL
EXT CMV DNA QUANT. BY PCR: ABNORMAL
EXT CO2: 26 MMOL/L (ref 26–34)
EXT CREATININE: 2.07 MG/DL (ref 0.92–135)
EXT CYCLOSPORINE LVL: ABNORMAL
EXT EBV DNA BY PCR: ABNORMAL
EXT EBV DNA-COPIES/ML: ABNORMAL
EXT EOSINOPHIL%: 6 % (ref 0–4)
EXT FERRITIN: ABNORMAL
EXT GFR MDRD AF AMER: 46.2
EXT GFR MDRD NON AF AMER: ABNORMAL
EXT GLUCOSE UA: NEGATIVE
EXT GLUCOSE: 95 MG/DL (ref 74–112)
EXT HBV DNA QUANT PCR: ABNORMAL
EXT HCV QUANT: ABNORMAL
EXT HDL: ABNORMAL
EXT HEMATOCRIT: 35.8 % (ref 40.2–51.4)
EXT HEMOGLOBIN A1C: 4.6 % (ref 4–6)
EXT HEMOGLOBIN: 10.5 G/DL (ref 13.6–17.2)
EXT HEP B S AG: ABNORMAL
EXT HIV RNA QUANT PCR: ABNORMAL
EXT HIV: ABNORMAL
EXT IMMUNKNOW (STIMULATED): ABNORMAL
EXT INR: ABNORMAL
EXT IRON SATURATION: ABNORMAL
EXT LDH, TOTAL: ABNORMAL
EXT LDL CHOLESTEROL: ABNORMAL
EXT LIPASE: 30 U/L (ref 31–96)
EXT LYMPH%: 13 % (ref 20–48)
EXT MAGNESIUM: 1.55 MG/DL (ref 1.69–2.15)
EXT MONOCYTES%: 11 % (ref 1–9)
EXT NITRITES UA: NEGATIVE
EXT PHOSPHORUS: 3.7 MG/DL (ref 2.8–4.8)
EXT PLATELETS: 329 (ref 160–400)
EXT POTASSIUM: 5.2 MMOL/L (ref 3.6–5.1)
EXT PROT/CREAT RATIO UR: 0.2
EXT PROTEIN TOTAL: ABNORMAL
EXT PROTEIN UA: ABNORMAL
EXT PT: ABNORMAL
EXT PTH, INTACT: ABNORMAL
EXT RBC UA: ABNORMAL
EXT SARS COV-2 (COVID-19): ABNORMAL
EXT SEGS%: 68 % (ref 45–75)
EXT SERUM IRON: ABNORMAL
EXT SIROLIMUS LVL: ABNORMAL
EXT SODIUM: 140 MMOL/L (ref 135–142)
EXT STOOL CDIFF: ABNORMAL
EXT STOOL CMV: ABNORMAL
EXT STOOL CULTURE: ABNORMAL
EXT STOOL OCP: ABNORMAL
EXT TACROLIMUS LVL: 5.2 NG/ML
EXT TIBC: ABNORMAL
EXT TRIGLYCERIDES: ABNORMAL
EXT UNSATURATED IRON BINDING CAP.: ABNORMAL
EXT URIC ACID: ABNORMAL
EXT URINE CULTURE: ABNORMAL
EXT VIT D 25 HYDROXY: ABNORMAL
EXT WBC UA: ABNORMAL
EXT WBC: 3.2 (ref 4.8–10.8)

## 2021-05-07 RX ORDER — TACROLIMUS 1 MG/1
6 CAPSULE ORAL EVERY 12 HOURS
Qty: 360 CAPSULE | Refills: 11 | Status: SHIPPED | OUTPATIENT
Start: 2021-05-07 | End: 2021-05-17

## 2021-05-07 RX ORDER — FOLIC ACID 1 MG/1
1 TABLET ORAL DAILY
Qty: 30 TABLET | Refills: 5 | Status: CANCELLED | OUTPATIENT
Start: 2021-05-07 | End: 2021-11-03

## 2021-05-10 ENCOUNTER — OFFICE VISIT (OUTPATIENT)
Dept: TRANSPLANT | Facility: CLINIC | Age: 41
End: 2021-05-10
Payer: MEDICARE

## 2021-05-10 VITALS
DIASTOLIC BLOOD PRESSURE: 91 MMHG | OXYGEN SATURATION: 96 % | SYSTOLIC BLOOD PRESSURE: 153 MMHG | RESPIRATION RATE: 18 BRPM | HEART RATE: 92 BPM | WEIGHT: 212.31 LBS | TEMPERATURE: 99 F | BODY MASS INDEX: 30.39 KG/M2 | HEIGHT: 70 IN

## 2021-05-10 DIAGNOSIS — Z94.0 STATUS POST SIMULTANEOUS KIDNEY AND PANCREAS TRANSPLANT: Primary | Chronic | ICD-10-CM

## 2021-05-10 DIAGNOSIS — Z94.83 STATUS POST SIMULTANEOUS KIDNEY AND PANCREAS TRANSPLANT: Primary | Chronic | ICD-10-CM

## 2021-05-10 DIAGNOSIS — E87.20 METABOLIC ACIDOSIS: ICD-10-CM

## 2021-05-10 DIAGNOSIS — Z79.60 LONG-TERM USE OF IMMUNOSUPPRESSANT MEDICATION: ICD-10-CM

## 2021-05-10 DIAGNOSIS — T86.19 RECEIVED KIDNEY FROM DONOR WITH HEPATITIS C: ICD-10-CM

## 2021-05-10 DIAGNOSIS — I10 ESSENTIAL HYPERTENSION: ICD-10-CM

## 2021-05-10 DIAGNOSIS — B19.20 HEPATITIS C VIRUS INFECTION WITHOUT HEPATIC COMA, UNSPECIFIED CHRONICITY: ICD-10-CM

## 2021-05-10 PROCEDURE — 99214 OFFICE O/P EST MOD 30 MIN: CPT | Mod: PBBFAC | Performed by: NURSE PRACTITIONER

## 2021-05-10 PROCEDURE — 99999 PR PBB SHADOW E&M-EST. PATIENT-LVL IV: CPT | Mod: PBBFAC,,, | Performed by: NURSE PRACTITIONER

## 2021-05-10 PROCEDURE — 99999 PR PBB SHADOW E&M-EST. PATIENT-LVL IV: ICD-10-PCS | Mod: PBBFAC,,, | Performed by: NURSE PRACTITIONER

## 2021-05-10 PROCEDURE — 99215 PR OFFICE/OUTPT VISIT, EST, LEVL V, 40-54 MIN: ICD-10-PCS | Mod: S$PBB,,, | Performed by: NURSE PRACTITIONER

## 2021-05-10 PROCEDURE — 99215 OFFICE O/P EST HI 40 MIN: CPT | Mod: S$PBB,,, | Performed by: NURSE PRACTITIONER

## 2021-05-10 RX ORDER — PRAVASTATIN SODIUM 40 MG/1
40 TABLET ORAL DAILY
Qty: 90 TABLET | Refills: 2 | Status: SHIPPED | OUTPATIENT
Start: 2021-05-10 | End: 2022-06-03 | Stop reason: SDUPTHER

## 2021-05-12 ENCOUNTER — PATIENT MESSAGE (OUTPATIENT)
Dept: RESEARCH | Facility: HOSPITAL | Age: 41
End: 2021-05-12

## 2021-05-17 ENCOUNTER — DOCUMENTATION ONLY (OUTPATIENT)
Dept: TRANSPLANT | Facility: CLINIC | Age: 41
End: 2021-05-17

## 2021-05-17 DIAGNOSIS — Z79.60 LONG-TERM USE OF IMMUNOSUPPRESSANT MEDICATION: ICD-10-CM

## 2021-05-17 DIAGNOSIS — Z94.83 STATUS POST SIMULTANEOUS KIDNEY AND PANCREAS TRANSPLANT: ICD-10-CM

## 2021-05-17 DIAGNOSIS — Z94.0 STATUS POST SIMULTANEOUS KIDNEY AND PANCREAS TRANSPLANT: ICD-10-CM

## 2021-05-17 LAB — EXT TACROLIMUS LVL: 6.5

## 2021-05-17 RX ORDER — TACROLIMUS 1 MG/1
CAPSULE ORAL
Qty: 390 CAPSULE | Refills: 11 | Status: SHIPPED | OUTPATIENT
Start: 2021-05-17 | End: 2021-10-18

## 2021-05-28 ENCOUNTER — DOCUMENTATION ONLY (OUTPATIENT)
Dept: TRANSPLANT | Facility: CLINIC | Age: 41
End: 2021-05-28

## 2021-05-28 LAB — EXT TACROLIMUS LVL: 8.7

## 2021-06-08 RX ORDER — ERGOCALCIFEROL 1.25 MG/1
50000 CAPSULE ORAL
Qty: 4 CAPSULE | Refills: 5 | Status: CANCELLED | OUTPATIENT
Start: 2021-06-08

## 2021-06-17 ENCOUNTER — DOCUMENTATION ONLY (OUTPATIENT)
Dept: TRANSPLANT | Facility: CLINIC | Age: 41
End: 2021-06-17

## 2021-06-17 LAB
EXT ALBUMIN: 4.7 (ref 4.2–5.4)
EXT ALKALINE PHOSPHATASE: ABNORMAL
EXT ALLOSURE: ABNORMAL
EXT ALT: ABNORMAL
EXT AMYLASE: 80 (ref 23–130)
EXT ANC: ABNORMAL
EXT AST: ABNORMAL
EXT BACTERIA UA: ABNORMAL
EXT BANDS%: ABNORMAL
EXT BILIRUBIN DIRECT: ABNORMAL
EXT BILIRUBIN TOTAL: ABNORMAL
EXT BK VIRUS DNA QN PCR: ABNORMAL
EXT BK VIRUS DNA QUANT, PCR, URINE: ABNORMAL
EXT BUN: 25 (ref 6–22)
EXT C PEPTIDE: ABNORMAL
EXT CALCIUM: 9.8 (ref 9–10.2)
EXT CHLORIDE: 110 (ref 97–108)
EXT CHOLESTEROL (LIPID PANEL): ABNORMAL
EXT CHOLESTEROL: ABNORMAL
EXT CMV DNA QUANT. BY PCR: ABNORMAL
EXT CO2: 28 (ref 26–34)
EXT CREATININE: 2.12 MG/DL (ref 0.92–1.35)
EXT CYCLOSPORINE LVL: ABNORMAL
EXT EBV DNA BY PCR: ABNORMAL
EXT EBV DNA-COPIES/ML: ABNORMAL
EXT EOSINOPHIL%: 2 (ref 0–4)
EXT FERRITIN: ABNORMAL
EXT GFR MDRD AF AMER: 44.95
EXT GFR MDRD NON AF AMER: ABNORMAL
EXT GLUCOSE UA: ABNORMAL
EXT GLUCOSE: 91 (ref 74–112)
EXT HBV DNA QUANT PCR: ABNORMAL
EXT HCV QUANT: ABNORMAL
EXT HDL: ABNORMAL
EXT HEMATOCRIT: 40.4 (ref 40.2–51.4)
EXT HEMOGLOBIN A1C: ABNORMAL
EXT HEMOGLOBIN: 11.8 (ref 13.6–17.2)
EXT HEP B S AG: ABNORMAL
EXT HIV RNA QUANT PCR: ABNORMAL
EXT HIV: ABNORMAL
EXT IMMUNKNOW (STIMULATED): ABNORMAL
EXT INR: ABNORMAL
EXT IRON SATURATION: ABNORMAL
EXT LDH, TOTAL: ABNORMAL
EXT LDL CHOLESTEROL: ABNORMAL
EXT LIPASE: 26 (ref 31–96)
EXT LYMPH%: 25 (ref 20–48)
EXT MAGNESIUM: 1.74 (ref 1.69–2.15)
EXT MONOCYTES%: 11 (ref 1–9)
EXT NITRITES UA: ABNORMAL
EXT PHOSPHORUS: 3.9 (ref 2.8–4.8)
EXT PLATELETS: 259 (ref 160–400)
EXT POTASSIUM: 5 (ref 3.6–5.1)
EXT PROT/CREAT RATIO UR: ABNORMAL
EXT PROTEIN TOTAL: ABNORMAL
EXT PROTEIN UA: ABNORMAL
EXT PT: ABNORMAL
EXT PTH, INTACT: ABNORMAL
EXT RBC UA: ABNORMAL
EXT SARS COV-2 (COVID-19): ABNORMAL
EXT SEGS%: 61 (ref 45–75)
EXT SERUM IRON: ABNORMAL
EXT SIROLIMUS LVL: ABNORMAL
EXT SODIUM: 139 MMOL/L (ref 135–142)
EXT STOOL CDIFF: ABNORMAL
EXT STOOL CMV: ABNORMAL
EXT STOOL CULTURE: ABNORMAL
EXT STOOL OCP: ABNORMAL
EXT TACROLIMUS LVL: 9
EXT TIBC: ABNORMAL
EXT TRIGLYCERIDES: ABNORMAL
EXT UNSATURATED IRON BINDING CAP.: ABNORMAL
EXT URIC ACID: ABNORMAL
EXT URINE CULTURE: ABNORMAL
EXT VIT D 25 HYDROXY: ABNORMAL
EXT WBC UA: ABNORMAL
EXT WBC: 2.4 (ref 4.8–10.8)

## 2021-06-18 DIAGNOSIS — Z94.0 KIDNEY REPLACED BY TRANSPLANT: Primary | ICD-10-CM

## 2021-06-18 RX ORDER — MYCOPHENOLIC ACID 180 MG/1
360 TABLET, DELAYED RELEASE ORAL 2 TIMES DAILY
Qty: 120 TABLET | Refills: 11 | Status: ON HOLD | OUTPATIENT
Start: 2021-06-18 | End: 2021-11-15 | Stop reason: SDUPTHER

## 2021-06-24 ENCOUNTER — PATIENT MESSAGE (OUTPATIENT)
Dept: TRANSPLANT | Facility: CLINIC | Age: 41
End: 2021-06-24

## 2021-06-30 ENCOUNTER — DOCUMENTATION ONLY (OUTPATIENT)
Dept: TRANSPLANT | Facility: CLINIC | Age: 41
End: 2021-06-30

## 2021-06-30 ENCOUNTER — PATIENT MESSAGE (OUTPATIENT)
Dept: TRANSPLANT | Facility: CLINIC | Age: 41
End: 2021-06-30

## 2021-06-30 DIAGNOSIS — E87.5 HYPERKALEMIA: Primary | ICD-10-CM

## 2021-06-30 LAB
EXT ALBUMIN: 4.9 (ref 4.2–5.4)
EXT BUN: 25 (ref 6–22)
EXT CALCIUM: 9.6 (ref 9–10.2)
EXT CHLORIDE: 107 (ref 97–108)
EXT CMV DNA QUANT. BY PCR: NOT DETECTED
EXT CO2: 27 (ref 26–34)
EXT CREATININE: 2.12 MG/DL (ref 0.92–1.35)
EXT EOSINOPHIL%: 4 (ref 0–4)
EXT GFR MDRD AF AMER: 44.95
EXT GLUCOSE: 86 (ref 74–112)
EXT HEMATOCRIT: 42.2 (ref 40.2–51.4)
EXT HEMOGLOBIN: 12.7 (ref 13.6–17.2)
EXT LYMPH%: 21 (ref 20–48)
EXT MONOCYTES%: 20 (ref 1–9)
EXT PHOSPHORUS: 4.2 (ref 2.8–4.8)
EXT PLATELETS: 253 (ref 160–400)
EXT POTASSIUM: 5.4 (ref 3.6–5.1)
EXT SEGS%: 52 (ref 45–75)
EXT SODIUM: 140 MMOL/L (ref 135–142)
EXT TACROLIMUS LVL: 7.6
EXT WBC: 2.4 (ref 4.8–10.8)

## 2021-07-20 ENCOUNTER — OFFICE VISIT (OUTPATIENT)
Dept: TRANSPLANT | Facility: CLINIC | Age: 41
End: 2021-07-20
Payer: MEDICARE

## 2021-07-20 ENCOUNTER — DOCUMENTATION ONLY (OUTPATIENT)
Dept: TRANSPLANT | Facility: CLINIC | Age: 41
End: 2021-07-20

## 2021-07-20 DIAGNOSIS — Z94.83 STATUS POST SIMULTANEOUS KIDNEY AND PANCREAS TRANSPLANT: Primary | Chronic | ICD-10-CM

## 2021-07-20 DIAGNOSIS — N25.81 SECONDARY HYPERPARATHYROIDISM: ICD-10-CM

## 2021-07-20 DIAGNOSIS — D84.9 IMMUNOCOMPROMISED STATE: ICD-10-CM

## 2021-07-20 DIAGNOSIS — Z79.60 LONG-TERM USE OF IMMUNOSUPPRESSANT MEDICATION: ICD-10-CM

## 2021-07-20 DIAGNOSIS — I15.0 RENOVASCULAR HYPERTENSION: ICD-10-CM

## 2021-07-20 DIAGNOSIS — D63.8 ANEMIA OF CHRONIC DISEASE: ICD-10-CM

## 2021-07-20 DIAGNOSIS — Z29.89 PROPHYLACTIC IMMUNOTHERAPY: ICD-10-CM

## 2021-07-20 DIAGNOSIS — N18.30 CKD STAGE 3 DUE TO TYPE 1 DIABETES MELLITUS: ICD-10-CM

## 2021-07-20 DIAGNOSIS — E10.22 CKD STAGE 3 DUE TO TYPE 1 DIABETES MELLITUS: ICD-10-CM

## 2021-07-20 DIAGNOSIS — Z91.89 AT RISK FOR OPPORTUNISTIC INFECTIONS: ICD-10-CM

## 2021-07-20 DIAGNOSIS — Z94.0 STATUS POST SIMULTANEOUS KIDNEY AND PANCREAS TRANSPLANT: Primary | Chronic | ICD-10-CM

## 2021-07-20 LAB
EXT ALBUMIN: 4.9 (ref 4.2–5.4)
EXT BUN: 31 (ref 6–22)
EXT CALCIUM: 9.6 (ref 9–10.2)
EXT CHLORIDE: 107 (ref 97–108)
EXT CMV DNA QUANT. BY PCR: NOT DETECTED
EXT CO2: 27 (ref 26–34)
EXT CREATININE: 2.5 MG/DL (ref 0.92–1.35)
EXT EOSINOPHIL%: 3 (ref 0–4)
EXT GLUCOSE: 95 (ref 74–112)
EXT HEMATOCRIT: 46.2 (ref 40.2–51.4)
EXT HEMOGLOBIN: 13.6 (ref 13.6–17.2)
EXT LYMPH%: 26 (ref 20–48)
EXT MONOCYTES%: 19 (ref 1–9)
EXT PHOSPHORUS: 3.8 (ref 2.8–4.8)
EXT PLATELETS: 253 (ref 160–400)
EXT POTASSIUM: 5.1 (ref 3.6–5.1)
EXT SEGS%: 50 (ref 45–75)
EXT SODIUM: 142 MMOL/L (ref 135–142)
EXT TACROLIMUS LVL: 11
EXT WBC: 2.9 (ref 4.8–10.8)

## 2021-07-20 PROCEDURE — 99213 PR OFFICE/OUTPT VISIT, EST, LEVL III, 20-29 MIN: ICD-10-PCS | Mod: 95,,, | Performed by: NURSE PRACTITIONER

## 2021-07-20 PROCEDURE — 99213 OFFICE O/P EST LOW 20 MIN: CPT | Mod: 95,,, | Performed by: NURSE PRACTITIONER

## 2021-07-21 ENCOUNTER — PATIENT MESSAGE (OUTPATIENT)
Dept: TRANSPLANT | Facility: CLINIC | Age: 41
End: 2021-07-21

## 2021-08-04 LAB — EXT HCV RNA QUANT PCR: <10 IU/ML

## 2021-08-05 ENCOUNTER — TELEPHONE (OUTPATIENT)
Dept: HEPATOLOGY | Facility: CLINIC | Age: 41
End: 2021-08-05

## 2021-08-05 DIAGNOSIS — Z86.19 HEPATITIS C VIRUS INFECTION CURED AFTER ANTIVIRAL DRUG THERAPY: ICD-10-CM

## 2021-08-06 ENCOUNTER — DOCUMENTATION ONLY (OUTPATIENT)
Dept: TRANSPLANT | Facility: CLINIC | Age: 41
End: 2021-08-06

## 2021-08-06 LAB
EXT ALBUMIN: 4.7 (ref 4.2–5.4)
EXT ALKALINE PHOSPHATASE: 157 (ref 57–111)
EXT ALT: 18 (ref 10–49)
EXT AMYLASE: 87 (ref 23–130)
EXT AST: 26 (ref 18–30)
EXT BILIRUBIN DIRECT: <0.1 MG/DL (ref 0–0.6)
EXT BILIRUBIN TOTAL: 0.3 (ref 0.3–2)
EXT BK VIRUS DNA QN PCR: ABNORMAL
EXT BUN: 25 (ref 6–22)
EXT CALCIUM: 10.2 (ref 9–10.2)
EXT CHLORIDE: 106 (ref 97–108)
EXT CO2: 28 (ref 26–34)
EXT CREATININE: 2.45 MG/DL (ref 0.92–1.35)
EXT EOSINOPHIL%: 5 (ref 0–4)
EXT GFR MDRD AF AMER: 38.04
EXT GLUCOSE UA: NEGATIVE
EXT GLUCOSE: 91 (ref 74–112)
EXT HCV QUANT: NOT DETECTED
EXT HEMATOCRIT: 46.8 (ref 40.2–51.4)
EXT HEMOGLOBIN A1C: 5.2 % (ref 4–6)
EXT HEMOGLOBIN: 13.9 (ref 13.6–17.2)
EXT LIPASE: 25 (ref 31–96)
EXT LYMPH%: 22 (ref 20–48)
EXT MAGNESIUM: 1.81 (ref 1.69–2.15)
EXT MONOCYTES%: 15 (ref 1–9)
EXT NITRITES UA: NEGATIVE
EXT PHOSPHORUS: 3.9 (ref 2.8–4.8)
EXT PLATELETS: 229 (ref 160–400)
EXT POTASSIUM: 4.5 (ref 3.6–5.1)
EXT PROT/CREAT RATIO UR: 0.08
EXT PROTEIN TOTAL: 6.9 (ref 6.2–8)
EXT PROTEIN UA: NEGATIVE
EXT SEGS%: 55 (ref 45–75)
EXT SODIUM: 139 MMOL/L (ref 135–142)
EXT TACROLIMUS LVL: 10.1
EXT WBC: 2.5 (ref 4.8–10.8)

## 2021-08-12 ENCOUNTER — PATIENT MESSAGE (OUTPATIENT)
Dept: TRANSPLANT | Facility: CLINIC | Age: 41
End: 2021-08-12

## 2021-10-18 ENCOUNTER — DOCUMENTATION ONLY (OUTPATIENT)
Dept: TRANSPLANT | Facility: CLINIC | Age: 41
End: 2021-10-18

## 2021-10-18 DIAGNOSIS — Z94.83 STATUS POST SIMULTANEOUS KIDNEY AND PANCREAS TRANSPLANT: ICD-10-CM

## 2021-10-18 DIAGNOSIS — Z94.0 STATUS POST SIMULTANEOUS KIDNEY AND PANCREAS TRANSPLANT: ICD-10-CM

## 2021-10-18 DIAGNOSIS — Z79.60 LONG-TERM USE OF IMMUNOSUPPRESSANT MEDICATION: ICD-10-CM

## 2021-10-18 LAB
EXT AMYLASE: 78 (ref 23–130)
EXT BUN: 25 (ref 6–22)
EXT CALCIUM: 10.3 (ref 9–10.2)
EXT CHLORIDE: 104 (ref 97–108)
EXT CO2: 32 (ref 26–34)
EXT CREATININE: 2.37 MG/DL (ref 0.92–1.35)
EXT EOSINOPHIL%: 4 (ref 0–4)
EXT GFR MDRD AF AMER: 39.52
EXT GLUCOSE: 88 (ref 74–112)
EXT HEMATOCRIT: 44 (ref 40.2–51.4)
EXT HEMOGLOBIN: 13.3 (ref 13.6–17.2)
EXT LIPASE: 22 (ref 31–96)
EXT LYMPH%: 20 (ref 20–48)
EXT MAGNESIUM: 1.76 (ref 1.69–2.15)
EXT MONOCYTES%: 19 (ref 1–9)
EXT PHOSPHORUS: 3.9 (ref 2.8–4.8)
EXT PLATELETS: 248 (ref 160–400)
EXT POTASSIUM: 4.7 (ref 3.6–5.1)
EXT SEGS%: 55 (ref 45–55)
EXT SODIUM: 141 MMOL/L (ref 135–142)
EXT TACROLIMUS LVL: 6.6
EXT WBC: 2.6 (ref 4.8–10.8)

## 2021-10-18 RX ORDER — TACROLIMUS 1 MG/1
CAPSULE ORAL
Qty: 450 CAPSULE | Refills: 11 | Status: ON HOLD | OUTPATIENT
Start: 2021-10-18 | End: 2021-11-15 | Stop reason: SDUPTHER

## 2021-10-29 DIAGNOSIS — N18.30 CKD STAGE 3 DUE TO TYPE 1 DIABETES MELLITUS: ICD-10-CM

## 2021-10-29 DIAGNOSIS — Z94.83 STATUS POST PANCREAS TRANSPLANTATION: ICD-10-CM

## 2021-10-29 DIAGNOSIS — Z94.0 KIDNEY REPLACED BY TRANSPLANT: Primary | ICD-10-CM

## 2021-10-29 DIAGNOSIS — E10.22 CKD STAGE 3 DUE TO TYPE 1 DIABETES MELLITUS: ICD-10-CM

## 2021-11-01 ENCOUNTER — OFFICE VISIT (OUTPATIENT)
Dept: TRANSPLANT | Facility: CLINIC | Age: 41
End: 2021-11-01
Payer: MEDICARE

## 2021-11-01 ENCOUNTER — LAB VISIT (OUTPATIENT)
Dept: LAB | Facility: HOSPITAL | Age: 41
End: 2021-11-01
Payer: MEDICARE

## 2021-11-01 VITALS
DIASTOLIC BLOOD PRESSURE: 68 MMHG | SYSTOLIC BLOOD PRESSURE: 145 MMHG | TEMPERATURE: 97 F | RESPIRATION RATE: 16 BRPM | WEIGHT: 212.75 LBS | HEART RATE: 95 BPM | OXYGEN SATURATION: 95 % | HEIGHT: 70 IN | BODY MASS INDEX: 30.46 KG/M2

## 2021-11-01 DIAGNOSIS — E10.22 CKD STAGE 3 DUE TO TYPE 1 DIABETES MELLITUS: ICD-10-CM

## 2021-11-01 DIAGNOSIS — T86.19 RECEIVED KIDNEY FROM DONOR WITH HEPATITIS C: ICD-10-CM

## 2021-11-01 DIAGNOSIS — Z91.89 AT RISK FOR OPPORTUNISTIC INFECTIONS: ICD-10-CM

## 2021-11-01 DIAGNOSIS — Z94.83 STATUS POST SIMULTANEOUS KIDNEY AND PANCREAS TRANSPLANT: Primary | Chronic | ICD-10-CM

## 2021-11-01 DIAGNOSIS — Z79.60 LONG-TERM USE OF IMMUNOSUPPRESSANT MEDICATION: ICD-10-CM

## 2021-11-01 DIAGNOSIS — I15.0 RENOVASCULAR HYPERTENSION: ICD-10-CM

## 2021-11-01 DIAGNOSIS — Z94.0 KIDNEY REPLACED BY TRANSPLANT: ICD-10-CM

## 2021-11-01 DIAGNOSIS — N18.30 CKD STAGE 3 DUE TO TYPE 1 DIABETES MELLITUS: ICD-10-CM

## 2021-11-01 DIAGNOSIS — Z94.0 STATUS POST SIMULTANEOUS KIDNEY AND PANCREAS TRANSPLANT: Primary | Chronic | ICD-10-CM

## 2021-11-01 DIAGNOSIS — K31.84 GASTROPARESIS DUE TO DM: ICD-10-CM

## 2021-11-01 DIAGNOSIS — E11.43 GASTROPARESIS DUE TO DM: ICD-10-CM

## 2021-11-01 DIAGNOSIS — Z94.83 STATUS POST PANCREAS TRANSPLANTATION: ICD-10-CM

## 2021-11-01 LAB
ALBUMIN SERPL BCP-MCNC: 4.1 G/DL (ref 3.5–5.2)
ALBUMIN SERPL BCP-MCNC: 4.1 G/DL (ref 3.5–5.2)
ALP SERPL-CCNC: 95 U/L (ref 55–135)
ALT SERPL W/O P-5'-P-CCNC: 24 U/L (ref 10–44)
AMYLASE SERPL-CCNC: 77 U/L (ref 20–110)
ANION GAP SERPL CALC-SCNC: 6 MMOL/L (ref 8–16)
AST SERPL-CCNC: 21 U/L (ref 10–40)
BASOPHILS # BLD AUTO: 0.01 K/UL (ref 0–0.2)
BASOPHILS NFR BLD: 0.4 % (ref 0–1.9)
BILIRUB DIRECT SERPL-MCNC: 0.2 MG/DL (ref 0.1–0.3)
BILIRUB SERPL-MCNC: 0.4 MG/DL (ref 0.1–1)
BUN SERPL-MCNC: 28 MG/DL (ref 6–20)
C PEPTIDE SERPL-MCNC: 3.81 NG/ML (ref 0.78–5.19)
CALCIUM SERPL-MCNC: 9.8 MG/DL (ref 8.7–10.5)
CHLORIDE SERPL-SCNC: 107 MMOL/L (ref 95–110)
CO2 SERPL-SCNC: 26 MMOL/L (ref 23–29)
CREAT SERPL-MCNC: 2.4 MG/DL (ref 0.5–1.4)
DIFFERENTIAL METHOD: ABNORMAL
EOSINOPHIL # BLD AUTO: 0.1 K/UL (ref 0–0.5)
EOSINOPHIL NFR BLD: 3.9 % (ref 0–8)
ERYTHROCYTE [DISTWIDTH] IN BLOOD BY AUTOMATED COUNT: 15.3 % (ref 11.5–14.5)
EST. GFR  (AFRICAN AMERICAN): 37.9 ML/MIN/1.73 M^2
EST. GFR  (NON AFRICAN AMERICAN): 32.8 ML/MIN/1.73 M^2
ESTIMATED AVG GLUCOSE: 105 MG/DL (ref 68–131)
GLUCOSE SERPL-MCNC: 98 MG/DL (ref 70–110)
HBA1C MFR BLD: 5.3 % (ref 4–5.6)
HCT VFR BLD AUTO: 42.3 % (ref 40–54)
HGB BLD-MCNC: 12.7 G/DL (ref 14–18)
IMM GRANULOCYTES # BLD AUTO: 0.04 K/UL (ref 0–0.04)
IMM GRANULOCYTES NFR BLD AUTO: 1.6 % (ref 0–0.5)
LIPASE SERPL-CCNC: 18 U/L (ref 4–60)
LYMPHOCYTES # BLD AUTO: 0.5 K/UL (ref 1–4.8)
LYMPHOCYTES NFR BLD: 18.6 % (ref 18–48)
MAGNESIUM SERPL-MCNC: 1.7 MG/DL (ref 1.6–2.6)
MCH RBC QN AUTO: 24.8 PG (ref 27–31)
MCHC RBC AUTO-ENTMCNC: 30 G/DL (ref 32–36)
MCV RBC AUTO: 83 FL (ref 82–98)
MONOCYTES # BLD AUTO: 0.5 K/UL (ref 0.3–1)
MONOCYTES NFR BLD: 19.8 % (ref 4–15)
NEUTROPHILS # BLD AUTO: 1.4 K/UL (ref 1.8–7.7)
NEUTROPHILS NFR BLD: 55.7 % (ref 38–73)
NRBC BLD-RTO: 0 /100 WBC
PHOSPHATE SERPL-MCNC: 3.8 MG/DL (ref 2.7–4.5)
PLATELET # BLD AUTO: 224 K/UL (ref 150–450)
PMV BLD AUTO: 10.1 FL (ref 9.2–12.9)
POTASSIUM SERPL-SCNC: 4.7 MMOL/L (ref 3.5–5.1)
PROT SERPL-MCNC: 7.3 G/DL (ref 6–8.4)
RBC # BLD AUTO: 5.13 M/UL (ref 4.6–6.2)
SODIUM SERPL-SCNC: 139 MMOL/L (ref 136–145)
TACROLIMUS BLD-MCNC: 9.6 NG/ML (ref 5–15)
WBC # BLD AUTO: 2.58 K/UL (ref 3.9–12.7)

## 2021-11-01 PROCEDURE — 84681 ASSAY OF C-PEPTIDE: CPT | Performed by: INTERNAL MEDICINE

## 2021-11-01 PROCEDURE — 82150 ASSAY OF AMYLASE: CPT | Performed by: INTERNAL MEDICINE

## 2021-11-01 PROCEDURE — 99999 PR PBB SHADOW E&M-EST. PATIENT-LVL V: CPT | Mod: PBBFAC,,, | Performed by: NURSE PRACTITIONER

## 2021-11-01 PROCEDURE — 99215 PR OFFICE/OUTPT VISIT, EST, LEVL V, 40-54 MIN: ICD-10-PCS | Mod: S$PBB,,, | Performed by: NURSE PRACTITIONER

## 2021-11-01 PROCEDURE — 83735 ASSAY OF MAGNESIUM: CPT | Performed by: INTERNAL MEDICINE

## 2021-11-01 PROCEDURE — 83690 ASSAY OF LIPASE: CPT | Performed by: INTERNAL MEDICINE

## 2021-11-01 PROCEDURE — 80069 RENAL FUNCTION PANEL: CPT | Performed by: INTERNAL MEDICINE

## 2021-11-01 PROCEDURE — 85025 COMPLETE CBC W/AUTO DIFF WBC: CPT | Performed by: INTERNAL MEDICINE

## 2021-11-01 PROCEDURE — 80197 ASSAY OF TACROLIMUS: CPT | Performed by: INTERNAL MEDICINE

## 2021-11-01 PROCEDURE — 99999 PR PBB SHADOW E&M-EST. PATIENT-LVL V: ICD-10-PCS | Mod: PBBFAC,,, | Performed by: NURSE PRACTITIONER

## 2021-11-01 PROCEDURE — 99215 OFFICE O/P EST HI 40 MIN: CPT | Mod: S$PBB,,, | Performed by: NURSE PRACTITIONER

## 2021-11-01 PROCEDURE — 84075 ASSAY ALKALINE PHOSPHATASE: CPT | Performed by: INTERNAL MEDICINE

## 2021-11-01 PROCEDURE — 36415 COLL VENOUS BLD VENIPUNCTURE: CPT | Performed by: INTERNAL MEDICINE

## 2021-11-01 PROCEDURE — 83036 HEMOGLOBIN GLYCOSYLATED A1C: CPT | Performed by: INTERNAL MEDICINE

## 2021-11-01 PROCEDURE — 99215 OFFICE O/P EST HI 40 MIN: CPT | Mod: PBBFAC | Performed by: NURSE PRACTITIONER

## 2021-11-01 PROCEDURE — 87799 DETECT AGENT NOS DNA QUANT: CPT | Performed by: INTERNAL MEDICINE

## 2021-11-01 RX ORDER — PREDNISONE 5 MG/1
TABLET ORAL
Qty: 30 TABLET | Refills: 11 | Status: SHIPPED | OUTPATIENT
Start: 2021-11-01 | End: 2022-08-30 | Stop reason: SDUPTHER

## 2021-11-01 RX ORDER — KETOCONAZOLE 200 MG/1
100 TABLET ORAL DAILY
Qty: 15 TABLET | Refills: 11 | Status: SHIPPED | OUTPATIENT
Start: 2021-11-01 | End: 2022-08-30 | Stop reason: SDUPTHER

## 2021-11-03 LAB
BKV DNA SERPL NAA+PROBE-ACNC: 295 COPIES/ML
BKV DNA SERPL NAA+PROBE-LOG#: 2.47 LOG (10) COPIES/ML
BKV DNA SERPL QL NAA+PROBE: DETECTED

## 2021-11-12 ENCOUNTER — HOSPITAL ENCOUNTER (INPATIENT)
Facility: HOSPITAL | Age: 41
LOS: 3 days | Discharge: HOME | DRG: 389 | End: 2021-11-15
Attending: SURGERY | Admitting: SURGERY
Payer: MEDICARE

## 2021-11-12 DIAGNOSIS — K56.609 SMALL BOWEL OBSTRUCTION: Primary | ICD-10-CM

## 2021-11-12 DIAGNOSIS — Z91.89 AT RISK FOR OPPORTUNISTIC INFECTIONS: ICD-10-CM

## 2021-11-12 DIAGNOSIS — N18.32 STAGE 3B CHRONIC KIDNEY DISEASE: ICD-10-CM

## 2021-11-12 DIAGNOSIS — B33.8 BK VIRUS NEPHROPATHY: ICD-10-CM

## 2021-11-12 DIAGNOSIS — Z94.83 STATUS POST SIMULTANEOUS KIDNEY AND PANCREAS TRANSPLANT: ICD-10-CM

## 2021-11-12 DIAGNOSIS — N05.8 BK VIRUS NEPHROPATHY: ICD-10-CM

## 2021-11-12 DIAGNOSIS — H54.3 BLINDNESS OF BOTH EYES DUE TO DIABETES MELLITUS: ICD-10-CM

## 2021-11-12 DIAGNOSIS — Z94.83 STATUS POST SIMULTANEOUS KIDNEY AND PANCREAS TRANSPLANT: Chronic | ICD-10-CM

## 2021-11-12 DIAGNOSIS — Z94.0 STATUS POST SIMULTANEOUS KIDNEY AND PANCREAS TRANSPLANT: ICD-10-CM

## 2021-11-12 DIAGNOSIS — I15.0 RENOVASCULAR HYPERTENSION: ICD-10-CM

## 2021-11-12 DIAGNOSIS — Z29.89 PROPHYLACTIC IMMUNOTHERAPY: ICD-10-CM

## 2021-11-12 DIAGNOSIS — N18.30 CKD STAGE 3 DUE TO TYPE 2 DIABETES MELLITUS: ICD-10-CM

## 2021-11-12 DIAGNOSIS — E11.39 BLINDNESS OF BOTH EYES DUE TO DIABETES MELLITUS: ICD-10-CM

## 2021-11-12 DIAGNOSIS — E11.22 CKD STAGE 3 DUE TO TYPE 2 DIABETES MELLITUS: ICD-10-CM

## 2021-11-12 DIAGNOSIS — N17.9 AKI (ACUTE KIDNEY INJURY): ICD-10-CM

## 2021-11-12 DIAGNOSIS — Z79.60 LONG-TERM USE OF IMMUNOSUPPRESSANT MEDICATION: ICD-10-CM

## 2021-11-12 DIAGNOSIS — Z94.0 KIDNEY REPLACED BY TRANSPLANT: ICD-10-CM

## 2021-11-12 DIAGNOSIS — Z94.0 STATUS POST SIMULTANEOUS KIDNEY AND PANCREAS TRANSPLANT: Chronic | ICD-10-CM

## 2021-11-12 LAB
ALBUMIN SERPL BCP-MCNC: 3.7 G/DL (ref 3.5–5.2)
ALBUMIN SERPL BCP-MCNC: 3.8 G/DL (ref 3.5–5.2)
AMYLASE SERPL-CCNC: 50 U/L (ref 20–110)
AMYLASE SERPL-CCNC: 52 U/L (ref 20–110)
ANION GAP SERPL CALC-SCNC: 11 MMOL/L (ref 8–16)
ANION GAP SERPL CALC-SCNC: 12 MMOL/L (ref 8–16)
ANION GAP SERPL CALC-SCNC: 13 MMOL/L (ref 8–16)
BASOPHILS # BLD AUTO: 0.02 K/UL (ref 0–0.2)
BASOPHILS # BLD AUTO: 0.03 K/UL (ref 0–0.2)
BASOPHILS NFR BLD: 0.6 % (ref 0–1.9)
BASOPHILS NFR BLD: 0.6 % (ref 0–1.9)
BUN SERPL-MCNC: 21 MG/DL (ref 6–20)
BUN SERPL-MCNC: 22 MG/DL (ref 6–20)
BUN SERPL-MCNC: 23 MG/DL (ref 6–20)
CALCIUM SERPL-MCNC: 10 MG/DL (ref 8.7–10.5)
CALCIUM SERPL-MCNC: 9.6 MG/DL (ref 8.7–10.5)
CALCIUM SERPL-MCNC: 9.7 MG/DL (ref 8.7–10.5)
CHLORIDE SERPL-SCNC: 103 MMOL/L (ref 95–110)
CHLORIDE SERPL-SCNC: 105 MMOL/L (ref 95–110)
CHLORIDE SERPL-SCNC: 106 MMOL/L (ref 95–110)
CO2 SERPL-SCNC: 18 MMOL/L (ref 23–29)
CO2 SERPL-SCNC: 21 MMOL/L (ref 23–29)
CO2 SERPL-SCNC: 24 MMOL/L (ref 23–29)
CREAT SERPL-MCNC: 2.4 MG/DL (ref 0.5–1.4)
DIFFERENTIAL METHOD: ABNORMAL
DIFFERENTIAL METHOD: ABNORMAL
EOSINOPHIL # BLD AUTO: 0.2 K/UL (ref 0–0.5)
EOSINOPHIL # BLD AUTO: 0.2 K/UL (ref 0–0.5)
EOSINOPHIL NFR BLD: 3.3 % (ref 0–8)
EOSINOPHIL NFR BLD: 4.4 % (ref 0–8)
ERYTHROCYTE [DISTWIDTH] IN BLOOD BY AUTOMATED COUNT: 14.7 % (ref 11.5–14.5)
ERYTHROCYTE [DISTWIDTH] IN BLOOD BY AUTOMATED COUNT: 14.8 % (ref 11.5–14.5)
EST. GFR  (AFRICAN AMERICAN): 37.9 ML/MIN/1.73 M^2
EST. GFR  (NON AFRICAN AMERICAN): 32.8 ML/MIN/1.73 M^2
GLUCOSE SERPL-MCNC: 89 MG/DL (ref 70–110)
GLUCOSE SERPL-MCNC: 91 MG/DL (ref 70–110)
GLUCOSE SERPL-MCNC: 95 MG/DL (ref 70–110)
HCT VFR BLD AUTO: 38.6 % (ref 40–54)
HCT VFR BLD AUTO: 40.3 % (ref 40–54)
HGB BLD-MCNC: 11.8 G/DL (ref 14–18)
HGB BLD-MCNC: 12.1 G/DL (ref 14–18)
IMM GRANULOCYTES # BLD AUTO: 0.1 K/UL (ref 0–0.04)
IMM GRANULOCYTES # BLD AUTO: 0.22 K/UL (ref 0–0.04)
IMM GRANULOCYTES NFR BLD AUTO: 2.8 % (ref 0–0.5)
IMM GRANULOCYTES NFR BLD AUTO: 4.5 % (ref 0–0.5)
LIPASE SERPL-CCNC: 12 U/L (ref 4–60)
LIPASE SERPL-CCNC: 13 U/L (ref 4–60)
LYMPHOCYTES # BLD AUTO: 0.4 K/UL (ref 1–4.8)
LYMPHOCYTES # BLD AUTO: 0.4 K/UL (ref 1–4.8)
LYMPHOCYTES NFR BLD: 10.5 % (ref 18–48)
LYMPHOCYTES NFR BLD: 9 % (ref 18–48)
MAGNESIUM SERPL-MCNC: 1.6 MG/DL (ref 1.6–2.6)
MCH RBC QN AUTO: 24.9 PG (ref 27–31)
MCH RBC QN AUTO: 25 PG (ref 27–31)
MCHC RBC AUTO-ENTMCNC: 30 G/DL (ref 32–36)
MCHC RBC AUTO-ENTMCNC: 30.6 G/DL (ref 32–36)
MCV RBC AUTO: 82 FL (ref 82–98)
MCV RBC AUTO: 83 FL (ref 82–98)
MONOCYTES # BLD AUTO: 0.6 K/UL (ref 0.3–1)
MONOCYTES # BLD AUTO: 0.7 K/UL (ref 0.3–1)
MONOCYTES NFR BLD: 14.7 % (ref 4–15)
MONOCYTES NFR BLD: 15.2 % (ref 4–15)
NEUTROPHILS # BLD AUTO: 2.4 K/UL (ref 1.8–7.7)
NEUTROPHILS # BLD AUTO: 3.3 K/UL (ref 1.8–7.7)
NEUTROPHILS NFR BLD: 66.5 % (ref 38–73)
NEUTROPHILS NFR BLD: 67.9 % (ref 38–73)
NRBC BLD-RTO: 0 /100 WBC
NRBC BLD-RTO: 0 /100 WBC
PHOSPHATE SERPL-MCNC: 3.4 MG/DL (ref 2.7–4.5)
PHOSPHATE SERPL-MCNC: 3.7 MG/DL (ref 2.7–4.5)
PLATELET # BLD AUTO: 245 K/UL (ref 150–450)
PLATELET # BLD AUTO: 262 K/UL (ref 150–450)
PMV BLD AUTO: 10.4 FL (ref 9.2–12.9)
PMV BLD AUTO: 9.9 FL (ref 9.2–12.9)
POTASSIUM SERPL-SCNC: 4.9 MMOL/L (ref 3.5–5.1)
POTASSIUM SERPL-SCNC: 4.9 MMOL/L (ref 3.5–5.1)
POTASSIUM SERPL-SCNC: 5.6 MMOL/L (ref 3.5–5.1)
RBC # BLD AUTO: 4.72 M/UL (ref 4.6–6.2)
RBC # BLD AUTO: 4.86 M/UL (ref 4.6–6.2)
SODIUM SERPL-SCNC: 136 MMOL/L (ref 136–145)
SODIUM SERPL-SCNC: 138 MMOL/L (ref 136–145)
SODIUM SERPL-SCNC: 139 MMOL/L (ref 136–145)
TACROLIMUS BLD-MCNC: 4.8 NG/ML (ref 5–15)
WBC # BLD AUTO: 3.63 K/UL (ref 3.9–12.7)
WBC # BLD AUTO: 4.91 K/UL (ref 3.9–12.7)

## 2021-11-12 PROCEDURE — 80197 ASSAY OF TACROLIMUS: CPT | Performed by: PHYSICIAN ASSISTANT

## 2021-11-12 PROCEDURE — 25000003 PHARM REV CODE 250: Performed by: PHYSICIAN ASSISTANT

## 2021-11-12 PROCEDURE — 25000003 PHARM REV CODE 250: Performed by: SURGERY

## 2021-11-12 PROCEDURE — 63600175 PHARM REV CODE 636 W HCPCS: Performed by: PHYSICIAN ASSISTANT

## 2021-11-12 PROCEDURE — 85025 COMPLETE CBC W/AUTO DIFF WBC: CPT | Performed by: PHYSICIAN ASSISTANT

## 2021-11-12 PROCEDURE — 99233 PR SUBSEQUENT HOSPITAL CARE,LEVL III: ICD-10-PCS | Mod: ,,, | Performed by: PHYSICIAN ASSISTANT

## 2021-11-12 PROCEDURE — 20600001 HC STEP DOWN PRIVATE ROOM

## 2021-11-12 PROCEDURE — 99223 1ST HOSP IP/OBS HIGH 75: CPT | Mod: AI,,, | Performed by: PHYSICIAN ASSISTANT

## 2021-11-12 PROCEDURE — 36415 COLL VENOUS BLD VENIPUNCTURE: CPT | Performed by: PHYSICIAN ASSISTANT

## 2021-11-12 PROCEDURE — 82150 ASSAY OF AMYLASE: CPT | Performed by: PHYSICIAN ASSISTANT

## 2021-11-12 PROCEDURE — 99233 SBSQ HOSP IP/OBS HIGH 50: CPT | Mod: ,,, | Performed by: PHYSICIAN ASSISTANT

## 2021-11-12 PROCEDURE — 80069 RENAL FUNCTION PANEL: CPT | Performed by: PHYSICIAN ASSISTANT

## 2021-11-12 PROCEDURE — 83735 ASSAY OF MAGNESIUM: CPT | Performed by: PHYSICIAN ASSISTANT

## 2021-11-12 PROCEDURE — 99223 PR INITIAL HOSPITAL CARE,LEVL III: ICD-10-PCS | Mod: AI,,, | Performed by: PHYSICIAN ASSISTANT

## 2021-11-12 PROCEDURE — 83690 ASSAY OF LIPASE: CPT | Performed by: PHYSICIAN ASSISTANT

## 2021-11-12 PROCEDURE — 80048 BASIC METABOLIC PNL TOTAL CA: CPT | Performed by: PHYSICIAN ASSISTANT

## 2021-11-12 RX ORDER — SODIUM BICARBONATE 650 MG/1
650 TABLET ORAL 2 TIMES DAILY
Status: DISCONTINUED | OUTPATIENT
Start: 2021-11-12 | End: 2021-11-15 | Stop reason: HOSPADM

## 2021-11-12 RX ORDER — PANTOPRAZOLE SODIUM 40 MG/1
40 TABLET, DELAYED RELEASE ORAL DAILY
Status: DISCONTINUED | OUTPATIENT
Start: 2021-11-12 | End: 2021-11-15 | Stop reason: HOSPADM

## 2021-11-12 RX ORDER — SODIUM CHLORIDE 0.9 % (FLUSH) 0.9 %
10 SYRINGE (ML) INJECTION
Status: DISCONTINUED | OUTPATIENT
Start: 2021-11-12 | End: 2021-11-15 | Stop reason: HOSPADM

## 2021-11-12 RX ORDER — TACROLIMUS 1 MG/1
7 CAPSULE ORAL EVERY EVENING
Status: DISCONTINUED | OUTPATIENT
Start: 2021-11-12 | End: 2021-11-13

## 2021-11-12 RX ORDER — ASPIRIN 81 MG/1
81 TABLET ORAL DAILY
Status: DISCONTINUED | OUTPATIENT
Start: 2021-11-12 | End: 2021-11-15 | Stop reason: HOSPADM

## 2021-11-12 RX ORDER — PREDNISONE 5 MG/1
5 TABLET ORAL DAILY
Status: DISCONTINUED | OUTPATIENT
Start: 2021-11-12 | End: 2021-11-15 | Stop reason: HOSPADM

## 2021-11-12 RX ORDER — LANOLIN ALCOHOL/MO/W.PET/CERES
800 CREAM (GRAM) TOPICAL 3 TIMES DAILY
Status: DISCONTINUED | OUTPATIENT
Start: 2021-11-12 | End: 2021-11-15 | Stop reason: HOSPADM

## 2021-11-12 RX ORDER — TALC
6 POWDER (GRAM) TOPICAL NIGHTLY PRN
Status: DISCONTINUED | OUTPATIENT
Start: 2021-11-12 | End: 2021-11-15 | Stop reason: HOSPADM

## 2021-11-12 RX ORDER — MUPIROCIN 20 MG/G
OINTMENT TOPICAL 2 TIMES DAILY
Status: DISCONTINUED | OUTPATIENT
Start: 2021-11-12 | End: 2021-11-15 | Stop reason: HOSPADM

## 2021-11-12 RX ORDER — CALCITRIOL 0.25 UG/1
0.25 CAPSULE ORAL DAILY
Status: DISCONTINUED | OUTPATIENT
Start: 2021-11-12 | End: 2021-11-15 | Stop reason: HOSPADM

## 2021-11-12 RX ORDER — FUROSEMIDE 10 MG/ML
60 INJECTION INTRAMUSCULAR; INTRAVENOUS ONCE
Status: COMPLETED | OUTPATIENT
Start: 2021-11-12 | End: 2021-11-12

## 2021-11-12 RX ORDER — ONDANSETRON 8 MG/1
8 TABLET, ORALLY DISINTEGRATING ORAL EVERY 8 HOURS PRN
Status: DISCONTINUED | OUTPATIENT
Start: 2021-11-12 | End: 2021-11-15 | Stop reason: HOSPADM

## 2021-11-12 RX ORDER — ACETAMINOPHEN 325 MG/1
650 TABLET ORAL EVERY 8 HOURS PRN
Status: DISCONTINUED | OUTPATIENT
Start: 2021-11-12 | End: 2021-11-15 | Stop reason: HOSPADM

## 2021-11-12 RX ORDER — METOCLOPRAMIDE 5 MG/1
10 TABLET ORAL
Status: DISCONTINUED | OUTPATIENT
Start: 2021-11-12 | End: 2021-11-12

## 2021-11-12 RX ORDER — MYCOPHENOLIC ACID 180 MG/1
360 TABLET, DELAYED RELEASE ORAL 2 TIMES DAILY
Status: DISCONTINUED | OUTPATIENT
Start: 2021-11-12 | End: 2021-11-14

## 2021-11-12 RX ORDER — TACROLIMUS 1 MG/1
8 CAPSULE ORAL EVERY MORNING
Status: DISCONTINUED | OUTPATIENT
Start: 2021-11-12 | End: 2021-11-13

## 2021-11-12 RX ADMIN — ASPIRIN 81 MG: 81 TABLET, COATED ORAL at 09:11

## 2021-11-12 RX ADMIN — SODIUM BICARBONATE 650 MG: 650 TABLET ORAL at 09:11

## 2021-11-12 RX ADMIN — MYCOPHENILIC ACID 360 MG: 180 TABLET, DELAYED RELEASE ORAL at 08:11

## 2021-11-12 RX ADMIN — PANTOPRAZOLE SODIUM 40 MG: 40 TABLET, DELAYED RELEASE ORAL at 09:11

## 2021-11-12 RX ADMIN — MUPIROCIN: 20 OINTMENT TOPICAL at 09:11

## 2021-11-12 RX ADMIN — Medication 800 MG: at 09:11

## 2021-11-12 RX ADMIN — MYCOPHENILIC ACID 360 MG: 180 TABLET, DELAYED RELEASE ORAL at 09:11

## 2021-11-12 RX ADMIN — Medication 800 MG: at 08:11

## 2021-11-12 RX ADMIN — Medication 6 MG: at 08:11

## 2021-11-12 RX ADMIN — SODIUM BICARBONATE 650 MG: 650 TABLET ORAL at 08:11

## 2021-11-12 RX ADMIN — SODIUM BICARBONATE: 84 INJECTION, SOLUTION INTRAVENOUS at 09:11

## 2021-11-12 RX ADMIN — FUROSEMIDE 60 MG: 10 INJECTION, SOLUTION INTRAVENOUS at 09:11

## 2021-11-12 RX ADMIN — MUPIROCIN: 20 OINTMENT TOPICAL at 08:11

## 2021-11-12 RX ADMIN — CALCITRIOL CAPSULES 0.25 MCG 0.25 MCG: 0.25 CAPSULE ORAL at 09:11

## 2021-11-12 RX ADMIN — KETOCONAZOLE 100 MG: 200 TABLET ORAL at 09:11

## 2021-11-12 RX ADMIN — TACROLIMUS 7 MG: 1 CAPSULE ORAL at 05:11

## 2021-11-12 RX ADMIN — TACROLIMUS 8 MG: 1 CAPSULE ORAL at 09:11

## 2021-11-12 RX ADMIN — Medication 800 MG: at 02:11

## 2021-11-12 RX ADMIN — PREDNISONE 5 MG: 5 TABLET ORAL at 09:11

## 2021-11-13 PROBLEM — N18.32 STAGE 3B CHRONIC KIDNEY DISEASE: Status: ACTIVE | Noted: 2021-11-13

## 2021-11-13 LAB
ALBUMIN SERPL BCP-MCNC: 3.8 G/DL (ref 3.5–5.2)
AMYLASE SERPL-CCNC: 62 U/L (ref 20–110)
ANION GAP SERPL CALC-SCNC: 11 MMOL/L (ref 8–16)
ANISOCYTOSIS BLD QL SMEAR: SLIGHT
BASOPHILS # BLD AUTO: ABNORMAL K/UL (ref 0–0.2)
BASOPHILS NFR BLD: 0 % (ref 0–1.9)
BUN SERPL-MCNC: 26 MG/DL (ref 6–20)
CALCIUM SERPL-MCNC: 9.9 MG/DL (ref 8.7–10.5)
CHLORIDE SERPL-SCNC: 103 MMOL/L (ref 95–110)
CO2 SERPL-SCNC: 25 MMOL/L (ref 23–29)
CREAT SERPL-MCNC: 2.9 MG/DL (ref 0.5–1.4)
DIFFERENTIAL METHOD: ABNORMAL
EOSINOPHIL # BLD AUTO: ABNORMAL K/UL (ref 0–0.5)
EOSINOPHIL NFR BLD: 5 % (ref 0–8)
ERYTHROCYTE [DISTWIDTH] IN BLOOD BY AUTOMATED COUNT: 14.6 % (ref 11.5–14.5)
EST. GFR  (AFRICAN AMERICAN): 30.1 ML/MIN/1.73 M^2
EST. GFR  (NON AFRICAN AMERICAN): 26.1 ML/MIN/1.73 M^2
GLUCOSE SERPL-MCNC: 85 MG/DL (ref 70–110)
HCT VFR BLD AUTO: 40.1 % (ref 40–54)
HGB BLD-MCNC: 12.1 G/DL (ref 14–18)
HYPOCHROMIA BLD QL SMEAR: ABNORMAL
IMM GRANULOCYTES # BLD AUTO: ABNORMAL K/UL (ref 0–0.04)
IMM GRANULOCYTES NFR BLD AUTO: ABNORMAL % (ref 0–0.5)
LIPASE SERPL-CCNC: 14 U/L (ref 4–60)
LYMPHOCYTES # BLD AUTO: ABNORMAL K/UL (ref 1–4.8)
LYMPHOCYTES NFR BLD: 25 % (ref 18–48)
MAGNESIUM SERPL-MCNC: 1.8 MG/DL (ref 1.6–2.6)
MCH RBC QN AUTO: 24.3 PG (ref 27–31)
MCHC RBC AUTO-ENTMCNC: 30.2 G/DL (ref 32–36)
MCV RBC AUTO: 81 FL (ref 82–98)
MONOCYTES # BLD AUTO: ABNORMAL K/UL (ref 0.3–1)
MONOCYTES NFR BLD: 12 % (ref 4–15)
MYELOCYTES NFR BLD MANUAL: 1 %
NEUTROPHILS NFR BLD: 57 % (ref 38–73)
NRBC BLD-RTO: 0 /100 WBC
OVALOCYTES BLD QL SMEAR: ABNORMAL
PHOSPHATE SERPL-MCNC: 4.4 MG/DL (ref 2.7–4.5)
PLATELET # BLD AUTO: 283 K/UL (ref 150–450)
PLATELET BLD QL SMEAR: ABNORMAL
PMV BLD AUTO: 10.1 FL (ref 9.2–12.9)
POIKILOCYTOSIS BLD QL SMEAR: SLIGHT
POLYCHROMASIA BLD QL SMEAR: ABNORMAL
POTASSIUM SERPL-SCNC: 4.6 MMOL/L (ref 3.5–5.1)
RBC # BLD AUTO: 4.98 M/UL (ref 4.6–6.2)
SODIUM SERPL-SCNC: 139 MMOL/L (ref 136–145)
TACROLIMUS BLD-MCNC: 11.2 NG/ML (ref 5–15)
WBC # BLD AUTO: 2.73 K/UL (ref 3.9–12.7)

## 2021-11-13 PROCEDURE — 80069 RENAL FUNCTION PANEL: CPT | Performed by: PHYSICIAN ASSISTANT

## 2021-11-13 PROCEDURE — 63600175 PHARM REV CODE 636 W HCPCS: Performed by: INTERNAL MEDICINE

## 2021-11-13 PROCEDURE — 85007 BL SMEAR W/DIFF WBC COUNT: CPT | Performed by: PHYSICIAN ASSISTANT

## 2021-11-13 PROCEDURE — 25000003 PHARM REV CODE 250: Performed by: PHYSICIAN ASSISTANT

## 2021-11-13 PROCEDURE — 36415 COLL VENOUS BLD VENIPUNCTURE: CPT | Performed by: PHYSICIAN ASSISTANT

## 2021-11-13 PROCEDURE — 99233 SBSQ HOSP IP/OBS HIGH 50: CPT | Mod: ,,, | Performed by: INTERNAL MEDICINE

## 2021-11-13 PROCEDURE — 85027 COMPLETE CBC AUTOMATED: CPT | Performed by: PHYSICIAN ASSISTANT

## 2021-11-13 PROCEDURE — 63600175 PHARM REV CODE 636 W HCPCS: Performed by: PHYSICIAN ASSISTANT

## 2021-11-13 PROCEDURE — 80197 ASSAY OF TACROLIMUS: CPT | Performed by: PHYSICIAN ASSISTANT

## 2021-11-13 PROCEDURE — 25000003 PHARM REV CODE 250: Performed by: STUDENT IN AN ORGANIZED HEALTH CARE EDUCATION/TRAINING PROGRAM

## 2021-11-13 PROCEDURE — 83735 ASSAY OF MAGNESIUM: CPT | Performed by: PHYSICIAN ASSISTANT

## 2021-11-13 PROCEDURE — 83690 ASSAY OF LIPASE: CPT | Performed by: PHYSICIAN ASSISTANT

## 2021-11-13 PROCEDURE — 99233 PR SUBSEQUENT HOSPITAL CARE,LEVL III: ICD-10-PCS | Mod: ,,, | Performed by: INTERNAL MEDICINE

## 2021-11-13 PROCEDURE — 82150 ASSAY OF AMYLASE: CPT | Performed by: PHYSICIAN ASSISTANT

## 2021-11-13 PROCEDURE — 20600001 HC STEP DOWN PRIVATE ROOM

## 2021-11-13 RX ORDER — TACROLIMUS 1 MG/1
7 CAPSULE ORAL 2 TIMES DAILY
Status: DISCONTINUED | OUTPATIENT
Start: 2021-11-13 | End: 2021-11-15 | Stop reason: HOSPADM

## 2021-11-13 RX ORDER — SODIUM CHLORIDE 9 MG/ML
INJECTION, SOLUTION INTRAVENOUS CONTINUOUS
Status: ACTIVE | OUTPATIENT
Start: 2021-11-13 | End: 2021-11-14

## 2021-11-13 RX ORDER — HEPARIN SODIUM 5000 [USP'U]/ML
5000 INJECTION, SOLUTION INTRAVENOUS; SUBCUTANEOUS EVERY 8 HOURS
Status: DISCONTINUED | OUTPATIENT
Start: 2021-11-13 | End: 2021-11-15 | Stop reason: HOSPADM

## 2021-11-13 RX ADMIN — Medication 6 MG: at 08:11

## 2021-11-13 RX ADMIN — ASPIRIN 81 MG: 81 TABLET, COATED ORAL at 07:11

## 2021-11-13 RX ADMIN — MYCOPHENILIC ACID 360 MG: 180 TABLET, DELAYED RELEASE ORAL at 07:11

## 2021-11-13 RX ADMIN — CALCITRIOL CAPSULES 0.25 MCG 0.25 MCG: 0.25 CAPSULE ORAL at 07:11

## 2021-11-13 RX ADMIN — Medication 800 MG: at 07:11

## 2021-11-13 RX ADMIN — SODIUM BICARBONATE 650 MG: 650 TABLET ORAL at 08:11

## 2021-11-13 RX ADMIN — PANTOPRAZOLE SODIUM 40 MG: 40 TABLET, DELAYED RELEASE ORAL at 07:11

## 2021-11-13 RX ADMIN — TACROLIMUS 7 MG: 1 CAPSULE ORAL at 05:11

## 2021-11-13 RX ADMIN — TACROLIMUS 8 MG: 1 CAPSULE ORAL at 07:11

## 2021-11-13 RX ADMIN — PREDNISONE 5 MG: 5 TABLET ORAL at 07:11

## 2021-11-13 RX ADMIN — HEPARIN SODIUM 5000 UNITS: 5000 INJECTION INTRAVENOUS; SUBCUTANEOUS at 01:11

## 2021-11-13 RX ADMIN — HEPARIN SODIUM 5000 UNITS: 5000 INJECTION INTRAVENOUS; SUBCUTANEOUS at 08:11

## 2021-11-13 RX ADMIN — Medication 800 MG: at 01:11

## 2021-11-13 RX ADMIN — SODIUM BICARBONATE 650 MG: 650 TABLET ORAL at 07:11

## 2021-11-13 RX ADMIN — Medication 800 MG: at 08:11

## 2021-11-13 RX ADMIN — MYCOPHENILIC ACID 360 MG: 180 TABLET, DELAYED RELEASE ORAL at 08:11

## 2021-11-13 RX ADMIN — SODIUM CHLORIDE: 0.9 INJECTION, SOLUTION INTRAVENOUS at 09:11

## 2021-11-13 RX ADMIN — MUPIROCIN: 20 OINTMENT TOPICAL at 08:11

## 2021-11-13 RX ADMIN — KETOCONAZOLE 100 MG: 200 TABLET ORAL at 07:11

## 2021-11-14 LAB
ALBUMIN SERPL BCP-MCNC: 3.1 G/DL (ref 3.5–5.2)
AMYLASE SERPL-CCNC: 64 U/L (ref 20–110)
ANION GAP SERPL CALC-SCNC: 8 MMOL/L (ref 8–16)
BASOPHILS # BLD AUTO: 0.02 K/UL (ref 0–0.2)
BASOPHILS NFR BLD: 0.8 % (ref 0–1.9)
BUN SERPL-MCNC: 27 MG/DL (ref 6–20)
CALCIUM SERPL-MCNC: 9 MG/DL (ref 8.7–10.5)
CHLORIDE SERPL-SCNC: 109 MMOL/L (ref 95–110)
CO2 SERPL-SCNC: 24 MMOL/L (ref 23–29)
CREAT SERPL-MCNC: 2.7 MG/DL (ref 0.5–1.4)
DIFFERENTIAL METHOD: ABNORMAL
EOSINOPHIL # BLD AUTO: 0.1 K/UL (ref 0–0.5)
EOSINOPHIL NFR BLD: 3.9 % (ref 0–8)
ERYTHROCYTE [DISTWIDTH] IN BLOOD BY AUTOMATED COUNT: 14.7 % (ref 11.5–14.5)
EST. GFR  (AFRICAN AMERICAN): 32.8 ML/MIN/1.73 M^2
EST. GFR  (NON AFRICAN AMERICAN): 28.4 ML/MIN/1.73 M^2
GLUCOSE SERPL-MCNC: 96 MG/DL (ref 70–110)
HCT VFR BLD AUTO: 35.3 % (ref 40–54)
HGB BLD-MCNC: 10.7 G/DL (ref 14–18)
IMM GRANULOCYTES # BLD AUTO: 0.06 K/UL (ref 0–0.04)
IMM GRANULOCYTES NFR BLD AUTO: 2.3 % (ref 0–0.5)
LIPASE SERPL-CCNC: 25 U/L (ref 4–60)
LYMPHOCYTES # BLD AUTO: 0.6 K/UL (ref 1–4.8)
LYMPHOCYTES NFR BLD: 23 % (ref 18–48)
MAGNESIUM SERPL-MCNC: 1.9 MG/DL (ref 1.6–2.6)
MCH RBC QN AUTO: 24.7 PG (ref 27–31)
MCHC RBC AUTO-ENTMCNC: 30.3 G/DL (ref 32–36)
MCV RBC AUTO: 82 FL (ref 82–98)
MONOCYTES # BLD AUTO: 0.4 K/UL (ref 0.3–1)
MONOCYTES NFR BLD: 16.4 % (ref 4–15)
NEUTROPHILS # BLD AUTO: 1.4 K/UL (ref 1.8–7.7)
NEUTROPHILS NFR BLD: 53.6 % (ref 38–73)
NRBC BLD-RTO: 0 /100 WBC
PHOSPHATE SERPL-MCNC: 3.8 MG/DL (ref 2.7–4.5)
PLATELET # BLD AUTO: 250 K/UL (ref 150–450)
PMV BLD AUTO: 9.5 FL (ref 9.2–12.9)
POTASSIUM SERPL-SCNC: 4.6 MMOL/L (ref 3.5–5.1)
RBC # BLD AUTO: 4.33 M/UL (ref 4.6–6.2)
SODIUM SERPL-SCNC: 141 MMOL/L (ref 136–145)
TACROLIMUS BLD-MCNC: 11 NG/ML (ref 5–15)
WBC # BLD AUTO: 2.56 K/UL (ref 3.9–12.7)

## 2021-11-14 PROCEDURE — 63600175 PHARM REV CODE 636 W HCPCS: Performed by: INTERNAL MEDICINE

## 2021-11-14 PROCEDURE — 83735 ASSAY OF MAGNESIUM: CPT | Performed by: PHYSICIAN ASSISTANT

## 2021-11-14 PROCEDURE — 63600175 PHARM REV CODE 636 W HCPCS: Performed by: PHYSICIAN ASSISTANT

## 2021-11-14 PROCEDURE — 25000003 PHARM REV CODE 250: Performed by: PHYSICIAN ASSISTANT

## 2021-11-14 PROCEDURE — 36415 COLL VENOUS BLD VENIPUNCTURE: CPT | Performed by: PHYSICIAN ASSISTANT

## 2021-11-14 PROCEDURE — 99233 PR SUBSEQUENT HOSPITAL CARE,LEVL III: ICD-10-PCS | Mod: ,,, | Performed by: INTERNAL MEDICINE

## 2021-11-14 PROCEDURE — 85025 COMPLETE CBC W/AUTO DIFF WBC: CPT | Performed by: PHYSICIAN ASSISTANT

## 2021-11-14 PROCEDURE — 99233 SBSQ HOSP IP/OBS HIGH 50: CPT | Mod: ,,, | Performed by: INTERNAL MEDICINE

## 2021-11-14 PROCEDURE — 83690 ASSAY OF LIPASE: CPT | Performed by: PHYSICIAN ASSISTANT

## 2021-11-14 PROCEDURE — 20600001 HC STEP DOWN PRIVATE ROOM

## 2021-11-14 PROCEDURE — 80197 ASSAY OF TACROLIMUS: CPT | Performed by: PHYSICIAN ASSISTANT

## 2021-11-14 PROCEDURE — 80069 RENAL FUNCTION PANEL: CPT | Performed by: PHYSICIAN ASSISTANT

## 2021-11-14 PROCEDURE — 25000003 PHARM REV CODE 250: Performed by: STUDENT IN AN ORGANIZED HEALTH CARE EDUCATION/TRAINING PROGRAM

## 2021-11-14 PROCEDURE — 82150 ASSAY OF AMYLASE: CPT | Performed by: PHYSICIAN ASSISTANT

## 2021-11-14 RX ORDER — MYCOPHENOLIC ACID 180 MG/1
180 TABLET, DELAYED RELEASE ORAL 2 TIMES DAILY
Status: DISCONTINUED | OUTPATIENT
Start: 2021-11-14 | End: 2021-11-15 | Stop reason: HOSPADM

## 2021-11-14 RX ORDER — POLYETHYLENE GLYCOL 3350 17 G/17G
17 POWDER, FOR SOLUTION ORAL DAILY
Status: DISCONTINUED | OUTPATIENT
Start: 2021-11-14 | End: 2021-11-15 | Stop reason: HOSPADM

## 2021-11-14 RX ADMIN — KETOCONAZOLE 100 MG: 200 TABLET ORAL at 08:11

## 2021-11-14 RX ADMIN — MYCOPHENILIC ACID 180 MG: 180 TABLET, DELAYED RELEASE ORAL at 08:11

## 2021-11-14 RX ADMIN — CALCITRIOL CAPSULES 0.25 MCG 0.25 MCG: 0.25 CAPSULE ORAL at 08:11

## 2021-11-14 RX ADMIN — SODIUM BICARBONATE 650 MG: 650 TABLET ORAL at 08:11

## 2021-11-14 RX ADMIN — Medication 800 MG: at 02:11

## 2021-11-14 RX ADMIN — HEPARIN SODIUM 5000 UNITS: 5000 INJECTION INTRAVENOUS; SUBCUTANEOUS at 04:11

## 2021-11-14 RX ADMIN — ASPIRIN 81 MG: 81 TABLET, COATED ORAL at 08:11

## 2021-11-14 RX ADMIN — HEPARIN SODIUM 5000 UNITS: 5000 INJECTION INTRAVENOUS; SUBCUTANEOUS at 08:11

## 2021-11-14 RX ADMIN — Medication 800 MG: at 08:11

## 2021-11-14 RX ADMIN — MUPIROCIN: 20 OINTMENT TOPICAL at 10:11

## 2021-11-14 RX ADMIN — Medication 6 MG: at 08:11

## 2021-11-14 RX ADMIN — TACROLIMUS 7 MG: 1 CAPSULE ORAL at 08:11

## 2021-11-14 RX ADMIN — TACROLIMUS 7 MG: 1 CAPSULE ORAL at 05:11

## 2021-11-14 RX ADMIN — HEPARIN SODIUM 5000 UNITS: 5000 INJECTION INTRAVENOUS; SUBCUTANEOUS at 02:11

## 2021-11-14 RX ADMIN — MYCOPHENILIC ACID 360 MG: 180 TABLET, DELAYED RELEASE ORAL at 08:11

## 2021-11-14 RX ADMIN — DOCUSATE SODIUM 50 MG: 50 CAPSULE, LIQUID FILLED ORAL at 08:11

## 2021-11-14 RX ADMIN — PREDNISONE 5 MG: 5 TABLET ORAL at 08:11

## 2021-11-14 RX ADMIN — PANTOPRAZOLE SODIUM 40 MG: 40 TABLET, DELAYED RELEASE ORAL at 08:11

## 2021-11-14 RX ADMIN — POLYETHYLENE GLYCOL 3350 17 G: 17 POWDER, FOR SOLUTION ORAL at 10:11

## 2021-11-15 VITALS
TEMPERATURE: 98 F | BODY MASS INDEX: 29.73 KG/M2 | OXYGEN SATURATION: 98 % | SYSTOLIC BLOOD PRESSURE: 181 MMHG | WEIGHT: 207.69 LBS | HEIGHT: 70 IN | HEART RATE: 83 BPM | DIASTOLIC BLOOD PRESSURE: 82 MMHG | RESPIRATION RATE: 18 BRPM

## 2021-11-15 PROBLEM — K56.609 SMALL BOWEL OBSTRUCTION: Status: RESOLVED | Noted: 2021-11-12 | Resolved: 2021-11-15

## 2021-11-15 LAB
ALBUMIN SERPL BCP-MCNC: 3.2 G/DL (ref 3.5–5.2)
AMYLASE SERPL-CCNC: 63 U/L (ref 20–110)
ANION GAP SERPL CALC-SCNC: 8 MMOL/L (ref 8–16)
BASOPHILS # BLD AUTO: 0.03 K/UL (ref 0–0.2)
BASOPHILS NFR BLD: 1.1 % (ref 0–1.9)
BUN SERPL-MCNC: 30 MG/DL (ref 6–20)
CALCIUM SERPL-MCNC: 9.2 MG/DL (ref 8.7–10.5)
CHLORIDE SERPL-SCNC: 110 MMOL/L (ref 95–110)
CO2 SERPL-SCNC: 24 MMOL/L (ref 23–29)
CREAT SERPL-MCNC: 2.4 MG/DL (ref 0.5–1.4)
DIFFERENTIAL METHOD: ABNORMAL
EOSINOPHIL # BLD AUTO: 0.1 K/UL (ref 0–0.5)
EOSINOPHIL NFR BLD: 4.9 % (ref 0–8)
ERYTHROCYTE [DISTWIDTH] IN BLOOD BY AUTOMATED COUNT: 14.2 % (ref 11.5–14.5)
EST. GFR  (AFRICAN AMERICAN): 37.9 ML/MIN/1.73 M^2
EST. GFR  (NON AFRICAN AMERICAN): 32.8 ML/MIN/1.73 M^2
GLUCOSE SERPL-MCNC: 96 MG/DL (ref 70–110)
HCT VFR BLD AUTO: 37.1 % (ref 40–54)
HGB BLD-MCNC: 10.9 G/DL (ref 14–18)
IMM GRANULOCYTES # BLD AUTO: 0.1 K/UL (ref 0–0.04)
IMM GRANULOCYTES NFR BLD AUTO: 3.8 % (ref 0–0.5)
LIPASE SERPL-CCNC: 20 U/L (ref 4–60)
LYMPHOCYTES # BLD AUTO: 0.7 K/UL (ref 1–4.8)
LYMPHOCYTES NFR BLD: 26.8 % (ref 18–48)
MAGNESIUM SERPL-MCNC: 1.7 MG/DL (ref 1.6–2.6)
MCH RBC QN AUTO: 24.7 PG (ref 27–31)
MCHC RBC AUTO-ENTMCNC: 29.4 G/DL (ref 32–36)
MCV RBC AUTO: 84 FL (ref 82–98)
MONOCYTES # BLD AUTO: 0.5 K/UL (ref 0.3–1)
MONOCYTES NFR BLD: 18.1 % (ref 4–15)
NEUTROPHILS # BLD AUTO: 1.2 K/UL (ref 1.8–7.7)
NEUTROPHILS NFR BLD: 45.3 % (ref 38–73)
NRBC BLD-RTO: 0 /100 WBC
PHOSPHATE SERPL-MCNC: 3.2 MG/DL (ref 2.7–4.5)
PLATELET # BLD AUTO: 292 K/UL (ref 150–450)
PMV BLD AUTO: 9.7 FL (ref 9.2–12.9)
POTASSIUM SERPL-SCNC: 4.6 MMOL/L (ref 3.5–5.1)
RBC # BLD AUTO: 4.42 M/UL (ref 4.6–6.2)
SODIUM SERPL-SCNC: 142 MMOL/L (ref 136–145)
TACROLIMUS BLD-MCNC: 11.9 NG/ML (ref 5–15)
WBC # BLD AUTO: 2.65 K/UL (ref 3.9–12.7)

## 2021-11-15 PROCEDURE — 25000003 PHARM REV CODE 250: Performed by: PHYSICIAN ASSISTANT

## 2021-11-15 PROCEDURE — 80197 ASSAY OF TACROLIMUS: CPT | Performed by: PHYSICIAN ASSISTANT

## 2021-11-15 PROCEDURE — 36415 COLL VENOUS BLD VENIPUNCTURE: CPT | Performed by: PHYSICIAN ASSISTANT

## 2021-11-15 PROCEDURE — 83690 ASSAY OF LIPASE: CPT | Performed by: PHYSICIAN ASSISTANT

## 2021-11-15 PROCEDURE — 85025 COMPLETE CBC W/AUTO DIFF WBC: CPT | Performed by: PHYSICIAN ASSISTANT

## 2021-11-15 PROCEDURE — 25000003 PHARM REV CODE 250: Performed by: STUDENT IN AN ORGANIZED HEALTH CARE EDUCATION/TRAINING PROGRAM

## 2021-11-15 PROCEDURE — 99239 PR HOSPITAL DISCHARGE DAY,>30 MIN: ICD-10-PCS | Mod: ,,, | Performed by: PHYSICIAN ASSISTANT

## 2021-11-15 PROCEDURE — 63600175 PHARM REV CODE 636 W HCPCS: Performed by: PHYSICIAN ASSISTANT

## 2021-11-15 PROCEDURE — 80069 RENAL FUNCTION PANEL: CPT | Performed by: PHYSICIAN ASSISTANT

## 2021-11-15 PROCEDURE — 82150 ASSAY OF AMYLASE: CPT | Performed by: PHYSICIAN ASSISTANT

## 2021-11-15 PROCEDURE — 63600175 PHARM REV CODE 636 W HCPCS: Performed by: INTERNAL MEDICINE

## 2021-11-15 PROCEDURE — 87799 DETECT AGENT NOS DNA QUANT: CPT | Performed by: PHYSICIAN ASSISTANT

## 2021-11-15 PROCEDURE — 83735 ASSAY OF MAGNESIUM: CPT | Performed by: PHYSICIAN ASSISTANT

## 2021-11-15 PROCEDURE — 99239 HOSP IP/OBS DSCHRG MGMT >30: CPT | Mod: ,,, | Performed by: PHYSICIAN ASSISTANT

## 2021-11-15 RX ORDER — TACROLIMUS 1 MG/1
7 CAPSULE ORAL EVERY 12 HOURS
Qty: 420 CAPSULE | Refills: 11 | Status: SHIPPED | OUTPATIENT
Start: 2021-11-15 | End: 2022-02-10

## 2021-11-15 RX ORDER — MYCOPHENOLIC ACID 180 MG/1
180 TABLET, DELAYED RELEASE ORAL 2 TIMES DAILY
Qty: 120 TABLET | Refills: 11 | Status: SHIPPED | OUTPATIENT
Start: 2021-11-15 | End: 2021-12-01

## 2021-11-15 RX ORDER — TALC
6 POWDER (GRAM) TOPICAL NIGHTLY PRN
Qty: 30 TABLET | Refills: 2 | Status: SHIPPED | OUTPATIENT
Start: 2021-11-15

## 2021-11-15 RX ORDER — ERGOCALCIFEROL 1.25 MG/1
50000 CAPSULE ORAL
Qty: 4 CAPSULE | Refills: 2 | Status: SHIPPED | OUTPATIENT
Start: 2021-11-15 | End: 2022-03-08 | Stop reason: SDUPTHER

## 2021-11-15 RX ADMIN — PREDNISONE 5 MG: 5 TABLET ORAL at 08:11

## 2021-11-15 RX ADMIN — Medication 800 MG: at 08:11

## 2021-11-15 RX ADMIN — ASPIRIN 81 MG: 81 TABLET, COATED ORAL at 08:11

## 2021-11-15 RX ADMIN — PANTOPRAZOLE SODIUM 40 MG: 40 TABLET, DELAYED RELEASE ORAL at 08:11

## 2021-11-15 RX ADMIN — Medication 800 MG: at 12:11

## 2021-11-15 RX ADMIN — TACROLIMUS 7 MG: 1 CAPSULE ORAL at 08:11

## 2021-11-15 RX ADMIN — MYCOPHENILIC ACID 180 MG: 180 TABLET, DELAYED RELEASE ORAL at 08:11

## 2021-11-15 RX ADMIN — SODIUM BICARBONATE 650 MG: 650 TABLET ORAL at 08:11

## 2021-11-15 RX ADMIN — DOCUSATE SODIUM 50 MG: 50 CAPSULE, LIQUID FILLED ORAL at 08:11

## 2021-11-15 RX ADMIN — MUPIROCIN: 20 OINTMENT TOPICAL at 08:11

## 2021-11-15 RX ADMIN — CALCITRIOL CAPSULES 0.25 MCG 0.25 MCG: 0.25 CAPSULE ORAL at 08:11

## 2021-11-15 RX ADMIN — HEPARIN SODIUM 5000 UNITS: 5000 INJECTION INTRAVENOUS; SUBCUTANEOUS at 05:11

## 2021-11-15 RX ADMIN — KETOCONAZOLE 100 MG: 200 TABLET ORAL at 08:11

## 2021-11-16 ENCOUNTER — PATIENT OUTREACH (OUTPATIENT)
Dept: ADMINISTRATIVE | Facility: CLINIC | Age: 41
End: 2021-11-16
Payer: MEDICARE

## 2021-11-17 LAB
BKV DNA SERPL NAA+PROBE-ACNC: 192 COPIES/ML
BKV DNA SERPL NAA+PROBE-LOG#: 2.28 LOG (10) COPIES/ML
BKV DNA SERPL QL NAA+PROBE: DETECTED

## 2021-11-22 RX ORDER — ASPIRIN 81 MG/1
81 TABLET ORAL DAILY
Qty: 30 TABLET | Refills: 11 | Status: SHIPPED | OUTPATIENT
Start: 2021-11-22 | End: 2022-11-22

## 2021-11-23 ENCOUNTER — DOCUMENTATION ONLY (OUTPATIENT)
Dept: TRANSPLANT | Facility: CLINIC | Age: 41
End: 2021-11-23
Payer: MEDICARE

## 2021-11-23 LAB
EXT ALBUMIN: 4.2 (ref 4.2–5.4)
EXT ALKALINE PHOSPHATASE: 91 (ref 57–111)
EXT ALT: 37 (ref 10–49)
EXT AMYLASE: 91 (ref 23–130)
EXT AST: 24 (ref 18–30)
EXT BILIRUBIN TOTAL: 0.2 (ref 0.3–2)
EXT BUN: 41 (ref 6–22)
EXT CALCIUM: 9.6 (ref 9–10.2)
EXT CHLORIDE: 113 (ref 97–108)
EXT CO2: 30 (ref 26–34)
EXT CREATININE: 2.57 MG/DL (ref 0.92–1.35)
EXT EOSINOPHIL%: 7
EXT GFR MDRD AF AMER: 36
EXT GLUCOSE: 96 (ref 74–112)
EXT HEMATOCRIT: 38.6 (ref 40.2–51.4)
EXT HEMOGLOBIN: 11.2 (ref 13.6–17.2)
EXT LIPASE: 20 (ref 31–96)
EXT LYMPH%: 22
EXT MAGNESIUM: 2.16 (ref 1.69–2.15)
EXT MONOCYTES%: 15
EXT PHOSPHORUS: 5.5 (ref 2.8–4.8)
EXT PLATELETS: 292 (ref 160–400)
EXT POTASSIUM: 4.6 (ref 3.6–5.1)
EXT PROTEIN TOTAL: 6.5 (ref 6.2–8)
EXT SEGS%: 54
EXT SODIUM: 147 MMOL/L (ref 135–142)
EXT TACROLIMUS LVL: 8.8
EXT WBC: 3.2 (ref 4.8–10.8)

## 2021-12-01 ENCOUNTER — DOCUMENTATION ONLY (OUTPATIENT)
Dept: TRANSPLANT | Facility: CLINIC | Age: 41
End: 2021-12-01
Payer: MEDICARE

## 2021-12-01 DIAGNOSIS — Z94.0 KIDNEY REPLACED BY TRANSPLANT: ICD-10-CM

## 2021-12-02 RX ORDER — MYCOPHENOLIC ACID 180 MG/1
360 TABLET, DELAYED RELEASE ORAL 2 TIMES DAILY
Qty: 120 TABLET | Refills: 11 | Status: SHIPPED | OUTPATIENT
Start: 2021-12-02 | End: 2022-08-30 | Stop reason: SDUPTHER

## 2022-02-03 ENCOUNTER — DOCUMENTATION ONLY (OUTPATIENT)
Dept: TRANSPLANT | Facility: CLINIC | Age: 42
End: 2022-02-03
Payer: MEDICARE

## 2022-02-03 LAB
EXT ALBUMIN: 4.3 (ref 4.2–5.4)
EXT ALKALINE PHOSPHATASE: ABNORMAL
EXT ALLOSURE: ABNORMAL
EXT ALT: ABNORMAL
EXT AMYLASE: 102 (ref 23–130)
EXT ANC: 4.43 (ref 2.16–8.1)
EXT AST: ABNORMAL
EXT BACTERIA UA: ABNORMAL
EXT BANDS%: ABNORMAL
EXT BILIRUBIN DIRECT: ABNORMAL
EXT BILIRUBIN TOTAL: ABNORMAL
EXT BK VIRUS DNA QN PCR: ABNORMAL
EXT BK VIRUS DNA QUANT, PCR, URINE: ABNORMAL
EXT BUN: 33 (ref 6–22)
EXT C PEPTIDE: ABNORMAL
EXT CALCIUM: 9.5 (ref 9–10.2)
EXT CHLORIDE: 104 (ref 97–108)
EXT CHOLESTEROL (LIPID PANEL): ABNORMAL
EXT CHOLESTEROL: ABNORMAL
EXT CMV DNA QUANT. BY PCR: ABNORMAL
EXT CO2: 27 (ref 26–34)
EXT CREATININE: 2.76 (ref 0.92–1.35)
EXT CYCLOSPORINE LVL: ABNORMAL
EXT EBV DNA BY PCR: ABNORMAL
EXT EBV DNA-COPIES/ML: ABNORMAL
EXT EOSINOPHIL%: 2 (ref 0–4)
EXT FERRITIN: ABNORMAL
EXT GFR MDRD AF AMER: 33.15
EXT GFR MDRD NON AF AMER: ABNORMAL
EXT GLUCOSE UA: ABNORMAL
EXT GLUCOSE: 100 (ref 74–112)
EXT HBV DNA QUANT PCR: ABNORMAL
EXT HCV QUANT: ABNORMAL
EXT HDL: ABNORMAL
EXT HEMATOCRIT: 39.6 (ref 40.2–51.4)
EXT HEMOGLOBIN A1C: ABNORMAL
EXT HEMOGLOBIN: 12.2 (ref 13.6–17.2)
EXT HEP B S AG: ABNORMAL
EXT HIV RNA QUANT PCR: ABNORMAL
EXT HIV: ABNORMAL
EXT IMMUNKNOW (STIMULATED): ABNORMAL
EXT INR: ABNORMAL
EXT IRON SATURATION: ABNORMAL
EXT LDH, TOTAL: ABNORMAL
EXT LDL CHOLESTEROL: ABNORMAL
EXT LIPASE: 33 (ref 31–96)
EXT LYMPH%: 11 (ref 20–48)
EXT MAGNESIUM: 2.32 (ref 1.69–2.15)
EXT MONOCYTES%: 8 (ref 1–9)
EXT NITRITES UA: ABNORMAL
EXT PHOSPHORUS: 4.3 (ref 2.8–4.8)
EXT PLATELETS: 387 (ref 160–400)
EXT POTASSIUM: 5.1 (ref 3.6–5.1)
EXT PROT/CREAT RATIO UR: ABNORMAL
EXT PROTEIN TOTAL: ABNORMAL
EXT PROTEIN UA: ABNORMAL
EXT PT: ABNORMAL
EXT PTH, INTACT: ABNORMAL
EXT RBC UA: ABNORMAL
EXT SARS COV-2 (COVID-19): ABNORMAL
EXT SEGS%: 74 (ref 45–75)
EXT SERUM IRON: ABNORMAL
EXT SIROLIMUS LVL: ABNORMAL
EXT SODIUM: 139 MMOL/L (ref 135–142)
EXT STOOL CDIFF: ABNORMAL
EXT STOOL CMV: ABNORMAL
EXT STOOL CULTURE: ABNORMAL
EXT STOOL OCP: ABNORMAL
EXT TACROLIMUS LVL: ABNORMAL
EXT TIBC: ABNORMAL
EXT TRIGLYCERIDES: ABNORMAL
EXT UNSATURATED IRON BINDING CAP.: ABNORMAL
EXT URIC ACID: ABNORMAL
EXT URINE CULTURE: ABNORMAL
EXT VIT D 25 HYDROXY: ABNORMAL
EXT WBC UA: ABNORMAL
EXT WBC: 5.9 (ref 4.8–10.8)

## 2022-02-10 ENCOUNTER — DOCUMENTATION ONLY (OUTPATIENT)
Dept: TRANSPLANT | Facility: CLINIC | Age: 42
End: 2022-02-10
Payer: MEDICARE

## 2022-02-10 DIAGNOSIS — Z94.83 STATUS POST SIMULTANEOUS KIDNEY AND PANCREAS TRANSPLANT: Chronic | ICD-10-CM

## 2022-02-10 DIAGNOSIS — Z94.0 STATUS POST SIMULTANEOUS KIDNEY AND PANCREAS TRANSPLANT: Chronic | ICD-10-CM

## 2022-02-10 DIAGNOSIS — Z79.60 LONG-TERM USE OF IMMUNOSUPPRESSANT MEDICATION: ICD-10-CM

## 2022-02-10 LAB
EXT ALBUMIN: 4.3 (ref 4.2–5.4)
EXT AMYLASE: 102 (ref 23–130)
EXT ANC: 4.43 (ref 2.16–8.1)
EXT BANDS%: 1
EXT BUN: 33 (ref 6–22)
EXT CALCIUM: 9.5 (ref 9–10.2)
EXT CHLORIDE: 104 (ref 97–108)
EXT CO2: 27 (ref 26–34)
EXT CREATININE: 2.76 MG/DL (ref 0.92–1.35)
EXT EOSINOPHIL%: 2
EXT GFR MDRD AF AMER: 33.15
EXT GLUCOSE: 100 (ref 74–112)
EXT HEMATOCRIT: 39.6 (ref 40.2–51.4)
EXT HEMOGLOBIN: 12.2 (ref 13.6–17.2)
EXT LIPASE: 33 (ref 31–96)
EXT LYMPH%: 11
EXT MAGNESIUM: 2.32 (ref 1.69–2.15)
EXT MONOCYTES%: 8
EXT PHOSPHORUS: 4.3 (ref 2.8–4.8)
EXT PLATELETS: 387 (ref 160–400)
EXT POTASSIUM: 5.1 (ref 3.6–5.1)
EXT SEGS%: 74
EXT SODIUM: 139 MMOL/L (ref 135–142)
EXT TACROLIMUS LVL: 15.4
EXT WBC: 5.9 (ref 4.8–10.8)

## 2022-02-10 NOTE — TELEPHONE ENCOUNTER
Written order received from Dr. Valladares.  Spoke to patient's caregiver Malena.  Instructed Malena to adjust patient's Prograf to 6 mg bid and repeat labs on 2/21.  Ms. Guy repeated the dose change back correctly and verbalized understanding.         ----- Message from Damien Valladares MD sent at 2/10/2022  3:51 PM CST -----  Lower prograf to 6 mg PO bid  Repeat labs in 10 days please

## 2022-02-11 RX ORDER — TACROLIMUS 1 MG/1
6 CAPSULE ORAL EVERY 12 HOURS
Qty: 360 CAPSULE | Refills: 11 | Status: SHIPPED | OUTPATIENT
Start: 2022-02-11 | End: 2022-06-22

## 2022-02-12 DIAGNOSIS — T86.19 RECEIVED KIDNEY FROM DONOR WITH HEPATITIS C: ICD-10-CM

## 2022-02-14 RX ORDER — PRAVASTATIN SODIUM 40 MG/1
40 TABLET ORAL DAILY
Qty: 90 TABLET | Refills: 2 | OUTPATIENT
Start: 2022-02-14

## 2022-02-24 ENCOUNTER — DOCUMENTATION ONLY (OUTPATIENT)
Dept: TRANSPLANT | Facility: CLINIC | Age: 42
End: 2022-02-24
Payer: MEDICARE

## 2022-02-24 NOTE — PROGRESS NOTES
Spoke to the patient and caregiver Malena.  Informed of a very high potassium level on today's labs.  Patient states that he feels fine and has eaten lots of bananas this past week.  Dr. Valladares advised to go to the local ER now or call 911.  Patient and caregiver are going to the ER now to be evaluated.

## 2022-04-08 ENCOUNTER — PATIENT MESSAGE (OUTPATIENT)
Dept: TRANSPLANT | Facility: CLINIC | Age: 42
End: 2022-04-08
Payer: MEDICARE

## 2022-04-11 ENCOUNTER — PATIENT MESSAGE (OUTPATIENT)
Dept: TRANSPLANT | Facility: CLINIC | Age: 42
End: 2022-04-11
Payer: MEDICARE

## 2022-04-30 NOTE — OP NOTE
Patient:   Hernandez Rosales            MRN: [id number]            FIN: [id number]               Age:   39 years     Sex:  Male     :  1980   Associated Diagnoses:   Chronic kidney disease, stage 5   Author:   Harman Lynch MD      Operative Note   Operative Information   Date/ Time:  2019 13:58:00.     Procedures Performed: Left basilic transposition   .     Indications: Mrs. Mckeon is a 72 y/o woman with ESRD who needs long term hemodialysis access creation.   Preoperative mapping suggested a possible suitable right arm basilic vein..     Preoperative Diagnosis: Chronic kidney disease, stage 5 (NKR52-MH N18.5).     Postoperative Diagnosis: Chronic kidney disease, stage 5 ([id number] N18.5).     Surgeon: Harman Lynch MD.     Assistant: Belgica Voss.     Speciman Removed: none   .     Esimated blood loss: loss less than  100  cc.     Description of Procedure/Findings/    Complications: The patient was draped with sterile prepping of the left upper extremity. The ultrasound was utilized to appropriately denny the course of the basilic vein .  An incision was made over this course and dissection was performed down to the level of the basilic vein at the antecubital fossa.  The basilic vein was then exposed proximally to the axilla.  The medial antecubital brachial nerve was identified and protected.  Branches were ligated with 3-0 silk and small clips.   The basilic vein was transected at a branch point at the antecubital fossa. Utilizing a  and forceps a superficial tunnel was created along the superior portion of the upper arm and vein was transposed.  Care was taken to avoid twisting of the vein.   5000 units of heparin was administered.   The brachial artery had been exposed in the distal upper arm. A longitudinal arteriotomy was performed on the brachial artery after clamping.   The vein was then anastomosed to the artery utilizing 6-0 prolene suture.  The  anastomosis was backbled and foreward bled prior to completion. Upon completion there was a thrill to the fistula.   There was a palpable pulse in the radial artery at wrist.  Minimal amount of further soft tissue dissection was performed to ensure an appropriate lie to the basilic vein. Protamine was administered hemostasis was achieved. The antecubital brachial nerve was confirmed in its anatomic position. Irrigation was performed. Layered closure was performed with 3 separate running 3-0 Vicryl sutures. 4-0 Monocryl was utilized to reapproximate the skin. Dermabond Prineo dressing was used. This completed the procedure the patient was extubated and transferred to the recovery room.       Belgica Lindsey assisted throughout.  She assisted with basilic vein exposure and mobilization.  She assisted with anastamosis.  She performed skin closure..     Findings:    ,    .     Complications: None.

## 2022-05-11 ENCOUNTER — PATIENT MESSAGE (OUTPATIENT)
Dept: RESEARCH | Facility: CLINIC | Age: 42
End: 2022-05-11
Payer: MEDICARE

## 2022-05-17 ENCOUNTER — TELEPHONE (OUTPATIENT)
Dept: TRANSPLANT | Facility: CLINIC | Age: 42
End: 2022-05-17
Payer: MEDICARE

## 2022-05-17 NOTE — TELEPHONE ENCOUNTER
----- Message from Damien Valladares MD sent at 5/17/2022 11:26 AM CDT -----  Regarding: RE: dental clearance  Yes they can proceed with dental clearance    Damien   ----- Message -----  From: Camila NEGRON Do, RN  Sent: 5/17/2022  11:13 AM CDT  To: Damien Valladares MD  Subject: dental clearance                                 Patient is at the dentist and needs clearance for dental extractions.  He is at his appointment now. Is he cleared from transplant?

## 2022-05-30 LAB — EXT HCV RNA QUANT PCR: <10 IU/ML

## 2022-06-03 DIAGNOSIS — E83.40 DISORDER OF MAGNESIUM METABOLISM: ICD-10-CM

## 2022-06-03 DIAGNOSIS — T86.19 RECEIVED KIDNEY FROM DONOR WITH HEPATITIS C: ICD-10-CM

## 2022-06-03 RX ORDER — LANOLIN ALCOHOL/MO/W.PET/CERES
800 CREAM (GRAM) TOPICAL 3 TIMES DAILY
Qty: 180 TABLET | Refills: 11 | Status: SHIPPED | OUTPATIENT
Start: 2022-06-03

## 2022-06-03 RX ORDER — PRAVASTATIN SODIUM 40 MG/1
40 TABLET ORAL DAILY
Qty: 90 TABLET | Refills: 0 | Status: SHIPPED | OUTPATIENT
Start: 2022-06-03

## 2022-06-08 ENCOUNTER — PATIENT MESSAGE (OUTPATIENT)
Dept: TRANSPLANT | Facility: CLINIC | Age: 42
End: 2022-06-08
Payer: MEDICARE

## 2022-06-09 ENCOUNTER — DOCUMENTATION ONLY (OUTPATIENT)
Dept: TRANSPLANT | Facility: CLINIC | Age: 42
End: 2022-06-09
Payer: MEDICARE

## 2022-06-10 LAB
EXT ALBUMIN: 4.4 (ref 4.2–5.4)
EXT AMYLASE: 103 (ref 23–130)
EXT ANC: 2.64
EXT BK VIRUS DNA QN PCR: NOT DETECTED
EXT BUN: 27 (ref 6–22)
EXT CALCIUM: 9.9 (ref 9–10.2)
EXT CHLORIDE: 111 (ref 97–108)
EXT CO2: 27 (ref 26–34)
EXT CREATININE: 1.81 MG/DL (ref 0.92–1.35)
EXT EOSINOPHIL%: 7
EXT GFR MDRD AF AMER: 53.67
EXT GLUCOSE: 96 (ref 74–112)
EXT HEMATOCRIT: 39 (ref 40.2–51.4)
EXT HEMOGLOBIN A1C: 5.6 % (ref 4–6)
EXT HEMOGLOBIN: 11.7 (ref 13.6–17.2)
EXT LIPASE: 39 (ref 31–96)
EXT LYMPH%: 19
EXT MAGNESIUM: 2.1 (ref 1.69–2.15)
EXT MONOCYTES%: 12
EXT PHOSPHORUS: 4.4 (ref 2.8–4.8)
EXT PLATELETS: 264 (ref 160–400)
EXT POTASSIUM: 4.8 (ref 3.6–5.1)
EXT SEGS%: 60 (ref 45–75)
EXT SODIUM: 143 MMOL/L (ref 135–142)
EXT TACROLIMUS LVL: 4.3
EXT WBC: 4.4 (ref 4.8–10.8)

## 2022-06-20 ENCOUNTER — PATIENT MESSAGE (OUTPATIENT)
Dept: TRANSPLANT | Facility: CLINIC | Age: 42
End: 2022-06-20
Payer: MEDICARE

## 2022-06-20 NOTE — PATIENT INSTRUCTIONS
It is my privilege to participate in your transplant care! Please be sure to let us know if you have any questions or concerns about your health care - we cannot help you if we do not know. Don't forget we are on call 24/7 for any emergencies.      Education:  Patient reminded to call with any health changes, since these can be early signs of significant complications. Also, advised the patient to be sure any new medications or changes of old medications are discussed with either a pharmacist, or physician knowledgeable with transplant to avoid rejection/drug toxicity related to significant drug interactions.     Exercise: reminded Hernandez of the importance of regular exercise for weight management, blood sugar and blood pressure management.  I also explained exercise has been shown to improve cardiovascular health, energy level, and sleep hygiene.  Lastly, I advised him that cardiovascular complications are leading cause of death for renal transplant recipients, and regular exercise can help lower this risk.     Please continue seeking general medical care with your primary care provider.    Please continue seeking care for your kidney with your General Nephrologist. This is to ensure that monitoring and care of your kidney is being addressed in a timely manner. Your General Nephrologist will also manage your blood pressure and possible electrolyte abnormalities in your lab work; ei. Potassium, phosphorus, calcium, magnesium, PTH, and Vitamin D.     Please follow with a Dermatologist (skin doctor) for yearly skin checks starting at 1 year post transplant or if your develp skin lesion/new moles. Your are on immunosuppression therapy to help prevent rejection of your transplanted kidney. A side effect of this medications is skin cancer. You should see the Dermatologist at least yearly. If at any point after transplant a skin lesion was remove please update your coordinator with the information.       Best  Radha Aleman, FLYNN-C

## 2022-06-21 ENCOUNTER — OFFICE VISIT (OUTPATIENT)
Dept: TRANSPLANT | Facility: CLINIC | Age: 42
End: 2022-06-21
Payer: MEDICARE

## 2022-06-21 DIAGNOSIS — Z94.83 STATUS POST SIMULTANEOUS KIDNEY AND PANCREAS TRANSPLANT: Primary | Chronic | ICD-10-CM

## 2022-06-21 DIAGNOSIS — N18.32 STAGE 3B CHRONIC KIDNEY DISEASE: ICD-10-CM

## 2022-06-21 DIAGNOSIS — I15.0 RENOVASCULAR HYPERTENSION: ICD-10-CM

## 2022-06-21 DIAGNOSIS — Z94.0 STATUS POST SIMULTANEOUS KIDNEY AND PANCREAS TRANSPLANT: Primary | Chronic | ICD-10-CM

## 2022-06-21 DIAGNOSIS — D84.9 IMMUNOCOMPROMISED STATE: ICD-10-CM

## 2022-06-21 PROCEDURE — 99499 NO LOS: ICD-10-PCS | Mod: 95,,, | Performed by: NURSE PRACTITIONER

## 2022-06-21 PROCEDURE — 99499 UNLISTED E&M SERVICE: CPT | Mod: 95,,, | Performed by: NURSE PRACTITIONER

## 2022-06-21 NOTE — LETTER
June 21, 2022        ARUNA Lockett From .  [address]  [place] [address]  Phone: [phone]  Fax: [phone]             [facility] Transplant 1st Fl  [address]  [facility] [address]  Phone: [phone]   Patient: Hernandez Rosales   MR Number: [id number]   YOB: 1980   Date of Visit: 6/21/2022       Dear Dr. ARUNA Lockett From .    Thank you for referring Hernandez Rosales to me for evaluation. Attached you will find relevant portions of my assessment and plan of care.    If you have questions, please do not hesitate to call me. I look forward to following Hernandez Rosales along with you.    Sincerely,    Radha Mancia, NP    Enclosure    If you would like to receive this communication electronically, please contact [email or web address] or [phone] to request [employer] Link access.    [employer] Link is a tool which provides read-only access to select patient information with whom you have a relationship. Its easy to use and provides real time access to review your patients record including encounter summaries, notes, results, and demographic information.    If you feel you have received this communication in error or would no longer like to receive these types of communications, please e-mail [email or web address]

## 2022-06-22 DIAGNOSIS — Z94.83 STATUS POST SIMULTANEOUS KIDNEY AND PANCREAS TRANSPLANT: Chronic | ICD-10-CM

## 2022-06-22 DIAGNOSIS — Z79.60 LONG-TERM USE OF IMMUNOSUPPRESSANT MEDICATION: ICD-10-CM

## 2022-06-22 DIAGNOSIS — Z94.0 STATUS POST SIMULTANEOUS KIDNEY AND PANCREAS TRANSPLANT: Chronic | ICD-10-CM

## 2022-06-22 RX ORDER — TACROLIMUS 1 MG/1
8 CAPSULE ORAL EVERY 12 HOURS
Qty: 480 CAPSULE | Refills: 11 | Status: SHIPPED | OUTPATIENT
Start: 2022-06-22 | End: 2023-08-28 | Stop reason: SDUPTHER

## 2022-06-22 NOTE — PROGRESS NOTES
Ok increase tacrolimus to 8 mg PO bid and repeat a level in 2 weeks, if level is ok we may no need to use ketoconazole to make things easier for this gentleman

## 2022-06-22 NOTE — TELEPHONE ENCOUNTER
Written order received from Dr. Valladares.  Spoke to patient caregiver Malena and instructed her to increase patient's Prograf to 8 mg twice a day and continue to hold ketoconazole.  Repeat lab on 7/6/22. Malena verbalized understanding.       ----- Message from Damien Valladares MD sent at 6/22/2022 11:47 AM CDT -----  Ok increase tacrolimus to 8 mg PO bid and repeat a level in 2 weeks, if level is ok we may no need to use ketoconazole to make things easier for this gentleman

## 2022-08-08 ENCOUNTER — PATIENT MESSAGE (OUTPATIENT)
Dept: TRANSPLANT | Facility: CLINIC | Age: 42
End: 2022-08-08
Payer: MEDICARE

## 2022-08-08 ENCOUNTER — TELEPHONE (OUTPATIENT)
Dept: TRANSPLANT | Facility: CLINIC | Age: 42
End: 2022-08-08
Payer: MEDICARE

## 2022-08-26 NOTE — PROGRESS NOTES
Kidney/Pancreas Post-Transplant Assessment    Referring Physician: ARUNA Krishnan Jr.  Current Nephrologist: ARUNA Krishnan Jr.    ORGAN: PANCREAS  Donor Type: donation after brain death  PHS Increased Risk: yes  Cold Ischemia: 432 mins  Induction Medications:     Subjective:   The patient location is: HOME  The chief complaint leading to consultation is: Kidney/Pancreas Post-Transplant Assessment    Visit type: audiovisual    Face to Face time with patient:   30 minutes of total time spent on the encounter, which includes face to face time and non-face to face time preparing to see the patient (eg, review of tests), Obtaining and/or reviewing separately obtained history, Documenting clinical information in the electronic or other health record, Independently interpreting results (not separately reported) and communicating results to the patient/family/caregiver, or Care coordination (not separately reported).     Each patient to whom he or she provides medical services by telemedicine is:  (1) informed of the relationship between the physician and patient and the respective role of any other health care provider with respect to management of the patient; and (2) notified that he or she may decline to receive medical services by telemedicine and may withdraw from such care at any time.      CC:  Reassessment of renal allograft function and management of chronic immunosuppression.    HPI:  Mr. Rosales is a 42 y.o. 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  He has CKD stage 3 - GFR 30-59 and his baseline creatinine is between 2.3-2.8. He takes mycophenolic acid, prednisone and tacrolimus for maintenance immunosuppression. He denies any recent hospitalizations or ER visits since his previous clinic visit.    Completed COVID vaccinations as well as third dose.     Previous general nephrologist passed away, is scheduled with new  general nephrologist on September 26.    Overall feels well. No health concerns today. Reports good appetite, weight stable. no peripheral edema. Staying active, no CP or SOB with exertion. Looks good.    Tolerating IS without issues, no diarrhea or vomiting. No problems with urination.      Current Outpatient Medications   Medication Sig Dispense Refill    aspirin (ECOTRIN) 81 MG EC tablet Take 1 tablet (81 mg total) by mouth once daily. 30 tablet 11    ergocalciferol (VITAMIN D2) 50,000 unit Cap Take 1 capsule (50,000 Units total) by mouth every 7 days. 4 capsule 2    ferrous sulfate 325 (65 FE) MG EC tablet Take 325 mg by mouth once daily.      flu vacc [id number] 6mos up,PF, 60 mcg (15 mcg x 4)/0.5 mL Syrg inject IM by [facility] 0.5 mL 0    gabapentin (NEURONTIN) 300 MG capsule Take 1 capsule (300 mg total) by mouth 2 (two) times daily. 60 capsule 4    ketoconazole (NIZORAL) 200 mg Tab Take 0.5 tablets (100 mg total) by mouth once daily. 15 tablet 11    magnesium oxide (MAG-OX) 400 mg (241.3 mg magnesium) tablet Take 2 tablets (800 mg total) by mouth 3 (three) times daily. 180 tablet 11    melatonin (MELATIN) 3 mg tablet Take 2 tablets (6 mg total) by mouth nightly as needed for Insomnia. 30 tablet 2    mycophenolate (MYFORTIC) 180 MG TbEC Take 2 tablets (360 mg total) by mouth 2 (two) times daily. 120 tablet 11    pantoprazole (PROTONIX) 40 MG tablet Take 1 tablet (40 mg total) by mouth once daily. 30 tablet 5    pravastatin (PRAVACHOL) 40 MG tablet Take 1 tablet (40 mg total) by mouth once daily. 90 tablet 0    predniSONE (DELTASONE) 5 MG tablet Take 5 mg by mouth daily 30 tablet 11    tacrolimus (PROGRAF) 1 MG Cap Take 8 capsules (8 mg total) by mouth every 12 (twelve) hours. 480 capsule 11     No current facility-administered medications for this visit.       Past Medical History:   Diagnosis Date    STEVEN (acute kidney injury) 12/16/2020    Anxiety     Cataract     CKD stage 3 due to type 1 diabetes mellitus  12/16/2020    Diabetes mellitus     Diabetic retinopathy     Disorder of kidney and ureter     Encounter for blood transfusion     Glaucoma     Hepatitis C virus infection cured after antiviral drug therapy     acquired through transplant, treated / cured - svr - 8/2021    High cholesterol     Hypertension     Retinal detachment     Stage 3b chronic kidney disease 11/13/2021         Review of Systems   Constitutional: Negative for appetite change, chills, fatigue and fever.   HENT: Negative for trouble swallowing.    Eyes: Positive for visual disturbance.        BLIND   Respiratory: Negative for cough, chest tightness and shortness of breath.    Cardiovascular: Negative for chest pain, palpitations and leg swelling.   Gastrointestinal: Negative for abdominal pain, constipation, diarrhea and nausea.   Genitourinary: Negative for difficulty urinating, frequency and urgency.   Musculoskeletal: Negative for arthralgias and myalgias.   Skin: Negative for rash.   Allergic/Immunologic: Positive for immunocompromised state.   Neurological: Negative for dizziness, weakness, light-headedness and headaches.   Psychiatric/Behavioral: Negative for sleep disturbance.       Objective:     There were no vitals taken for this visit.body mass index is unknown because there is no height or weight on file.    Physical Exam    Labs:  Lab Results   Component Value Date    WBC 2.65 (L) 11/15/2021    HGB 10.9 (L) 11/15/2021    HCT 37.1 (L) 11/15/2021     11/15/2021    K 4.6 11/15/2021     11/15/2021    CO2 24 11/15/2021    BUN 30 (H) 11/15/2021    CREATININE 2.4 (H) 11/15/2021    EGFRNONAA 32.8 (A) 11/15/2021    EGFRIFAFRICA 12 07/17/2019    GLUCOSE 215 (H) 07/17/2019    CALCIUM 9.2 11/15/2021    PHOS 3.2 11/15/2021    MG 1.7 11/15/2021    ALBUMIN 3.2 (L) 11/15/2021    AST 21 11/01/2021    ALT 24 11/01/2021    UTPCR 0.08 11/01/2021    .0 (H) 01/25/2021    TACROLIMUS 11.9 11/15/2021       Lab Results   Component Value  Date    EXTANC 2.64 06/10/2022    EXTWBC 4.4 (A) 06/10/2022    EXTSEGS 60 06/10/2022    EXTPLATELETS 264 06/10/2022    EXTHEMOGLOBI 11.7 (A) 06/10/2022    EXTHEMATOCRI 39 (A) 06/10/2022    EXTCREATININ 1.81 (A) 06/10/2022    EXTSODIUM 143 (A) 06/10/2022    EXTPOTASSIUM 4.8 06/10/2022    EXTBUN 27 (A) 06/10/2022    EXTCO2 27 06/10/2022    EXTCALCIUM 9.9 06/10/2022    EXTPHOSPHORU 4.4 06/10/2022    EXTGLUCOSE 96 06/10/2022    EXTALBUMIN 4.4 06/10/2022    EXTAST 24 11/22/2021    EXTALT 37 11/22/2021    EXTBILITOTAL 0.2 (A) 11/22/2021    EXTLIPASE 39 06/10/2022    EXTAMYLASE 103 06/10/2022       Lab Results   Component Value Date    EXTTACROLVL 4.3 06/10/2022    EXTPROTCRE 0.08 07/30/2021    EXTPROTEINUA negative 07/30/2021    EXTWBCUA none 05/03/2021    EXTRBCUA none 05/03/2021       Labs were reviewed with the patient.    Assessment:     1. Kidney transplanted    2. Status post pancreas transplantation    3. Long-term use of immunosuppressant medication    4. Stage 3a chronic kidney disease    5. Essential hypertension    6. At risk for opportunistic infections        Plan:   Needs to re-establish care with general nephrology for CKD care, he has visit scheduled on 9/26  Needs updated prograf level after last dose adjustment     Follow-up:   1. CKD stage: 3a     2. Immunosuppression: Prograf trough 4.3, which is  SUBtherapeutic (target 7-10). Continue Prograf 8/8 (dose increased 6/22/2022), Ketoconazole 100 mg QD, MyF 360mg BID, and Prednisone 5 mg QD. Will continue to monitor for drug toxicities    3. Allograft Function: Stable. Continue good po hydration.    Pancreas function stable, cont ASA as ordered.     6/10/2022  1yr 7mo   EXT Creatinine 0.92 - 1.35 mg/dL 1.81 (A)   EXT GFR MDRD AF AMER >60 53.67       6/10/2022  1yr 7mo   EXT Amylase 23 - 130 103   EXT Lipase 31 - 96 39       6/10/2022  1yr 7mo   EXT Hemoglobin A1C 4 - 6.0 % 5.6     4. Hypertension management: advise low salt diet and home BP monitoring       5. Metabolic Bone Disease/Secondary Hyperparathyroidism:stable  Will monitor PTH, CA and Vit D/guidelines   6/10/2022  1yr 7mo   EXT Calcium 9 - 10.2 9.9   EXT Phosphorus 2.8 - 4.8 4.4   EXT Magnesium 1.69 - 2.15 2.10       6. Electrolytes:  Will monitor /guidelines   6/10/2022  1yr 7mo   EXT Sodium 135 - 142 mmol/L 143 (A)   EXT Potassium 3.6 - 5.1 4.8   EXT Chloride 97 - 108 111 (A)   EXT CO2 26 - 34 27     7. Anemia: stable. No need for intervention    6/10/2022  1yr 7mo   EXT WBC 4.8 - 10.8 4.4 (A)   EXT Hemoglobin 13.6 - 17.2 11.7 (A)   EXT Hematocrit 40.2 - 51.4 39 (A)   EXT Platelets 160 - 400 264       8.  Cytopenias: no significant cytopenias will monitor as per our guidelines. Medicine list reviewed including potential causes of drug-induced cytopenias    9.Proteinuria: continue p/c ratio as per guidelines       10. BK virus screening:  will continue to monitor/ guidelines   6/10/2022  1yr 7mo   EXT BK Virus DNA QN PCR Not Detected     11. Weight education: provided during the clinic visit   There is no height or weight on file to calculate BMI.     12.Patient safety education regarding immunosuppression including prophylaxis posttransplant for CMV, PCP : Education provided about vaccination and prevention of infections           Follow-up:   Clinic: return to transplant clinic weekly for the first month after transplant; every 2 weeks during months 2-3; then at 6-, 9-, 12-, 18-, 24-, and 36- months post-transplant to reassess for complications from immunosuppression toxicity and monitor for rejection.  Annually thereafter.    Labs: since patient remains at high risk for rejection and drug-related complications that warrant close monitoring, labs will be ordered as follows: continue twice weekly CBC, renal panel, and drug level for first month; then same labs once weekly through 3rd month post-transplant.  Urine for UA and protein/creatinine ratio monthly.  Serum BK - PCR at 1-, 3-, 6-, 9-, 12-, 18-,  24-, 36- 48-, and 60 months post-transplant.  Hepatic panel at 1-, 2-, 3-, 6-, 9-, 12-, 18-, 24-, and 36- months post-transplant.    Education:   Material provided to the patient.  Patient reminded to call with any health changes since these can be early signs of significant complications.  Also, I advised the patient to be sure any new medications or changes of old medications are discussed with either a pharmacist or physician knowledgeable with transplant to avoid rejection/drug toxicity related to significant drug interactions.    Exercise: reminded Hernandez of the importance of regular exercise for weight management, blood sugar and blood pressure management.  I also explained exercise has been shown to improve cardiovascular health, energy level, and sleep hygiene.  Lastly, I advised him that cardiovascular complications are leading cause of death for renal transplant recipients, and regular exercise can help lower this risk.    I spoke with the patient for 30 minutes. More than half dedicated to counseling and education. All questions answered    Lisha Marks NP-C  Transplant Nephrology

## 2022-08-29 ENCOUNTER — PATIENT MESSAGE (OUTPATIENT)
Dept: TRANSPLANT | Facility: CLINIC | Age: 42
End: 2022-08-29
Payer: MEDICARE

## 2022-08-30 ENCOUNTER — OFFICE VISIT (OUTPATIENT)
Dept: TRANSPLANT | Facility: CLINIC | Age: 42
End: 2022-08-30
Payer: MEDICARE

## 2022-08-30 DIAGNOSIS — Z79.60 LONG-TERM USE OF IMMUNOSUPPRESSANT MEDICATION: ICD-10-CM

## 2022-08-30 DIAGNOSIS — Z94.0 KIDNEY TRANSPLANTED: Primary | ICD-10-CM

## 2022-08-30 DIAGNOSIS — Z91.89 AT RISK FOR OPPORTUNISTIC INFECTIONS: ICD-10-CM

## 2022-08-30 DIAGNOSIS — Z94.83 STATUS POST PANCREAS TRANSPLANTATION: ICD-10-CM

## 2022-08-30 DIAGNOSIS — I10 ESSENTIAL HYPERTENSION: ICD-10-CM

## 2022-08-30 DIAGNOSIS — N18.31 STAGE 3A CHRONIC KIDNEY DISEASE: ICD-10-CM

## 2022-08-30 PROCEDURE — 99214 OFFICE O/P EST MOD 30 MIN: CPT | Mod: 95,,, | Performed by: NURSE PRACTITIONER

## 2022-08-30 PROCEDURE — 99214 PR OFFICE/OUTPT VISIT, EST, LEVL IV, 30-39 MIN: ICD-10-PCS | Mod: 95,,, | Performed by: NURSE PRACTITIONER

## 2022-08-30 RX ORDER — KETOCONAZOLE 200 MG/1
100 TABLET ORAL DAILY
Qty: 15 TABLET | Refills: 11 | Status: SHIPPED | OUTPATIENT
Start: 2022-08-30 | End: 2023-09-18 | Stop reason: SDUPTHER

## 2022-08-30 RX ORDER — MYCOPHENOLIC ACID 180 MG/1
360 TABLET, DELAYED RELEASE ORAL 2 TIMES DAILY
Qty: 120 TABLET | Refills: 11 | Status: SHIPPED | OUTPATIENT
Start: 2022-08-30 | End: 2023-09-18 | Stop reason: SDUPTHER

## 2022-08-30 RX ORDER — PREDNISONE 5 MG/1
5 TABLET ORAL DAILY
Qty: 30 TABLET | Refills: 11 | Status: SHIPPED | OUTPATIENT
Start: 2022-08-30 | End: 2022-12-08 | Stop reason: SDUPTHER

## 2022-08-30 NOTE — LETTER
August 30, 2022        ARUNA Lockett From .  [address]  [place] [address]  Phone: [phone]  Fax: [phone]             [facility] [address]st [address]  [address]  [facility] [address]  Phone: [phone]   Patient: Hernandez Rosales   MR Number: [id number]   YOB: 1980   Date of Visit: 8/30/2022       Dear Dr. ARUNA Lockett From .    Thank you for referring Hernandez Rosales to me for evaluation. Attached you will find relevant portions of my assessment and plan of care.    If you have questions, please do not hesitate to call me. I look forward to following Hernandez Rosales along with you.    Sincerely,    Lisha Marks, NP    Enclosure    If you would like to receive this communication electronically, please contact [email or web address] or [phone] to request [facility] access.    [facility] is a tool which provides read-only access to select patient information with whom you have a relationship. Its easy to use and provides real time access to review your patients record including encounter summaries, notes, results, and demographic information.    If you feel you have received this communication in error or would no longer like to receive these types of communications, please e-mail [email or web address]

## 2022-12-08 DIAGNOSIS — Z94.83 STATUS POST PANCREAS TRANSPLANTATION: ICD-10-CM

## 2022-12-08 DIAGNOSIS — Z79.60 LONG-TERM USE OF IMMUNOSUPPRESSANT MEDICATION: ICD-10-CM

## 2022-12-08 DIAGNOSIS — Z94.0 KIDNEY TRANSPLANTED: ICD-10-CM

## 2022-12-08 RX ORDER — PREDNISONE 5 MG/1
5 TABLET ORAL DAILY
Qty: 30 TABLET | Refills: 11 | Status: SHIPPED | OUTPATIENT
Start: 2022-12-08 | End: 2023-09-18 | Stop reason: SDUPTHER

## 2022-12-09 NOTE — PROGRESS NOTES
Patient ID: Bart is a 47 year old male.  MRN: [id number]    SUBJECTIVE  Chief Complaint   Patient presents with   • ER F/U     Patient is here to follow up from er. Half of face was numb and left arm was also numb. Was seen at [facility]. States he is feeling good but the same as yesterday.      HISTORY OF PRESENT ILLNESS  Presents for ER f/u. Went to [facility] ED 12/4/22 for acute onset chest pain with left arm and face numbness. Troponins x2 were negative. EKG revealed normal sinus rhythm, nonspecific T wave abnormality. CXR unremarkable. CT brain negative. CTA head & carotids only significant for minimal to mild cervical carotid atherosclerotic plaque. MRI brain only significant for very subtle high signal abnormalities on the flair sequence only may represent chronic microvascular ischemia. He had f/u with neuro Noelle STOVALL, LISA, (Dr. Trevizo's office) yesterday. Dx with \"neuro spell\" and it was recommended he start daily baby Aspirin, continue Atorvastatin and Fenofibrate for cholesterol control, LDL goal <70.  She did order home sleep test for him to complete also. Again, neuro exam was completely normal. He is awaiting cardiologist's call to set up appointment for f/u also. Completed stress echo yesterday. Results are available: The patient's functional capacity is good. Resting EKG and stress EKG normal. Resting echo and stress echo normal. Cardio to decide if event monitor is needed.  Today, pt states, \"I've been feeling good. I don't know what happened Sunday.\" Went to [facility] for lunch, ate steak. Reports, \"I got diarrhea right away and I was sweating a lot and I started getting dizzy.\" When he got home after lunch he started vomiting also. After that he was feeling progressively worse and had headache and left side of face and left upper extremity felt numb and he started to have left sided chest pain. Hands were shaky. He was scared so he went to the hospital at that point. By the following  Labs and diagnostic tests were reviewed. No action/changes indicated.   day everything had resolved. So far neuro is \"not exactly sure what happened to me.\" Has yet to speak to cardiologist but they should be calling soon.    Also needs form for work completed.      Review of Systems   All other systems reviewed and are negative.   see HPI, he is completely asymptomatic today    ALLERGIES  ALLERGIES:  No Known Allergies    CURRENT MEDICATIONS  Current Outpatient Medications   Medication Sig Dispense Refill   • aspirin 81 MG EC tablet Take 1 tablet by mouth daily (with breakfast). 90 tablet 3   • buPROPion XL (WELLBUTRIN XL) 150 MG 24 hr tablet Take 1 tablet by mouth daily (with breakfast). 90 tablet 1   • fenofibrate 160 MG tablet Take 1 tablet by mouth daily. 90 tablet 3   • atorvastatin (LIPITOR) 80 MG tablet Take 1 tablet by mouth daily. 90 tablet 3   • VITAMIN D, CHOLECALCIFEROL, PO Take by mouth daily.     • Omega-3 Fatty Acids (Fish Oil) 1000 MG capsule Take 2 g by mouth daily.     • MULTIPLE VITAMIN PO Take 1 tablet by mouth daily.     • MILK THISTLE EXTRACT PO Take 140 mg by mouth daily.     • fexofenadine (ALLEGRA) 180 MG tablet Take 180 mg by mouth every evening.       No current facility-administered medications for this visit.       MEDICAL HISTORY  Past Medical History:   Diagnosis Date   • Allergic rhinitis    • Elevated liver enzymes    • Fatty liver    • Hyperlipidemia    • Iron excess    • Nicotine addiction    • Testicular mass 2015   • Vitamin D deficiency      Patient Active Problem List   Diagnosis   • High serum ferritin   • Mixed hyperlipidemia   • Elevated liver enzymes   • Former smoker   • Fatty liver   • Elevated fasting blood sugar   • Prediabetes     SURGICAL HISTORY  Past Surgical History:   Procedure Laterality Date   • Inguinal hernia repair      x3   • Nose surgery      broken nose repair   • Vasectomy  03/2015     FAMILY HISTORY  Family History   Problem Relation Age of Onset   • Hyperlipidemia Maternal Uncle      SOCIAL HISTORY  Social History      Tobacco Use   • Smoking status: Former Smoker     Types: Cigarettes     Quit date: 2021     Years since quittin.2   • Smokeless tobacco: Never Used   Vaping Use   • Vaping Use: never used   Substance Use Topics   • Alcohol use: Yes     Alcohol/week: 0.0 standard drinks     Comment: occasional   • Drug use: Never          OBJECTIVE  Vitals:    22 1422   BP: 132/80   BP Location: RUE - Right upper extremity   Patient Position: Sitting   Cuff Size: Regular   Pulse: 100   Temp: 98 °F (36.7 °C)   TempSrc: Temporal   SpO2: 98%   Weight: 97.5 kg (215 lb)   Height: 5' 11\" (1.803 m)   PainSc:  0     Physical Exam  Vitals and nursing note reviewed.   Constitutional:       General: He is not in acute distress.     Appearance: Normal appearance. He is well-developed. He is not ill-appearing.   HENT:      Head: Normocephalic and atraumatic.   Eyes:      General: Vision grossly intact. Gaze aligned appropriately. No visual field deficit.     Extraocular Movements: Extraocular movements intact.      Conjunctiva/sclera: Conjunctivae normal.      Pupils: Pupils are equal, round, and reactive to light.   Neck:      Thyroid: No thyromegaly.      Vascular: No carotid bruit.   Cardiovascular:      Rate and Rhythm: Normal rate and regular rhythm.      Heart sounds: Normal heart sounds.   Pulmonary:      Effort: Pulmonary effort is normal.      Breath sounds: Normal breath sounds.   Musculoskeletal:      Cervical back: Normal range of motion and neck supple.   Lymphadenopathy:      Head:      Right side of head: No submandibular adenopathy.      Left side of head: No submandibular adenopathy.      Cervical: No cervical adenopathy.   Skin:     Findings: No rash.   Neurological:      General: No focal deficit present.      Mental Status: He is alert and oriented to person, place, and time. He is not disoriented.      Cranial Nerves: No cranial nerve deficit, dysarthria or facial asymmetry.      Sensory: No sensory  deficit.      Motor: Motor function is intact. No weakness, tremor or abnormal muscle tone.      Coordination: Coordination is intact. Coordination normal.      Gait: Gait is intact. Gait normal.      Deep Tendon Reflexes: Reflexes are normal and symmetric.   Psychiatric:         Attention and Perception: Attention and perception normal.         Mood and Affect: Mood and affect normal.         Speech: Speech normal.         Behavior: Behavior normal. Behavior is cooperative.         Thought Content: Thought content normal.         Cognition and Memory: Cognition and memory normal.         Judgment: Judgment normal.          ASSESSMENT AND PLAN  Bart was seen today for er f/u.    Diagnoses and all orders for this visit:    History of chest pain at rest  Comments:  resolved, cardiac testing thus far negative, neuro ordered sleep study, f/u with cardiologist, may be proceeding with event monitor next  Orders:  -     aspirin 81 MG EC tablet; Take 1 tablet by mouth daily (with breakfast).    History of numbness  Comments:  resolved, testing thus far negative, neuro Dr Trevizo/Noelle Cheek CNP  Orders:  -     aspirin 81 MG EC tablet; Take 1 tablet by mouth daily (with breakfast).    History of nausea and vomiting  Comments:  resolved    History of diarrhea  Comments:  resolved    Encounter for vision screening  Comments:  completed for work form; uncorrected both eyes 20/20, right eye 20/25, left eye 20/25; didn't have glasses to do corrected exam            Educated on disease process. Plan discussed and risk of noncompliance reviewed. Proper usage and all side effects of medications reviewed.  When to RTO as clinically indicated discussed with patient.  Pt voiced understanding of and agreement with the plan.    Schedule follow up: Return if symptoms worsen or fail to improve.    Oralia Sesay PA-C    I have personally reviewed and analyzed this patient's medical history in addition to evaluation and management  done today. Total time spent 40 minutes.

## 2023-07-27 NOTE — ASSESSMENT & PLAN NOTE
- see long term immuno.    BERTA FLORIAN is a 61yo Female with a PMH significant for Bipolar 1 disorder who is here for urgent HD for [place] toxicity.     Neuro   #Bipolar disorder  #Lithium Toxicity  - Obtunded AOx1 only to person, baseline AOx4   - Lithium and haldol on hold; awaiting Hemodialysis  - Continue w/ Neuro checks for lithium toxicity    CV  - stable, s/p 3L bolus fluids  - Fluid resuscitation as needed  - Monitor on tele    Resp  - sat >98% on RA    GI  - Diarrhea, nausea and vomiting in setting of Lithium toxicity   - NPO    Metabolic  - monitor electrolytes and replenish after Dialysis   - Obtain BMP Mg Phos immediately after dialysis and then 6-hours post-dialysis     Renal  - ICU team consulted Nephrology for urgent dialysis  - Cr. 3.8, BUN 20, producing 25cc/hr via sierra  - shiley placed at ED  - NS 100mL/hr until HD  - repeat lithium 3.2 post-dialysis  - per nephro and toxicology will plan for dialysis at 3:30 PM today    Endocrine  - Blood Glucose 168  - TSH pending     Heme  - DVT Prophylaxis Heparin 5000U     ID  # UTI  - Patient endorses buring w/ urination  - UA positive for bacteruria LE positive nitrite negative  - Leukocytosis on admission 14.03  - Cr. elevated to 3.8, BUN 20  - Ucx Bcx pending  - Ceftriaxone 1g Daily 7/16-    Ethics  - Full code BERTA FLORIAN is a 63yo Female with a PMH significant for Bipolar 1 disorder who is here for urgent HD for Lithium toxicity.     62yFemale with PMHx of *** who presents with ***.     NEURO:  #Mental status: altered/non altered. Likely 2/2 lithium toxicity vs prev hx bipolar w/ montserrat  #Bipolar disorder w/ montserrat   #[place] Toxicity  - Obtunded AOx1 only to person, baseline AOx4   - s/p HD  - Continue w/ Neuro checks for lithium toxicity      CV:  #Hemodynamically unstable:  - Prev on pressors due to vasoplegic vs hypovolemic shock  - Cont NS maintenance fluids  - s/p 2 L LR bolus ON    PULM:  - satting well on RA    RENAL:  #ALICJA:   -UO:  -Sierra:    GI:  #Diet: NPO  - NPO for pending dysphagia screening for AMS  - has not eaten since Sunday  - consider NGT    ENDO:  #DM2: HbA1c ***   - Insulin Sliding Scale    METABOLIC:  #Electrolyte abnormalities    HEMATOLOGIC:  #CBC results show  #Coag panel shows  #DVT prophylaxis with     ID:  #Pt without strong objective or clinical evidence of infection. Will observe off antibiotics    SKIN:  #Lines:  #Decubitus ulcers:    Code Status: FULL CODE  [facility] decision ***      Metabolic  - monitor electrolytes and replenish after Dialysis   - Obtain BMP Mg Phos immediately after dialysis and then 6-hours post-dialysis     Renal  - ICU team consulted Nephrology for urgent dialysis  - Cr. 3.8, BUN 20, producing 25cc/hr via sierra  - shiley placed at ED  - NS 100mL/hr until HD  - repeat lithium 3.2 post-dialysis  - per nephro and toxicology will plan for dialysis at 3:30 PM today    Endocrine  - Blood Glucose 168  - TSH pending     Heme  - DVT Prophylaxis Heparin 5000U     ID  # UTI  - Patient endorses buring w/ urination  - UA positive for bacteruria LE positive nitrite negative  - Leukocytosis on admission 14.03  - Cr. elevated to 3.8, BUN 20  - Ucx Bcx pending  - Ceftriaxone 1g Daily 7/16-    Ethics  - Full code BERTA FLORIAN is a 63yo Female with a PMH significant for Bipolar 1 disorder who is here for urgent HD for Lithium toxicity.     62yFemale with PMHx of *** who presents with ***.     NEURO:  #Mental status: altered/non altered. Likely 2/2 lithium toxicity vs prev hx bipolar w/ montserrat  #Bipolar disorder w/ montserrat   #[place] Toxicity  - Obtunded AOx1 only to person, baseline AOx4   - s/p HD  - Continue w/ Neuro checks for lithium toxicity      CV:  #Hemodynamically unstable:  - Prev on levophed due to vasoplegic vs hypovolemic shock  - s/p 2 L LR bolus ON  - Cont NS maintenance fluids    PULM:  - satting well on RA    RENAL:  #ALICJA:   -UO:  -Sierra:    GI:  #Diet: NPO  - NPO for pending dysphagia screening for AMS  - has not eaten since Sunday  - consider NGT    ENDO:  #DM2: HbA1c ***   - Insulin Sliding Scale    METABOLIC:  #Hypokalemia    - Trend electrolytes, replete if K > 4, Mg > 2, Phos > 3    HEMATOLOGIC:  #CBC results show  #Coag panel shows  #DVT prophylaxis with     ID:  #Pt without strong objective or clinical evidence of infection. Will observe off antibiotics    SKIN:  #Lines:  #Decubitus ulcers:    Code Status: FULL CODE  [facility] decision ***          Renal  - ICU team consulted Nephrology for urgent dialysis  - Cr. 3.8, BUN 20, producing 25cc/hr via sierra  - shiley placed at ED  - NS 100mL/hr until HD  - repeat lithium 3.2 post-dialysis  - per nephro and toxicology will plan for dialysis at 3:30 PM today    Endocrine  - Blood Glucose 168  - TSH pending     Heme  - DVT Prophylaxis Heparin 5000U     ID  # UTI  - Patient endorses buring w/ urination  - UA positive for bacteruria LE positive nitrite negative  - Leukocytosis on admission 14.03  - Cr. elevated to 3.8, BUN 20  - Ucx Bcx pending  - Ceftriaxone 1g Daily 7/16-    Ethics  - Full code BERTA FLORIAN is a 63yo Female with a PMHx of bipolar 1 disorder who is here for urgent HD for lithium toxicity.     NEURO:  #Mental status: altered likely 2/2 lithium toxicity.  #Bipolar disorder w/ montserrat   #[place] Toxicity  - obtunded AOx1 only to person, baseline AOx4   - d/c precedex  - continue w/ Neuro checks for lithium toxicity  - psych consult pending     CV:  #Hemodynamically unstable:   - prev on levophed due to vasoplegic vs hypovolemic shock  - s/p 2 L LR bolus ON  - d/c levophed given HDS  - cont NS maintenance fluids    PULM:  - satting well on RA    RENAL:  #ALICJA  #Lithium toxicity  #Nephrogenic DI?  - s/p HD x 2 on 7/26 (2nd session net gain 500 mL)  -UO:  -Sierra:     Renal  - ICU team consulted Nephrology for urgent dialysis  - Cr. 3.8, BUN 20, producing 25cc/hr via sierra  - shiley placed at ED  - NS 100mL/hr until HD  - repeat lithium 3.2 post-dialysis  - per nephro and toxicology will plan for dialysis at 3:30 PM today      GI:  #Diet: NPO  - NPO for pending dysphagia screening for AMS  - has not eaten since Sunday  - consider NGT    ENDO:  #DM2: HbA1c ***   - Insulin Sliding Scale    METABOLIC:  #Hypokalemia  - Trend electrolytes, replete if K > 4, Mg > 2, Phos > 3    HEMATOLOGIC:  #CBC results show  #Coag panel shows  #DVT prophylaxis with     ID:  #Pt without strong objective or clinical evidence of infection. Will observe off antibiotics    SKIN:  #Lines:  #Decubitus ulcers:    Code Status: FULL CODE  [facility] decision ***            Endocrine  - Blood Glucose 168  - TSH pending     Heme  - DVT Prophylaxis Heparin 5000U     ID  # UTI  - Patient endorses buring w/ urination  - UA positive for bacteruria LE positive nitrite negative  - Leukocytosis on admission 14.03  - Cr. elevated to 3.8, BUN 20  - Ucx Bcx pending  - Ceftriaxone 1g Daily 7/16-    Ethics  - Full code BERTA FLORIAN is a 61yo Female with a PMHx of bipolar 1 disorder who is here for urgent HD for lithium toxicity.     NEURO:  #Mental status: altered likely 2/2 lithium toxicity.  #Bipolar disorder w/ montserrat   #[place] Toxicity  - obtunded AOx1 only to person, baseline AOx4   - d/c precedex  - continue w/ Neuro checks for lithium toxicity  - psych consult pending     CV:  #Hemodynamically unstable:   - prev on levophed due to vasoplegic vs hypovolemic shock  - s/p 2 L LR bolus ON  - d/c levophed given HDS  - cont NS maintenance fluids    PULM:  - satting well on RA    RENAL:  #ALICJA  #Lithium toxicity  #Nephrogenic DI?  - s/p HD x 2 on 7/26 (2nd session net gain 500 mL)  - monitor I/O's  - rigo woo in place for possible HD    GI:  #Diet: NPO  - NPO for pending dysphagia screening for AMS  - has not eaten since Sunday  - consider NGT    ENDO:  - TSH wnl  - glucose 118    METABOLIC:  #Hypokalemia  - Trend electrolytes, replete if K > 4, Mg > 2, Phos > 3    HEMATOLOGIC:  #Anemia   - most likely dilutional   - no source of obvious bleed  #DVT prophylaxis with Heparin 5000U     ID:  # UTI  - UA positive for bacteruria LE positive nitrite negative  - Leukocytosis on admission 14.03  - Cr. elevated to 3.8, BUN 20  - Ucx Bcx pending  - Ceftriaxone 1g Daily 7/16-    SKIN:  #Lines: peripheral lines     Code Status: FULL CODE

## 2023-08-23 ENCOUNTER — TELEPHONE (OUTPATIENT)
Dept: TRANSPLANT | Facility: CLINIC | Age: 43
End: 2023-08-23
Payer: MEDICARE

## 2023-08-23 NOTE — TELEPHONE ENCOUNTER
Spoke with patient's sister to schedule pt for labs and clinic. Pt's sister will call back with availabilities

## 2023-08-28 DIAGNOSIS — Z94.83 STATUS POST SIMULTANEOUS KIDNEY AND PANCREAS TRANSPLANT: Chronic | ICD-10-CM

## 2023-08-28 DIAGNOSIS — Z94.0 STATUS POST SIMULTANEOUS KIDNEY AND PANCREAS TRANSPLANT: Chronic | ICD-10-CM

## 2023-08-28 DIAGNOSIS — Z79.60 LONG-TERM USE OF IMMUNOSUPPRESSANT MEDICATION: ICD-10-CM

## 2023-08-28 RX ORDER — TACROLIMUS 1 MG/1
8 CAPSULE ORAL EVERY 12 HOURS
Qty: 480 CAPSULE | Refills: 11 | Status: SHIPPED | OUTPATIENT
Start: 2023-08-28

## 2023-09-05 ENCOUNTER — TELEPHONE (OUTPATIENT)
Dept: TRANSPLANT | Facility: CLINIC | Age: 43
End: 2023-09-05
Payer: MEDICARE

## 2023-09-05 DIAGNOSIS — Z94.83 PANCREAS REPLACED BY TRANSPLANT: ICD-10-CM

## 2023-09-05 DIAGNOSIS — Z94.0 KIDNEY REPLACED BY TRANSPLANT: Primary | ICD-10-CM

## 2023-09-05 NOTE — TELEPHONE ENCOUNTER
"I received a phone call from the transfer center about this gentleman and spoke with Dr Santillan ([phone])  Patient went to  [facility] on July 15 with severe STEVEN, creatinine of 33 and Potassium of 8.8  on July 15 to July 27, he was dialyzed emergently and after several dialysis treatments the creatinine "stabilized at 4.4" according with Dr Santillan. As per  doctor, patient stopped taking immunosuppressive medications for an unknown period of time.   Patient had h/o Herpes virus esophagitis (biopsy proved) and received 2 weeks of IV Acyclovir.  Hospital medicine physician was not sure if patient was discharged on July with Nephrology follow up.  Patient came back to [facility] with a creatinine of 27 and hematocrit of 14 on August 29 2023. He received 4 units of PRBC and GI was consulted. H/h stable in the last 3 days. On admission tacro level was <2. Patient is now back on dialysis. Outpatient dialysis was already set up when he is ready to be discharged.   Hospital medicine also told me that patient has some hematuria and Urology was consulted.   Sister called this morning to report events to our transplant nurse.    Plan: I spoke with  doctor at length  I advised the following:    Kidney and pancreas US. As per  doctor patient has normal blood sugars and is not getting insulin replacement.  Amylase lipase hemoglobin A1c and C-peptide in addition to routine  labs.  Tacro level in am. Patient is back on tacrolimus and Myfortic 720 bid.  Continue with dialysis treatments (I called the nephrologist on the case Dr Reeves and left a voice mail with my cell phone to be called to discuss the case)   I do not see any indication for a kidney biopsy. Severe STEVEN, exceedingly unlikely we can do anything to reverse this.  Will discuss with  tomorrow they have my phone number.  When we spoke with transfer center we are on diversion. I do not see need for emergent transfer right now.   It is possible that we need to do  " SM pulses and steroid taper due to hematuria.  I also called the care giver three times and left a voice mail.      Damien Valladares MD  Transplant Nephrology

## 2023-09-05 NOTE — TELEPHONE ENCOUNTER
Returned call to pt sister; pt sister reports pt has been admitted to local hospital ([facility]) and is concerned he is not receiving appropriate care. Instructed pt sister to speak with MD currently caring for pt and ask for transfer to [facility]. Gave pt sister [phone] number to have MD consult Transplant MD on call. Pt sister verbalized understanding.  ----- Message from Karyn Ocasio MA sent at 9/5/2023  1:11 PM CDT -----  Regarding: PT admit/ transfer  Patient's sister is requesting to speak to physician, brother was admitted for rejection and she doesn't feel confident in the care he is receiving.       Malena Rosales      [phone]

## 2023-09-12 ENCOUNTER — TELEPHONE (OUTPATIENT)
Dept: TRANSPLANT | Facility: CLINIC | Age: 43
End: 2023-09-12
Payer: MEDICARE

## 2023-09-12 DIAGNOSIS — Z94.83 PANCREAS REPLACED BY TRANSPLANT: ICD-10-CM

## 2023-09-12 DIAGNOSIS — Z94.0 KIDNEY REPLACED BY TRANSPLANT: Primary | ICD-10-CM

## 2023-09-12 DIAGNOSIS — E11.9 DIABETES MELLITUS: ICD-10-CM

## 2023-09-12 NOTE — TELEPHONE ENCOUNTER
Returned call to pt caregiver. Pt caregiver reports pt does not have transportation for tomorrow and would like to reschedule appointments for Monday 9/18.   ----- Message from Hal Gupta sent at 9/12/2023 11:49 AM CDT -----  Regarding: call back  Pt's caregiver call to reschedule appt requesting call back    Call

## 2023-09-12 NOTE — TELEPHONE ENCOUNTER
Called to remind pt's caregiver of upcoming appointments tomorrow. Pt caregiver reports she knew about the appointments but is still in process of setting up transportation for patient and may need appointments rescheduled. Pt caregiver reports she will call back if they need rescheduling.

## 2023-09-13 ENCOUNTER — TELEPHONE (OUTPATIENT)
Dept: TRANSPLANT | Facility: CLINIC | Age: 43
End: 2023-09-13
Payer: MEDICARE

## 2023-09-14 NOTE — PROGRESS NOTES
"     Kidney/Pancreas Post-Transplant Assessment    Referring Physician: ARUNA Krishnan Jr.  Current Nephrologist: ARUNA Krishnan Jr.    ORGAN: PANCREAS  Donor Type: donation after brain death  PHS Increased Risk: yes  Cold Ischemia: 432 mins  Induction Medications:     Subjective:       CC:  Reassessment of renal allograft function and management of chronic immunosuppression.    HPI:  Mr. Rosales is a 43 y.o. 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  He has CKD stage 5 - GFR < 15 and his baseline creatinine previously between 2.3-2.8 prior to 2023. He takes mycophenolic acid, prednisone and tacrolimus for maintenance immunosuppression.     Admitted to [facility] July for severe STEVEN (Cr 33 and K 8.8) after he stopped taking immunosuppression for unknown period of time. Was dialyzed emergently. Re-admitted to [facility] August 29 with a creatinine of 27 and hematocrit of 14 on August 29 2023. He received 4 units of PRBC and GI was consulted.  On admission tacro level was <2. Discussion with Dr. Valladares, no indications for kidney biopsy as unlikely to reverse STEVEN.     He reports that he stopped taking his medications because "he was going though some stuff". Has been on dialysis TTS since hospital admission last month with no recovery of renal function. He reports compliance with IS at this time. Still making small amount of urine. Family member on telephone reports that his urine has looked bloody. He reports no pain with urination. No pain over allograft. Family reports that did have upper GI scope during last hospitalization, unsure if he has required blood since. Only complaint is itching. No peripheral edema. No nausea or vomiting.     Current Outpatient Medications   Medication Sig Dispense Refill    aspirin 81 MG Chew CHEW 1 tablet (81 mg total) by mouth once daily 90 days 90 tablet 3    calcitRIOL (ROCALTROL) 0.5 MCG Cap Take 1 " capsule (0.5 mcg total) by mouth once daily. 90 capsule 2    ergocalciferol (ERGOCALCIFEROL) 50,000 unit Cap Take 1 capsule (50,000 Units total) by mouth every 7 days 90days 12 capsule 3    ferrous sulfate 325 (65 FE) MG EC tablet Take 325 mg by mouth once daily.      gabapentin (NEURONTIN) 300 MG capsule Take 1 capsule (300 mg total) by mouth 2 (two) times a day. 120 capsule 0    magnesium oxide (MAG-OX) 400 mg (241.3 mg magnesium) tablet Take 2 tablets (800 mg total) by mouth 3 (three) times daily. 180 tablet 11    melatonin (MELATIN) 3 mg tablet Take 2 tablets (6 mg total) by mouth nightly as needed for Insomnia. 30 tablet 2    pantoprazole (PROTONIX) 40 MG tablet Take 1 tablet (40 mg total) by mouth once daily. 30 tablet 5    pravastatin (PRAVACHOL) 40 MG tablet Take 1 tablet (40 mg total) by mouth once daily. 90 tablet 0    tacrolimus (PROGRAF) 1 MG Cap Take 8 capsules (8 mg total) by mouth every 12 (twelve) hours. 480 capsule 11    calcitRIOL (ROCALTROL) 0.5 MCG Cap Take 1 capsule by mouth once a day (Patient not taking: Reported on 9/18/2023) 90 capsule 1    flu vacc [id number] 6mos up,PF, 60 mcg (15 mcg x 4)/0.5 mL Syrg inject IM by [facility] (Patient not taking: Reported on 9/18/2023) 0.5 mL 0    ketoconazole (NIZORAL) 200 mg Tab Take HALF tablet (100 mg total) by mouth once daily. 15 tablet 11    mycophenolate (MYFORTIC) 180 MG TbEC Take 2 tablets (360 mg total) by mouth 2 (two) times daily. 120 tablet 11    patiromer calcium sorbitex (VELTASSA) 8.4 gram PwPk Mix 1 packet dissolved in water and take orally once every other day 30 day(s).  Take other medications at least 3 hours before or 3 hours after this medication (Patient not taking: Reported on 9/18/2023) 15 packet 6    predniSONE (DELTASONE) 5 MG tablet Take 1 tablet (5 mg total) by mouth once daily. 30 tablet 11     No current facility-administered medications for this visit.       Past Medical History:   Diagnosis Date    STEVEN (acute kidney injury)  "12/16/2020    Anxiety     Cataract     CKD stage 3 due to type 1 diabetes mellitus 12/16/2020    Diabetes mellitus     Diabetic retinopathy     Disorder of kidney and ureter     Encounter for blood transfusion     Glaucoma     Hepatitis C virus infection cured after antiviral drug therapy     acquired through transplant, treated / cured - svr - 8/2021    High cholesterol     Hypertension     Retinal detachment     Stage 3b chronic kidney disease 11/13/2021     Review of Systems   Constitutional:  Negative for appetite change, chills, fatigue and fever.   HENT:  Negative for trouble swallowing.    Eyes:  Positive for visual disturbance.        BLIND   Respiratory:  Negative for cough, chest tightness and shortness of breath.    Cardiovascular:  Negative for chest pain, palpitations and leg swelling.   Gastrointestinal:  Negative for abdominal pain, constipation, diarrhea and nausea.   Genitourinary:  Positive for hematuria. Negative for difficulty urinating, frequency and urgency.   Musculoskeletal:  Negative for arthralgias and myalgias.   Skin:  Negative for rash.        + itchy   Allergic/Immunologic: Positive for immunocompromised state.   Neurological:  Negative for dizziness, weakness, light-headedness and headaches.   Psychiatric/Behavioral:  Negative for sleep disturbance.        Objective:     Blood pressure (!) 179/88, pulse 92, temperature 97.2 °F (36.2 °C), temperature source Tympanic, resp. rate 18, height 5' 10" (1.778 m), weight 76.4 kg (168 lb 6.9 oz), SpO2 100 %.body mass index is 24.17 kg/m².    Physical Exam  Vitals and nursing note reviewed.   Constitutional:       Appearance: Normal appearance.   HENT:      Head: Normocephalic.   Cardiovascular:      Rate and Rhythm: Normal rate and regular rhythm.      Heart sounds: Normal heart sounds.   Pulmonary:      Effort: Pulmonary effort is normal.      Breath sounds: Normal breath sounds.   Abdominal:      General: Bowel sounds are normal. There is no " distension.      Palpations: Abdomen is soft.      Tenderness: There is no abdominal tenderness.   Musculoskeletal:         General: Normal range of motion.   Skin:     General: Skin is warm and dry.   Neurological:      General: No focal deficit present.      Mental Status: He is alert.   Psychiatric:         Behavior: Behavior normal.         Labs:  Lab Results   Component Value Date    WBC 10.13 09/18/2023    HGB 6.6 (L) 09/18/2023    HCT 21.5 (L) 09/18/2023     09/18/2023    K 4.1 09/18/2023    CL 95 09/18/2023    CO2 33 (H) 09/18/2023    BUN 26 (H) 09/18/2023    CREATININE 6.6 (H) 09/18/2023    EGFRNONAA 32.8 (A) 11/15/2021    EGFRIFAFRICA 12 07/17/2019    GLUCOSE 215 (H) 07/17/2019    CALCIUM 8.8 09/18/2023    PHOS 4.1 09/18/2023    MG 1.7 11/15/2021    ALBUMIN 2.4 (L) 09/18/2023    AST 21 11/01/2021    ALT 24 11/01/2021    UTPCR 0.08 11/01/2021    .0 (H) 01/25/2021    TACROLIMUS 7.6 09/18/2023       Lab Results   Component Value Date    EXTANC 2.64 06/10/2022    EXTWBC 4.4 (A) 06/10/2022    EXTSEGS 60 06/10/2022    EXTPLATELETS 264 06/10/2022    EXTHEMOGLOBI 11.7 (A) 06/10/2022    EXTHEMATOCRI 39 (A) 06/10/2022    EXTCREATININ 1.81 (A) 06/10/2022    EXTSODIUM 143 (A) 06/10/2022    EXTPOTASSIUM 4.8 06/10/2022    EXTBUN 27 (A) 06/10/2022    EXTCO2 27 06/10/2022    EXTCALCIUM 9.9 06/10/2022    EXTPHOSPHORU 4.4 06/10/2022    EXTGLUCOSE 96 06/10/2022    EXTALBUMIN 4.4 06/10/2022    EXTAST 24 11/22/2021    EXTALT 37 11/22/2021    EXTBILITOTAL 0.2 (A) 11/22/2021    EXTLIPASE 39 06/10/2022    EXTAMYLASE 103 06/10/2022       Lab Results   Component Value Date    EXTTACROLVL 4.3 06/10/2022    EXTPROTCRE 0.08 07/30/2021    EXTPROTEINUA negative 07/30/2021    EXTWBCUA none 05/03/2021    EXTRBCUA none 05/03/2021       Labs were reviewed with the patient.    Assessment:     1. Kidney transplanted    2. Status post pancreas transplantation    3. Long-term use of immunosuppressant medication    4. Received  kidney/panc from donor with hepatitis C    5. Essential hypertension    6. At risk for opportunistic infections    7. Hematuria, unspecified type      Plan:   Unfortunately has been on dialysis with no renal recovery since hospitalization last month after noncompliance with immunosuppression. Will defer dialysis management to community nephrologist. Labs this morning with anemia (6.6/21.5) to be sent to dialysis center for management. UA/culture and allograft US today. Encouraged GI follow up if anemia persists. Stressed importance of compliance with IS as pancreas is still functioning. Discussed possible kidney re-transplant in the future; however, he would have to demonstrate extended period of medication compliance for consideration.       1. Immunosuppression: Prograf trough 7.6. Continue Prograf 8/8, Ketoconazole 100 mg QD to augment prograf levels,  MyF 360 mg BID, and Prednisone 5 mg QD. Will continue to monitor for drug toxicities    2. Allograft Function:   Kidney allograft: has been back on dialysis TTS with no signs of renal recovery  Pancreas allograft: stable    Lab Results   Component Value Date    CREATININE 6.6 (H) 09/18/2023      Latest Reference Range & Units 2yr 10mo,   Kidney,Pancreas-Post 3 Year  09/18/23 07:14   eGFR >60 mL/min/1.73 m^2 10.1 !      Latest Reference Range & Units 2yr 10mo,   Kidney,Pancreas-Post 3 Year  09/18/23 07:14   Amylase 20 - 110 U/L 67   Lipase 4 - 60 U/L 15      Latest Reference Range & Units 2yr 10mo,   Kidney,Pancreas-Post 3 Year  09/18/23 07:14   Hemoglobin A1C External 4.0 - 5.6 % 5.1 [1]       3. Hypertension management: advise low salt diet and home BP monitoring      4. Metabolic Bone Disease/Secondary Hyperparathyroidism:  Lab Results   Component Value Date    .0 (H) 01/25/2021    CALCIUM 8.8 09/18/2023    CAION 1.14 11/05/2020    PHOS 4.1 09/18/2023       5. Electrolytes:  Will monitor /guidelines  Lab Results   Component Value Date     09/18/2023     K 4.1 09/18/2023    CL 95 09/18/2023    CO2 33 (H) 09/18/2023       6. Anemia: management per dialysis, encouraged GI follow up in the future   Lab Results   Component Value Date    WBC 10.13 09/18/2023    HGB 6.6 (L) 09/18/2023    HCT 21.5 (L) 09/18/2023    MCV 84 09/18/2023     (H) 09/18/2023         7. Cytopenias: no significant cytopenias. Medicine list reviewed including potential causes of drug-induced cytopenias    8.  Proteinuria: continue p/c ratio as per guidelines     9. BK virus infection screening:  will continue to monitor per guidelines    10. Weight education: provided during the clinic visit   Body mass index is 24.17 kg/m².     11.Patient safety education regarding immunosuppression including prophylaxis posttransplant for CMV, PCP : Education provided about vaccination and prevention of infections                Follow-up:   Clinic: return to transplant clinic weekly for the first month after transplant; every 2 weeks during months 2-3; then at 6-, 9-, 12-, 18-, 24-, and 36- months post-transplant to reassess for complications from immunosuppression toxicity and monitor for rejection.  Annually thereafter.    Labs: since patient remains at high risk for rejection and drug-related complications that warrant close monitoring, labs will be ordered as follows: continue twice weekly CBC, renal panel, and drug level for first month; then same labs once weekly through 3rd month post-transplant.  Urine for UA and protein/creatinine ratio monthly.  Serum BK - PCR at 1-, 3-, 6-, 9-, 12-, 18-, 24-, 36- 48-, and 60 months post-transplant.  Hepatic panel at 1-, 2-, 3-, 6-, 9-, 12-, 18-, 24-, and 36- months post-transplant.    Education:   Material provided to the patient.  Patient reminded to call with any health changes since these can be early signs of significant complications.  Also, I advised the patient to be sure any new medications or changes of old medications are discussed with either a  pharmacist or physician knowledgeable with transplant to avoid rejection/drug toxicity related to significant drug interactions.    Exercise: reminded Hernandez of the importance of regular exercise for weight management, blood sugar and blood pressure management.  I also explained exercise has been shown to improve cardiovascular health, energy level, and sleep hygiene.  Lastly, I advised him that cardiovascular complications are leading cause of death for renal transplant recipients, and regular exercise can help lower this risk.    I spoke with the patient for 30 minutes. More than half dedicated to counseling and education. All questions answered    Lisha Marks, FLYNN-C  Transplant Nephrology

## 2023-09-18 ENCOUNTER — HOSPITAL ENCOUNTER (OUTPATIENT)
Dept: RADIOLOGY | Facility: HOSPITAL | Age: 43
Discharge: HOME | End: 2023-09-18
Attending: INTERNAL MEDICINE
Payer: MEDICARE

## 2023-09-18 ENCOUNTER — OFFICE VISIT (OUTPATIENT)
Dept: TRANSPLANT | Facility: CLINIC | Age: 43
End: 2023-09-18
Payer: MEDICARE

## 2023-09-18 ENCOUNTER — TELEPHONE (OUTPATIENT)
Dept: TRANSPLANT | Facility: CLINIC | Age: 43
End: 2023-09-18

## 2023-09-18 VITALS
OXYGEN SATURATION: 100 % | TEMPERATURE: 97 F | WEIGHT: 168.44 LBS | DIASTOLIC BLOOD PRESSURE: 88 MMHG | HEART RATE: 92 BPM | SYSTOLIC BLOOD PRESSURE: 179 MMHG | BODY MASS INDEX: 24.11 KG/M2 | HEIGHT: 70 IN | RESPIRATION RATE: 18 BRPM

## 2023-09-18 DIAGNOSIS — Z79.60 LONG-TERM USE OF IMMUNOSUPPRESSANT MEDICATION: ICD-10-CM

## 2023-09-18 DIAGNOSIS — Z94.83 PANCREAS REPLACED BY TRANSPLANT: ICD-10-CM

## 2023-09-18 DIAGNOSIS — Z94.0 KIDNEY TRANSPLANTED: Primary | ICD-10-CM

## 2023-09-18 DIAGNOSIS — Z91.89 AT RISK FOR OPPORTUNISTIC INFECTIONS: ICD-10-CM

## 2023-09-18 DIAGNOSIS — R31.9 HEMATURIA, UNSPECIFIED TYPE: ICD-10-CM

## 2023-09-18 DIAGNOSIS — I10 ESSENTIAL HYPERTENSION: ICD-10-CM

## 2023-09-18 DIAGNOSIS — Z94.0 KIDNEY REPLACED BY TRANSPLANT: ICD-10-CM

## 2023-09-18 DIAGNOSIS — T86.19 RECEIVED KIDNEY FROM DONOR WITH HEPATITIS C: ICD-10-CM

## 2023-09-18 DIAGNOSIS — Z94.83 STATUS POST PANCREAS TRANSPLANTATION: ICD-10-CM

## 2023-09-18 PROCEDURE — 76776 US EXAM K TRANSPL W/DOPPLER: CPT | Mod: TC

## 2023-09-18 PROCEDURE — 81001 URINALYSIS AUTO W/SCOPE: CPT | Performed by: NURSE PRACTITIONER

## 2023-09-18 PROCEDURE — 76705 ECHO EXAM OF ABDOMEN: CPT | Mod: 26,59,, | Performed by: RADIOLOGY

## 2023-09-18 PROCEDURE — 76705 ECHO EXAM OF ABDOMEN: CPT | Mod: TC

## 2023-09-18 PROCEDURE — 99999 PR PBB SHADOW E&M-EST. PATIENT-LVL V: CPT | Mod: PBBFAC,,, | Performed by: NURSE PRACTITIONER

## 2023-09-18 PROCEDURE — 99215 OFFICE O/P EST HI 40 MIN: CPT | Mod: S$PBB,,, | Performed by: NURSE PRACTITIONER

## 2023-09-18 PROCEDURE — 76705 US DOPPLER PANCREAS TRANSPLANT POST (XPD): ICD-10-PCS | Mod: 26,59,, | Performed by: RADIOLOGY

## 2023-09-18 PROCEDURE — 99999 PR PBB SHADOW E&M-EST. PATIENT-LVL V: ICD-10-PCS | Mod: PBBFAC,,, | Performed by: NURSE PRACTITIONER

## 2023-09-18 PROCEDURE — 87086 URINE CULTURE/COLONY COUNT: CPT | Performed by: NURSE PRACTITIONER

## 2023-09-18 PROCEDURE — 99215 OFFICE O/P EST HI 40 MIN: CPT | Mod: PBBFAC | Performed by: NURSE PRACTITIONER

## 2023-09-18 PROCEDURE — 99215 PR OFFICE/OUTPT VISIT, EST, LEVL V, 40-54 MIN: ICD-10-PCS | Mod: S$PBB,,, | Performed by: NURSE PRACTITIONER

## 2023-09-18 PROCEDURE — 93976 VASCULAR STUDY: CPT | Mod: 26,,, | Performed by: RADIOLOGY

## 2023-09-18 PROCEDURE — 93976 US DOPPLER PANCREAS TRANSPLANT POST (XPD): ICD-10-PCS | Mod: 26,,, | Performed by: RADIOLOGY

## 2023-09-18 RX ORDER — MYCOPHENOLIC ACID 180 MG/1
360 TABLET, DELAYED RELEASE ORAL 2 TIMES DAILY
Qty: 120 TABLET | Refills: 11 | Status: SHIPPED | OUTPATIENT
Start: 2023-09-18

## 2023-09-18 RX ORDER — PREDNISONE 5 MG/1
5 TABLET ORAL DAILY
Qty: 30 TABLET | Refills: 11 | Status: SHIPPED | OUTPATIENT
Start: 2023-09-18 | End: 2023-09-20 | Stop reason: SDUPTHER

## 2023-09-18 RX ORDER — KETOCONAZOLE 200 MG/1
100 TABLET ORAL DAILY
Qty: 15 TABLET | Refills: 11 | Status: SHIPPED | OUTPATIENT
Start: 2023-09-18 | End: 2024-09-17

## 2023-09-18 NOTE — PATIENT INSTRUCTIONS
High phosphorous foods:  Cola drinks  Dried or baked beans  Nuts and seeds of all kinds  Peanut butter  Split peas  Whole-grain cereals    It is my privilege to participate in your transplant care! Please be sure to let us know if you have any questions or concerns about your health care - we cannot help you if we do not know. Don't forget we are on call 24/7 for any emergencies.      Best Wishes,  Lisha CORRIGANC

## 2023-09-18 NOTE — TELEPHONE ENCOUNTER
Called pt caregiver to get information on HD center in order to send lab results. Pt caregiver does not currently have info, but pt is going to HD tomorrow. Pt with known anemia. Pt caregiver had spoken with HD staff, who had also advised pt to get blood transfusion for low H/H but pt had refused. Informed pt caregiver that both the Transplant MD and the Dialysis MD recommend the pt receive blood transfusion and possible w/u to assess anemia/blood loss. Advised pt to go to local ED and f/u with HD nephrologist regarding anemia. Pt caregiver verbalized understanding and will try to convince pt to go to ED.  ----- Message -----  From: Damien Valladares MD  Sent: 9/18/2023   9:26 AM CDT  To: Mary Khalil RN; #    Is he currently on chronic dialysis If he is please send this report to his dialysis unit to address acute anemia

## 2023-09-18 NOTE — LETTER
September 18, 2023        Ayaan Lockett From  [address]  [place] [address]  Phone: [phone]  Fax: [phone]             [facility] Trans[facility] 1st Fl  [address]  [facility] [address]  Phone: [phone]   Patient: Hernandez Rosales   MR Number: [id number]   YOB: 1980   Date of Visit: 9/18/2023       Dear Dr. Ayaan Lockett From    Thank you for referring Hernandez Rosales to me for evaluation. Attached you will find relevant portions of my assessment and plan of care.    If you have questions, please do not hesitate to call me. I look forward to following Hernandez Rosales along with you.    Sincerely,    Lisha Marks, FLYNN    Enclosure    If you would like to receive this communication electronically, please contact [email or web address] or [phone] to request [employer] access.    [employer] is a tool which provides read-only access to select patient information with whom you have a relationship. Its easy to use and provides real time access to review your patients record including encounter summaries, notes, results, and demographic information.    If you feel you have received this communication in error or would no longer like to receive these types of communications, please e-mail [email or web address]

## 2023-09-19 ENCOUNTER — TELEPHONE (OUTPATIENT)
Dept: TRANSPLANT | Facility: HOSPITAL | Age: 43
End: 2023-09-19
Payer: MEDICARE

## 2023-09-19 ENCOUNTER — TELEPHONE (OUTPATIENT)
Dept: TRANSPLANT | Facility: CLINIC | Age: 43
End: 2023-09-19
Payer: MEDICARE

## 2023-09-19 LAB
BACTERIA #/AREA URNS AUTO: ABNORMAL /HPF
BILIRUB UR QL STRIP: NEGATIVE
CLARITY UR REFRACT.AUTO: ABNORMAL
COLOR UR AUTO: ABNORMAL
GLUCOSE UR QL STRIP: ABNORMAL
HGB UR QL STRIP: ABNORMAL
HYALINE CASTS UR QL AUTO: 0 /LPF
KETONES UR QL STRIP: NEGATIVE
LEUKOCYTE ESTERASE UR QL STRIP: NEGATIVE
MICROSCOPIC COMMENT: ABNORMAL
NITRITE UR QL STRIP: NEGATIVE
PH UR STRIP: 8 [PH] (ref 5–8)
PROT UR QL STRIP: ABNORMAL
RBC #/AREA URNS AUTO: >100 /HPF (ref 0–4)
SP GR UR STRIP: 1.02 (ref 1–1.03)
URN SPEC COLLECT METH UR: ABNORMAL
WBC #/AREA URNS AUTO: 16 /HPF (ref 0–5)

## 2023-09-19 NOTE — TELEPHONE ENCOUNTER
Pt currently in local ED for blood transfusion and hematuria. Local Urology following pt; consulted Transplant MD on call - prednisone taper ordered, pt to f/u in clinic.  ----- Message from Lisha Marks NP sent at 9/19/2023 12:35 PM CDT -----  Advise patient and caregiver that he needs to see local urology for evaluation of hematuria

## 2023-09-19 NOTE — TELEPHONE ENCOUNTER
Received call from RN in ED at [facility] informing us pt has received 1uPRBC but is experiencing hematuria and is asking for recommendations. Gave Transplant MD on call number but also informed RN that pt had just re-established with us and to consult pt's local nephrologist for management. ED RN informed us that Dr. Martinez Orourke of [facility] has been following with patient and she will reach out to him as well.     ----- Message -----  From: Damien Valladares MD  Sent: 9/18/2023   9:26 AM CDT  To: Mary Khalil RN; #    Is he currently on chronic dialysis If he is please send this report to his dialysis unit to address acute anemia

## 2023-09-19 NOTE — TELEPHONE ENCOUNTER
Dr. Beckett at [facility] called about this patient having low hemoglobin in ED. Urology suggested the bleeding was coming from the kidney and recommended consulting with kidney transplant. His hemoglobin has been 6-7 since the end of 8/2023 which is about the duration of his hematuria. Since he has a working pancreas his kidney is now failed and he is on dialysis I opt to treat with oral steroids prior to evaluation for surgical options. The ED will give him the blood and communicated to the patient that he should expect to hear from us.    I have message our coordinator pool to start patient on the following taper and schedule a clinic appt with me in 2-4 weeks.    80mg po daily for 7 days  60mg po daily for 7 days  40mg po daily for 7 days  20mg po daily for 7 days  10mg po daily for 7 days  Resume home pred 5     He should remain on his tac and mmf.    Rodolfo Solomon Jr.

## 2023-09-20 ENCOUNTER — TELEPHONE (OUTPATIENT)
Dept: TRANSPLANT | Facility: CLINIC | Age: 43
End: 2023-09-20
Payer: MEDICARE

## 2023-09-20 DIAGNOSIS — Z94.83 STATUS POST PANCREAS TRANSPLANTATION: ICD-10-CM

## 2023-09-20 DIAGNOSIS — Z79.60 LONG-TERM USE OF IMMUNOSUPPRESSANT MEDICATION: ICD-10-CM

## 2023-09-20 DIAGNOSIS — Z94.0 KIDNEY REPLACED BY TRANSPLANT: Primary | ICD-10-CM

## 2023-09-20 DIAGNOSIS — Z94.0 KIDNEY TRANSPLANTED: ICD-10-CM

## 2023-09-20 DIAGNOSIS — E11.9 DIABETES MELLITUS: ICD-10-CM

## 2023-09-20 DIAGNOSIS — Z94.83 PANCREAS REPLACED BY TRANSPLANT: ICD-10-CM

## 2023-09-20 LAB — BACTERIA UR CULT: NORMAL

## 2023-09-20 RX ORDER — PREDNISONE 5 MG/1
5 TABLET ORAL DAILY
Qty: 30 TABLET | Refills: 11 | Status: SHIPPED | OUTPATIENT
Start: 2023-10-25

## 2023-09-20 RX ORDER — PREDNISONE 10 MG/1
TABLET ORAL
Qty: 147 TABLET | Refills: 0 | Status: SHIPPED | OUTPATIENT
Start: 2023-09-20 | End: 2023-10-25

## 2023-09-20 NOTE — TELEPHONE ENCOUNTER
Called and spoke with pt caregiver Malena. Explained prednisone taper - pt caregiver repeated instructions and verbalized understanding. [facility] Instructed her to continue pt's keto, prograf, and myfortic as prescribed. Pt caregiver to assess pt's hematuria over the weekend and will report assessment Monday. Instructed pt caregiver to repeat pt labs Monday 9/25 to assess CBC. Informed pt caregiver of need for clinic appointment - tentatively scheduled for 10/16 pending transportation approval. Informed pt caregiver pt will also need to go to labs for that day as well. Pt caregiver verbalized understanding and will callback with verification of appointment time.  ----- Message from Damien Valladares MD sent at 9/20/2023  6:26 AM CDT -----  Dr Solomon was called yesterday due to hematuria, patient was started on prednisone taper. Please call the care giver to see how he is doing by the end of the week and schedule a follow up appointment with surgery if they report persistent hematuria. I suspect he will need a transplant nephrectomy. 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

## 2023-09-20 NOTE — TELEPHONE ENCOUNTER
Returned call to pt caregiver - scheduled clinic appt for 10/16 at 1pm, labs to be drawn @ 10AM.   ----- Message from Karyn Ocasio MA sent at 9/20/2023  1:09 PM CDT -----  Regarding: Returning call  Patient's mom is returning call.          Malena Rosales (Relative)   [phone] (Mobile)

## 2023-09-25 ENCOUNTER — DOCUMENTATION ONLY (OUTPATIENT)
Dept: TRANSPLANT | Facility: CLINIC | Age: 43
End: 2023-09-25
Payer: MEDICARE

## 2023-09-25 ENCOUNTER — TELEPHONE (OUTPATIENT)
Dept: TRANSPLANT | Facility: CLINIC | Age: 43
End: 2023-09-25
Payer: MEDICARE

## 2023-09-25 LAB
EXT EOSINOPHIL%: 1 (ref 0–4)
EXT HEMATOCRIT: 23 (ref 40.2–51.4)
EXT HEMOGLOBIN: 7.1 (ref 13.6–17.2)
EXT LYMPH%: 9 (ref 20–48)
EXT MONOCYTES%: 10 (ref 1–9)
EXT PLATELETS: 388 (ref 160–400)
EXT SEGS%: 78 (ref 45–75)
EXT WBC: 11.8 (ref 4.8–10.8)

## 2023-09-25 NOTE — PROGRESS NOTES
HD unit should be addressing anemia. This is the man that lost the kidney due to non compliance. Working pancreas. Should be back on immunosuppression and is currently on a prednisone taper per DR Solomon call with OSH.  Also the plan was a follow up with transplant surgery if sustained hematuria despite prednisone taper.

## 2023-09-25 NOTE — TELEPHONE ENCOUNTER
"Called pt's caregiver and discussed pt's hematuria. Pt caregiver and pt report decrease in bloody UOP. Instructed them to call Transplant if they notice bloody UOP increasing. Reminded them of upcoming appointments on 10/16 - pt caregiver verbalized understanding.   ----- Message from Juan Solis sent at 9/25/2023  1:28 PM CDT -----  Consult/Advisory:          Name Of Caller: Self      Contact Preference?: [phone] (Mobile)       What is the nature of the call?: Returning call to Mary        Additional Notes:  "Thank you for all that you do for our patients"       "

## 2023-09-28 ENCOUNTER — TELEPHONE (OUTPATIENT)
Dept: TRANSPLANT | Facility: CLINIC | Age: 43
End: 2023-09-28
Payer: MEDICARE

## 2023-10-03 ENCOUNTER — TELEPHONE (OUTPATIENT)
Dept: TRANSPLANT | Facility: CLINIC | Age: 43
End: 2023-10-03
Payer: MEDICARE

## 2023-10-03 NOTE — TELEPHONE ENCOUNTER
Contacted patient to review initial intake information. Patient reports the followin. Can you walk up a flight of stairs without getting short of breath or stopping? no  2. Can you walk one block without getting short of breath or having to stop? no  3. Do you use oxygen? no  4. Do you use a cane, walker, or wheel chair to assist in mobility? Blind Stick  5. Have you been hospitalized or had recent surgery in the last 6 months? no   A. Stroke   B. Heart surgery or heart catheterization   C. Broken bone  6. Do you have any cuts, open sores (ulcers), or wounds anywhere on your body? no   7. Do you go to dialysis? Yes What days? T,T,S  8. Preferred appointment day?   9. Caregiver? Mother (Malena Rosales)    Patient is aware the next steps will include completing records and compliance verification. Patient is aware once provider review and insurance authorization is received we will contact patient to schedule initial visit. Patient is aware that initial visit will begin prior to / at 7 am and will conclude at approximately 3 pm on date of appointment. All questions answered at this time.

## 2023-10-13 ENCOUNTER — TELEPHONE (OUTPATIENT)
Dept: TRANSPLANT | Facility: CLINIC | Age: 43
End: 2023-10-13
Payer: MEDICARE

## 2023-10-13 NOTE — TELEPHONE ENCOUNTER
"----- Message from Damien Valladares MD sent at 10/13/2023  4:06 PM CDT -----  Regarding: RE: High Risk Referral  Present the case to selection so as a group we can deny until he and the family shows adherence to medication Psych stability, he is not a candidate at the present time .  ----- Message -----  From: Sarai Hses RN  Sent: 10/13/2023   4:05 PM CDT  To: Damien Valladares MD  Subject: High Risk Referral                               Good afternoon Dr Valladares    I am reaching out to you as Lisha informed me that Mr Rosales is your patient and she has some concerns she sent me a secure chat about him. I received a referral on him for re transplant. However, Lisha has a progress note in Epic from Sept stating " would be eligible for re-transplant in the future; however, he would have to demonstrate extended period of medication compliance for consideration." She stated in her chat that she has concerns of him being re referred so soon as he just recently lost his kidney due to medication non compliance.     How should I proceed with his referral at this time Dr Valladares?    Thanks  Vania      "

## 2023-10-16 PROBLEM — N17.9 AKI (ACUTE KIDNEY INJURY): Status: RESOLVED | Noted: 2020-12-16 | Resolved: 2023-10-16

## 2023-10-16 PROBLEM — E83.42 HYPOMAGNESEMIA: Status: RESOLVED | Noted: 2020-11-11 | Resolved: 2023-10-16

## 2023-10-16 PROBLEM — E87.20 METABOLIC ACIDOSIS: Status: RESOLVED | Noted: 2020-11-12 | Resolved: 2023-10-16

## 2023-10-16 PROBLEM — E83.39 HYPOPHOSPHATEMIA: Status: RESOLVED | Noted: 2020-11-12 | Resolved: 2023-10-16

## 2023-10-16 PROBLEM — N18.6 END STAGE KIDNEY DISEASE: Status: ACTIVE | Noted: 2021-11-13

## 2023-10-20 ENCOUNTER — COMMITTEE REVIEW (OUTPATIENT)
Dept: TRANSPLANT | Facility: CLINIC | Age: 43
End: 2023-10-20
Payer: MEDICARE

## 2023-10-20 NOTE — COMMITTEE REVIEW
Native Organ Dx: Diabetes Mellitus - Type I      Not approved for LRD/CAD transplant due to non compliance - needs 12 months of compliance with all medical recs.  Needs Psych clearance prior to re-referral       Note written by Sarai Scherer RN    ===============================================    I was present at the meeting and attest to the general consensus of the committee.   Rodolfo Solomon Jr.

## 2023-11-13 DIAGNOSIS — Z94.0 KIDNEY TRANSPLANTED: ICD-10-CM

## 2023-11-13 DIAGNOSIS — Z79.60 LONG-TERM USE OF IMMUNOSUPPRESSANT MEDICATION: ICD-10-CM

## 2023-11-13 DIAGNOSIS — Z94.83 STATUS POST PANCREAS TRANSPLANTATION: ICD-10-CM

## 2023-11-13 RX ORDER — PREDNISONE 5 MG/1
5 TABLET ORAL DAILY
Qty: 30 TABLET | Refills: 11 | Status: CANCELLED | OUTPATIENT
Start: 2023-11-13

## 2023-12-28 ENCOUNTER — TELEPHONE (OUTPATIENT)
Dept: TRANSPLANT | Facility: CLINIC | Age: 43
End: 2023-12-28
Payer: MEDICARE

## 2023-12-28 NOTE — TELEPHONE ENCOUNTER
Spoke with both pt sister and pt aunt. They report pt has been going in and out of the hospital the past few months. Per caregiver report, HD unit has been sending pt to ED for low blood count/blood transfusion. Unable to r/s missed lab/clinic appt at this time.  Spoke with pt - currently in ED. Pt reports he no longer makes any urine - no hematuria. Pt reports he has been constipated and unable to provide stool sample for occult blood test. Pt reports local ED has done imaging and has not found source of blood loss. Pt requesting to speak with someone regarding being re-listed for transplant.  ----- Message from Joanna Gonzalez sent at 12/28/2023  3:39 PM CST -----  Regarding: return call  Contact: lanson  Caller:Hernandez        Returning call to: Mary Kruger can be reached @: [phone](home) [phone] (work)

## 2023-12-28 NOTE — TELEPHONE ENCOUNTER
LVM  ----- Message from Ena Montague RN sent at 12/22/2023  1:04 PM CST -----  Regarding: Patient still has a working pancreas and should be followed by hyacinth i believe. 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 For this reason, our committee has the difficult task of evaluating patients to determine which ones have the greatest chance of having a successful transplant. We are aware of the magnitude of this responsibility, and we approach it with reverence and humility.     It is with regret I inform you that you are not approved as a transplant candidate due to  medical non compliance and untreated depression . Based on this review, we have determined that at this time, you are not a candidate for a transplant at [facility]. If you would like to be reconsidered for Kidney Transplant you will need to demonstrate compliance with dialysis and medication for at least one (1) year,with written documentation of meeting treatments goals, Psychiatric Clearance due to untreated depression, along with a new referral.         ----- Message -----  From: Belgica Yepez  Sent: 12/22/2023  10:56 AM CST  To: [facility] Pre-Kidney Transplant Non-Clinical  Subject: Patient advice                                               Name of Caller:  Hernandez     Contact Preference:   [phone]     Nature of Call:  Requesting a call back would like to discuss list status and placement on list

## 2023-12-29 ENCOUNTER — TELEPHONE (OUTPATIENT)
Dept: TRANSPLANT | Facility: CLINIC | Age: 43
End: 2023-12-29
Payer: MEDICARE

## 2023-12-29 NOTE — TELEPHONE ENCOUNTER
Returned phone call, no answer left message. Copy of denial letter being mailed again to patient.    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 the transplanted episoide was under ayde, i changed it  Contact: Pt  [phone]  Rigo collins,   I just got off the phone with the patient and he wants to speak with someone about being relisted for transplant. Please call patient at [phone]  ----- Message -----  From: Ena Montague RN  Sent: 12/22/2023   1:06 PM CST  To: Maryan Forrester MA; Sarai Hess RN; #  Subject: Patient still has a working pancreas and oralia#    October 20, 2023     Hernandez Rosales  [address] [facility] [address]     Dear Hernandez Rosales:  MRN: [id number]     It is the duty of the [facility] Selection [facility] to determine which patients are candidates for a transplant. For this reason, our committee has the difficult task of evaluating patients to determine which ones have the greatest chance of having a successful transplant. We are aware of the magnitude of this responsibility, and we approach it with reverence and humility.     It is with regret I inform you that you are not approved as a transplant candidate due to  medical non compliance and untreated depression . Based on this review, we have determined that at this time, you are not a candidate for a transplant at [facility]. If you would like to be reconsidered for Kidney Transplant you will need to demonstrate compliance with dialysis and medication for at least one (1) year,with written documentation of meeting treatments goals, Psychiatric Clearance due to untreated depression, along with a new referral.         ----- Message -----  From: Belgica Yepez  Sent: 12/22/2023  10:56 AM CST  To: [facility] Pre-Kidney Transplant Non-Clinical  Subject: Patient advice                                                Name of Caller:  Hernandez     Contact Preference:   [phone]     Nature of Call:  Requesting a call back would like to discuss list status and placement on list

## 2024-06-03 ENCOUNTER — TELEPHONE (OUTPATIENT)
Dept: TRANSPLANT | Facility: CLINIC | Age: 44
End: 2024-06-03
Payer: MEDICARE

## (undated) DEVICE — SEE MEDLINE ITEM 152622

## (undated) DEVICE — SEE MEDLINE ITEM 146347

## (undated) DEVICE — CLIP SPRING 12MM

## (undated) DEVICE — STAPLER SKIN PROXIMATE WIDE

## (undated) DEVICE — CUTTER PROXIMATE BLUE 75MM

## (undated) DEVICE — SUT PROLENE 5-0 36IN C-1

## (undated) DEVICE — SUT 3-0 12-18IN SILK

## (undated) DEVICE — LOOP VESSEL BLUE MAXI

## (undated) DEVICE — INSERTS STEALTH FIBRA SIDEBRIT

## (undated) DEVICE — COVER CLAMP FABRIC RADIOPAQUE

## (undated) DEVICE — DRAPE SLUSH WARMER WITH DISC

## (undated) DEVICE — APPLICATOR CHLORAPREP ORN 26ML

## (undated) DEVICE — STOCKINETTE 2INX36

## (undated) DEVICE — SEE MEDLINE ITEM 156911

## (undated) DEVICE — DRESSING AQUACEL SACRAL 9 X 9

## (undated) DEVICE — SEE MEDLINE ITEM 156900

## (undated) DEVICE — COVER LIGHT HANDLE 80/CA

## (undated) DEVICE — SET DECANTER MEDICHOICE

## (undated) DEVICE — SUT PROLENE 6-0 BV-1 30IN

## (undated) DEVICE — SUT ETHILON 3-0 PS2 18 BLK

## (undated) DEVICE — DRESSING ADH ISLAND 3.6 X 14

## (undated) DEVICE — PLUG CATHETER STERILE FOLEY

## (undated) DEVICE — SOL NS 1000CC

## (undated) DEVICE — TRAY FOLEY 16FR INFECTION CONT

## (undated) DEVICE — SUT 2-0 12-18IN SILK

## (undated) DEVICE — SUT 4/0 27IN PDS II VIO MON

## (undated) DEVICE — CLIPPER BLADE MOD 4406 (CAREF)

## (undated) DEVICE — SUT SILK 3-0 SH 18IN BLACK

## (undated) DEVICE — DRAPE INCISE IOBAN 2 23X17IN

## (undated) DEVICE — SYR 30CC LUER LOCK

## (undated) DEVICE — HANDSET ARGON PLUS

## (undated) DEVICE — CLIP SPRING 6MM

## (undated) DEVICE — PAD K-THERMIA 24IN X 60IN

## (undated) DEVICE — HEMOSTAT SURGICEL NU-KNIT 6X9

## (undated) DEVICE — BOOT SUTURE AID

## (undated) DEVICE — SPONGE IV DRAIN 4X4 STERILE

## (undated) DEVICE — SYR ONLY LUER LOCK 20CC

## (undated) DEVICE — TOWEL OR XRAY WHITE 17X26IN

## (undated) DEVICE — PUNCH AORTIC 4.8MM

## (undated) DEVICE — KIT ENDOKIT COMPLIANCE CUSTOM

## (undated) DEVICE — SUT PDS BV 6-0

## (undated) DEVICE — KIT SAHARA DRAPE DRAW/LIFT

## (undated) DEVICE — DRAPE BAG ISOLATION 20 X 20

## (undated) DEVICE — SUT 7/0 24IN PROLENE BL MO

## (undated) DEVICE — NDL BOX COUNTER

## (undated) DEVICE — SEE MEDLINE ITEM 146417

## (undated) DEVICE — DRESSING MEPORE ADH 3.5X12

## (undated) DEVICE — KIT EVACUATOR FLAT DRAIN 100CC

## (undated) DEVICE — WARMER DRAPE STERILE LF

## (undated) DEVICE — SUT SILK 2-0 STRANDS 30IN

## (undated) DEVICE — FOLEY BLLN 20FR 3WAY 5CC

## (undated) DEVICE — ELECTRODE REM PLYHSV RETURN 9

## (undated) DEVICE — DRAPE STERI INSTRUMENT 1018

## (undated) DEVICE — SUT 4-0 12-18IN SILK BLACK

## (undated) DEVICE — SUT SILK 3-0 STRANDS 30IN

## (undated) DEVICE — SEE MEDLINE ITEM 157128

## (undated) DEVICE — HEMOSTAT SURGICEL 4X8IN

## (undated) DEVICE — SUT PROLENE 4-0 SH BLU 36IN

## (undated) DEVICE — SET IRR URLGY 2LINE UNIV SPIKE

## (undated) DEVICE — TAPE UMBILICAL 1/8X36IN WHITE

## (undated) DEVICE — SUT 1 36IN PDS II VIO MONO

## (undated) DEVICE — PACK UNIVERSAL SPLIT II